# Patient Record
Sex: MALE | Race: BLACK OR AFRICAN AMERICAN | Employment: OTHER | URBAN - METROPOLITAN AREA
[De-identification: names, ages, dates, MRNs, and addresses within clinical notes are randomized per-mention and may not be internally consistent; named-entity substitution may affect disease eponyms.]

---

## 2017-03-02 LAB
AVERAGE GLUCOSE: NORMAL
HBA1C MFR BLD: 5.3 %
HBA1C MFR BLD: 5.3 %

## 2017-03-09 PROBLEM — M48.00 SPINAL STENOSIS: Status: ACTIVE | Noted: 2017-03-09

## 2017-07-25 ENCOUNTER — HOSPITAL ENCOUNTER (OUTPATIENT)
Dept: GENERAL RADIOLOGY | Age: 77
Discharge: HOME | End: 2017-07-25
Payer: COMMERCIAL

## 2017-07-25 ENCOUNTER — HOSPITAL ENCOUNTER (OUTPATIENT)
Age: 77
Discharge: HOME | End: 2017-07-25
Payer: COMMERCIAL

## 2017-07-25 DIAGNOSIS — G89.29 CHRONIC PAIN OF LEFT KNEE: ICD-10-CM

## 2017-07-25 DIAGNOSIS — M25.562 CHRONIC PAIN OF LEFT KNEE: ICD-10-CM

## 2017-07-25 DIAGNOSIS — N28.9 RENAL INSUFFICIENCY: ICD-10-CM

## 2017-07-25 DIAGNOSIS — I10 ESSENTIAL HYPERTENSION: ICD-10-CM

## 2017-07-25 LAB
ALBUMIN SERPL-MCNC: 3.8 G/DL (ref 3.5–5.2)
ALBUMIN/GLOBULIN RATIO: ABNORMAL (ref 1–2.5)
ALP BLD-CCNC: 65 U/L (ref 40–129)
ALT SERPL-CCNC: 20 U/L (ref 5–41)
ANION GAP SERPL CALCULATED.3IONS-SCNC: 14 MMOL/L (ref 9–17)
AST SERPL-CCNC: 22 U/L
BILIRUB SERPL-MCNC: 0.35 MG/DL (ref 0.3–1.2)
BUN BLDV-MCNC: 15 MG/DL (ref 8–23)
BUN/CREAT BLD: 12 (ref 9–20)
CALCIUM SERPL-MCNC: 9.1 MG/DL (ref 8.6–10.4)
CHLORIDE BLD-SCNC: 101 MMOL/L (ref 98–107)
CO2: 27 MMOL/L (ref 20–31)
CREAT SERPL-MCNC: 1.27 MG/DL (ref 0.7–1.2)
GFR AFRICAN AMERICAN: >60 ML/MIN
GFR NON-AFRICAN AMERICAN: 55 ML/MIN
GFR SERPL CREATININE-BSD FRML MDRD: ABNORMAL ML/MIN/{1.73_M2}
GFR SERPL CREATININE-BSD FRML MDRD: ABNORMAL ML/MIN/{1.73_M2}
GLUCOSE BLD-MCNC: 101 MG/DL (ref 70–99)
POTASSIUM SERPL-SCNC: 4 MMOL/L (ref 3.7–5.3)
SODIUM BLD-SCNC: 142 MMOL/L (ref 135–144)
TOTAL PROTEIN: 7.5 G/DL (ref 6.4–8.3)

## 2017-07-25 PROCEDURE — 36415 COLL VENOUS BLD VENIPUNCTURE: CPT

## 2017-07-25 PROCEDURE — 80053 COMPREHEN METABOLIC PANEL: CPT

## 2017-07-25 PROCEDURE — 73562 X-RAY EXAM OF KNEE 3: CPT

## 2017-07-27 PROBLEM — M17.12 OSTEOARTHRITIS OF LEFT KNEE: Status: ACTIVE | Noted: 2017-07-27

## 2017-09-13 ENCOUNTER — HOSPITAL ENCOUNTER (OUTPATIENT)
Age: 77
Discharge: HOME | End: 2017-09-13
Payer: COMMERCIAL

## 2017-09-13 DIAGNOSIS — I10 ESSENTIAL HYPERTENSION: ICD-10-CM

## 2017-09-13 DIAGNOSIS — R73.09 ELEVATED GLUCOSE: ICD-10-CM

## 2017-09-13 DIAGNOSIS — E88.81 METABOLIC SYNDROME: ICD-10-CM

## 2017-09-13 DIAGNOSIS — R73.03 PREDIABETES: ICD-10-CM

## 2017-09-13 LAB
ALBUMIN SERPL-MCNC: 4 G/DL (ref 3.5–5.2)
ALBUMIN/GLOBULIN RATIO: ABNORMAL (ref 1–2.5)
ALP BLD-CCNC: 62 U/L (ref 40–129)
ALT SERPL-CCNC: 14 U/L (ref 5–41)
ANION GAP SERPL CALCULATED.3IONS-SCNC: 14 MMOL/L (ref 9–17)
AST SERPL-CCNC: 16 U/L
BILIRUB SERPL-MCNC: 0.36 MG/DL (ref 0.3–1.2)
BUN BLDV-MCNC: 16 MG/DL (ref 8–23)
BUN/CREAT BLD: 13 (ref 9–20)
CALCIUM SERPL-MCNC: 9.2 MG/DL (ref 8.6–10.4)
CHLORIDE BLD-SCNC: 101 MMOL/L (ref 98–107)
CHOLESTEROL/HDL RATIO: 3.2
CHOLESTEROL: 179 MG/DL
CO2: 27 MMOL/L (ref 20–31)
CREAT SERPL-MCNC: 1.26 MG/DL (ref 0.7–1.2)
ESTIMATED AVERAGE GLUCOSE: 114 MG/DL
GFR AFRICAN AMERICAN: >60 ML/MIN
GFR NON-AFRICAN AMERICAN: 56 ML/MIN
GFR SERPL CREATININE-BSD FRML MDRD: ABNORMAL ML/MIN/{1.73_M2}
GFR SERPL CREATININE-BSD FRML MDRD: ABNORMAL ML/MIN/{1.73_M2}
GLUCOSE BLD-MCNC: 104 MG/DL (ref 70–99)
HBA1C MFR BLD: 5.6 % (ref 4–6)
HDLC SERPL-MCNC: 56 MG/DL
LDL CHOLESTEROL: 111 MG/DL (ref 0–130)
POTASSIUM SERPL-SCNC: 4 MMOL/L (ref 3.7–5.3)
SODIUM BLD-SCNC: 142 MMOL/L (ref 135–144)
TOTAL PROTEIN: 7.3 G/DL (ref 6.4–8.3)
TRIGL SERPL-MCNC: 59 MG/DL
VLDLC SERPL CALC-MCNC: NORMAL MG/DL (ref 1–30)

## 2017-09-13 PROCEDURE — 80061 LIPID PANEL: CPT

## 2017-09-13 PROCEDURE — 83036 HEMOGLOBIN GLYCOSYLATED A1C: CPT

## 2017-09-13 PROCEDURE — 36415 COLL VENOUS BLD VENIPUNCTURE: CPT

## 2017-09-13 PROCEDURE — 80053 COMPREHEN METABOLIC PANEL: CPT

## 2017-11-13 ENCOUNTER — HOSPITAL ENCOUNTER (OUTPATIENT)
Age: 77
Discharge: HOME | End: 2017-11-13
Payer: COMMERCIAL

## 2017-11-13 DIAGNOSIS — N28.9 RENAL INSUFFICIENCY: ICD-10-CM

## 2017-11-13 LAB
ALBUMIN SERPL-MCNC: 3.7 G/DL (ref 3.5–5.2)
ALBUMIN/GLOBULIN RATIO: ABNORMAL (ref 1–2.5)
ALP BLD-CCNC: 62 U/L (ref 40–129)
ALT SERPL-CCNC: 28 U/L (ref 5–41)
ANION GAP SERPL CALCULATED.3IONS-SCNC: 11 MMOL/L (ref 9–17)
AST SERPL-CCNC: 21 U/L
BILIRUB SERPL-MCNC: 0.46 MG/DL (ref 0.3–1.2)
BUN BLDV-MCNC: 16 MG/DL (ref 8–23)
BUN/CREAT BLD: 15 (ref 9–20)
CALCIUM SERPL-MCNC: 8.7 MG/DL (ref 8.6–10.4)
CHLORIDE BLD-SCNC: 98 MMOL/L (ref 98–107)
CO2: 28 MMOL/L (ref 20–31)
CREAT SERPL-MCNC: 1.05 MG/DL (ref 0.7–1.2)
GFR AFRICAN AMERICAN: >60 ML/MIN
GFR NON-AFRICAN AMERICAN: >60 ML/MIN
GFR SERPL CREATININE-BSD FRML MDRD: ABNORMAL ML/MIN/{1.73_M2}
GFR SERPL CREATININE-BSD FRML MDRD: ABNORMAL ML/MIN/{1.73_M2}
GLUCOSE FASTING: 106 MG/DL (ref 70–99)
POTASSIUM SERPL-SCNC: 4.1 MMOL/L (ref 3.7–5.3)
SODIUM BLD-SCNC: 137 MMOL/L (ref 135–144)
TOTAL PROTEIN: 7 G/DL (ref 6.4–8.3)

## 2017-11-13 PROCEDURE — 36415 COLL VENOUS BLD VENIPUNCTURE: CPT

## 2017-11-13 PROCEDURE — 80053 COMPREHEN METABOLIC PANEL: CPT

## 2017-11-20 ENCOUNTER — HOSPITAL ENCOUNTER (OUTPATIENT)
Age: 77
Discharge: HOME | End: 2017-11-20
Payer: COMMERCIAL

## 2017-11-20 LAB
SEX HORMONE BINDING GLOBULIN: 43 NMOL/L (ref 11–80)
TESTOSTERONE FREE-NONMALE: 57.9 PG/ML (ref 47–244)
TESTOSTERONE TOTAL: 343 NG/DL (ref 220–1000)
TESTOSTERONE, BIOAVAILABLE: 135.7 NG/DL (ref 130–680)

## 2017-11-20 PROCEDURE — 36415 COLL VENOUS BLD VENIPUNCTURE: CPT

## 2017-11-20 PROCEDURE — 84403 ASSAY OF TOTAL TESTOSTERONE: CPT

## 2017-11-20 PROCEDURE — 84270 ASSAY OF SEX HORMONE GLOBUL: CPT

## 2018-04-24 PROBLEM — Z79.899 ON POTASSIUM WASTING DIURETIC THERAPY: Status: ACTIVE | Noted: 2018-04-24

## 2018-04-24 PROBLEM — R60.9 EDEMA: Status: ACTIVE | Noted: 2018-04-24

## 2018-05-04 ENCOUNTER — HOSPITAL ENCOUNTER (OUTPATIENT)
Age: 78
Discharge: HOME | End: 2018-05-04
Payer: MEDICARE

## 2018-05-04 DIAGNOSIS — Z79.899 ON POTASSIUM WASTING DIURETIC THERAPY: ICD-10-CM

## 2018-05-04 LAB
ALBUMIN SERPL-MCNC: 3.8 G/DL (ref 3.5–5.2)
ALBUMIN/GLOBULIN RATIO: NORMAL (ref 1–2.5)
ALP BLD-CCNC: 69 U/L (ref 40–129)
ALT SERPL-CCNC: 21 U/L (ref 5–41)
ANION GAP SERPL CALCULATED.3IONS-SCNC: 13 MMOL/L (ref 9–17)
AST SERPL-CCNC: 21 U/L
BILIRUB SERPL-MCNC: 0.4 MG/DL (ref 0.3–1.2)
BUN BLDV-MCNC: 18 MG/DL (ref 8–23)
BUN/CREAT BLD: 16 (ref 9–20)
CALCIUM SERPL-MCNC: 9.1 MG/DL (ref 8.6–10.4)
CHLORIDE BLD-SCNC: 102 MMOL/L (ref 98–107)
CO2: 28 MMOL/L (ref 20–31)
CREAT SERPL-MCNC: 1.15 MG/DL (ref 0.7–1.2)
GFR AFRICAN AMERICAN: >60 ML/MIN
GFR NON-AFRICAN AMERICAN: >60 ML/MIN
GFR SERPL CREATININE-BSD FRML MDRD: NORMAL ML/MIN/{1.73_M2}
GFR SERPL CREATININE-BSD FRML MDRD: NORMAL ML/MIN/{1.73_M2}
GLUCOSE BLD-MCNC: 99 MG/DL (ref 70–99)
POTASSIUM SERPL-SCNC: 4.3 MMOL/L (ref 3.7–5.3)
SODIUM BLD-SCNC: 143 MMOL/L (ref 135–144)
TOTAL PROTEIN: 7.8 G/DL (ref 6.4–8.3)

## 2018-05-04 PROCEDURE — 80053 COMPREHEN METABOLIC PANEL: CPT

## 2018-05-04 PROCEDURE — 36415 COLL VENOUS BLD VENIPUNCTURE: CPT

## 2018-05-24 PROBLEM — M25.561 CHRONIC PAIN OF BOTH KNEES: Status: ACTIVE | Noted: 2018-05-24

## 2018-05-24 PROBLEM — G89.29 CHRONIC PAIN OF BOTH KNEES: Status: ACTIVE | Noted: 2018-05-24

## 2018-05-24 PROBLEM — Z71.2 ENCOUNTER TO DISCUSS TEST RESULTS: Status: ACTIVE | Noted: 2018-05-24

## 2018-05-24 PROBLEM — M25.562 CHRONIC PAIN OF BOTH KNEES: Status: ACTIVE | Noted: 2018-05-24

## 2018-06-21 PROBLEM — R14.0 BLOATING: Status: ACTIVE | Noted: 2018-06-21

## 2018-06-22 ENCOUNTER — HOSPITAL ENCOUNTER (OUTPATIENT)
Age: 78
Discharge: HOME | End: 2018-06-22
Payer: MEDICARE

## 2018-06-22 DIAGNOSIS — R14.0 BLOATING: ICD-10-CM

## 2018-06-22 PROCEDURE — 87338 HPYLORI STOOL AG IA: CPT

## 2018-06-23 PROBLEM — Z71.2 ENCOUNTER TO DISCUSS TEST RESULTS: Status: RESOLVED | Noted: 2018-05-24 | Resolved: 2018-06-23

## 2018-06-23 LAB
DIRECT EXAM: NEGATIVE
Lab: NORMAL
SPECIMEN DESCRIPTION: NORMAL
STATUS: NORMAL

## 2018-07-09 ENCOUNTER — HOSPITAL ENCOUNTER (OUTPATIENT)
Dept: GENERAL RADIOLOGY | Age: 78
Discharge: HOME | End: 2018-07-11
Payer: MEDICARE

## 2018-07-09 ENCOUNTER — HOSPITAL ENCOUNTER (OUTPATIENT)
Age: 78
Discharge: HOME | End: 2018-07-11
Payer: MEDICARE

## 2018-07-09 DIAGNOSIS — M43.16 SPONDYLOLISTHESIS OF LUMBAR REGION: ICD-10-CM

## 2018-07-09 PROCEDURE — 72100 X-RAY EXAM L-S SPINE 2/3 VWS: CPT

## 2018-09-17 PROBLEM — M17.11 ARTHRITIS OF RIGHT KNEE: Status: ACTIVE | Noted: 2018-09-17

## 2018-09-17 PROBLEM — G47.30 SLEEP APNEA: Status: ACTIVE | Noted: 2018-09-17

## 2018-10-02 ENCOUNTER — HOSPITAL ENCOUNTER (OUTPATIENT)
Age: 78
Discharge: HOME | End: 2018-10-02
Payer: MEDICARE

## 2018-10-02 DIAGNOSIS — E88.81 METABOLIC SYNDROME: ICD-10-CM

## 2018-10-02 LAB
ESTIMATED AVERAGE GLUCOSE: 114 MG/DL
HBA1C MFR BLD: 5.6 % (ref 4–6)

## 2018-10-02 PROCEDURE — 83036 HEMOGLOBIN GLYCOSYLATED A1C: CPT

## 2018-10-02 PROCEDURE — 36415 COLL VENOUS BLD VENIPUNCTURE: CPT

## 2018-10-15 PROBLEM — M54.16 LUMBAR RADICULOPATHY: Status: ACTIVE | Noted: 2018-10-15

## 2018-10-15 PROBLEM — E66.01 MORBID OBESITY WITH BMI OF 45.0-49.9, ADULT (HCC): Status: ACTIVE | Noted: 2018-10-15

## 2018-11-07 ENCOUNTER — OFFICE VISIT (OUTPATIENT)
Dept: PULMONOLOGY | Age: 78
End: 2018-11-07
Payer: MEDICARE

## 2018-11-07 VITALS
HEART RATE: 79 BPM | HEIGHT: 69 IN | OXYGEN SATURATION: 97 % | RESPIRATION RATE: 20 BRPM | BODY MASS INDEX: 41.32 KG/M2 | DIASTOLIC BLOOD PRESSURE: 88 MMHG | SYSTOLIC BLOOD PRESSURE: 158 MMHG | WEIGHT: 279 LBS

## 2018-11-07 DIAGNOSIS — E66.01 MORBID OBESITY (HCC): ICD-10-CM

## 2018-11-07 DIAGNOSIS — G47.33 OBSTRUCTIVE SLEEP APNEA SYNDROME: Primary | ICD-10-CM

## 2018-11-07 PROCEDURE — 99204 OFFICE O/P NEW MOD 45 MIN: CPT | Performed by: INTERNAL MEDICINE

## 2018-11-07 NOTE — PROGRESS NOTES
PULMONARY OUTPATIENT CONSULT NOTE     Patient:  Mary Kate Nichols  MRN: [id number]  [facility] Physician: Danny Fagan MD    HISTORY     CHIEF COMPLAINT/REASON FOR CONSULT:  Obstructive Sleep Apnea    HISTORY OF PRESENT ILLNESS:  Mary Kate Nichols is a 78 y.o. male who is being evaluated in the clinic today for sleep apnea. Patient recently had a home sleep study and was noted to have severe obstructive sleep apnea AHI 68.5. Patient is morbidly obese, his major symptoms include:     Snoring Yes    Witnessed apnea  No    Wakes up with choking and gasping sensation No    Dry mouth upon awakening No    Fatigue and tiredness during the day Yes    Morning headaches No    Car wrecks or near wrecks because of the sleepiness No    Nodding off while driving No    Weight gain Yes    Forgetfulness or decreased concentration No    Nasal congestion or obstruction at night No    Leg jerks during sleep No        Parasomnias: No    Restless feelings, numbness/burning, aches/cramps in legs at night    Loss of muscle strength when angry or laugh    Hallucination when dozing off or waking up from sleep    Paralysis upon awakening from sleep or going to sleep    Teeth grinding    Nightmares     Sleep walking    Night time panic attacks      Sleep Habits:   Weekdays Weekends   Bedtime at  10-11 PM   10-11 PM    Wakes up at 7-8 AM  7-8 AM      It takes him about 10-15 minutes to fall asleep and wakes up 1-2 times at night to go to bathroom  Usually takes no naps during the day   Usually consumes 1-2 caffienated drinks during the day. Shift work ? No   Patient denies excessive daytime sleepiness and the total score on [place] Sleepiness Scale (ESS) is 5 today.   Collar size: unknown    PAST MEDICAL HISTORY:       Diagnosis Date    Abnormal renal function     Anemia     Depression     Dysmetabolic syndrome     Edema     Elevated glucose 2/7/2016    Health care maintenance 4/14/2016    Hypertension     Leukopenia    

## 2018-12-04 ENCOUNTER — TELEPHONE (OUTPATIENT)
Dept: PULMONOLOGY | Age: 78
End: 2018-12-04

## 2018-12-05 NOTE — TELEPHONE ENCOUNTER
Ramila Fields called back, states they made a mistake and told the patient to cancel the sleep study.  I gave her the number to the sleep center to schedule and instructed the patient to call us when it is scheduled, and make a follow up

## 2018-12-27 ENCOUNTER — HOSPITAL ENCOUNTER (OUTPATIENT)
Dept: SLEEP CENTER | Age: 78
Discharge: HOME | End: 2018-12-29
Payer: MEDICARE

## 2018-12-27 DIAGNOSIS — G47.33 OBSTRUCTIVE SLEEP APNEA SYNDROME: ICD-10-CM

## 2018-12-27 PROBLEM — Z13.31 POSITIVE DEPRESSION SCREENING: Status: ACTIVE | Noted: 2018-12-27

## 2018-12-27 PROBLEM — F32.A DEPRESSION: Status: ACTIVE | Noted: 2018-12-27

## 2018-12-27 PROCEDURE — 95811 POLYSOM 6/>YRS CPAP 4/> PARM: CPT

## 2018-12-28 VITALS — HEART RATE: 90 BPM | HEIGHT: 69 IN | WEIGHT: 279 LBS | BODY MASS INDEX: 41.32 KG/M2 | RESPIRATION RATE: 12 BRPM

## 2018-12-30 PROBLEM — M17.11 LOCALIZED OSTEOARTHRITIS OF RIGHT KNEE: Status: ACTIVE | Noted: 2018-12-30

## 2019-02-14 LAB — STATUS: NORMAL

## 2019-05-01 PROBLEM — M17.12 OSTEOARTHRITIS OF LEFT KNEE: Status: ACTIVE | Noted: 2019-05-01

## 2019-05-01 PROBLEM — M17.11 ARTHRITIS OF RIGHT KNEE: Status: RESOLVED | Noted: 2018-09-17 | Resolved: 2019-05-01

## 2019-06-10 PROBLEM — R73.03 PREDIABETES: Status: ACTIVE | Noted: 2019-06-10

## 2019-06-10 PROBLEM — Z23 NEED FOR PNEUMOCOCCAL VACCINATION: Status: ACTIVE | Noted: 2019-06-10

## 2019-06-11 PROBLEM — Z13.6 SCREENING FOR CARDIOVASCULAR CONDITION: Status: ACTIVE | Noted: 2019-06-11

## 2019-07-01 ENCOUNTER — HOSPITAL ENCOUNTER (OUTPATIENT)
Age: 79
Discharge: HOME | End: 2019-07-01
Payer: MEDICARE

## 2019-07-01 DIAGNOSIS — E88.81 METABOLIC SYNDROME: ICD-10-CM

## 2019-07-01 DIAGNOSIS — R73.03 PREDIABETES: ICD-10-CM

## 2019-07-01 DIAGNOSIS — I10 HYPERTENSION, ESSENTIAL: ICD-10-CM

## 2019-07-01 DIAGNOSIS — Z23 NEED FOR PNEUMOCOCCAL VACCINATION: ICD-10-CM

## 2019-07-01 LAB
ALBUMIN SERPL-MCNC: 3.9 G/DL (ref 3.5–5.2)
ALBUMIN/GLOBULIN RATIO: 1.1 (ref 1–2.5)
ALP BLD-CCNC: 59 U/L (ref 40–129)
ALT SERPL-CCNC: 13 U/L (ref 5–41)
ANION GAP SERPL CALCULATED.3IONS-SCNC: 11 MMOL/L (ref 9–17)
AST SERPL-CCNC: 15 U/L
BILIRUB SERPL-MCNC: 0.33 MG/DL (ref 0.3–1.2)
BUN BLDV-MCNC: 21 MG/DL (ref 8–23)
BUN/CREAT BLD: NORMAL (ref 9–20)
CALCIUM SERPL-MCNC: 8.9 MG/DL (ref 8.6–10.4)
CHLORIDE BLD-SCNC: 106 MMOL/L (ref 98–107)
CHOLESTEROL/HDL RATIO: 3.1
CHOLESTEROL: 182 MG/DL
CO2: 26 MMOL/L (ref 20–31)
CREAT SERPL-MCNC: 1.13 MG/DL (ref 0.7–1.2)
ESTIMATED AVERAGE GLUCOSE: 100 MG/DL
GFR AFRICAN AMERICAN: >60 ML/MIN
GFR NON-AFRICAN AMERICAN: >60 ML/MIN
GFR SERPL CREATININE-BSD FRML MDRD: NORMAL ML/MIN/{1.73_M2}
GFR SERPL CREATININE-BSD FRML MDRD: NORMAL ML/MIN/{1.73_M2}
GLUCOSE BLD-MCNC: 95 MG/DL (ref 70–99)
HBA1C MFR BLD: 5.1 % (ref 4–6)
HDLC SERPL-MCNC: 59 MG/DL
LDL CHOLESTEROL: 114 MG/DL (ref 0–130)
POTASSIUM SERPL-SCNC: 4 MMOL/L (ref 3.7–5.3)
SODIUM BLD-SCNC: 143 MMOL/L (ref 135–144)
TOTAL PROTEIN: 7.4 G/DL (ref 6.4–8.3)
TRIGL SERPL-MCNC: 47 MG/DL
VLDLC SERPL CALC-MCNC: NORMAL MG/DL (ref 1–30)

## 2019-07-01 PROCEDURE — 80061 LIPID PANEL: CPT

## 2019-07-01 PROCEDURE — 83036 HEMOGLOBIN GLYCOSYLATED A1C: CPT

## 2019-07-01 PROCEDURE — 36415 COLL VENOUS BLD VENIPUNCTURE: CPT

## 2019-07-01 PROCEDURE — 80053 COMPREHEN METABOLIC PANEL: CPT

## 2019-07-11 PROBLEM — Z13.6 SCREENING FOR CARDIOVASCULAR CONDITION: Status: RESOLVED | Noted: 2019-06-11 | Resolved: 2019-07-11

## 2019-08-07 PROBLEM — M65.30 ACQUIRED TRIGGER FINGER: Status: ACTIVE | Noted: 2019-08-07

## 2019-08-07 PROBLEM — M54.50 BILATERAL LOW BACK PAIN: Status: ACTIVE | Noted: 2019-08-07

## 2019-08-07 PROBLEM — E55.9 VITAMIN D DEFICIENCY: Status: ACTIVE | Noted: 2019-08-07

## 2019-08-07 PROBLEM — M25.551 RIGHT HIP PAIN: Status: ACTIVE | Noted: 2019-08-07

## 2019-08-08 ENCOUNTER — HOSPITAL ENCOUNTER (OUTPATIENT)
Age: 79
Discharge: HOME | End: 2019-08-10
Payer: MEDICARE

## 2019-08-08 ENCOUNTER — HOSPITAL ENCOUNTER (OUTPATIENT)
Dept: GENERAL RADIOLOGY | Age: 79
Discharge: HOME | End: 2019-08-10
Payer: MEDICARE

## 2019-08-08 DIAGNOSIS — M25.551 RIGHT HIP PAIN: ICD-10-CM

## 2019-08-08 DIAGNOSIS — M54.5 BILATERAL LOW BACK PAIN, UNSPECIFIED CHRONICITY, WITH SCIATICA PRESENCE UNSPECIFIED: ICD-10-CM

## 2019-08-08 PROCEDURE — 72100 X-RAY EXAM L-S SPINE 2/3 VWS: CPT

## 2019-08-08 PROCEDURE — 73502 X-RAY EXAM HIP UNI 2-3 VIEWS: CPT

## 2019-09-06 PROBLEM — M54.50 BILATERAL LOW BACK PAIN: Status: ACTIVE | Noted: 2019-09-06

## 2019-09-06 PROBLEM — M17.9 OSTEOARTHRITIS OF KNEE: Status: ACTIVE | Noted: 2019-09-06

## 2019-09-06 PROBLEM — M16.11 PRIMARY OSTEOARTHRITIS OF RIGHT HIP: Status: ACTIVE | Noted: 2019-09-06

## 2019-10-16 ENCOUNTER — HOSPITAL ENCOUNTER (OUTPATIENT)
Age: 79
Discharge: HOME | End: 2019-10-16
Payer: MEDICARE

## 2019-10-16 DIAGNOSIS — E53.8 VITAMIN B 12 DEFICIENCY: ICD-10-CM

## 2019-10-16 LAB — VITAMIN B-12: 732 PG/ML (ref 232–1245)

## 2019-10-16 PROCEDURE — 82607 VITAMIN B-12: CPT

## 2019-10-16 PROCEDURE — 36415 COLL VENOUS BLD VENIPUNCTURE: CPT

## 2019-10-31 ENCOUNTER — HOSPITAL ENCOUNTER (OUTPATIENT)
Dept: MRI IMAGING | Age: 79
Discharge: HOME | End: 2019-11-02
Payer: MEDICARE

## 2019-10-31 DIAGNOSIS — M54.16 LUMBAR RADICULOPATHY: ICD-10-CM

## 2019-10-31 DIAGNOSIS — M48.00 SPINAL STENOSIS, UNSPECIFIED SPINAL REGION: ICD-10-CM

## 2019-10-31 DIAGNOSIS — M43.16 SPONDYLOLISTHESIS OF LUMBAR REGION: ICD-10-CM

## 2019-10-31 PROCEDURE — 72148 MRI LUMBAR SPINE W/O DYE: CPT

## 2019-12-19 PROBLEM — K59.09 OTHER CONSTIPATION: Status: ACTIVE | Noted: 2019-12-19

## 2019-12-19 PROBLEM — K92.1 BLACK STOOL: Status: ACTIVE | Noted: 2019-12-19

## 2019-12-20 ENCOUNTER — HOSPITAL ENCOUNTER (OUTPATIENT)
Age: 79
Discharge: HOME | End: 2019-12-22
Payer: MEDICARE

## 2019-12-20 ENCOUNTER — HOSPITAL ENCOUNTER (OUTPATIENT)
Dept: GENERAL RADIOLOGY | Age: 79
Discharge: HOME | End: 2019-12-22
Payer: MEDICARE

## 2019-12-20 ENCOUNTER — HOSPITAL ENCOUNTER (OUTPATIENT)
Age: 79
Discharge: HOME | End: 2019-12-20
Payer: MEDICARE

## 2019-12-20 ENCOUNTER — HOSPITAL ENCOUNTER (OUTPATIENT)
Age: 79
End: 2019-12-20
Payer: MEDICARE

## 2019-12-20 DIAGNOSIS — K92.1 BLACK STOOL: ICD-10-CM

## 2019-12-20 DIAGNOSIS — K59.09 OTHER CONSTIPATION: ICD-10-CM

## 2019-12-20 LAB
ABSOLUTE EOS #: 0.21 K/UL (ref 0–0.44)
ABSOLUTE IMMATURE GRANULOCYTE: <0.03 K/UL (ref 0–0.3)
ABSOLUTE LYMPH #: 1.47 K/UL (ref 1.1–3.7)
ABSOLUTE MONO #: 0.35 K/UL (ref 0.1–1.2)
BASOPHILS # BLD: 1 % (ref 0–2)
BASOPHILS ABSOLUTE: 0.04 K/UL (ref 0–0.2)
DIFFERENTIAL TYPE: ABNORMAL
EOSINOPHILS RELATIVE PERCENT: 4 % (ref 1–4)
HCT VFR BLD CALC: 41.7 % (ref 40.7–50.3)
HEMOGLOBIN: 12.9 G/DL (ref 13–17)
IMMATURE GRANULOCYTES: 0 %
LYMPHOCYTES # BLD: 26 % (ref 24–43)
MCH RBC QN AUTO: 29.4 PG (ref 25.2–33.5)
MCHC RBC AUTO-ENTMCNC: 30.9 G/DL (ref 28.4–34.8)
MCV RBC AUTO: 95 FL (ref 82.6–102.9)
MONOCYTES # BLD: 6 % (ref 3–12)
NRBC AUTOMATED: 0 PER 100 WBC
PDW BLD-RTO: 11.7 % (ref 11.8–14.4)
PLATELET # BLD: 191 K/UL (ref 138–453)
PLATELET ESTIMATE: ABNORMAL
PMV BLD AUTO: 11.3 FL (ref 8.1–13.5)
RBC # BLD: 4.39 M/UL (ref 4.21–5.77)
RBC # BLD: ABNORMAL 10*6/UL
SEG NEUTROPHILS: 63 % (ref 36–65)
SEGMENTED NEUTROPHILS ABSOLUTE COUNT: 3.49 K/UL (ref 1.5–8.1)
WBC # BLD: 5.6 K/UL (ref 3.5–11.3)
WBC # BLD: ABNORMAL 10*3/UL

## 2019-12-20 PROCEDURE — 85025 COMPLETE CBC W/AUTO DIFF WBC: CPT

## 2019-12-20 PROCEDURE — 36415 COLL VENOUS BLD VENIPUNCTURE: CPT

## 2019-12-20 PROCEDURE — 74018 RADEX ABDOMEN 1 VIEW: CPT

## 2019-12-23 ENCOUNTER — HOSPITAL ENCOUNTER (OUTPATIENT)
Age: 79
Setting detail: SPECIMEN
Discharge: HOME | End: 2019-12-23
Payer: MEDICARE

## 2019-12-23 DIAGNOSIS — K59.09 OTHER CONSTIPATION: ICD-10-CM

## 2019-12-23 DIAGNOSIS — K92.1 BLACK STOOL: ICD-10-CM

## 2019-12-23 LAB
DATE, STOOL #1: 12
DATE, STOOL #2: NORMAL
DATE, STOOL #3: NORMAL
HEMOCCULT SP1 STL QL: NEGATIVE
HEMOCCULT SP2 STL QL: NORMAL
HEMOCCULT SP3 STL QL: NORMAL
TIME, STOOL #1: 700
TIME, STOOL #2: NORMAL
TIME, STOOL #3: NORMAL

## 2019-12-23 PROCEDURE — G0328 FECAL BLOOD SCRN IMMUNOASSAY: HCPCS

## 2020-01-13 PROBLEM — B07.9 VIRAL WARTS: Status: ACTIVE | Noted: 2020-01-13

## 2020-02-25 ENCOUNTER — OFFICE VISIT (OUTPATIENT)
Dept: ORTHOPEDIC SURGERY | Age: 80
End: 2020-02-25
Payer: MEDICARE

## 2020-02-25 VITALS — HEIGHT: 69 IN | WEIGHT: 302.69 LBS | BODY MASS INDEX: 44.83 KG/M2

## 2020-02-25 PROCEDURE — 99203 OFFICE O/P NEW LOW 30 MIN: CPT | Performed by: ORTHOPAEDIC SURGERY

## 2020-05-20 ENCOUNTER — TELEPHONE (OUTPATIENT)
Dept: ORTHOPEDIC SURGERY | Age: 80
End: 2020-05-20

## 2020-05-21 PROBLEM — Z20.828 VIRAL DISEASE EXPOSURE: Status: ACTIVE | Noted: 2020-05-21

## 2020-07-31 PROBLEM — Z13.29 SCREENING FOR THYROID DISORDER: Status: ACTIVE | Noted: 2019-06-11

## 2020-07-31 PROBLEM — Z00.00 MEDICARE ANNUAL WELLNESS VISIT, SUBSEQUENT: Status: ACTIVE | Noted: 2019-06-11

## 2020-08-03 ENCOUNTER — HOSPITAL ENCOUNTER (OUTPATIENT)
Age: 80
Discharge: HOME | End: 2020-08-03
Payer: MEDICARE

## 2020-08-03 LAB
ABSOLUTE EOS #: 0.13 K/UL (ref 0–0.44)
ABSOLUTE IMMATURE GRANULOCYTE: <0.03 K/UL (ref 0–0.3)
ABSOLUTE LYMPH #: 0.99 K/UL (ref 1.1–3.7)
ABSOLUTE MONO #: 0.43 K/UL (ref 0.1–1.2)
ALBUMIN SERPL-MCNC: 3.8 G/DL (ref 3.5–5.2)
ALBUMIN/GLOBULIN RATIO: 1 (ref 1–2.5)
ALP BLD-CCNC: 70 U/L (ref 40–129)
ALT SERPL-CCNC: 15 U/L (ref 5–41)
ANION GAP SERPL CALCULATED.3IONS-SCNC: 17 MMOL/L (ref 9–17)
AST SERPL-CCNC: 24 U/L
BASOPHILS # BLD: 1 % (ref 0–2)
BASOPHILS ABSOLUTE: 0.03 K/UL (ref 0–0.2)
BILIRUB SERPL-MCNC: 0.43 MG/DL (ref 0.3–1.2)
BUN BLDV-MCNC: 19 MG/DL (ref 8–23)
BUN/CREAT BLD: ABNORMAL (ref 9–20)
CALCIUM SERPL-MCNC: 9.4 MG/DL (ref 8.6–10.4)
CHLORIDE BLD-SCNC: 101 MMOL/L (ref 98–107)
CHOLESTEROL/HDL RATIO: 3.5
CHOLESTEROL: 173 MG/DL
CO2: 23 MMOL/L (ref 20–31)
CREAT SERPL-MCNC: 1.25 MG/DL (ref 0.7–1.2)
DIFFERENTIAL TYPE: ABNORMAL
EOSINOPHILS RELATIVE PERCENT: 3 % (ref 1–4)
ESTIMATED AVERAGE GLUCOSE: 108 MG/DL
GFR AFRICAN AMERICAN: >60 ML/MIN
GFR NON-AFRICAN AMERICAN: 56 ML/MIN
GFR SERPL CREATININE-BSD FRML MDRD: ABNORMAL ML/MIN/{1.73_M2}
GFR SERPL CREATININE-BSD FRML MDRD: ABNORMAL ML/MIN/{1.73_M2}
GLUCOSE FASTING: 90 MG/DL (ref 70–99)
HBA1C MFR BLD: 5.4 % (ref 4–6)
HCT VFR BLD CALC: 41.6 % (ref 40.7–50.3)
HDLC SERPL-MCNC: 49 MG/DL
HEMOGLOBIN: 13 G/DL (ref 13–17)
IMMATURE GRANULOCYTES: 0 %
LDL CHOLESTEROL: 112 MG/DL (ref 0–130)
LYMPHOCYTES # BLD: 24 % (ref 24–43)
MCH RBC QN AUTO: 30.1 PG (ref 25.2–33.5)
MCHC RBC AUTO-ENTMCNC: 31.3 G/DL (ref 28.4–34.8)
MCV RBC AUTO: 96.3 FL (ref 82.6–102.9)
MONOCYTES # BLD: 10 % (ref 3–12)
NRBC AUTOMATED: 0 PER 100 WBC
PDW BLD-RTO: 12 % (ref 11.8–14.4)
PLATELET # BLD: 173 K/UL (ref 138–453)
PLATELET ESTIMATE: ABNORMAL
PMV BLD AUTO: 11.2 FL (ref 8.1–13.5)
POTASSIUM SERPL-SCNC: 4.4 MMOL/L (ref 3.7–5.3)
RBC # BLD: 4.32 M/UL (ref 4.21–5.77)
RBC # BLD: ABNORMAL 10*6/UL
SEG NEUTROPHILS: 62 % (ref 36–65)
SEGMENTED NEUTROPHILS ABSOLUTE COUNT: 2.63 K/UL (ref 1.5–8.1)
SODIUM BLD-SCNC: 141 MMOL/L (ref 135–144)
THYROXINE, FREE: 1.23 NG/DL (ref 0.93–1.7)
TOTAL PROTEIN: 7.5 G/DL (ref 6.4–8.3)
TRIGL SERPL-MCNC: 61 MG/DL
TSH SERPL DL<=0.05 MIU/L-ACNC: 1.32 MIU/L (ref 0.3–5)
VLDLC SERPL CALC-MCNC: NORMAL MG/DL (ref 1–30)
WBC # BLD: 4.2 K/UL (ref 3.5–11.3)
WBC # BLD: ABNORMAL 10*3/UL

## 2020-08-03 PROCEDURE — 85025 COMPLETE CBC W/AUTO DIFF WBC: CPT

## 2020-08-03 PROCEDURE — 80061 LIPID PANEL: CPT

## 2020-08-03 PROCEDURE — 80053 COMPREHEN METABOLIC PANEL: CPT

## 2020-08-03 PROCEDURE — 84443 ASSAY THYROID STIM HORMONE: CPT

## 2020-08-03 PROCEDURE — 36415 COLL VENOUS BLD VENIPUNCTURE: CPT

## 2020-08-03 PROCEDURE — 83036 HEMOGLOBIN GLYCOSYLATED A1C: CPT

## 2020-08-03 PROCEDURE — 84439 ASSAY OF FREE THYROXINE: CPT

## 2020-08-25 ENCOUNTER — HOSPITAL ENCOUNTER (OUTPATIENT)
Age: 80
Discharge: HOME | End: 2020-08-27
Payer: MEDICARE

## 2020-08-25 ENCOUNTER — HOSPITAL ENCOUNTER (OUTPATIENT)
Dept: GENERAL RADIOLOGY | Age: 80
Discharge: HOME | End: 2020-08-27
Payer: MEDICARE

## 2020-08-25 ENCOUNTER — HOSPITAL ENCOUNTER (OUTPATIENT)
Age: 80
Discharge: HOME | End: 2020-08-25
Payer: MEDICARE

## 2020-08-25 LAB
ANION GAP SERPL CALCULATED.3IONS-SCNC: 13 MMOL/L (ref 9–17)
BUN BLDV-MCNC: 22 MG/DL (ref 8–23)
BUN/CREAT BLD: ABNORMAL (ref 9–20)
CALCIUM SERPL-MCNC: 9.2 MG/DL (ref 8.6–10.4)
CHLORIDE BLD-SCNC: 104 MMOL/L (ref 98–107)
CO2: 25 MMOL/L (ref 20–31)
CREAT SERPL-MCNC: 1.35 MG/DL (ref 0.7–1.2)
GFR AFRICAN AMERICAN: >60 ML/MIN
GFR NON-AFRICAN AMERICAN: 51 ML/MIN
GFR SERPL CREATININE-BSD FRML MDRD: ABNORMAL ML/MIN/{1.73_M2}
GFR SERPL CREATININE-BSD FRML MDRD: ABNORMAL ML/MIN/{1.73_M2}
GLUCOSE BLD-MCNC: 114 MG/DL (ref 70–99)
POTASSIUM SERPL-SCNC: 4.3 MMOL/L (ref 3.7–5.3)
SODIUM BLD-SCNC: 142 MMOL/L (ref 135–144)

## 2020-08-25 PROCEDURE — 72100 X-RAY EXAM L-S SPINE 2/3 VWS: CPT

## 2020-08-25 PROCEDURE — 80048 BASIC METABOLIC PNL TOTAL CA: CPT

## 2020-08-25 PROCEDURE — 36415 COLL VENOUS BLD VENIPUNCTURE: CPT

## 2020-08-27 PROBLEM — L23.89 ALLERGIC CONTACT DERMATITIS DUE TO OTHER AGENTS: Status: ACTIVE | Noted: 2020-08-27

## 2020-08-30 PROBLEM — Z00.00 MEDICARE ANNUAL WELLNESS VISIT, SUBSEQUENT: Status: RESOLVED | Noted: 2019-06-11 | Resolved: 2020-08-30

## 2020-09-23 ENCOUNTER — HOSPITAL ENCOUNTER (OUTPATIENT)
Age: 80
Discharge: HOME | End: 2020-09-23
Payer: MEDICARE

## 2020-09-23 LAB
ALBUMIN SERPL-MCNC: 3.8 G/DL (ref 3.5–5.2)
ALBUMIN/GLOBULIN RATIO: 1.2 (ref 1–2.5)
ALP BLD-CCNC: 61 U/L (ref 40–129)
ALT SERPL-CCNC: 15 U/L (ref 5–41)
ANION GAP SERPL CALCULATED.3IONS-SCNC: 9 MMOL/L (ref 9–17)
AST SERPL-CCNC: 18 U/L
BILIRUB SERPL-MCNC: 0.44 MG/DL (ref 0.3–1.2)
BUN BLDV-MCNC: 19 MG/DL (ref 8–23)
BUN/CREAT BLD: ABNORMAL (ref 9–20)
CALCIUM SERPL-MCNC: 9.1 MG/DL (ref 8.6–10.4)
CHLORIDE BLD-SCNC: 106 MMOL/L (ref 98–107)
CO2: 26 MMOL/L (ref 20–31)
CREAT SERPL-MCNC: 1.31 MG/DL (ref 0.7–1.2)
GFR AFRICAN AMERICAN: >60 ML/MIN
GFR NON-AFRICAN AMERICAN: 53 ML/MIN
GFR SERPL CREATININE-BSD FRML MDRD: ABNORMAL ML/MIN/{1.73_M2}
GFR SERPL CREATININE-BSD FRML MDRD: ABNORMAL ML/MIN/{1.73_M2}
GLUCOSE FASTING: 100 MG/DL (ref 70–99)
POTASSIUM SERPL-SCNC: 4.2 MMOL/L (ref 3.7–5.3)
SODIUM BLD-SCNC: 141 MMOL/L (ref 135–144)
TOTAL PROTEIN: 7.1 G/DL (ref 6.4–8.3)

## 2020-09-23 PROCEDURE — 36415 COLL VENOUS BLD VENIPUNCTURE: CPT

## 2020-09-23 PROCEDURE — 80053 COMPREHEN METABOLIC PANEL: CPT

## 2020-10-19 ENCOUNTER — HOSPITAL ENCOUNTER (OUTPATIENT)
Age: 80
Discharge: HOME | End: 2020-10-19
Payer: MEDICARE

## 2020-10-19 LAB
ALBUMIN SERPL-MCNC: 3.6 G/DL (ref 3.5–5.2)
ALBUMIN/GLOBULIN RATIO: 1.1 (ref 1–2.5)
ALP BLD-CCNC: 67 U/L (ref 40–129)
ALT SERPL-CCNC: 14 U/L (ref 5–41)
ANION GAP SERPL CALCULATED.3IONS-SCNC: 14 MMOL/L (ref 9–17)
AST SERPL-CCNC: 21 U/L
BILIRUB SERPL-MCNC: 0.41 MG/DL (ref 0.3–1.2)
BUN BLDV-MCNC: 19 MG/DL (ref 8–23)
BUN/CREAT BLD: ABNORMAL (ref 9–20)
CALCIUM SERPL-MCNC: 9.1 MG/DL (ref 8.6–10.4)
CHLORIDE BLD-SCNC: 107 MMOL/L (ref 98–107)
CO2: 22 MMOL/L (ref 20–31)
CREAT SERPL-MCNC: 1.18 MG/DL (ref 0.7–1.2)
GFR AFRICAN AMERICAN: >60 ML/MIN
GFR NON-AFRICAN AMERICAN: 60 ML/MIN
GFR SERPL CREATININE-BSD FRML MDRD: ABNORMAL ML/MIN/{1.73_M2}
GFR SERPL CREATININE-BSD FRML MDRD: ABNORMAL ML/MIN/{1.73_M2}
GLUCOSE BLD-MCNC: 99 MG/DL (ref 70–99)
POTASSIUM SERPL-SCNC: 4.4 MMOL/L (ref 3.7–5.3)
SODIUM BLD-SCNC: 143 MMOL/L (ref 135–144)
TOTAL PROTEIN: 7 G/DL (ref 6.4–8.3)

## 2020-10-19 PROCEDURE — 80053 COMPREHEN METABOLIC PANEL: CPT

## 2020-10-19 PROCEDURE — 36415 COLL VENOUS BLD VENIPUNCTURE: CPT

## 2020-11-13 ENCOUNTER — TELEPHONE (OUTPATIENT)
Dept: SURGERY | Age: 80
End: 2020-11-13

## 2020-11-13 NOTE — TELEPHONE ENCOUNTER
Left a VM to call the office back about his appt. That is scheduled on Wednesday November 18th, due to him having spine problems we need to move his appt to see our spine specialist Dr. Nelia Betancourt. Dr. Nelia Betancourt is in office on Thursday mornings.        Please Reschedule appt with Dr. John Lee Specialist.

## 2020-11-19 ENCOUNTER — OFFICE VISIT (OUTPATIENT)
Dept: ORTHOPEDIC SURGERY | Age: 80
End: 2020-11-19
Payer: MEDICARE

## 2020-11-19 VITALS — WEIGHT: 296 LBS | TEMPERATURE: 99.1 F | BODY MASS INDEX: 43.84 KG/M2 | HEIGHT: 69 IN

## 2020-11-19 PROBLEM — M54.50 LUMBAR BACK PAIN: Status: ACTIVE | Noted: 2020-11-19

## 2020-11-19 PROBLEM — M48.062 SPINAL STENOSIS OF LUMBAR REGION WITH NEUROGENIC CLAUDICATION: Status: ACTIVE | Noted: 2017-03-09

## 2020-11-19 PROCEDURE — 99203 OFFICE O/P NEW LOW 30 MIN: CPT | Performed by: ORTHOPAEDIC SURGERY

## 2020-11-19 NOTE — PROGRESS NOTES
Patient ID: Juany Latham is a 70 y.o. male. Chief Complaint   Patient presents with    New Patient     lower back pain         HPI     Patient presents with chronic low back pain with neurogenic claudication qualities. Patient status post lumbar decompression in 2004 at L4-5. Patient reports prior to that he had lumbar epidural steroid injections. At that time he thought he was having good days about 4 days a week ever since her at least is anywhere in the recent. The patient has chronic low back pain with strong neurogenic claudication symptoms difficulty walking greater than 50 yards or standing greater than 5 minutes. Patient also with right greater than left radicular leg pain    Past Medical History:   Diagnosis Date    Abnormal renal function     Anemia     Depression     Dysmetabolic syndrome     Edema     Elevated glucose 2/7/2016    Health care maintenance 4/14/2016    Hypertension     Leukopenia     Osteoarthritis     Other specified counseling 3/3/2016    Renal insufficiency 10/21/2015    Sleep apnea     with severe desaturations    Trigger finger of right thumb 2/3/2016    Has seen Ortho and got injection, doing better      Past Surgical History:   Procedure Laterality Date    BACK SURGERY  2005    NECK SURGERY       Family History   Problem Relation Age of Onset    High Blood Pressure Mother     Asthma Other      Social History     Occupational History    Not on file   Tobacco Use    Smoking status: Never Smoker    Smokeless tobacco: Never Used   Substance and Sexual Activity    Alcohol use: No     Alcohol/week: 0.0 standard drinks    Drug use: Not on file    Sexual activity: Not on file        Review of Systems   All other systems reviewed and are negative. Physical Exam  Vitals signs and nursing note reviewed. Constitutional:       Appearance: He is well-developed. HENT:      Head: Normocephalic and atraumatic.       Nose: Nose normal.

## 2021-02-03 ENCOUNTER — HOSPITAL ENCOUNTER (OUTPATIENT)
Dept: GENERAL RADIOLOGY | Age: 81
Discharge: HOME | End: 2021-02-05
Payer: MEDICARE

## 2021-02-03 ENCOUNTER — HOSPITAL ENCOUNTER (OUTPATIENT)
Age: 81
Discharge: HOME | End: 2021-02-05
Payer: MEDICARE

## 2021-02-03 DIAGNOSIS — M54.6 THORACIC SPINE PAIN: ICD-10-CM

## 2021-02-03 DIAGNOSIS — M54.12 CERVICAL RADICULOPATHY: ICD-10-CM

## 2021-02-03 DIAGNOSIS — M54.2 NECK PAIN: ICD-10-CM

## 2021-02-03 PROBLEM — M62.838 TRAPEZIUS MUSCLE SPASM: Status: ACTIVE | Noted: 2021-02-03

## 2021-02-03 PROCEDURE — 72040 X-RAY EXAM NECK SPINE 2-3 VW: CPT

## 2021-02-03 PROCEDURE — 72072 X-RAY EXAM THORAC SPINE 3VWS: CPT

## 2021-02-26 ENCOUNTER — HOSPITAL ENCOUNTER (OUTPATIENT)
Dept: MRI IMAGING | Age: 81
Discharge: HOME | End: 2021-02-28
Payer: MEDICARE

## 2021-02-26 DIAGNOSIS — M48.00 SPINAL STENOSIS, UNSPECIFIED SPINAL REGION: ICD-10-CM

## 2021-02-26 PROCEDURE — 72141 MRI NECK SPINE W/O DYE: CPT

## 2021-03-17 PROBLEM — M53.9 MULTILEVEL DEGENERATIVE DISC DISEASE: Status: ACTIVE | Noted: 2021-03-17

## 2021-03-17 PROBLEM — M48.02 CERVICAL STENOSIS OF SPINAL CANAL: Status: ACTIVE | Noted: 2021-03-17

## 2021-03-19 PROBLEM — Z11.59 NEED FOR HEPATITIS C SCREENING TEST: Status: ACTIVE | Noted: 2021-03-19

## 2021-03-22 ENCOUNTER — HOSPITAL ENCOUNTER (OUTPATIENT)
Age: 81
Setting detail: SPECIMEN
Discharge: HOME | End: 2021-03-22
Payer: MEDICARE

## 2021-03-22 DIAGNOSIS — Z11.59 NEED FOR HEPATITIS C SCREENING TEST: ICD-10-CM

## 2021-03-22 DIAGNOSIS — N28.9 RENAL INSUFFICIENCY: ICD-10-CM

## 2021-03-22 DIAGNOSIS — R60.0 LOCALIZED EDEMA: ICD-10-CM

## 2021-03-22 DIAGNOSIS — I10 HYPERTENSION, ESSENTIAL: ICD-10-CM

## 2021-03-22 LAB
ALBUMIN SERPL-MCNC: 3.7 G/DL (ref 3.5–5.2)
ALBUMIN/GLOBULIN RATIO: 1.1 (ref 1–2.5)
ALP BLD-CCNC: 67 U/L (ref 40–129)
ALT SERPL-CCNC: 19 U/L (ref 5–41)
ANION GAP SERPL CALCULATED.3IONS-SCNC: 10 MMOL/L (ref 9–17)
AST SERPL-CCNC: 20 U/L
BILIRUB SERPL-MCNC: 0.45 MG/DL (ref 0.3–1.2)
BUN BLDV-MCNC: 20 MG/DL (ref 8–23)
BUN/CREAT BLD: NORMAL (ref 9–20)
CALCIUM SERPL-MCNC: 9 MG/DL (ref 8.6–10.4)
CHLORIDE BLD-SCNC: 105 MMOL/L (ref 98–107)
CO2: 26 MMOL/L (ref 20–31)
CREAT SERPL-MCNC: 1.15 MG/DL (ref 0.7–1.2)
GFR AFRICAN AMERICAN: >60 ML/MIN
GFR NON-AFRICAN AMERICAN: >60 ML/MIN
GFR SERPL CREATININE-BSD FRML MDRD: NORMAL ML/MIN/{1.73_M2}
GFR SERPL CREATININE-BSD FRML MDRD: NORMAL ML/MIN/{1.73_M2}
GLUCOSE FASTING: 85 MG/DL (ref 70–99)
HEPATITIS C ANTIBODY: NONREACTIVE
POTASSIUM SERPL-SCNC: 3.7 MMOL/L (ref 3.7–5.3)
SODIUM BLD-SCNC: 141 MMOL/L (ref 135–144)
TOTAL PROTEIN: 7 G/DL (ref 6.4–8.3)

## 2021-03-22 PROCEDURE — 36415 COLL VENOUS BLD VENIPUNCTURE: CPT

## 2021-03-22 PROCEDURE — 80053 COMPREHEN METABOLIC PANEL: CPT

## 2021-03-22 PROCEDURE — 86803 HEPATITIS C AB TEST: CPT

## 2021-04-18 PROBLEM — Z11.59 NEED FOR HEPATITIS C SCREENING TEST: Status: RESOLVED | Noted: 2021-03-19 | Resolved: 2021-04-18

## 2021-05-25 PROBLEM — E53.8 VITAMIN B12 DEFICIENCY: Status: ACTIVE | Noted: 2021-05-25

## 2021-05-25 PROBLEM — Z20.828 VIRAL DISEASE EXPOSURE: Status: RESOLVED | Noted: 2020-05-21 | Resolved: 2021-05-25

## 2021-05-25 PROBLEM — L23.89 ALLERGIC CONTACT DERMATITIS DUE TO OTHER AGENTS: Status: RESOLVED | Noted: 2020-08-27 | Resolved: 2021-05-25

## 2021-05-25 PROBLEM — K59.09 OTHER CONSTIPATION: Status: RESOLVED | Noted: 2019-12-19 | Resolved: 2021-05-25

## 2021-06-15 ENCOUNTER — OFFICE VISIT (OUTPATIENT)
Dept: ORTHOPEDIC SURGERY | Age: 81
End: 2021-06-15
Payer: MEDICARE

## 2021-06-15 VITALS — BODY MASS INDEX: 42.51 KG/M2 | RESPIRATION RATE: 12 BRPM | WEIGHT: 287 LBS | HEIGHT: 69 IN | TEMPERATURE: 98.6 F

## 2021-06-15 DIAGNOSIS — M25.561 PAIN IN BOTH KNEES, UNSPECIFIED CHRONICITY: Primary | ICD-10-CM

## 2021-06-15 DIAGNOSIS — M25.562 PAIN IN BOTH KNEES, UNSPECIFIED CHRONICITY: Primary | ICD-10-CM

## 2021-06-15 DIAGNOSIS — M17.0 PRIMARY OSTEOARTHRITIS OF BOTH KNEES: Primary | ICD-10-CM

## 2021-06-15 DIAGNOSIS — M24.50 FLEXION CONTRACTURE: ICD-10-CM

## 2021-06-15 PROCEDURE — 20610 DRAIN/INJ JOINT/BURSA W/O US: CPT | Performed by: ORTHOPAEDIC SURGERY

## 2021-06-15 PROCEDURE — 99203 OFFICE O/P NEW LOW 30 MIN: CPT | Performed by: ORTHOPAEDIC SURGERY

## 2021-06-15 RX ORDER — LIDOCAINE HYDROCHLORIDE 10 MG/ML
8 INJECTION, SOLUTION INFILTRATION; PERINEURAL ONCE
Status: COMPLETED | OUTPATIENT
Start: 2021-06-15 | End: 2021-06-15

## 2021-06-15 RX ORDER — TRIAMCINOLONE ACETONIDE 40 MG/ML
40 INJECTION, SUSPENSION INTRA-ARTICULAR; INTRAMUSCULAR ONCE
Status: COMPLETED | OUTPATIENT
Start: 2021-06-15 | End: 2021-06-15

## 2021-06-15 RX ADMIN — TRIAMCINOLONE ACETONIDE 40 MG: 40 INJECTION, SUSPENSION INTRA-ARTICULAR; INTRAMUSCULAR at 12:22

## 2021-06-15 RX ADMIN — LIDOCAINE HYDROCHLORIDE 8 ML: 10 INJECTION, SOLUTION INFILTRATION; PERINEURAL at 12:19

## 2021-06-15 RX ADMIN — TRIAMCINOLONE ACETONIDE 40 MG: 40 INJECTION, SUSPENSION INTRA-ARTICULAR; INTRAMUSCULAR at 12:21

## 2021-06-15 NOTE — PROGRESS NOTES
[facility]  [facility], [facility] Veronica Lies  [address] [place] [address]  Dept: [phone]    Ambulatory Orthopedic Consult      CHIEF COMPLAINT:    Chief Complaint   Patient presents with    Knee Pain     bilateral       HISTORY OF PRESENT ILLNESS:      The patient is a 68 y.o. male who is being seen for evaluation of the above, which began 3 years ago atraumatically  . At today's visit, he is using no brace/assistive device. History is obtained today from:   [x]  the patient     [x]  [facility]     []  one family member/friend    []  multiple family members/friends    []  other:      He is referred here today by Dr. Verena Lawrence. REVIEW OF SYSTEMS:  Constitutional: Negative for fever. HENT: Negative for tinnitus. Eyes: Negative for pain. Respiratory: Negative for shortness of breath. Cardiovascular: Negative for chest pain. Gastrointestinal: Negative for abdominal pain. Genitourinary: Negative for dysuria. Skin: Negative for rash. Neurological: Negative for headaches. Hematological: Does not bruise/bleed easily. Musculoskeletal: See HPI for pertinent positives     Past Medical History:    He  has a past medical history of Abnormal renal function, Allergic contact dermatitis due to other agents (8/27/2020), Anemia, Depression, Dysmetabolic syndrome, Edema, Elevated glucose (2/7/2016), Health care maintenance (4/14/2016), Hypertension, Leukopenia, Osteoarthritis, Other constipation (12/19/2019), Other specified counseling (3/3/2016), Renal insufficiency (10/21/2015), Sleep apnea, Trigger finger of right thumb (2/3/2016), and Viral disease exposure (5/21/2020). Past Surgical History:    He  has a past surgical history that includes back surgery (2005) and Neck surgery.      Current Medications:     Current Outpatient Medications:     bumetanide (BUMEX) 2 MG tablet, Take 1 tablet by mouth daily, Disp: 30 tablet, Rfl: 3    DULoxetine (CYMBALTA) reflects the content of the visit, the document can still have some errors including those of syntax and sound-a-like substitutions which may escape proof reading. In such instances, actual meaning can be extrapolated by context.

## 2021-06-15 NOTE — LETTER
Dr. Carlos Yoder  [address] [place] [address] [facility]  [phone]        6/15/2021     Patient: Vesta Dawkins  YOB: 1940    Dear Peggy Landin MD,    I had the pleasure of seeing one of your patients, Vesta Dawkins, recently in the office. Below are the relevant portions of my assessment and plan of care. ASSESSMENT AND PLAN:  He has bilateral tricompartmental knee arthritis. Notably, he has the past medical history as above. He has a history of lumbar radiculopathy, prediabetes, and renal insufficiency. We had a discussion today about the likely diagnosis and its natural history, physical exam and imaging findings, as well as various treatment options in detail. Surgically, we discussed possible total knee replacements. After discussing surgical and nonsurgical options, at this point, he is considering proceeding with knee replacements in the fall 2021. Orders/referrals were placed as below at today's visit. He will continue to use his Voltaren gel, to help avoid injury to his kidneys from an oral NSAID. After discussing his treatment options, he wished to proceed with bilateral knee injections as below, to help provide him some pain relief prior to surgery. All questions were answered and the above plan was agreed upon. The patient will return to clinic in 8 weeks . Thank you for allowing me to participate in the care of this patient. I look forward to serving you and your patients again in the future. Please don't hesitate to contact me at my mobile number .         Van Day MD  Orthopedic Surgery

## 2021-06-19 ENCOUNTER — HOSPITAL ENCOUNTER (OUTPATIENT)
Age: 81
Setting detail: SPECIMEN
Discharge: HOME | End: 2021-06-19
Payer: MEDICARE

## 2021-06-19 DIAGNOSIS — R60.0 LOCALIZED EDEMA: ICD-10-CM

## 2021-06-19 DIAGNOSIS — Z79.899 ON POTASSIUM WASTING DIURETIC THERAPY: ICD-10-CM

## 2021-06-19 DIAGNOSIS — E53.8 VITAMIN B12 DEFICIENCY: ICD-10-CM

## 2021-06-19 LAB
ALBUMIN SERPL-MCNC: 3.8 G/DL (ref 3.5–5.2)
ALBUMIN/GLOBULIN RATIO: 1.1 (ref 1–2.5)
ALP BLD-CCNC: 67 U/L (ref 40–129)
ALT SERPL-CCNC: 15 U/L (ref 5–41)
ANION GAP SERPL CALCULATED.3IONS-SCNC: 10 MMOL/L (ref 9–17)
AST SERPL-CCNC: 18 U/L
BILIRUB SERPL-MCNC: 0.65 MG/DL (ref 0.3–1.2)
BUN BLDV-MCNC: 22 MG/DL (ref 8–23)
BUN/CREAT BLD: ABNORMAL (ref 9–20)
CALCIUM SERPL-MCNC: 8.8 MG/DL (ref 8.6–10.4)
CHLORIDE BLD-SCNC: 104 MMOL/L (ref 98–107)
CO2: 25 MMOL/L (ref 20–31)
CREAT SERPL-MCNC: 1.19 MG/DL (ref 0.7–1.2)
GFR AFRICAN AMERICAN: >60 ML/MIN
GFR NON-AFRICAN AMERICAN: 59 ML/MIN
GFR SERPL CREATININE-BSD FRML MDRD: ABNORMAL ML/MIN/{1.73_M2}
GFR SERPL CREATININE-BSD FRML MDRD: ABNORMAL ML/MIN/{1.73_M2}
GLUCOSE BLD-MCNC: 90 MG/DL (ref 70–99)
POTASSIUM SERPL-SCNC: 4.3 MMOL/L (ref 3.7–5.3)
SODIUM BLD-SCNC: 139 MMOL/L (ref 135–144)
TOTAL PROTEIN: 7.3 G/DL (ref 6.4–8.3)
VITAMIN B-12: 893 PG/ML (ref 232–1245)

## 2021-06-19 PROCEDURE — 80053 COMPREHEN METABOLIC PANEL: CPT

## 2021-06-19 PROCEDURE — 82607 VITAMIN B-12: CPT

## 2021-06-19 PROCEDURE — 36415 COLL VENOUS BLD VENIPUNCTURE: CPT

## 2021-06-22 ENCOUNTER — HOSPITAL ENCOUNTER (OUTPATIENT)
Age: 81
Discharge: HOME | End: 2021-06-22
Payer: MEDICARE

## 2021-06-22 DIAGNOSIS — R30.0 DYSURIA: ICD-10-CM

## 2021-06-22 LAB
-: NORMAL
AMORPHOUS: NORMAL
BACTERIA: NORMAL
BILIRUBIN URINE: NEGATIVE
CASTS UA: NORMAL /LPF (ref 0–8)
COLOR: YELLOW
CRYSTALS, UA: NORMAL /HPF
EPITHELIAL CELLS UA: NORMAL /HPF (ref 0–5)
GLUCOSE URINE: NEGATIVE
KETONES, URINE: NEGATIVE
LEUKOCYTE ESTERASE, URINE: NEGATIVE
MUCUS: NORMAL
NITRITE, URINE: NEGATIVE
OTHER OBSERVATIONS UA: NORMAL
PH UA: 6 (ref 5–8)
PROTEIN UA: NEGATIVE
RBC UA: NORMAL /HPF (ref 0–4)
RENAL EPITHELIAL, UA: NORMAL /HPF
SPECIFIC GRAVITY UA: 1.02 (ref 1–1.03)
TRICHOMONAS: NORMAL
TURBIDITY: CLEAR
URINE HGB: NEGATIVE
UROBILINOGEN, URINE: NORMAL
WBC UA: NORMAL /HPF (ref 0–5)
YEAST: NORMAL

## 2021-06-22 PROCEDURE — 87086 URINE CULTURE/COLONY COUNT: CPT

## 2021-06-22 PROCEDURE — 81001 URINALYSIS AUTO W/SCOPE: CPT

## 2021-06-23 LAB
CULTURE: NORMAL
Lab: NORMAL
SPECIMEN DESCRIPTION: NORMAL

## 2021-06-28 PROBLEM — R30.0 DYSURIA: Status: ACTIVE | Noted: 2021-06-28

## 2021-07-01 ENCOUNTER — HOSPITAL ENCOUNTER (OUTPATIENT)
Age: 81
Setting detail: SPECIMEN
Discharge: HOME | End: 2021-07-01
Payer: MEDICARE

## 2021-07-01 LAB — PROSTATE SPECIFIC ANTIGEN: 0.25 UG/L

## 2021-07-01 PROCEDURE — 36415 COLL VENOUS BLD VENIPUNCTURE: CPT

## 2021-07-01 PROCEDURE — 84154 ASSAY OF PSA FREE: CPT

## 2021-07-01 PROCEDURE — G0103 PSA SCREENING: HCPCS

## 2021-07-02 LAB
PROSTATE SPECIFIC ANTIGEN FREE: <0.1 UG/L
PROSTATE SPECIFIC ANTIGEN PERCENT FREE: 20 %
PROSTATE SPECIFIC ANTIGEN: 0.2 UG/L (ref 0–4)

## 2021-07-22 PROBLEM — L08.1 ERYTHRASMA: Status: ACTIVE | Noted: 2021-07-22

## 2021-07-22 PROBLEM — L98.8 SKIN MACERATION: Status: ACTIVE | Noted: 2021-07-22

## 2021-07-22 PROBLEM — L03.317 CELLULITIS OF BUTTOCK: Status: ACTIVE | Noted: 2021-07-22

## 2021-07-30 ENCOUNTER — OFFICE VISIT (OUTPATIENT)
Dept: ORTHOPEDIC SURGERY | Age: 81
End: 2021-07-30
Payer: MEDICARE

## 2021-07-30 DIAGNOSIS — M17.0 PRIMARY OSTEOARTHRITIS OF BOTH KNEES: Primary | ICD-10-CM

## 2021-07-30 PROCEDURE — 99213 OFFICE O/P EST LOW 20 MIN: CPT | Performed by: ORTHOPAEDIC SURGERY

## 2021-08-05 PROBLEM — L30.4 INTERTRIGO: Status: ACTIVE | Noted: 2021-08-05

## 2021-09-13 PROBLEM — R20.0 NUMBNESS: Status: ACTIVE | Noted: 2021-09-13

## 2021-09-14 ENCOUNTER — HOSPITAL ENCOUNTER (OUTPATIENT)
Dept: GENERAL RADIOLOGY | Age: 81
Discharge: HOME | End: 2021-09-16
Payer: MEDICARE

## 2021-09-14 ENCOUNTER — HOSPITAL ENCOUNTER (OUTPATIENT)
Age: 81
Discharge: HOME | End: 2021-09-16
Payer: MEDICARE

## 2021-09-14 DIAGNOSIS — M54.12 CERVICAL RADICULOPATHY: ICD-10-CM

## 2021-09-14 DIAGNOSIS — G89.29 CHRONIC BILATERAL LOW BACK PAIN WITH BILATERAL SCIATICA: ICD-10-CM

## 2021-09-14 DIAGNOSIS — M54.42 CHRONIC BILATERAL LOW BACK PAIN WITH BILATERAL SCIATICA: ICD-10-CM

## 2021-09-14 DIAGNOSIS — M54.41 CHRONIC BILATERAL LOW BACK PAIN WITH BILATERAL SCIATICA: ICD-10-CM

## 2021-09-14 PROCEDURE — 72100 X-RAY EXAM L-S SPINE 2/3 VWS: CPT

## 2021-09-14 PROCEDURE — 73521 X-RAY EXAM HIPS BI 2 VIEWS: CPT

## 2021-09-14 PROCEDURE — 72040 X-RAY EXAM NECK SPINE 2-3 VW: CPT

## 2021-09-16 ENCOUNTER — OFFICE VISIT (OUTPATIENT)
Dept: ORTHOPEDIC SURGERY | Age: 81
End: 2021-09-16
Payer: MEDICARE

## 2021-09-16 DIAGNOSIS — M17.0 PRIMARY OSTEOARTHRITIS OF BOTH KNEES: Primary | ICD-10-CM

## 2021-09-16 PROCEDURE — 20610 DRAIN/INJ JOINT/BURSA W/O US: CPT | Performed by: ORTHOPAEDIC SURGERY

## 2021-09-16 RX ORDER — BETAMETHASONE SODIUM PHOSPHATE AND BETAMETHASONE ACETATE 3; 3 MG/ML; MG/ML
12 INJECTION, SUSPENSION INTRA-ARTICULAR; INTRALESIONAL; INTRAMUSCULAR; SOFT TISSUE ONCE
Status: COMPLETED | OUTPATIENT
Start: 2021-09-16 | End: 2021-09-16

## 2021-09-16 RX ORDER — LIDOCAINE HYDROCHLORIDE 10 MG/ML
2 INJECTION, SOLUTION INFILTRATION; PERINEURAL ONCE
Status: COMPLETED | OUTPATIENT
Start: 2021-09-16 | End: 2021-09-16

## 2021-09-16 RX ORDER — BUPIVACAINE HYDROCHLORIDE 5 MG/ML
2 INJECTION, SOLUTION PERINEURAL ONCE
Status: COMPLETED | OUTPATIENT
Start: 2021-09-16 | End: 2021-09-16

## 2021-09-16 RX ADMIN — BUPIVACAINE HYDROCHLORIDE 10 MG: 5 INJECTION, SOLUTION PERINEURAL at 15:21

## 2021-09-16 RX ADMIN — LIDOCAINE HYDROCHLORIDE 2 ML: 10 INJECTION, SOLUTION INFILTRATION; PERINEURAL at 15:21

## 2021-09-16 RX ADMIN — BETAMETHASONE SODIUM PHOSPHATE AND BETAMETHASONE ACETATE 12 MG: 3; 3 INJECTION, SUSPENSION INTRA-ARTICULAR; INTRALESIONAL; INTRAMUSCULAR; SOFT TISSUE at 15:22

## 2021-09-16 RX ADMIN — LIDOCAINE HYDROCHLORIDE 2 ML: 10 INJECTION, SOLUTION INFILTRATION; PERINEURAL at 15:20

## 2021-09-16 NOTE — PROGRESS NOTES
Arturo Beltran M.D.            [address] [place] [address]., [address] [facility], [address] [facility]           Dept Phone: [phone]           Dept Fax:  [address]           Angel Rodgers          Dept Phone: [phone]           Dept Fax:  [phone]      Chief Compliant:  Chief Complaint   Patient presents with    Pain     bilateral knees        History of Present Illness: This is a 68 y.o. male who presents to the clinic today for evaluation / follow up of lateral knee pain. Patient is a previous patient who is a 68-year-old gentleman who has severe DJD of both knees. Last time he had injections he got about nearly 4 months of relief. He like to have another set of injections he is considering having arthroplasty in early 2022. Femi Fairbanks Review of Systems   Constitutional: Negative for fever, chills, sweats. Eyes: Negative for changes in vision, or pain. HENT: Negative for ear ache, epistaxis, or sore throat. Respiratory/Cardio: Negative for Chest pain, palpitations, SOB, or cough. Gastrointestinal: Negative for abdominal pain, N/V/D. Genitourinary: Negative for dysuria, frequency, urgency, or hematuria. Neurological: Negative for headache, numbness, or weakness. Integumentary: Negative for rash, itching, laceration, or abrasion. Musculoskeletal: Positive for Pain (bilateral knees)       Physical Exam:  Constitutional: Patient is oriented to person, place, and time. Patient appears well-developed and well nourished. HENT: Negative otherwise noted  Head: Normocephalic and Atraumatic  Nose: Normal  Eyes: Conjunctivae and EOM are normal  Neck: Normal range of motion Neck supple. Respiratory/Cardio: Effort normal. No respiratory distress. Musculoskeletal: Examination of both knees are pretty much identical minimally changed from previous.   He has motion of 5 to but 110 degrees in both sides crepitus and grinding noted throughout. No obvious effusion noted today. Ligamentously grossly intact  Neurological: Patient is alert and oriented to person, place, and time. Normal strenght. No sensory deficit. Skin: Skin is warm and dry  Psychiatric: Behavior is normal. Thought content normal.  Nursing note and vitals reviewed. Labs and Imaging:     XR not taken today however patient had previous x-rays which show severe DJD both knees with bone-on-bone apposition medial compartment of both knees     No orders of the defined types were placed in this encounter. Assessment and Plan:  1.  Primary osteoarthritis of both knees        Administrations This Visit     betamethasone acetate-betamethasone sodium phosphate (CELESTONE) injection 12 mg     Admin Date  09/16/2021  15:22 Action  Given Dose  12 mg Route  Intra-articular Site   Administered By  Vipul Fisher MA    Ordering Provider: Bhavesh Perrin MD    ND: 0176-8918-56    Lot#: [id number]    : [id number]    Patient Supplied?: No    Admin Date  09/16/2021  15:22 Action  Given Dose  12 mg Route  Intra-articular Site   Administered By  Vipul Fisher MA    Ordering Provider: Bhavesh Perrin MD    ND: 6938-7021-49    Lot#: [id number]    : [id number]    Patient Supplied?: No          bupivacaine (MARCAINE) 0.5 % injection 10 mg     Admin Date  09/16/2021  15:21 Action  Given Dose  10 mg Route  Intra-articular Site   Administered By  Vipul Fisher MA    Ordering Provider: Bhavesh Perrin MD    ND: 8524-4723-86    Lot#: [id number]    : [address]    Patient Supplied?: No    Admin Date  09/16/2021  15:21 Action  Given Dose  10 mg Route  Intra-articular Site   Administered By  Vipul Fisher MA    Ordering Provider: Bhavesh Perrin MD    ND: 9871-5877-03    Lot#: [id number]    : [address]    Patient Supplied?: No          lidocaine 1 % injection 2 mL Admin Date  09/16/2021  15:20 Action  Given Dose  2 mL Route  Intra-articular Site   Administered By  Gerard Magana MA    Ordering Provider: Anita Nicole MD    NDC: 1842-9588-29    Lot#: [id number]    : Roberto Otto    Patient Supplied?: No           Admin Date  09/16/2021  15:21 Action  Given Dose  2 mL Route  Intra-articular Site   Administered By  Gerard Magana MA    Ordering Provider: Anita Nicole MD    NDC: 9597-6845-86    Lot#: [id number]    : [id number]    Patient Supplied?: No                This is a 68 y.o. male who presents to the clinic today for evaluation / follow up of DJD both knees. Past History:    Current Outpatient Medications:     baclofen (LIORESAL) 10 MG tablet, Take 1 tablet by mouth 3 times daily, Disp: 90 tablet, Rfl: 2    Cholecalciferol (VITAMIN D3) 125 MCG (5000 UT) CAPS, Take 1 capsule by mouth Daily, Disp: 30 capsule, Rfl: 3    terbinafine (LAMISIL) 1 % cream, Apply topically 2 times daily to affected area, Disp: 42 g, Rfl: 0    nystatin-triamcinolone (MYCOLOG II) 131862-1.1 UNIT/GM-% cream, Apply topically 2 times daily. , Disp: 60 g, Rfl: 0    zinc oxide (DESITIN) 40 % ointment, Apply topically three times daily, Disp: 1 Tube, Rfl: 3    bumetanide (BUMEX) 2 MG tablet, Take 1 tablet by mouth daily, Disp: 30 tablet, Rfl: 3    DULoxetine (CYMBALTA) 60 MG extended release capsule, Take 1 capsule by mouth daily, Disp: 30 capsule, Rfl: 3    diclofenac sodium (VOLTAREN) 1 % GEL, Voltaren 1 % topical gel, Disp: , Rfl:   Allergies   Allergen Reactions    Ace Inhibitors Other (See Comments)     cough    Voltaren [Diclofenac Sodium] Other (See Comments)     Elevated Creatinine     Social History     Socioeconomic History    Marital status:      Spouse name: Not on file    Number of children: Not on file    Years of education: Not on file    Highest education level: Not on file   Occupational History    Not on file   Tobacco Use    Smoking status: Never Smoker    Smokeless tobacco: Never Used   Vaping Use    Vaping Use: Never used   Substance and Sexual Activity    Alcohol use: No     Alcohol/week: 0.0 standard drinks    Drug use: Not on file    Sexual activity: Not on file   Other Topics Concern    Not on file   Social History Narrative    Not on file     Social Determinants of Health     Financial Resource Strain:     Difficulty of Paying Living Expenses:    Food Insecurity:     Worried About Running Out of Food in the Last Year:     [address] in the Last Year:    Transportation Needs:     Lack of Transportation (Medical):      Lack of Transportation (Non-Medical):    Physical Activity:     Days of Exercise per Week:     Minutes of Exercise per Session:    Stress:     Feeling of Stress :    Social Connections:     Frequency of Communication with Friends and Family:     Frequency of Social Gatherings with Friends and Family:     Attends Shinto Services:     Active Member of Clubs or Organizations:     Attends Club or Organization Meetings:     Marital Status:    Intimate Partner Violence:     Fear of Current or Ex-Partner:     Emotionally Abused:     Physically Abused:     Sexually Abused:      Past Medical History:   Diagnosis Date    Abnormal renal function     Allergic contact dermatitis due to other agents 8/27/2020    Anemia     Depression     Dysmetabolic syndrome     Edema     Elevated glucose 2/7/2016    Health care maintenance 4/14/2016    Hypertension     Leukopenia     Osteoarthritis     Other constipation 12/19/2019    Other specified counseling 3/3/2016    Renal insufficiency 10/21/2015    Sleep apnea     with severe desaturations    Trigger finger of right thumb 2/3/2016    Has seen Ortho and got injection, doing better     Viral disease exposure 5/21/2020     Past Surgical History:   Procedure Laterality Date    BACK SURGERY  2005    NECK SURGERY       Family History   Problem Relation Age of Onset    High Blood Pressure Mother     Asthma Other    Plan  An informed verbal consent for the procedure was obtained and risks including, but not limited to: allergy to medications, injection, bleeding, stiffness of joint, recurrence of symptoms, loss of function, swelling, drainage, irrigation, need for surgery and pseudo-septic inflammation, were explained to the patient. Also, discussed was the potential for further injections, irrigation and debridement and surgery. Alternate means of treatment have also been discussed with the patient.       Administrations This Visit     betamethasone acetate-betamethasone sodium phosphate (CELESTONE) injection 12 mg     Admin Date  09/16/2021  15:22 Action  Given Dose  12 mg Route  Intra-articular Site   Administered By  Stacy Brush MA    Ordering Provider: Alvaro Craft MD    NDC: 1691-8429-12    Lot#: [id number]    : [id number]     Patient Supplied?: No    Admin Date  09/16/2021  15:22 Action  Given Dose  12 mg Route  Intra-articular Site   Administered By  Stacy Brush MA    Ordering Provider: Alvaro Craft MD    NDC: 3843-8371-72    Lot#: [id number]    : [id number]    Patient Supplied?: No          bupivacaine (MARCAINE) 0.5 % injection 10 mg     Admin Date  09/16/2021  15:21 Action  Given Dose  10 mg Route  Intra-articular Site   Administered By  Stacy Brush MA    Ordering Provider: Alvaro Craft MD    NDC: 2206-9696-92    Lot#: [id number]    : Karl Cease    Patient Supplied?: No    Admin Date  09/16/2021  15:21 Action  Given Dose  10 mg Route  Intra-articular Site   Administered By  Stacy Brush MA    Ordering Provider: Alvaro Craft MD    NDC: 9793-7512-16    Lot#: [id number]    : Karl Cease    Patient Supplied?: No          lidocaine 1 % injection 2 mL     Admin Date  09/16/2021  15:20 Action  Given Dose  2 mL Route  Intra-articular Site   Administered By  Stacy Brush MA    Ordering Provider: Elias Hannah MD    NDC: 9394-8528-06    Lot#: [id number]    : Deloris Older    Patient Supplied?: No           Admin Date  09/16/2021  15:21 Action  Given Dose  2 mL Route  Intra-articular Site   Administered By  Mina Fallon MA    Ordering Provider: Elias Hannah MD    NDC: 7913-4863-50    Lot#: [id number]    : Deloris Older    Patient Supplied?: No            Under sterile conditions both knees were injected with lidocaine 2 cc bupivacaine 2 cc and Celestone 2 cc. He tolerate procedure well. Patient to return back here in 4 months for possible discussion for arthroplasty                    Provider Attestation:  Cary Agarwal, personally performed the services described in this documentation. All medical record entries made by the scribe were at my direction and in my presence. I have reviewed the chart and discharge instructions and agree that the records reflect my personal performance and is accurate and complete. Elias Hannah MD. 09/16/21      Please note that this chart was generated using voice recognition Dragon dictation software. Although every effort was made to ensure the accuracy of this automated transcription, some errors in transcription may have occurred.

## 2021-09-20 PROBLEM — M47.26 OSTEOARTHRITIS OF SPINE WITH RADICULOPATHY, LUMBAR REGION: Status: ACTIVE | Noted: 2021-09-20

## 2021-09-20 PROBLEM — R60.0 LOCALIZED EDEMA: Status: ACTIVE | Noted: 2021-09-20

## 2021-09-20 PROBLEM — M50.30 DDD (DEGENERATIVE DISC DISEASE), CERVICAL: Status: ACTIVE | Noted: 2021-03-17

## 2021-10-19 PROBLEM — M25.561 RIGHT KNEE PAIN: Status: ACTIVE | Noted: 2021-10-19

## 2021-10-22 ENCOUNTER — OFFICE VISIT (OUTPATIENT)
Dept: ORTHOPEDIC SURGERY | Age: 81
End: 2021-10-22
Payer: MEDICARE

## 2021-10-22 DIAGNOSIS — M43.16 SPONDYLOLISTHESIS OF LUMBAR REGION: ICD-10-CM

## 2021-10-22 DIAGNOSIS — M54.50 LUMBAR BACK PAIN: ICD-10-CM

## 2021-10-22 DIAGNOSIS — M17.0 PRIMARY OSTEOARTHRITIS OF BOTH KNEES: Primary | ICD-10-CM

## 2021-10-22 PROCEDURE — 99211 OFF/OP EST MAY X REQ PHY/QHP: CPT | Performed by: ORTHOPAEDIC SURGERY

## 2021-10-22 NOTE — PROGRESS NOTES
Time he is actually here today he was hoping to be seen for his low back unfortunately I do not do backs. I will refer the patient Dr. Benitez Padilla    In the meantime the patient I did discuss his knees he has a history of severe DJD of both knees. He is actually scheduled to be have his left knee performed in January 2022. We did discuss the perioperative course of total knee arthroplasty again. Patient was last injected on September 18 meeting we need 3 months from that time which will be adequate in January before considering arthroplasty.

## 2021-10-29 PROBLEM — M47.814 SPONDYLOSIS OF THORACIC REGION WITHOUT MYELOPATHY OR RADICULOPATHY: Status: ACTIVE | Noted: 2021-10-29

## 2021-11-02 PROBLEM — L60.0 INGROWN TOENAIL: Status: ACTIVE | Noted: 2021-11-02

## 2021-11-07 PROBLEM — L98.8 SKIN MACERATION: Status: RESOLVED | Noted: 2021-07-22 | Resolved: 2021-11-07

## 2021-11-07 PROBLEM — L03.317 CELLULITIS OF BUTTOCK: Status: RESOLVED | Noted: 2021-07-22 | Resolved: 2021-11-07

## 2021-11-15 ENCOUNTER — OFFICE VISIT (OUTPATIENT)
Dept: PODIATRY | Age: 81
End: 2021-11-15
Payer: MEDICARE

## 2021-11-15 VITALS — RESPIRATION RATE: 18 BRPM | HEIGHT: 70 IN | WEIGHT: 278 LBS | BODY MASS INDEX: 39.8 KG/M2

## 2021-11-15 DIAGNOSIS — I73.9 PVD (PERIPHERAL VASCULAR DISEASE) (HCC): ICD-10-CM

## 2021-11-15 DIAGNOSIS — L60.0 INGROWN TOENAIL: ICD-10-CM

## 2021-11-15 DIAGNOSIS — M79.605 PAIN OF LEFT LOWER EXTREMITY: ICD-10-CM

## 2021-11-15 DIAGNOSIS — B35.1 DERMATOPHYTOSIS OF NAIL: Primary | ICD-10-CM

## 2021-11-15 PROCEDURE — 99203 OFFICE O/P NEW LOW 30 MIN: CPT | Performed by: PODIATRIST

## 2021-11-15 PROCEDURE — 11720 DEBRIDE NAIL 1-5: CPT | Performed by: PODIATRIST

## 2021-11-15 NOTE — PROGRESS NOTES
[facility] PHYSICIANS  [facility]  [address] [address] [place] [address]  Dept: [phone]  Dept Fax: [phone]    NEW PATIENT PROGRESS NOTE  Date of patient's visit: 11/15/2021  Patient's Name:  Sekou Anderson YOB: 1940            Patient Care Team:  Shi Mendez MD as PCP - General (Family Medicine)  Shi Mendez MD as PCP - [facility]  Hector Dewitt DPM as Physician (Podiatry)        Chief Complaint   Patient presents with    New Patient    Ingrown Toenail     left great toe x3 weeks    Nail Problem         HPI:   Sekou Anderson is a 68 y.o. male who presents to the office today complaining of ingrown nail on the left great toe. Symptoms began 3 week(s) ago. Patient relates pain is Present. Pain is rated 2 out of 10 and is described as constant, mild. Treatments prior to today's visit include: none. Currently denies F/C/N/V. Pt's primary care physician is Shi Mendez MD last seen 11/12/2021     Allergies   Allergen Reactions    Ace Inhibitors Other (See Comments)     cough    Voltaren [Diclofenac Sodium] Other (See Comments)     Elevated Creatinine       Past Medical History:   Diagnosis Date    Abnormal renal function     Allergic contact dermatitis due to other agents 8/27/2020    Anemia     Cellulitis of buttock 7/22/2021    Depression     Dysmetabolic syndrome     Edema     Elevated glucose 2/7/2016    Health care maintenance 4/14/2016    Hypertension     Leukopenia     Osteoarthritis     Other constipation 12/19/2019    Other specified counseling 3/3/2016    Renal insufficiency 10/21/2015    Skin maceration 7/22/2021    Sleep apnea     with severe desaturations    Trigger finger of right thumb 2/3/2016    Has seen Ortho and got injection, doing better     Viral disease exposure 5/21/2020       Prior to Admission medications    Medication Sig Start Date End Date Taking?  Authorizing Provider Negative for weakness and numbness. Dermatological ROS: negative for - mole changes, rash  Cardiovascular: Negative for leg swelling. Gastrointestinal: Negative for constipation, diarrhea, nausea and vomiting. Lower Extremity Physical Examination:   Vitals:   Vitals:    11/15/21 1522   Resp: 18     General: AAO x 3 in NAD. Dermatologic Exam:  Left hallux nail is incurvated with increased edema. No erythema    Skin No rashes or nodules noted. .   Skin is thin, with flaky sloughing skin as well as decreased hair growth to the lower leg  Small red hemosiderin deposits seen dorsal foot   Musculoskeletal:     1st MPJ ROM decreased, Bilateral.  Muscle strength 5/5, Bilateral.  Pain present upon palpation of toenails 1-5, Bilateral. decreased medial longitudinal arch, Bilateral.  Ankle ROM decreased,Bilateral.    Dorsally contracted digits present digits 2, Bilateral.     Vascular: DP pulses 1/4 bilateral.  PT pulses 0/4 bilateral.   CFT <5 seconds, Bilateral.  Hair growth absent to the level of the digits, Bilateral.  Edema present, Bilateral.  Varicosities absent, Bilateral. Erythema absent, Bilateral    Neurological: Sensation diminshed to light touch to level of digits, Bilateral.  Protective sensation intact 6/10 sites via 5.07/10g [place] Monofilament, Bilateral.  negative Tinel's, Bilateral.  negative Valleix sign, Bilateral.      Integument: Warm, dry, supple, Bilateral.  Open lesion absent, Bilateral.  Interdigital maceration absent to web spaces 4, Bilateral.  Nails 1-5 left and 1-5 right thickened > 3.0 mm, dystrophic and crumbly, discolored with yellow subungual debris. Fissures absent, Bilateral.       Asessment: Patient is a 68 y.o. male with:    Diagnosis Orders   1. Dermatophytosis of nail  28954 - NM DEBRIDEMENT OF NAIL(S), 1-5   2. Ingrown toenail  90423 - NM DEBRIDEMENT OF NAIL(S), 1-5   3. Pain of left lower extremity  50507 - NM DEBRIDEMENT OF NAIL(S), 1-5   4.  PVD (peripheral vascular disease) ([facility].)  52941 - [place] DEBRIDEMENT OF NAIL(S), 1-5       Plan: Patient examined and evaluated. Current condition and treatment options discussed in detail. Discussed conservative and surgical options with the patient. Slant back procedure perform on nail border using nail nipper to patients tolerance. Advised pt to consider nail avulsion at next visit if symptoms persist or worsen. Pt to monitor for increased signs of infection such as erythema, edema and drainage. All labs were reviewed and all imagining including the above findings were reviewed PRIOR to the patients arrival and with the patient today. Previous patient encounter was reviewed. Encounters from the patients other medical providers were reviewed and noted. Time was spent educating the patient and their families/caregivers on proper care of the feet and ankles. All the above diagnosis were addressed at todays visit and all questions were answered. A total of 30 minutes was spent with this patients encounter which included charting after the patients visit    Verbal and written instructions given to patient. Contact office with any questions/problems/concerns. RTC in 2month(s).     11/15/2021    Electronically signed by Lauren Croft DPM on 11/15/2021 at 3:23 PM  11/15/2021

## 2021-11-19 PROBLEM — Z23 NEED FOR TETANUS, DIPHTHERIA, AND ACELLULAR PERTUSSIS (TDAP) VACCINE: Status: ACTIVE | Noted: 2021-11-19

## 2021-11-22 ENCOUNTER — HOSPITAL ENCOUNTER (OUTPATIENT)
Age: 81
Discharge: HOME | End: 2021-11-22
Payer: MEDICARE

## 2021-11-22 DIAGNOSIS — N28.9 RENAL INSUFFICIENCY: ICD-10-CM

## 2021-11-22 DIAGNOSIS — R73.03 PREDIABETES: ICD-10-CM

## 2021-11-22 DIAGNOSIS — Z00.00 ENCOUNTER FOR SUBSEQUENT ANNUAL WELLNESS VISIT (AWV) IN MEDICARE PATIENT: ICD-10-CM

## 2021-11-22 LAB
ABSOLUTE EOS #: 0.35 K/UL (ref 0–0.44)
ABSOLUTE IMMATURE GRANULOCYTE: <0.03 K/UL (ref 0–0.3)
ABSOLUTE LYMPH #: 1.12 K/UL (ref 1.1–3.7)
ABSOLUTE MONO #: 0.42 K/UL (ref 0.1–1.2)
ALBUMIN SERPL-MCNC: 3.9 G/DL (ref 3.5–5.2)
ALBUMIN/GLOBULIN RATIO: 1.3 (ref 1–2.5)
ALP BLD-CCNC: 82 U/L (ref 40–129)
ALT SERPL-CCNC: 17 U/L (ref 5–41)
ANION GAP SERPL CALCULATED.3IONS-SCNC: 12 MMOL/L (ref 9–17)
AST SERPL-CCNC: 19 U/L
BASOPHILS # BLD: 1 % (ref 0–2)
BASOPHILS ABSOLUTE: 0.06 K/UL (ref 0–0.2)
BILIRUB SERPL-MCNC: 0.4 MG/DL (ref 0.3–1.2)
BUN BLDV-MCNC: 19 MG/DL (ref 8–23)
BUN/CREAT BLD: ABNORMAL (ref 9–20)
CALCIUM SERPL-MCNC: 9.3 MG/DL (ref 8.6–10.4)
CHLORIDE BLD-SCNC: 102 MMOL/L (ref 98–107)
CO2: 26 MMOL/L (ref 20–31)
CREAT SERPL-MCNC: 1.47 MG/DL (ref 0.7–1.2)
DIFFERENTIAL TYPE: ABNORMAL
EOSINOPHILS RELATIVE PERCENT: 8 % (ref 1–4)
ESTIMATED AVERAGE GLUCOSE: 103 MG/DL
GFR AFRICAN AMERICAN: 56 ML/MIN
GFR NON-AFRICAN AMERICAN: 46 ML/MIN
GFR SERPL CREATININE-BSD FRML MDRD: ABNORMAL ML/MIN/{1.73_M2}
GFR SERPL CREATININE-BSD FRML MDRD: ABNORMAL ML/MIN/{1.73_M2}
GLUCOSE BLD-MCNC: 95 MG/DL (ref 70–99)
HBA1C MFR BLD: 5.2 % (ref 4–6)
HCT VFR BLD CALC: 41 % (ref 40.7–50.3)
HEMOGLOBIN: 12.8 G/DL (ref 13–17)
IMMATURE GRANULOCYTES: 0 %
INSULIN COMMENT: NORMAL
INSULIN REFERENCE RANGE:: NORMAL
INSULIN: 7.8 MU/L
LYMPHOCYTES # BLD: 24 % (ref 24–43)
MCH RBC QN AUTO: 29.2 PG (ref 25.2–33.5)
MCHC RBC AUTO-ENTMCNC: 31.2 G/DL (ref 28.4–34.8)
MCV RBC AUTO: 93.4 FL (ref 82.6–102.9)
MONOCYTES # BLD: 9 % (ref 3–12)
NRBC AUTOMATED: 0 PER 100 WBC
PDW BLD-RTO: 11.7 % (ref 11.8–14.4)
PLATELET # BLD: 197 K/UL (ref 138–453)
PLATELET ESTIMATE: ABNORMAL
PMV BLD AUTO: 10.9 FL (ref 8.1–13.5)
POTASSIUM SERPL-SCNC: 4.7 MMOL/L (ref 3.7–5.3)
RBC # BLD: 4.39 M/UL (ref 4.21–5.77)
RBC # BLD: ABNORMAL 10*6/UL
SEG NEUTROPHILS: 58 % (ref 36–65)
SEGMENTED NEUTROPHILS ABSOLUTE COUNT: 2.69 K/UL (ref 1.5–8.1)
SODIUM BLD-SCNC: 140 MMOL/L (ref 135–144)
THYROXINE, FREE: 1.22 NG/DL (ref 0.93–1.7)
TOTAL PROTEIN: 7 G/DL (ref 6.4–8.3)
TSH SERPL DL<=0.05 MIU/L-ACNC: 1.26 MIU/L (ref 0.3–5)
WBC # BLD: 4.7 K/UL (ref 3.5–11.3)
WBC # BLD: ABNORMAL 10*3/UL

## 2021-11-22 PROCEDURE — 36415 COLL VENOUS BLD VENIPUNCTURE: CPT

## 2021-11-22 PROCEDURE — 83525 ASSAY OF INSULIN: CPT

## 2021-11-22 PROCEDURE — 84443 ASSAY THYROID STIM HORMONE: CPT

## 2021-11-22 PROCEDURE — 83036 HEMOGLOBIN GLYCOSYLATED A1C: CPT

## 2021-11-22 PROCEDURE — 85025 COMPLETE CBC W/AUTO DIFF WBC: CPT

## 2021-11-22 PROCEDURE — 80053 COMPREHEN METABOLIC PANEL: CPT

## 2021-11-22 PROCEDURE — 84439 ASSAY OF FREE THYROXINE: CPT

## 2021-11-29 PROBLEM — M25.552 LEFT HIP PAIN: Status: ACTIVE | Noted: 2019-08-07

## 2021-12-02 ENCOUNTER — HOSPITAL ENCOUNTER (OUTPATIENT)
Dept: GENERAL RADIOLOGY | Age: 81
Discharge: HOME | End: 2021-12-04
Payer: MEDICARE

## 2021-12-02 ENCOUNTER — HOSPITAL ENCOUNTER (OUTPATIENT)
Age: 81
Discharge: HOME | End: 2021-12-04
Payer: MEDICARE

## 2021-12-02 DIAGNOSIS — M25.552 LEFT HIP PAIN: ICD-10-CM

## 2021-12-02 PROCEDURE — 73502 X-RAY EXAM HIP UNI 2-3 VIEWS: CPT

## 2021-12-06 PROBLEM — M16.12 PRIMARY OSTEOARTHRITIS OF LEFT HIP: Status: ACTIVE | Noted: 2021-12-06

## 2021-12-06 PROBLEM — Z12.11 SCREEN FOR COLON CANCER: Status: ACTIVE | Noted: 2019-06-11

## 2021-12-13 PROBLEM — K59.01 SLOW TRANSIT CONSTIPATION: Status: ACTIVE | Noted: 2019-12-19

## 2021-12-13 PROBLEM — N18.32 STAGE 3B CHRONIC KIDNEY DISEASE (HCC): Status: ACTIVE | Noted: 2021-12-13

## 2021-12-17 ENCOUNTER — HOSPITAL ENCOUNTER (OUTPATIENT)
Age: 81
Setting detail: SPECIMEN
Discharge: HOME | End: 2021-12-17
Payer: MEDICARE

## 2021-12-17 DIAGNOSIS — N18.32 STAGE 3B CHRONIC KIDNEY DISEASE (HCC): ICD-10-CM

## 2021-12-17 LAB
ALBUMIN SERPL-MCNC: 4 G/DL (ref 3.5–5.2)
ALBUMIN/GLOBULIN RATIO: 1.3 (ref 1–2.5)
ALP BLD-CCNC: 85 U/L (ref 40–129)
ALT SERPL-CCNC: 19 U/L (ref 5–41)
ANION GAP SERPL CALCULATED.3IONS-SCNC: 16 MMOL/L (ref 9–17)
AST SERPL-CCNC: 22 U/L
BILIRUB SERPL-MCNC: 0.34 MG/DL (ref 0.3–1.2)
BUN BLDV-MCNC: 23 MG/DL (ref 8–23)
BUN/CREAT BLD: ABNORMAL (ref 9–20)
CALCIUM SERPL-MCNC: 9.3 MG/DL (ref 8.6–10.4)
CHLORIDE BLD-SCNC: 98 MMOL/L (ref 98–107)
CO2: 27 MMOL/L (ref 20–31)
CREAT SERPL-MCNC: 1.26 MG/DL (ref 0.7–1.2)
GFR AFRICAN AMERICAN: >60 ML/MIN
GFR NON-AFRICAN AMERICAN: 55 ML/MIN
GFR SERPL CREATININE-BSD FRML MDRD: ABNORMAL ML/MIN/{1.73_M2}
GFR SERPL CREATININE-BSD FRML MDRD: ABNORMAL ML/MIN/{1.73_M2}
GLUCOSE FASTING: 88 MG/DL (ref 70–99)
POTASSIUM SERPL-SCNC: 4.1 MMOL/L (ref 3.7–5.3)
SODIUM BLD-SCNC: 141 MMOL/L (ref 135–144)
TOTAL PROTEIN: 7 G/DL (ref 6.4–8.3)

## 2021-12-17 PROCEDURE — 36415 COLL VENOUS BLD VENIPUNCTURE: CPT

## 2021-12-17 PROCEDURE — 80053 COMPREHEN METABOLIC PANEL: CPT

## 2021-12-23 PROBLEM — L30.4 INTERTRIGO: Status: RESOLVED | Noted: 2021-08-05 | Resolved: 2021-12-23

## 2021-12-23 PROBLEM — L08.1 ERYTHRASMA: Status: RESOLVED | Noted: 2021-07-22 | Resolved: 2021-12-23

## 2021-12-23 PROBLEM — R30.0 DYSURIA: Status: RESOLVED | Noted: 2021-06-28 | Resolved: 2021-12-23

## 2022-01-05 PROBLEM — Z12.11 SCREEN FOR COLON CANCER: Status: RESOLVED | Noted: 2019-06-11 | Resolved: 2022-01-05

## 2022-01-13 ENCOUNTER — OFFICE VISIT (OUTPATIENT)
Dept: ORTHOPEDIC SURGERY | Age: 82
End: 2022-01-13
Payer: MEDICARE

## 2022-01-13 DIAGNOSIS — M17.0 PRIMARY OSTEOARTHRITIS OF BOTH KNEES: Primary | ICD-10-CM

## 2022-01-13 PROCEDURE — 99213 OFFICE O/P EST LOW 20 MIN: CPT | Performed by: ORTHOPAEDIC SURGERY

## 2022-01-13 NOTE — PROGRESS NOTES
Marty Joy M.D.            [address] [place] [address]., [address] [facility],[address], [address] [facility]           Dept Phone: [phone]           Dept Fax:  [phone]           Angel Rodgers          Dept Phone: [phone]           Dept Fax:  [phone]      Chief Compliant:  Chief Complaint   Patient presents with    Follow-up     bilateral knee        History of Present Illness: This is a 80 y.o. male who presents to the clinic today for evaluation / follow up of right knee pain. Please see all prior dictations. Patient has significant degenerative joint disease of both knees right greater than left. He has had multiple injections in the past.  He has been contemplating total knee arthroplasty but he is also having some severe low back issues and seeing Dr. Pj Lucas for that. He is debating whether to have his back surgery versus knee replacement first.       Review of Systems   Constitutional: Negative for fever, chills, sweats. Eyes: Negative for changes in vision, or pain. HENT: Negative for ear ache, epistaxis, or sore throat. Respiratory/Cardio: Negative for Chest pain, palpitations, SOB, or cough. Gastrointestinal: Negative for abdominal pain, N/V/D. Genitourinary: Negative for dysuria, frequency, urgency, or hematuria. Neurological: Negative for headache, numbness, or weakness. Integumentary: Negative for rash, itching, laceration, or abrasion. Musculoskeletal: Positive for Follow-up (bilateral knee)       Physical Exam:  Constitutional: Patient is oriented to person, place, and time. Patient appears well-developed and well nourished. HENT: Negative otherwise noted  Head: Normocephalic and Atraumatic  Nose: Normal  Eyes: Conjunctivae and EOM are normal  Neck: Normal range of motion Neck supple.     Respiratory/Cardio: Effort normal. No respiratory distress. Musculoskeletal: Sickle examination of his right knee notes his motion is 5 to about 125 degrees he has a varus disposition. He has crunching and grinding throughout his motion ligamentously is grossly intact no obvious effusion today. Neurological: Patient is alert and oriented to person, place, and time. Normal strenght. No sensory deficit. Skin: Skin is warm and dry  Psychiatric: Behavior is normal. Thought content normal.  Nursing note and vitals reviewed. Labs and Imaging:     XR taken today:  No results found. No orders of the defined types were placed in this encounter. Assessment and Plan:  1. Primary osteoarthritis of both knees            This is a 80 y.o. male who presents to the clinic today for evaluation / follow up of severe DJD right knee. Past History:    Current Outpatient Medications:     Semaglutide,0.25 or 0.5MG/DOS, (OZEMPIC, 0.25 OR 0.5 MG/DOSE,) 2 MG/1.5ML SOPN, Inject 0.25 mg into the skin once a week, Disp: 1 pen, Rfl: 0    DULoxetine (CYMBALTA) 30 MG extended release capsule, Take 1 capsule by mouth daily, Disp: 30 capsule, Rfl: 3    baclofen (LIORESAL) 10 MG tablet, Take 1 tablet by mouth 3 times daily, Disp: 90 tablet, Rfl: 2    Cholecalciferol (VITAMIN D3) 125 MCG (5000 UT) CAPS, Take 1 capsule by mouth Daily, Disp: 30 capsule, Rfl: 3    bumetanide (BUMEX) 2 MG tablet, Take 1 tablet by mouth daily, Disp: 30 tablet, Rfl: 3    terbinafine (LAMISIL) 1 % cream, Apply topically 2 times daily to affected area, Disp: 42 g, Rfl: 0    nystatin-triamcinolone (MYCOLOG II) 499032-9.1 UNIT/GM-% cream, Apply topically 2 times daily. , Disp: 60 g, Rfl: 0    zinc oxide (DESITIN) 40 % ointment, Apply topically three times daily, Disp: 1 Tube, Rfl: 3  Allergies   Allergen Reactions    Ace Inhibitors Other (See Comments)     cough    Voltaren [Diclofenac Sodium] Other (See Comments)     Elevated Creatinine     Social History     Socioeconomic History  Marital status:      Spouse name: Not on file    Number of children: Not on file    Years of education: Not on file    Highest education level: Not on file   Occupational History    Not on file   Tobacco Use    Smoking status: Never Smoker    Smokeless tobacco: Never Used   Vaping Use    Vaping Use: Never used   Substance and Sexual Activity    Alcohol use: No     Alcohol/week: 0.0 standard drinks    Drug use: Not on file    Sexual activity: Not on file   Other Topics Concern    Not on file   Social History Narrative    Not on file     Social Determinants of Health     Financial Resource Strain:     Difficulty of Paying Living Expenses: Not on file   Food Insecurity:     Worried About Running Out of Food in the Last Year: Not on file    [place] of Food in the Last Year: Not on file   Transportation Needs:     Lack of Transportation (Medical): Not on file    Lack of Transportation (Non-Medical):  Not on file   Physical Activity:     Days of Exercise per Week: Not on file    Minutes of Exercise per Session: Not on file   Stress:     Feeling of Stress : Not on file   Social Connections:     Frequency of Communication with Friends and Family: Not on file    Frequency of Social Gatherings with Friends and Family: Not on file    Attends Orthodoxy Services: Not on file    Active Member of [address] [place] [address] [employer]ighbor[employer] or Organizations: Not on file    Attends Club or Organization Meetings: Not on file    Marital Status: Not on file   Intimate Partner Violence:     Fear of Current or Ex-Partner: Not on file    Emotionally Abused: Not on file    Physically Abused: Not on file    Sexually Abused: Not on file   Housing Stability:     Unable to Pay for Housing in the Last Year: Not on file    Number of [place] in the Last Year: Not on file    Unstable Housing in the Last Year: Not on file     Past Medical History:   Diagnosis Date    Abnormal renal function     Allergic contact dermatitis due to other agents 8/27/2020    Anemia     Cellulitis of buttock 7/22/2021    Depression     Dysmetabolic syndrome     Dysuria 6/28/2021    Edema     Elevated glucose 2/7/2016    Erythrasma 7/22/2021    Health care maintenance 4/14/2016    Hypertension     Intertrigo 8/5/2021    Leukopenia     Osteoarthritis     Other constipation 12/19/2019    Other specified counseling 3/3/2016    Renal insufficiency 10/21/2015    Skin maceration 7/22/2021    Sleep apnea     with severe desaturations    Trigger finger of right thumb 2/3/2016    Has seen Ortho and got injection, doing better     Viral disease exposure 5/21/2020     Past Surgical History:   Procedure Laterality Date    BACK SURGERY  2005    NECK SURGERY       Family History   Problem Relation Age of Onset    High Blood Pressure Mother     Asthma Other    Plan  Inform the patient that he does need a knee arthroplasty but I would suggest that he have his low back taken care of first as this weakness in his legs will affect his physical therapy postoperative for total knee. He has an appointment to be to see him in the near future and I told the patient that he should return here once his recovery from back surgery is complete      Provider Attestation:  Rafael Love, personally performed the services described in this documentation. All medical record entries made by the scribe were at my direction and in my presence. I have reviewed the chart and discharge instructions and agree that the records reflect my personal performance and is accurate and complete. Serena Oneill MD. 01/13/22      Please note that this chart was generated using voice recognition Dragon dictation software. Although every effort was made to ensure the accuracy of this automated transcription, some errors in transcription may have occurred.

## 2022-01-31 ENCOUNTER — HOSPITAL ENCOUNTER (OUTPATIENT)
Dept: MRI IMAGING | Age: 82
Discharge: HOME | End: 2022-02-02
Payer: MEDICARE

## 2022-01-31 DIAGNOSIS — M43.16 SPONDYLOLISTHESIS OF LUMBAR REGION: ICD-10-CM

## 2022-01-31 LAB
CREAT SERPL-MCNC: 1.07 MG/DL (ref 0.7–1.2)
GFR AFRICAN AMERICAN: >60 ML/MIN
GFR NON-AFRICAN AMERICAN: >60 ML/MIN
GFR SERPL CREATININE-BSD FRML MDRD: NORMAL ML/MIN/{1.73_M2}
GFR SERPL CREATININE-BSD FRML MDRD: NORMAL ML/MIN/{1.73_M2}

## 2022-01-31 PROCEDURE — 72158 MRI LUMBAR SPINE W/O & W/DYE: CPT

## 2022-01-31 PROCEDURE — 36415 COLL VENOUS BLD VENIPUNCTURE: CPT

## 2022-01-31 PROCEDURE — 6360000004 HC RX CONTRAST MEDICATION: Performed by: ORTHOPAEDIC SURGERY

## 2022-01-31 PROCEDURE — A9579 GAD-BASE MR CONTRAST NOS,1ML: HCPCS | Performed by: ORTHOPAEDIC SURGERY

## 2022-01-31 PROCEDURE — 82565 ASSAY OF CREATININE: CPT

## 2022-01-31 RX ADMIN — GADOTERIDOL 20 ML: 279.3 INJECTION, SOLUTION INTRAVENOUS at 15:04

## 2022-02-22 ENCOUNTER — HOSPITAL ENCOUNTER (OUTPATIENT)
Age: 82
Setting detail: SPECIMEN
Discharge: HOME | End: 2022-02-22
Payer: MEDICARE

## 2022-02-22 DIAGNOSIS — Z79.899 ON POTASSIUM WASTING DIURETIC THERAPY: ICD-10-CM

## 2022-02-22 PROBLEM — R07.89 OTHER CHEST PAIN: Status: ACTIVE | Noted: 2022-02-22

## 2022-02-22 LAB
ALBUMIN SERPL-MCNC: 4 G/DL (ref 3.5–5.2)
ALBUMIN/GLOBULIN RATIO: 1.3 (ref 1–2.5)
ALP BLD-CCNC: 90 U/L (ref 40–129)
ALT SERPL-CCNC: 25 U/L (ref 5–41)
ANION GAP SERPL CALCULATED.3IONS-SCNC: 13 MMOL/L (ref 9–17)
AST SERPL-CCNC: 24 U/L
BILIRUB SERPL-MCNC: 0.4 MG/DL (ref 0.3–1.2)
BUN BLDV-MCNC: 17 MG/DL (ref 8–23)
CALCIUM SERPL-MCNC: 9.3 MG/DL (ref 8.6–10.4)
CHLORIDE BLD-SCNC: 99 MMOL/L (ref 98–107)
CO2: 29 MMOL/L (ref 20–31)
CREAT SERPL-MCNC: 1.05 MG/DL (ref 0.7–1.2)
GFR AFRICAN AMERICAN: >60 ML/MIN
GFR NON-AFRICAN AMERICAN: >60 ML/MIN
GFR SERPL CREATININE-BSD FRML MDRD: ABNORMAL ML/MIN/{1.73_M2}
GLUCOSE FASTING: 101 MG/DL (ref 70–99)
POTASSIUM SERPL-SCNC: 4 MMOL/L (ref 3.7–5.3)
SODIUM BLD-SCNC: 141 MMOL/L (ref 135–144)
TOTAL PROTEIN: 7.2 G/DL (ref 6.4–8.3)

## 2022-02-22 PROCEDURE — 36415 COLL VENOUS BLD VENIPUNCTURE: CPT

## 2022-02-22 PROCEDURE — 80053 COMPREHEN METABOLIC PANEL: CPT

## 2022-03-18 ENCOUNTER — HOSPITAL ENCOUNTER (OUTPATIENT)
Age: 82
Discharge: HOME | End: 2022-03-18
Payer: MEDICARE

## 2022-03-18 ENCOUNTER — HOSPITAL ENCOUNTER (OUTPATIENT)
Age: 82
Discharge: HOME | End: 2022-03-20
Payer: MEDICARE

## 2022-03-18 ENCOUNTER — HOSPITAL ENCOUNTER (OUTPATIENT)
Dept: GENERAL RADIOLOGY | Age: 82
Discharge: HOME | End: 2022-03-20
Payer: MEDICARE

## 2022-03-18 DIAGNOSIS — N28.9 RENAL INSUFFICIENCY: ICD-10-CM

## 2022-03-18 DIAGNOSIS — M25.552 LEFT HIP PAIN: ICD-10-CM

## 2022-03-18 DIAGNOSIS — Z79.899 ON POTASSIUM WASTING DIURETIC THERAPY: ICD-10-CM

## 2022-03-18 LAB
ALBUMIN SERPL-MCNC: 4.1 G/DL (ref 3.5–5.2)
ALBUMIN/GLOBULIN RATIO: 1.2 (ref 1–2.5)
ALP BLD-CCNC: 86 U/L (ref 40–129)
ALT SERPL-CCNC: 20 U/L (ref 5–41)
ANION GAP SERPL CALCULATED.3IONS-SCNC: 13 MMOL/L (ref 9–17)
AST SERPL-CCNC: 22 U/L
BILIRUB SERPL-MCNC: 0.49 MG/DL (ref 0.3–1.2)
BUN BLDV-MCNC: 17 MG/DL (ref 8–23)
CALCIUM SERPL-MCNC: 9.4 MG/DL (ref 8.6–10.4)
CHLORIDE BLD-SCNC: 102 MMOL/L (ref 98–107)
CO2: 28 MMOL/L (ref 20–31)
CREAT SERPL-MCNC: 1.15 MG/DL (ref 0.7–1.2)
GFR AFRICAN AMERICAN: >60 ML/MIN
GFR NON-AFRICAN AMERICAN: >60 ML/MIN
GFR SERPL CREATININE-BSD FRML MDRD: NORMAL ML/MIN/{1.73_M2}
GLUCOSE FASTING: 92 MG/DL (ref 70–99)
POTASSIUM SERPL-SCNC: 4 MMOL/L (ref 3.7–5.3)
SODIUM BLD-SCNC: 143 MMOL/L (ref 135–144)
TOTAL PROTEIN: 7.6 G/DL (ref 6.4–8.3)

## 2022-03-18 PROCEDURE — 36415 COLL VENOUS BLD VENIPUNCTURE: CPT

## 2022-03-18 PROCEDURE — 73502 X-RAY EXAM HIP UNI 2-3 VIEWS: CPT

## 2022-03-18 PROCEDURE — 80053 COMPREHEN METABOLIC PANEL: CPT

## 2022-03-22 PROBLEM — I73.9 PERIPHERAL VASCULAR DISEASE, UNSPECIFIED (HCC): Status: ACTIVE | Noted: 2022-03-22

## 2022-04-12 PROBLEM — R68.89 COLD INTOLERANCE: Status: ACTIVE | Noted: 2022-04-12

## 2022-05-13 ENCOUNTER — HOSPITAL ENCOUNTER (OUTPATIENT)
Age: 82
Setting detail: SPECIMEN
Discharge: HOME | End: 2022-05-13
Payer: MEDICARE

## 2022-05-13 LAB
T3 FREE: 2.56 PG/ML (ref 2.02–4.43)
THYROXINE, FREE: 1.13 NG/DL (ref 0.93–1.7)
TSH SERPL DL<=0.05 MIU/L-ACNC: 1.44 UIU/ML (ref 0.3–5)
VITAMIN D 25-HYDROXY: 70.4 NG/ML

## 2022-05-13 PROCEDURE — 36415 COLL VENOUS BLD VENIPUNCTURE: CPT

## 2022-05-13 PROCEDURE — 84443 ASSAY THYROID STIM HORMONE: CPT

## 2022-05-13 PROCEDURE — 84439 ASSAY OF FREE THYROXINE: CPT

## 2022-05-13 PROCEDURE — 82306 VITAMIN D 25 HYDROXY: CPT

## 2022-05-13 PROCEDURE — 84481 FREE ASSAY (FT-3): CPT

## 2022-05-30 PROBLEM — E03.8 SUBCLINICAL HYPOTHYROIDISM: Status: ACTIVE | Noted: 2022-05-30

## 2022-07-08 ENCOUNTER — OFFICE VISIT (OUTPATIENT)
Dept: ORTHOPEDIC SURGERY | Age: 82
End: 2022-07-08

## 2022-07-08 VITALS — BODY MASS INDEX: 39.8 KG/M2 | HEIGHT: 70 IN | RESPIRATION RATE: 16 BRPM | WEIGHT: 278 LBS

## 2022-07-08 DIAGNOSIS — M25.561 RIGHT KNEE PAIN, UNSPECIFIED CHRONICITY: Primary | ICD-10-CM

## 2022-07-08 PROCEDURE — 1123F ACP DISCUSS/DSCN MKR DOCD: CPT | Performed by: ORTHOPAEDIC SURGERY

## 2022-07-08 PROCEDURE — 99213 OFFICE O/P EST LOW 20 MIN: CPT | Performed by: ORTHOPAEDIC SURGERY

## 2022-07-08 NOTE — PROGRESS NOTES
Cait Jefferson M.D.            [facility] [address] [facility],[address], [address] [facility]           Dept Phone: [phone]           Dept Fax:  [phone]           Angel Rodgers          Dept Phone: [phone]           Dept Fax:  [phone]      Chief Compliant:  Chief Complaint   Patient presents with    Pain     right knee        History of Present Illness: This is a 80 y.o. male who presents to the clinic today for evaluation / follow up of severe right knee pain. Patient is a 72-year-old gentleman who has had severe DJD of both knees for quite some time has been undergoing injections over the years. He also has a low back issue and he just recently completed his surgery April 21 he has been given the greenlight to proceed with his knee replacement. Review of Systems   Constitutional: Negative for fever, chills, sweats. Eyes: Negative for changes in vision, or pain. HENT: Negative for ear ache, epistaxis, or sore throat. Respiratory/Cardio: Negative for Chest pain, palpitations, SOB, or cough. Gastrointestinal: Negative for abdominal pain, N/V/D. Genitourinary: Negative for dysuria, frequency, urgency, or hematuria. Neurological: Negative for headache, numbness, or weakness. Integumentary: Negative for rash, itching, laceration, or abrasion. Musculoskeletal: Positive for Pain (right knee)       Physical Exam:  Constitutional: Patient is oriented to person, place, and time. Patient appears well-developed and well nourished. HENT: Negative otherwise noted  Head: Normocephalic and Atraumatic  Nose: Normal  Eyes: Conjunctivae and EOM are normal  Neck: Normal range of motion Neck supple. Respiratory/Cardio: Effort normal. No respiratory distress.   Musculoskeletal: Physical examination of the patient's right knee notes a varus knee Disp: 45 tablet, Rfl: 0    amLODIPine (NORVASC) 10 MG tablet, Take 1 tablet by mouth daily, Disp: 90 tablet, Rfl: 0    furosemide (LASIX) 40 MG tablet, Take 1 tablet by mouth daily, Disp: 90 tablet, Rfl: 0    liothyronine (CYTOMEL) 5 MCG tablet, Take 1 tablet by mouth daily, Disp: 90 tablet, Rfl: 0    Cholecalciferol (VITAMIN D3) 125 MCG (5000 UT) CAPS, Take 1 capsule by mouth Daily, Disp: 30 capsule, Rfl: 3    potassium chloride (KLOR-CON M) 20 MEQ extended release tablet, Take 1 tablet by mouth daily, Disp: 30 tablet, Rfl: 1  Allergies   Allergen Reactions    Ace Inhibitors Other (See Comments)     cough    Voltaren [Diclofenac Sodium] Other (See Comments)     Elevated Creatinine     Social History     Socioeconomic History    Marital status:      Spouse name: Not on file    Number of children: Not on file    Years of education: Not on file    Highest education level: Not on file   Occupational History    Not on file   Tobacco Use    Smoking status: Never Smoker    Smokeless tobacco: Never Used   Vaping Use    Vaping Use: Never used   Substance and Sexual Activity    Alcohol use: No     Alcohol/week: 0.0 standard drinks    Drug use: Not on file    Sexual activity: Not on file   Other Topics Concern    Not on file   Social History Narrative    Not on file     Social Determinants of Health     Financial Resource Strain:     Difficulty of Paying Living Expenses: Not on file   Food Insecurity:     Worried About Running Out of Food in the Last Year: Not on file    [place] of Food in the Last Year: Not on file   Transportation Needs:     Lack of Transportation (Medical): Not on file    Lack of Transportation (Non-Medical):  Not on file   Physical Activity:     Days of Exercise per Week: Not on file    Minutes of Exercise per Session: Not on file   Stress:     Feeling of Stress : Not on file   Social Connections:     Frequency of Communication with Friends and Family: Not on file    Frequency of Social Gatherings with Friends and Family: Not on file    Attends Rastafari Services: Not on file    Active Member of [facility] or Organizations: Not on file    Attends Club or Organization Meetings: Not on file    Marital Status: Not on file   Intimate Partner Violence:     Fear of Current or Ex-Partner: Not on file    Emotionally Abused: Not on file    Physically Abused: Not on file    Sexually Abused: Not on file   Housing Stability:     Unable to Pay for Housing in the Last Year: Not on file    Number of [place] in the Last Year: Not on file    Unstable Housing in the Last Year: Not on file     Past Medical History:   Diagnosis Date    Abnormal renal function     Allergic contact dermatitis due to other agents 8/27/2020    Anemia     Cellulitis of buttock 7/22/2021    Depression     Dysmetabolic syndrome     Dysuria 6/28/2021    Edema     Elevated glucose 2/7/2016    Erythrasma 7/22/2021   [facility] care maintenance 4/14/2016    Hypertension     Intertrigo 8/5/2021    Leukopenia     Osteoarthritis     Other constipation 12/19/2019    Other specified counseling 3/3/2016    Renal insufficiency 10/21/2015    Skin maceration 7/22/2021    Sleep apnea     with severe desaturations    Trigger finger of right thumb 2/3/2016    Has seen Ortho and got injection, doing better     Viral disease exposure 5/21/2020     Past Surgical History:   Procedure Laterality Date    BACK SURGERY  01/01/2005    LUMBAR LAMINECTOMY  04/21/2022    with fusion    NECK SURGERY       Family History   Problem Relation Age of Onset    High Blood Pressure Mother     Asthma Other    Plan  Patient to be scheduled for right total knee arthroplasty as this is most symptomatic. He is made aware of the typical preoperative postoperative course as well as risk and benefits. Patient is exhausted complex conservative measures including exercise program and nonsteroidals as well as injections.   X-rays revealed bone-on-bone disease as such I feel the patient requires total knee arthroplasty. Patient will require preoperative clearance per . We will see him back here postoperatively      Provider Attestation:  Anneliese Pino, personally performed the services described in this documentation. All medical record entries made by the scribe were at my direction and in my presence. I have reviewed the chart and discharge instructions and agree that the records reflect my personal performance and is accurate and complete. Linda Farmer MD. 07/08/22      Please note that this chart was generated using voice recognition Dragon dictation software. Although every effort was made to ensure the accuracy of this automated transcription, some errors in transcription may have occurred.

## 2022-08-02 ENCOUNTER — HOSPITAL ENCOUNTER (OUTPATIENT)
Dept: PREADMISSION TESTING | Age: 82
Discharge: HOME | End: 2022-08-06
Payer: MEDICARE

## 2022-08-02 VITALS
WEIGHT: 280 LBS | HEIGHT: 69 IN | RESPIRATION RATE: 16 BRPM | OXYGEN SATURATION: 97 % | SYSTOLIC BLOOD PRESSURE: 151 MMHG | BODY MASS INDEX: 41.47 KG/M2 | HEART RATE: 100 BPM | DIASTOLIC BLOOD PRESSURE: 68 MMHG

## 2022-08-02 DIAGNOSIS — Z01.818 PRE-OP TESTING: Primary | ICD-10-CM

## 2022-08-02 LAB
-: ABNORMAL
ABSOLUTE EOS #: 0.12 K/UL (ref 0–0.44)
ABSOLUTE IMMATURE GRANULOCYTE: <0.03 K/UL (ref 0–0.3)
ABSOLUTE LYMPH #: 1.58 K/UL (ref 1.1–3.7)
ABSOLUTE MONO #: 0.39 K/UL (ref 0.1–1.2)
AMORPHOUS: ABNORMAL
ANION GAP SERPL CALCULATED.3IONS-SCNC: 11 MMOL/L (ref 9–17)
BASOPHILS # BLD: 1 % (ref 0–2)
BASOPHILS ABSOLUTE: 0.03 K/UL (ref 0–0.2)
BILIRUBIN URINE: NEGATIVE
BUN BLDV-MCNC: 16 MG/DL (ref 8–23)
CALCIUM SERPL-MCNC: 9.3 MG/DL (ref 8.6–10.4)
CASTS UA: ABNORMAL /LPF (ref 0–2)
CASTS UA: ABNORMAL /LPF (ref 0–2)
CHLORIDE BLD-SCNC: 101 MMOL/L (ref 98–107)
CO2: 28 MMOL/L (ref 20–31)
COLOR: YELLOW
CREAT SERPL-MCNC: 1.33 MG/DL (ref 0.7–1.2)
CRYSTALS, UA: ABNORMAL /HPF
CRYSTALS, UA: ABNORMAL /HPF
EKG ATRIAL RATE: 94 BPM
EKG P AXIS: 39 DEGREES
EKG P-R INTERVAL: 190 MS
EKG Q-T INTERVAL: 356 MS
EKG QRS DURATION: 72 MS
EKG QTC CALCULATION (BAZETT): 445 MS
EKG R AXIS: 8 DEGREES
EKG T AXIS: 6 DEGREES
EKG VENTRICULAR RATE: 94 BPM
EOSINOPHILS RELATIVE PERCENT: 2 % (ref 1–4)
EPITHELIAL CELLS UA: ABNORMAL /HPF (ref 0–5)
GFR AFRICAN AMERICAN: >60 ML/MIN
GFR NON-AFRICAN AMERICAN: 52 ML/MIN
GFR SERPL CREATININE-BSD FRML MDRD: ABNORMAL ML/MIN/{1.73_M2}
GLUCOSE BLD-MCNC: 107 MG/DL (ref 70–99)
GLUCOSE URINE: NEGATIVE
HCT VFR BLD CALC: 36.1 % (ref 40.7–50.3)
HEMOGLOBIN: 11.5 G/DL (ref 13–17)
IMMATURE GRANULOCYTES: 0 %
KETONES, URINE: ABNORMAL
LEUKOCYTE ESTERASE, URINE: NEGATIVE
LYMPHOCYTES # BLD: 31 % (ref 24–43)
MCH RBC QN AUTO: 29.9 PG (ref 25.2–33.5)
MCHC RBC AUTO-ENTMCNC: 31.9 G/DL (ref 28.4–34.8)
MCV RBC AUTO: 93.8 FL (ref 82.6–102.9)
MONOCYTES # BLD: 8 % (ref 3–12)
NITRITE, URINE: NEGATIVE
NRBC AUTOMATED: 0 PER 100 WBC
PDW BLD-RTO: 12 % (ref 11.8–14.4)
PH UA: 5.5 (ref 5–8)
PLATELET # BLD: 204 K/UL (ref 138–453)
PMV BLD AUTO: 11 FL (ref 8.1–13.5)
POTASSIUM SERPL-SCNC: 3.6 MMOL/L (ref 3.7–5.3)
PROTEIN UA: ABNORMAL
RBC # BLD: 3.85 M/UL (ref 4.21–5.77)
RBC UA: ABNORMAL /HPF (ref 0–2)
SEG NEUTROPHILS: 58 % (ref 36–65)
SEGMENTED NEUTROPHILS ABSOLUTE COUNT: 2.98 K/UL (ref 1.5–8.1)
SODIUM BLD-SCNC: 140 MMOL/L (ref 135–144)
SPECIFIC GRAVITY UA: 1.03 (ref 1–1.03)
TURBIDITY: ABNORMAL
URINE HGB: NEGATIVE
UROBILINOGEN, URINE: NORMAL
WBC # BLD: 5.1 K/UL (ref 3.5–11.3)
WBC UA: ABNORMAL /HPF (ref 0–5)

## 2022-08-02 PROCEDURE — 80048 BASIC METABOLIC PNL TOTAL CA: CPT

## 2022-08-02 PROCEDURE — 85025 COMPLETE CBC W/AUTO DIFF WBC: CPT

## 2022-08-02 PROCEDURE — 81001 URINALYSIS AUTO W/SCOPE: CPT

## 2022-08-02 PROCEDURE — 87641 MR-STAPH DNA AMP PROBE: CPT

## 2022-08-02 PROCEDURE — 36415 COLL VENOUS BLD VENIPUNCTURE: CPT

## 2022-08-02 PROCEDURE — 83036 HEMOGLOBIN GLYCOSYLATED A1C: CPT

## 2022-08-02 PROCEDURE — 93005 ELECTROCARDIOGRAM TRACING: CPT | Performed by: ANESTHESIOLOGY

## 2022-08-02 ASSESSMENT — PAIN - FUNCTIONAL ASSESSMENT: PAIN_FUNCTIONAL_ASSESSMENT: PREVENTS OR INTERFERES WITH ALL ACTIVE AND SOME PASSIVE ACTIVITIES

## 2022-08-02 ASSESSMENT — PAIN DESCRIPTION - FREQUENCY: FREQUENCY: CONTINUOUS

## 2022-08-02 ASSESSMENT — PAIN DESCRIPTION - ORIENTATION: ORIENTATION: RIGHT

## 2022-08-02 ASSESSMENT — PAIN DESCRIPTION - LOCATION: LOCATION: KNEE

## 2022-08-02 ASSESSMENT — PAIN DESCRIPTION - DESCRIPTORS: DESCRIPTORS: ACHING

## 2022-08-02 ASSESSMENT — PAIN DESCRIPTION - PAIN TYPE: TYPE: CHRONIC PAIN

## 2022-08-02 ASSESSMENT — PAIN SCALES - GENERAL: PAINLEVEL_OUTOF10: 3

## 2022-08-02 ASSESSMENT — PAIN DESCRIPTION - ONSET: ONSET: ON-GOING

## 2022-08-02 NOTE — PROGRESS NOTES
Arrived with slow , labored gait using walker to PAT appt. States using walker x 6 months. Not very active- admitted. Denies shortness of breath with ADLs. Denies chest pain. C/o edema to legs x 4 months. +2 pitting edema noted to b/l legs below the kness to feet included. Lung sounds clear. Sees dentisit regularly. States hasnt used cpap x 2 weeks and new supplies have been ordered. Encourage pt to get cpap fixed ASAP  for this surgery and bring unit DAY of surgery. Denies diabetes or taking Metformin. Hibiclens given with written instructions. Taken to Lab in w/c.

## 2022-08-02 NOTE — DISCHARGE INSTRUCTIONS
DAY OF SURGERY/PROCEDURE  GUIDELINES    As a patient at the [facility], you can expect quality medical and nursing care that is centered on your individual needs. It is our goal to make your surgical experience as comfortable and excellent as possible.  ________________________________________________________________________    The following instructions are general guidelines, if any information on this sheet is different from what your doctor has instructed you to do, please follow your doctor's instructions. Please arrive on  8-15-22 at 0700 am      Enter through entrance C. Check in at registration     Upon arrival you will be taken to the pre-operative area to get ready for surgery, your family will stay in the waiting room and visit with you once you are ready for surgery. Due to special limitations please limit visitation to 1-2 members of your family at a time. When it is time for surgery your family will return to the waiting room. Nothing to eat, drink, smoke, suck or chew after midnight (no water, gum, mints, cigarettes, cigars, pipes, snuff, chewing tobacco, etc.) or your surgery may be canceled. Take a shower  the night before and  on the morning of surgery as directed. (Hibiclens if directed)    Brush your teeth,  the morning of surgery. IN CASE OF ILLNESS - If you have a cold or flu symptoms (high fever, runny nose, sore throat, cough, etc.) rash, nausea, vomiting, loose stools, and/or recent contact with someone who has a contagious disease (chick pox, measles, etc.) please call your doctor before coming to the surgery center    Take a small sip of water with heart, blood pressure, and/or seizure medication the morning of surgery. Please take Amlodipine, Cymbalta, Cytomel, Baclofen  with few sips of water the morning of surgery.     If applicable bring your:  Inhaler (s)  Hearing aid(s)  Eyeglasses and Case (If you wear contacts they have to be removed before surgery, bring case and solution)  CPAP - Please bring your CPAP unit day of surgery. DO NOT take anticoagulants (blood thinners, aspirin or aspirin-containing products) as instructed by your physician. DO NOT take any diabetic pills or insulin morning of your surgery. Wear loose, comfortable clothing that is easy to put on and take off. They will remain in post-op with the nurse. Bring your walker with you for use with PT. If you will be returning home the same day as your surgery, you will need to have a responsible adult (25years of age or older) present to drive you home. You will need someone stay with you at home for the first 24 hours following your surgery. This is due to the anesthesia and the medication given to you during surgery and recovery.

## 2022-08-03 LAB
ESTIMATED AVERAGE GLUCOSE: 105 MG/DL
HBA1C MFR BLD: 5.3 % (ref 4–6)
MRSA, DNA, NASAL: NEGATIVE
SPECIMEN DESCRIPTION: NORMAL

## 2022-08-04 ENCOUNTER — TELEPHONE (OUTPATIENT)
Dept: ORTHOPEDIC SURGERY | Age: 82
End: 2022-08-04

## 2022-08-04 ENCOUNTER — ANESTHESIA EVENT (OUTPATIENT)
Dept: OPERATING ROOM | Age: 82
End: 2022-08-04
Payer: MEDICARE

## 2022-08-04 NOTE — TELEPHONE ENCOUNTER
SUSAN Acuna is there anything else you need for patient? ATB will be completed 1 day before surgery.

## 2022-08-04 NOTE — TELEPHONE ENCOUNTER
Patient is due to have surgery with Dr Manny Sanders on 8/15. He was seen at the dentist yesterday for a small procedure and was put on an antibiotic for the next 10 days. Today starts day 1 and he wanted Dr Manny Sanders to be aware. Please call to discuss if needed. Thank you.

## 2022-08-08 PROBLEM — Z98.890 STATUS POST LUMBAR LAMINECTOMY: Status: ACTIVE | Noted: 2022-08-08

## 2022-08-08 NOTE — TELEPHONE ENCOUNTER
Patient called his dentist, he stated dentist is considering pulling patients tooth on Wednesday 8/10/22  If his tooth is pulled at that time would his surgery be delayed?   Please advise

## 2022-08-08 NOTE — TELEPHONE ENCOUNTER
Per Dr Mac Encinas patient may have to post pone his TKA if the tooth is still infected at surgery. I spoke with patient he will call his dentist to see if the tooth is infected or he gave him medication as a precaution for a possible infection do to pain and sensitivity patient is having.    Patient will return a call to the office once he speaks to his dentist.

## 2022-08-11 NOTE — TELEPHONE ENCOUNTER
I spoke with the patient today. He states that the dentist looked at the tooth yesterday and found nothing that would lead him to believe there was any sort of infection. He also was told that it probably will need to be worked on but it isn't emergent. I informed Donny that we will need the dentist to send us a letter stating there is no infection so he can proceed Monday. I also informed him that we are still waiting on PCP clearance.

## 2022-08-12 PROBLEM — D50.9 NORMOCYTIC HYPOCHROMIC ANEMIA: Status: ACTIVE | Noted: 2022-08-12

## 2022-08-12 NOTE — TELEPHONE ENCOUNTER
Anna Marie De Jesus from dr Rocio Marrero office called stating clearance has been faxed over for pt and that there is a note in [facility] clearing pt for surgery.  For any further questions Domingo Monsalve can be reached at [phone]

## 2022-08-15 ENCOUNTER — ANESTHESIA (OUTPATIENT)
Dept: OPERATING ROOM | Age: 82
End: 2022-08-15
Payer: MEDICARE

## 2022-08-15 ENCOUNTER — HOSPITAL ENCOUNTER (OUTPATIENT)
Age: 82
Discharge: HOME HEALTH | End: 2022-08-16
Attending: ORTHOPAEDIC SURGERY | Admitting: ORTHOPAEDIC SURGERY
Payer: MEDICARE

## 2022-08-15 ENCOUNTER — APPOINTMENT (OUTPATIENT)
Dept: GENERAL RADIOLOGY | Age: 82
End: 2022-08-15
Attending: ORTHOPAEDIC SURGERY
Payer: MEDICARE

## 2022-08-15 DIAGNOSIS — M17.11 PRIMARY OSTEOARTHRITIS OF RIGHT KNEE: Primary | ICD-10-CM

## 2022-08-15 PROCEDURE — 6360000002 HC RX W HCPCS: Performed by: ORTHOPAEDIC SURGERY

## 2022-08-15 PROCEDURE — 2580000003 HC RX 258: Performed by: ORTHOPAEDIC SURGERY

## 2022-08-15 PROCEDURE — 6370000000 HC RX 637 (ALT 250 FOR IP): Performed by: ORTHOPAEDIC SURGERY

## 2022-08-15 PROCEDURE — 2709999900 HC NON-CHARGEABLE SUPPLY: Performed by: ORTHOPAEDIC SURGERY

## 2022-08-15 PROCEDURE — 27447 TOTAL KNEE ARTHROPLASTY: CPT | Performed by: ORTHOPAEDIC SURGERY

## 2022-08-15 PROCEDURE — 2720000010 HC SURG SUPPLY STERILE: Performed by: ORTHOPAEDIC SURGERY

## 2022-08-15 PROCEDURE — 97110 THERAPEUTIC EXERCISES: CPT

## 2022-08-15 PROCEDURE — 97535 SELF CARE MNGMENT TRAINING: CPT

## 2022-08-15 PROCEDURE — 97116 GAIT TRAINING THERAPY: CPT

## 2022-08-15 PROCEDURE — 2580000003 HC RX 258: Performed by: ANESTHESIOLOGY

## 2022-08-15 PROCEDURE — 6360000002 HC RX W HCPCS

## 2022-08-15 PROCEDURE — 2500000003 HC RX 250 WO HCPCS: Performed by: ANESTHESIOLOGY

## 2022-08-15 PROCEDURE — 3700000000 HC ANESTHESIA ATTENDED CARE: Performed by: ORTHOPAEDIC SURGERY

## 2022-08-15 PROCEDURE — 97166 OT EVAL MOD COMPLEX 45 MIN: CPT

## 2022-08-15 PROCEDURE — C1776 JOINT DEVICE (IMPLANTABLE): HCPCS | Performed by: ORTHOPAEDIC SURGERY

## 2022-08-15 PROCEDURE — 3600000014 HC SURGERY LEVEL 4 ADDTL 15MIN: Performed by: ORTHOPAEDIC SURGERY

## 2022-08-15 PROCEDURE — 7100000000 HC PACU RECOVERY - FIRST 15 MIN: Performed by: ORTHOPAEDIC SURGERY

## 2022-08-15 PROCEDURE — 73560 X-RAY EXAM OF KNEE 1 OR 2: CPT

## 2022-08-15 PROCEDURE — 3700000001 HC ADD 15 MINUTES (ANESTHESIA): Performed by: ORTHOPAEDIC SURGERY

## 2022-08-15 PROCEDURE — 2500000003 HC RX 250 WO HCPCS

## 2022-08-15 PROCEDURE — 7100000001 HC PACU RECOVERY - ADDTL 15 MIN: Performed by: ORTHOPAEDIC SURGERY

## 2022-08-15 PROCEDURE — C1713 ANCHOR/SCREW BN/BN,TIS/BN: HCPCS | Performed by: ORTHOPAEDIC SURGERY

## 2022-08-15 PROCEDURE — A4216 STERILE WATER/SALINE, 10 ML: HCPCS | Performed by: ORTHOPAEDIC SURGERY

## 2022-08-15 PROCEDURE — 97162 PT EVAL MOD COMPLEX 30 MIN: CPT

## 2022-08-15 PROCEDURE — C9290 INJ, BUPIVACAINE LIPOSOME: HCPCS | Performed by: ANESTHESIOLOGY

## 2022-08-15 PROCEDURE — 2500000003 HC RX 250 WO HCPCS: Performed by: ORTHOPAEDIC SURGERY

## 2022-08-15 PROCEDURE — 3600000004 HC SURGERY LEVEL 4 BASE: Performed by: ORTHOPAEDIC SURGERY

## 2022-08-15 PROCEDURE — 6370000000 HC RX 637 (ALT 250 FOR IP)

## 2022-08-15 PROCEDURE — 64447 NJX AA&/STRD FEMORAL NRV IMG: CPT | Performed by: ANESTHESIOLOGY

## 2022-08-15 PROCEDURE — 6360000002 HC RX W HCPCS: Performed by: ANESTHESIOLOGY

## 2022-08-15 DEVICE — IMPLANTABLE DEVICE: Type: IMPLANTABLE DEVICE | Site: KNEE | Status: FUNCTIONAL

## 2022-08-15 DEVICE — CEMENT BNE 40GM HI VISC RADPQ FOR REV SURG: Type: IMPLANTABLE DEVICE | Site: KNEE | Status: FUNCTIONAL

## 2022-08-15 DEVICE — TRAY TIB L79MM KNEE CO CHROM FIN MOD INTLOK VANGUARD: Type: IMPLANTABLE DEVICE | Site: KNEE | Status: FUNCTIONAL

## 2022-08-15 DEVICE — COMPONENT PAT DIA40MM THK10MM STD KNEE TI ALLY S STL UHMWPE: Type: IMPLANTABLE DEVICE | Site: KNEE | Status: FUNCTIONAL

## 2022-08-15 DEVICE — BEARING TIB L79MM THK10MM KNEE ARCM ANT STBL MOD COMPLT: Type: IMPLANTABLE DEVICE | Site: KNEE | Status: FUNCTIONAL

## 2022-08-15 RX ORDER — DEXAMETHASONE SODIUM PHOSPHATE 10 MG/ML
INJECTION, SOLUTION INTRAMUSCULAR; INTRAVENOUS PRN
Status: DISCONTINUED | OUTPATIENT
Start: 2022-08-15 | End: 2022-08-15 | Stop reason: SDUPTHER

## 2022-08-15 RX ORDER — CALCIUM CHLORIDE 100 MG/ML
INJECTION INTRAVENOUS; INTRAVENTRICULAR
Status: DISPENSED
Start: 2022-08-15 | End: 2022-08-15

## 2022-08-15 RX ORDER — ACETAMINOPHEN 500 MG
1000 TABLET ORAL ONCE
Status: COMPLETED | OUTPATIENT
Start: 2022-08-15 | End: 2022-08-15

## 2022-08-15 RX ORDER — ROCURONIUM BROMIDE 10 MG/ML
INJECTION, SOLUTION INTRAVENOUS PRN
Status: DISCONTINUED | OUTPATIENT
Start: 2022-08-15 | End: 2022-08-15 | Stop reason: SDUPTHER

## 2022-08-15 RX ORDER — GLYCOPYRROLATE 1 MG/5 ML
SYRINGE (ML) INTRAVENOUS PRN
Status: DISCONTINUED | OUTPATIENT
Start: 2022-08-15 | End: 2022-08-15 | Stop reason: SDUPTHER

## 2022-08-15 RX ORDER — ACETAMINOPHEN 325 MG/1
650 TABLET ORAL EVERY 6 HOURS
Status: DISCONTINUED | OUTPATIENT
Start: 2022-08-15 | End: 2022-08-16 | Stop reason: HOSPADM

## 2022-08-15 RX ORDER — DEXAMETHASONE SODIUM PHOSPHATE 10 MG/ML
10 INJECTION, SOLUTION INTRAMUSCULAR; INTRAVENOUS ONCE
Status: DISCONTINUED | OUTPATIENT
Start: 2022-08-15 | End: 2022-08-15 | Stop reason: HOSPADM

## 2022-08-15 RX ORDER — SODIUM CHLORIDE 9 MG/ML
INJECTION, SOLUTION INTRAVENOUS CONTINUOUS
Status: DISCONTINUED | OUTPATIENT
Start: 2022-08-15 | End: 2022-08-15

## 2022-08-15 RX ORDER — SODIUM CHLORIDE 0.9 % (FLUSH) 0.9 %
5-40 SYRINGE (ML) INJECTION EVERY 12 HOURS SCHEDULED
Status: DISCONTINUED | OUTPATIENT
Start: 2022-08-15 | End: 2022-08-15 | Stop reason: HOSPADM

## 2022-08-15 RX ORDER — MORPHINE SULFATE 2 MG/ML
1 INJECTION, SOLUTION INTRAMUSCULAR; INTRAVENOUS EVERY 5 MIN PRN
Status: DISCONTINUED | OUTPATIENT
Start: 2022-08-15 | End: 2022-08-15 | Stop reason: HOSPADM

## 2022-08-15 RX ORDER — SODIUM CHLORIDE, SODIUM LACTATE, POTASSIUM CHLORIDE, CALCIUM CHLORIDE 600; 310; 30; 20 MG/100ML; MG/100ML; MG/100ML; MG/100ML
INJECTION, SOLUTION INTRAVENOUS CONTINUOUS
Status: DISCONTINUED | OUTPATIENT
Start: 2022-08-15 | End: 2022-08-15

## 2022-08-15 RX ORDER — SODIUM CHLORIDE 0.9 % (FLUSH) 0.9 %
5-40 SYRINGE (ML) INJECTION PRN
Status: DISCONTINUED | OUTPATIENT
Start: 2022-08-15 | End: 2022-08-16 | Stop reason: HOSPADM

## 2022-08-15 RX ORDER — CALCIUM CHLORIDE 100 MG/ML
INJECTION INTRAVENOUS; INTRAVENTRICULAR PRN
Status: DISCONTINUED | OUTPATIENT
Start: 2022-08-15 | End: 2022-08-15 | Stop reason: ALTCHOICE

## 2022-08-15 RX ORDER — ACETAMINOPHEN 500 MG
TABLET ORAL
Status: COMPLETED
Start: 2022-08-15 | End: 2022-08-15

## 2022-08-15 RX ORDER — FENTANYL CITRATE 50 UG/ML
INJECTION, SOLUTION INTRAMUSCULAR; INTRAVENOUS PRN
Status: DISCONTINUED | OUTPATIENT
Start: 2022-08-15 | End: 2022-08-15 | Stop reason: SDUPTHER

## 2022-08-15 RX ORDER — SODIUM CHLORIDE 9 MG/ML
INJECTION, SOLUTION INTRAVENOUS PRN
Status: DISCONTINUED | OUTPATIENT
Start: 2022-08-15 | End: 2022-08-15 | Stop reason: HOSPADM

## 2022-08-15 RX ORDER — OXYCODONE HYDROCHLORIDE 5 MG/1
10 TABLET ORAL EVERY 4 HOURS PRN
Status: DISCONTINUED | OUTPATIENT
Start: 2022-08-15 | End: 2022-08-16 | Stop reason: HOSPADM

## 2022-08-15 RX ORDER — TRANEXAMIC ACID 10 MG/ML
INJECTION, SOLUTION INTRAVENOUS
Status: COMPLETED
Start: 2022-08-15 | End: 2022-08-15

## 2022-08-15 RX ORDER — SODIUM CHLORIDE 9 MG/ML
INJECTION, SOLUTION INTRAVENOUS PRN
Status: DISCONTINUED | OUTPATIENT
Start: 2022-08-15 | End: 2022-08-16 | Stop reason: HOSPADM

## 2022-08-15 RX ORDER — DIPHENHYDRAMINE HYDROCHLORIDE 50 MG/ML
12.5 INJECTION INTRAMUSCULAR; INTRAVENOUS
Status: DISCONTINUED | OUTPATIENT
Start: 2022-08-15 | End: 2022-08-15 | Stop reason: HOSPADM

## 2022-08-15 RX ORDER — ONDANSETRON 2 MG/ML
4 INJECTION INTRAMUSCULAR; INTRAVENOUS
Status: DISCONTINUED | OUTPATIENT
Start: 2022-08-15 | End: 2022-08-15 | Stop reason: HOSPADM

## 2022-08-15 RX ORDER — BUPIVACAINE HYDROCHLORIDE 5 MG/ML
INJECTION, SOLUTION EPIDURAL; INTRACAUDAL
Status: COMPLETED
Start: 2022-08-15 | End: 2022-08-15

## 2022-08-15 RX ORDER — OXYCODONE HYDROCHLORIDE 5 MG/1
5 TABLET ORAL EVERY 4 HOURS PRN
Status: DISCONTINUED | OUTPATIENT
Start: 2022-08-15 | End: 2022-08-16 | Stop reason: HOSPADM

## 2022-08-15 RX ORDER — ASPIRIN 81 MG/1
81 TABLET ORAL 2 TIMES DAILY
Status: DISCONTINUED | OUTPATIENT
Start: 2022-08-15 | End: 2022-08-16 | Stop reason: HOSPADM

## 2022-08-15 RX ORDER — SODIUM CHLORIDE 0.9 % (FLUSH) 0.9 %
5-40 SYRINGE (ML) INJECTION PRN
Status: DISCONTINUED | OUTPATIENT
Start: 2022-08-15 | End: 2022-08-15 | Stop reason: HOSPADM

## 2022-08-15 RX ORDER — SODIUM CHLORIDE 0.9 % (FLUSH) 0.9 %
5-40 SYRINGE (ML) INJECTION EVERY 12 HOURS SCHEDULED
Status: DISCONTINUED | OUTPATIENT
Start: 2022-08-15 | End: 2022-08-16 | Stop reason: HOSPADM

## 2022-08-15 RX ORDER — GENTAMICIN SULFATE 40 MG/ML
INJECTION, SOLUTION INTRAMUSCULAR; INTRAVENOUS
Status: DISPENSED
Start: 2022-08-15 | End: 2022-08-15

## 2022-08-15 RX ORDER — NEOSTIGMINE METHYLSULFATE 5 MG/5 ML
SYRINGE (ML) INTRAVENOUS PRN
Status: DISCONTINUED | OUTPATIENT
Start: 2022-08-15 | End: 2022-08-15 | Stop reason: SDUPTHER

## 2022-08-15 RX ORDER — SCOLOPAMINE TRANSDERMAL SYSTEM 1 MG/1
PATCH, EXTENDED RELEASE TRANSDERMAL
Status: DISPENSED
Start: 2022-08-15 | End: 2022-08-15

## 2022-08-15 RX ORDER — PROPOFOL 10 MG/ML
INJECTION, EMULSION INTRAVENOUS PRN
Status: DISCONTINUED | OUTPATIENT
Start: 2022-08-15 | End: 2022-08-15 | Stop reason: SDUPTHER

## 2022-08-15 RX ORDER — SCOLOPAMINE TRANSDERMAL SYSTEM 1 MG/1
1 PATCH, EXTENDED RELEASE TRANSDERMAL ONCE
Status: DISCONTINUED | OUTPATIENT
Start: 2022-08-15 | End: 2022-08-15 | Stop reason: HOSPADM

## 2022-08-15 RX ORDER — GABAPENTIN 300 MG/1
CAPSULE ORAL
Status: COMPLETED
Start: 2022-08-15 | End: 2022-08-15

## 2022-08-15 RX ORDER — SODIUM CHLORIDE, SODIUM LACTATE, POTASSIUM CHLORIDE, CALCIUM CHLORIDE 600; 310; 30; 20 MG/100ML; MG/100ML; MG/100ML; MG/100ML
INJECTION, SOLUTION INTRAVENOUS CONTINUOUS
Status: DISCONTINUED | OUTPATIENT
Start: 2022-08-15 | End: 2022-08-16 | Stop reason: HOSPADM

## 2022-08-15 RX ORDER — SODIUM CHLORIDE 9 MG/ML
25 INJECTION, SOLUTION INTRAVENOUS PRN
Status: DISCONTINUED | OUTPATIENT
Start: 2022-08-15 | End: 2022-08-15 | Stop reason: HOSPADM

## 2022-08-15 RX ORDER — OXYCODONE HYDROCHLORIDE AND ACETAMINOPHEN 5; 325 MG/1; MG/1
1 TABLET ORAL EVERY 4 HOURS PRN
Qty: 42 TABLET | Refills: 0 | Status: SHIPPED | OUTPATIENT
Start: 2022-08-15 | End: 2022-08-22

## 2022-08-15 RX ORDER — LIDOCAINE HYDROCHLORIDE 10 MG/ML
INJECTION, SOLUTION EPIDURAL; INFILTRATION; INTRACAUDAL; PERINEURAL PRN
Status: DISCONTINUED | OUTPATIENT
Start: 2022-08-15 | End: 2022-08-15 | Stop reason: SDUPTHER

## 2022-08-15 RX ORDER — GABAPENTIN 600 MG/1
300 TABLET ORAL ONCE
Status: DISCONTINUED | OUTPATIENT
Start: 2022-08-15 | End: 2022-08-15 | Stop reason: HOSPADM

## 2022-08-15 RX ORDER — TRANEXAMIC ACID 10 MG/ML
INJECTION, SOLUTION INTRAVENOUS PRN
Status: DISCONTINUED | OUTPATIENT
Start: 2022-08-15 | End: 2022-08-15 | Stop reason: SDUPTHER

## 2022-08-15 RX ORDER — ASPIRIN 81 MG/1
81 TABLET ORAL 2 TIMES DAILY
Qty: 90 TABLET | Refills: 1 | Status: SHIPPED | OUTPATIENT
Start: 2022-08-15

## 2022-08-15 RX ORDER — BUPIVACAINE HYDROCHLORIDE 5 MG/ML
INJECTION, SOLUTION EPIDURAL; INTRACAUDAL
Status: DISCONTINUED | OUTPATIENT
Start: 2022-08-15 | End: 2022-08-15 | Stop reason: SDUPTHER

## 2022-08-15 RX ORDER — ONDANSETRON 2 MG/ML
INJECTION INTRAMUSCULAR; INTRAVENOUS PRN
Status: DISCONTINUED | OUTPATIENT
Start: 2022-08-15 | End: 2022-08-15 | Stop reason: SDUPTHER

## 2022-08-15 RX ORDER — KETOROLAC TROMETHAMINE 15 MG/ML
15 INJECTION, SOLUTION INTRAMUSCULAR; INTRAVENOUS EVERY 6 HOURS PRN
Status: ACTIVE | OUTPATIENT
Start: 2022-08-15 | End: 2022-08-15

## 2022-08-15 RX ADMIN — TRANEXAMIC ACID 1 G: 10 INJECTION, SOLUTION INTRAVENOUS at 10:10

## 2022-08-15 RX ADMIN — ACETAMINOPHEN 650 MG: 325 TABLET ORAL at 12:59

## 2022-08-15 RX ADMIN — Medication 0.6 MG: at 10:12

## 2022-08-15 RX ADMIN — Medication 1000 MG: at 08:01

## 2022-08-15 RX ADMIN — SUGAMMADEX 200 MG: 100 INJECTION, SOLUTION INTRAVENOUS at 10:24

## 2022-08-15 RX ADMIN — Medication 3000 MG: at 09:02

## 2022-08-15 RX ADMIN — ACETAMINOPHEN 650 MG: 325 TABLET ORAL at 18:35

## 2022-08-15 RX ADMIN — BUPIVACAINE HYDROCHLORIDE 15 ML: 5 INJECTION, SOLUTION EPIDURAL; INTRACAUDAL; PERINEURAL at 11:18

## 2022-08-15 RX ADMIN — TRANEXAMIC ACID 1 G: 10 INJECTION, SOLUTION INTRAVENOUS at 09:14

## 2022-08-15 RX ADMIN — SODIUM CHLORIDE, POTASSIUM CHLORIDE, SODIUM LACTATE AND CALCIUM CHLORIDE: 600; 310; 30; 20 INJECTION, SOLUTION INTRAVENOUS at 10:33

## 2022-08-15 RX ADMIN — GABAPENTIN 300 MG: 300 CAPSULE ORAL at 08:00

## 2022-08-15 RX ADMIN — FENTANYL CITRATE 50 MCG: 50 INJECTION, SOLUTION INTRAMUSCULAR; INTRAVENOUS at 10:30

## 2022-08-15 RX ADMIN — DEXAMETHASONE SODIUM PHOSPHATE 5 MG: 10 INJECTION INTRAMUSCULAR; INTRAVENOUS at 09:10

## 2022-08-15 RX ADMIN — ONDANSETRON 4 MG: 2 INJECTION INTRAMUSCULAR; INTRAVENOUS at 09:49

## 2022-08-15 RX ADMIN — Medication 3 MG: at 10:13

## 2022-08-15 RX ADMIN — DEXTROSE MONOHYDRATE 3000 MG: 5 INJECTION INTRAVENOUS at 16:47

## 2022-08-15 RX ADMIN — SODIUM CHLORIDE, POTASSIUM CHLORIDE, SODIUM LACTATE AND CALCIUM CHLORIDE: 600; 310; 30; 20 INJECTION, SOLUTION INTRAVENOUS at 12:38

## 2022-08-15 RX ADMIN — FENTANYL CITRATE 50 MCG: 50 INJECTION, SOLUTION INTRAMUSCULAR; INTRAVENOUS at 08:48

## 2022-08-15 RX ADMIN — ASPIRIN 81 MG: 81 TABLET, COATED ORAL at 19:57

## 2022-08-15 RX ADMIN — ACETAMINOPHEN 1000 MG: 500 TABLET ORAL at 08:01

## 2022-08-15 RX ADMIN — PROPOFOL 200 MG: 10 INJECTION, EMULSION INTRAVENOUS at 08:52

## 2022-08-15 RX ADMIN — ASPIRIN 81 MG: 81 TABLET, COATED ORAL at 13:00

## 2022-08-15 RX ADMIN — BUPIVACAINE 12 ML: 13.3 INJECTION, SUSPENSION, LIPOSOMAL INFILTRATION at 11:18

## 2022-08-15 RX ADMIN — LIDOCAINE HYDROCHLORIDE 50 MG: 10 INJECTION, SOLUTION EPIDURAL; INFILTRATION; INTRACAUDAL; PERINEURAL at 08:52

## 2022-08-15 RX ADMIN — SODIUM CHLORIDE, POTASSIUM CHLORIDE, SODIUM LACTATE AND CALCIUM CHLORIDE: 600; 310; 30; 20 INJECTION, SOLUTION INTRAVENOUS at 18:21

## 2022-08-15 RX ADMIN — FENTANYL CITRATE 50 MCG: 50 INJECTION, SOLUTION INTRAMUSCULAR; INTRAVENOUS at 08:52

## 2022-08-15 RX ADMIN — SODIUM CHLORIDE, POTASSIUM CHLORIDE, SODIUM LACTATE AND CALCIUM CHLORIDE: 600; 310; 30; 20 INJECTION, SOLUTION INTRAVENOUS at 08:30

## 2022-08-15 RX ADMIN — ROCURONIUM BROMIDE 40 MG: 10 INJECTION, SOLUTION INTRAVENOUS at 08:52

## 2022-08-15 ASSESSMENT — PAIN DESCRIPTION - PAIN TYPE
TYPE: SURGICAL PAIN;ACUTE PAIN
TYPE: SURGICAL PAIN;ACUTE PAIN

## 2022-08-15 ASSESSMENT — PAIN SCALES - GENERAL
PAINLEVEL_OUTOF10: 2
PAINLEVEL_OUTOF10: 4
PAINLEVEL_OUTOF10: 1
PAINLEVEL_OUTOF10: 0
PAINLEVEL_OUTOF10: 1
PAINLEVEL_OUTOF10: 2
PAINLEVEL_OUTOF10: 2
PAINLEVEL_OUTOF10: 4

## 2022-08-15 ASSESSMENT — PAIN DESCRIPTION - ONSET
ONSET: ON-GOING
ONSET: ON-GOING

## 2022-08-15 ASSESSMENT — PAIN DESCRIPTION - FREQUENCY
FREQUENCY: CONTINUOUS
FREQUENCY: CONTINUOUS

## 2022-08-15 ASSESSMENT — PAIN - FUNCTIONAL ASSESSMENT
PAIN_FUNCTIONAL_ASSESSMENT: PREVENTS OR INTERFERES SOME ACTIVE ACTIVITIES AND ADLS
PAIN_FUNCTIONAL_ASSESSMENT: 0-10
PAIN_FUNCTIONAL_ASSESSMENT: PREVENTS OR INTERFERES SOME ACTIVE ACTIVITIES AND ADLS

## 2022-08-15 ASSESSMENT — PAIN DESCRIPTION - LOCATION
LOCATION: KNEE

## 2022-08-15 ASSESSMENT — PAIN DESCRIPTION - DESCRIPTORS
DESCRIPTORS: ACHING
DESCRIPTORS: ACHING

## 2022-08-15 ASSESSMENT — PAIN DESCRIPTION - ORIENTATION
ORIENTATION: RIGHT

## 2022-08-15 NOTE — ANESTHESIA PRE PROCEDURE
Department of Anesthesiology  Preprocedure Note       Name:  Katerine Amaya   Age:  80 y.o.  :  1940                                          MRN:  [id number]         Date:  8/15/2022      Surgeon: Brian Woodson):  Cary Runner, MD    Procedure: Procedure(s):  RIGHT KNEE TOTAL ARTHROPLASTY WITH BIOMET AND GPS    Medications prior to admission:   Prior to Admission medications    Medication Sig Start Date End Date Taking? Authorizing Provider   aspirin EC 81 MG EC tablet Take 1 tablet by mouth in the morning and 1 tablet in the evening. 8/15/22  Yes Cary Runner, MD   oxyCODONE-acetaminophen (PERCOCET) 5-325 MG per tablet Take 1 tablet by mouth every 4 hours as needed for Pain for up to 7 days. Intended supply: 7 days. Take lowest dose possible to manage pain 8/15/22 8/22/22 Yes Cary Runner, MD   amLODIPine (NORVASC) 10 MG tablet Take 1 tablet by mouth in the morning. 8/12/22 11/10/22  Anushka Shaw MD   Cholecalciferol (VITAMIN D3) 125 MCG (5000 UT) CAPS Take 1 capsule by mouth in the morning. 8/12/22 11/10/22  Anushka Shaw MD   DULoxetine (CYMBALTA) 30 MG extended release capsule Take 1 capsule by mouth in the morning. 8/12/22 11/10/22  Anushka Shaw MD   furosemide (LASIX) 40 MG tablet Take 1 tablet by mouth in the morning. 8/12/22 11/10/22  Anushka Shaw MD   liothyronine (CYTOMEL) 5 MCG tablet Take 1 tablet by mouth in the morning. 8/12/22 11/10/22  Anushka Shaw MD   potassium chloride (KLOR-CON M) 20 MEQ extended release tablet Take 2 tablets by mouth in the morning. 8/12/22 11/10/22  Anushka Shaw MD   Handicap Placard MISC by Does not apply route Dx: Knee OA, Lumbar spine DDD    Duration: one year till 23   Anushka Shaw MD   chlorthalidone (HYGROTEN) 50 MG tablet Take 1 tablet by mouth in the morning. 8/12/22 11/10/22  Anushka Shaw MD   docusate sodium (COLACE) 100 MG capsule Take 1 capsule by mouth in the morning and 1 capsule before bedtime.  Hypertension, essential I10    Renal insufficiency N28.9    Fatigue R53.83    Primary osteoarthritis of both knees M17.0    On potassium wasting diuretic therapy Z79.899    Edema R60.9    Bloating R14.0    Arthritis of right knee M17.11    Sleep apnea G47.30    Morbid obesity with body mass index (BMI) of 40.0 or higher ([facility]) E66.01    Lumbar radiculopathy M54.16    Depression F32. A    Positive depression screening Z13.31    Primary osteoarthritis of left knee M17.12    Prediabetes R73.03    Acquired trigger finger M65.30    Vitamin D deficiency E55.9    Primary osteoarthritis of right hip M16.11    Other constipation K59.09    Black stool K92.1    Trapezius muscle spasm M62.838    Thoracic spine pain M54.6    Cervical stenosis of spinal canal M48.02    DDD (degenerative disc disease), cervical M50.30    Vitamin B12 deficiency E53.8    Numbness R20.0    Localized edema R60.0    Osteoarthritis of spine with radiculopathy, lumbar region M47.26    Spondylosis of thoracic region without myelopathy or radiculopathy M47.814    Ingrown toenail L60.0    Primary osteoarthritis of left hip M16.12    Stage 3b chronic kidney disease ([facility]) N18.32    Other chest pain R07.89    Peripheral vascular disease, unspecified ([facility]) I73.9    Cold intolerance R68.89    Subclinical hypothyroidism E03.8    Status post lumbar laminectomy Z98.890    Normocytic hypochromic anemia D50.9    Primary osteoarthritis of right knee M17.11       Past Medical History:        Diagnosis Date    Abnormal renal function     Allergic contact dermatitis due to other agents 08/27/2020    Anemia     Cellulitis of buttock 07/22/2021    Depression     Dysmetabolic syndrome     Dysuria 06/28/2021    Edema     legs    Elevated glucose 02/07/2016    Erythrasma 07/22/2021    Health care maintenance 04/14/2016    Hypertension     Intertrigo 08/05/2021    Leukopenia     Obesity     Osteoarthritis     Other constipation 2019    Other specified counseling 2016    PVD [facility]peripheral vascular disease) ([facility]     Renal insufficiency 10/21/2015    Skin maceration 2021    Sleep apnea     with severe desaturations    Trigger finger of right thumb 2016    Has seen Ortho and got injection, doing better     Viral disease exposure 2020       Past Surgical History:        Procedure Laterality Date    BACK SURGERY  2005    CATARACT EXTRACTION Left     COLONOSCOPY      EYE SURGERY      cataract    HERNIA REPAIR      age 12   [de-identified] LUMBAR LAMINECTOMY  2022    with fusion   [address]      not spine- hair follicle    TONSILLECTOMY         Social History:    Social History     Tobacco Use    Smoking status: Former     Types: Cigarettes     Quit date: 1961     Years since quittin.0    Smokeless tobacco: Never   Substance Use Topics    Alcohol use: No     Alcohol/week: 0.0 standard drinks                                Counseling given: Not Answered      Vital Signs (Current):   Vitals:    08/15/22 0734   Weight: 274 lb (124.3 kg)   Height: 5' 9\" (1.753 m)                                              BP Readings from Last 3 Encounters:   22 (!) (P) 140/83   22 (!) 151/68   22 126/68       NPO Status: Time of last liquid consumption: 0400 (sip of water with med)                                                 Date of last liquid consumption: 08/15/22                             BMI:   Wt Readings from Last 3 Encounters:   08/15/22 274 lb (124.3 kg)   22 (P) 279 lb 8 oz (126.8 kg)   22 280 lb (127 kg)     Body mass index is 40.46 kg/m².     CBC:   Lab Results   Component Value Date/Time    WBC 5.1 2022 04:05 PM    RBC 3.85 2022 04:05 PM    HGB 11.5 2022 04:05 PM    HCT 36.1 2022 04:05 PM    MCV 93.8 2022 04:05 PM    RDW 12.0 2022 04:05 PM     2022 04:05 PM       CMP:   Lab Results   Component Value Date/Time     08/02/2022 04:05 PM    K 3.6 08/02/2022 04:05 PM     08/02/2022 04:05 PM    CO2 28 08/02/2022 04:05 PM    BUN 16 08/02/2022 04:05 PM    CREATININE 1.33 08/02/2022 04:05 PM    GFRAA >60 08/02/2022 04:05 PM    LABGLOM 52 08/02/2022 04:05 PM    GLUCOSE 107 08/02/2022 04:05 PM    PROT 7.6 03/18/2022 11:13 AM    CALCIUM 9.3 08/02/2022 04:05 PM    BILITOT 0.49 03/18/2022 11:13 AM    ALKPHOS 86 03/18/2022 11:13 AM    AST 22 03/18/2022 11:13 AM    ALT 20 03/18/2022 11:13 AM       POC Tests: No results for input(s): POCGLU, POCNA, POCK, POCCL, POCBUN, POCHEMO, POCHCT in the last 72 hours. Coags: No results found for: PROTIME, INR, APTT    HCG (If Applicable): No results found for: PREGTESTUR, PREGSERUM, HCG, HCGQUANT     ABGs: No results found for: PHART, PO2ART, LSK5OWG, SSU8AKI, BEART, P7CGAAMI     Type & Screen (If Applicable):  No results found for: LABABO, LABRH    Drug/Infectious Status (If Applicable):  Lab Results   Component Value Date/Time    HEPCAB NONREACTIVE 03/22/2021 01:07 PM       COVID-19 Screening (If Applicable): No results found for: COVID19        Anesthesia Evaluation  Patient summary reviewed and Nursing notes reviewed no history of anesthetic complications:   Airway: Mallampati: III  TM distance: >3 FB   Neck ROM: full  Mouth opening: > = 3 FB   Dental: normal exam         Pulmonary:normal exam  breath sounds clear to auscultation  (+) sleep apnea:                             Cardiovascular:    (+) hypertension: no interval change,       ECG reviewed  Rhythm: regular  Rate: normal  Echocardiogram reviewed                  Neuro/Psych:   (+) neuromuscular disease:, psychiatric history:depression/anxiety             GI/Hepatic/Renal:   (+) renal disease: CRI and no interval change, morbid obesity          Endo/Other:    (+) hypothyroidism: arthritis: OA., .                 Abdominal:   (+) obese,     Abdomen: soft. Vascular:   + PVD, aortic or cerebral, .        Other Findings:           Anesthesia Plan      general     ASA 3     (GA and adductor canal block  Cleared for the surgery)  Induction: intravenous. Anesthetic plan and risks discussed with patient. Plan discussed with CRNA.           Post-op pain plan if not by surgeon: single peripheral nerve block            Sherrill Freitas MD   8/15/2022

## 2022-08-15 NOTE — DISCHARGE INSTRUCTIONS
DISCHARGE INSTRUCTIONS  Caring for yourself after joint replacement surgery (Total Hip and Total Knee Replacement)    Activity and Therapy  Receive physical therapy three times per week. (Pain medication one hour prior to therapy)   Perform PT exercises on own when not receiving home or outpatient PT. Ideally exercises should be at least two times a day. Increase level of activity and ambulation each day. Perform deep breathing exercises daily. Patient provides self-care when possible. Work on Range of motion for Total knee patients. No pillow under the knee for Total knee patients. Elevate the surgical leg when seated. Diet:  Increase oral intake of fruits, fiber and water to prevent constipation. Drink fluids frequently and take stool softeners to aid in bowel motility. Increase protein intake/reduce high-sugar intake to help promote healing and prevent infection. Incision Care:  Keep Aquacel or other dressing intact until seen and removed by surgeon, unless saturated, in which case, call surgeon and request instructions. If dressing falls off, call surgeon. [facility] on in the am and off in the pm to reduce swelling. Ice affected area four times a day, for twenty minutes. Pain Medications and Anticoagulant  You have been place on an anticoagulant to prevent blood clots. Take this medication exactly as prescribed. Be alert for signs of bleeding. Take care not to injure yourself. You have been provided pain medicine to control your pain. Do not take more narcotics than prescribed. You may begin weaning from narcotics as your pain level improves by decreasing the amount or frequency of the narcotics. You may also take plain acetaminophen as an alternate to the narcotics. Never exceed the recommended dosage. Ice, rest and elevating the surgical limb also help with pain control.       When to call the Surgeon:  Increased redness, warmth, drainage, swelling or odor from incision site.  Temperature above 101 degrees. Pain not controlled by prescribed medications. Calf tenderness, swelling, or redness. Shortness of breath or chest pain. If you cannot urinate and have been consuming liquids  Any incision or surgical-related concerns. Call surgeon with concerns PRIOR TO going to hospital.    Normal Conditions:  Swelling in the operative leg: this should reduce over time. Bruising behind the knee and around surgical area. Some post-operative pain. Constipation related to pain medications/decreased mobility. (Increase fiber & water intake.)   Slight warmth of operative leg. Fatigue and moderate pain after therapy. Numbness near the incision site. Nausea - take pain medications with food. Cut back on pain medication. NOTE: Remember to go to follow-up orthopedic appointment with surgeon      Discharge instructions video: [email or web address]

## 2022-08-15 NOTE — H&P
ORTHOPEDIC PATIENT EVALUATION      HPI / Chief Complaint  Genet Esparza is a 80 y.o. male who presents for right total knee arthroplasty having failed conservative treatment    Past Medical History  Donny  has a past medical history of Abnormal renal function, Allergic contact dermatitis due to other agents, Anemia, Cellulitis of buttock, Depression, Dysmetabolic syndrome, Dysuria, Edema, Elevated glucose, Erythrasma, Health care maintenance, Hypertension, Intertrigo, Leukopenia, Obesity, Osteoarthritis, Other constipation, Other specified counseling, PVD (peripheral vascular disease) ([address] Renal insufficiency, Skin maceration, Sleep apnea, Trigger finger of right thumb, and Viral disease exposure. Past Surgical History  Donny  has a past surgical history that includes back surgery (01/01/2005); Neck surgery; lumbar laminectomy (04/21/2022); Colonoscopy; Cataract extraction (Left); eye surgery; hernia repair; and Tonsillectomy.     Current Medications  Current Facility-Administered Medications   Medication Dose Route Frequency Provider Last Rate Last Admin    0.9 % sodium chloride infusion   IntraVENous Continuous Juarez Moss MD        lactated ringers infusion   IntraVENous Continuous Juarez Moss MD        sodium chloride flush 0.9 % injection 5-40 mL  5-40 mL IntraVENous 2 times per day Juarez Moss MD        sodium chloride flush 0.9 % injection 5-40 mL  5-40 mL IntraVENous PRN Juarez Moss MD        0.9 % sodium chloride infusion   IntraVENous PRN Juarez Moss MD        sodium chloride flush 0.9 % injection 5-40 mL  5-40 mL IntraVENous 2 times per day Darwin Caal MD        sodium chloride flush 0.9 % injection 5-40 mL  5-40 mL IntraVENous PRN Darwin Caal MD        0.9 % sodium chloride infusion   IntraVENous PRN Darwin Caal MD        ceFAZolin (ANCEF) 3000 mg in dextrose 5 % 100 mL IVPB  3,000 mg IntraVENous On Call to 16 Jensen Coelho MD        dexamethasone (PF) (DECADRON) injection 10 mg  10 mg discharge and utilizing aspirin 81 mg twice daily for DVT prophylaxis    This note is created with the assistance of a speech recognition program.  While intending to generate adocument that actually reflects the content of the visit, the document can still have some errors including those of syntax and sound a like substitutions which may escape proof reading. It such instances, actual meaningcan be extrapolated by contextual diversion.

## 2022-08-15 NOTE — CARE COORDINATION
the patient's individualized plan of care/goals, treatment preferences, and shares the quality data associated with the providers?   Yes       D/c home with wife, requesting [address] for home care- referral sent    21 [phone] with Shiela Current at [address] [place] [address] Street, they can accept patient

## 2022-08-15 NOTE — PROGRESS NOTES
Physical Therapy  Facility/Department: [facility] ICU  Physical Therapy Initial Assessment    Name: Nadir Kaur  : 1940  MRN: [id number]  Date of Service: 8/15/2022    Pt presented for elective right TKA with Dr Facundo Shearer this morning. Discharge Recommendations:  Patient would benefit from continued therapy after discharge   PT Equipment Recommendations  Equipment Needed: No  Other: Pt owns a RW and will need to use device at all times. Patient Diagnosis(es): The encounter diagnosis was Primary osteoarthritis of right knee. Past Medical History:  has a past medical history of Abnormal renal function, Allergic contact dermatitis due to other agents, Anemia, Cellulitis of buttock, Depression, Dysmetabolic syndrome, Dysuria, Edema, Elevated glucose, Erythrasma, Health care maintenance, Hypertension, Intertrigo, Leukopenia, Obesity, Osteoarthritis, Other constipation, Other specified counseling, PVD (peripheral vascular disease) ([facility] [address] Renal insufficiency, Skin maceration, Sleep apnea, Trigger finger of right thumb, and Viral disease exposure. Past Surgical History:  has a past surgical history that includes back surgery (2005); Neck surgery; lumbar laminectomy (2022); Colonoscopy; Cataract extraction (Left); eye surgery; hernia repair; Tonsillectomy; and Total knee arthroplasty (Right, 08/15/2022). Assessment   Body Structures, Functions, Activity Limitations Requiring Skilled Therapeutic Intervention: Decreased functional mobility ; Decreased ROM; Decreased strength;Decreased endurance;Decreased balance  Assessment: Pt with impaired mobility requiring up to max Ax2 for transfers this date and then able to ambulate 15ft CGA with RW. Pt currently would be unsafe to return to his prior living arrangements but will continue to assess as able to further attempt mobility. Pt will benefit from further therapy at discharge.   Therapy Prognosis: Good  Decision Making: Medium Complexity  Requires PT Follow-Up: Yes  Activity Tolerance  Activity Tolerance: Patient limited by endurance; Patient limited by fatigue;Patient tolerated treatment well     Plan   Plan  Plan: 2 times a day 7 days a week  Current Treatment Recommendations: Strengthening, Balance training, ROM, Functional mobility training, Transfer training, Endurance training, Gait training, Stair training, Patient/Caregiver education & training, Home exercise program, Safety education & training, Therapeutic activities  Safety Devices  Type of Devices: Call light within reach, Chair alarm in place, Gait belt, Left in chair, Nurse notified  Restraints  Restraints Initially in Place: No     Restrictions  Restrictions/Precautions  Restrictions/Precautions: Fall Risk, Weight Bearing  Required Braces or Orthoses?: No  Lower Extremity Weight Bearing Restrictions  Right Lower Extremity Weight Bearing: Weight Bearing As Tolerated  Position Activity Restriction  Other position/activity restrictions: S/p R TKA, POD#0 Dr Jaxson Ramirez  Patient assessed for rehabilitation services?: Yes  Response To Previous Treatment: Not applicable  Family / Caregiver Present: Yes (family presented at end of session)  Follows Commands: Within Functional Limits  Subjective  Subjective: Pt supine in bed and agreeable to therapy this afternoon. Pt reports 1-3/10 pain in his right knee (it increases with mobility).          Social/Functional History  Social/Functional History  Lives With: Spouse  Type of Home: House  Home Layout: One level  Home Access: Stairs to enter without rails  Entrance Stairs - Number of Steps: one small step at doorway  Bathroom Shower/Tub: Walk-in shower  Bathroom Toilet: Handicap height  Bathroom Equipment: Shower chair, Grab bars in shower, Grab bars around toilet  Bathroom Accessibility: Accessible  Home Equipment: [employer], 4 wheeled, Walker, rolling, Cane  Has the patient had two or more falls in the past year or any fall with injury in the past year?: No  Receives Help From: Family (Pt reports supportive wife however states wife does work)  ADL Assistance: Independent  Homemaking Assistance: Needs assistance  Homemaking Responsibilities: No (wife performs all)  Ambulation Assistance: Independent (pt reports use of RW for the last ~6 months)  Transfer Assistance: Independent  Active : Yes  Mode of Transportation: Truck, SUV  Occupation: Retired  Type of Occupation: Previously worked at [employer]: Enjoys riding his motorcycle, reading, and listening to music  [address]: Impaired  Vision Exceptions: Wears glasses at all times  Hearing  Hearing: Exceptions to [facility]  Hearing Exceptions: Bilateral hearing aid;Hard of hearing/hearing concerns (pt reports hearing aids are currently out for repair)    Cognition   Orientation  Overall Orientation Status: Within Functional Limits  Cognition  Overall Cognitive Status: WFL     Objective        Gross Assessment  Sensation: Intact (Pt denies any numnbess or tingling and reports equal sensation to light touch to lukas LEs- pt did have a sensory nerve block following surgery.)     AROM RLE (degrees)  RLE AROM: WFL  R Knee Flexion 0-145: 77 degrees AROM seated  R Knee Extension 0: Lacking 5 degrees supine  AROM LLE (degrees)  LLE AROM : WFL  AROM RUE (degrees)  RUE AROM : WFL  AROM LUE (degrees)  LUE AROM : WFL  Strength RLE  Strength RLE: Exception  R Hip Flexion: At least;3/5  R Knee Flexion: At least;3/5  R Knee Extension: At least;3/5  R Ankle Dorsiflexion: 4+/5  R Ankle Plantar flexion: 4+/5  Strength LLE  Strength LLE: Exception  L Hip Flexion: At least;3/5  L Knee Flexion: At least;3/5  L Knee Extension:  At least;3/5  L Ankle Dorsiflexion: 4+/5  L Ankle Plantar Flexion: 4+/5  Strength RUE  Comment: Co evaluation with OT-see OT evaluation for full UE assessment  Strength LUE  Comment: Co evaluation with OT-see OT evaluation for full UE RLE  Short term goal 5: Pt to demonstrate independent understanding of basic supine/seated HEP for R TKA to allow for continued ROM/strengthening at discharge       Education  Patient Education  Education Given To: Patient  Education Provided: Role of Therapy;Plan of Care;Transfer Training;Home Exercise Program  Education Method: Demonstration;Verbal  Barriers to Learning: None  Education Outcome: Verbalized understanding;Demonstrated understanding      Therapy Time   Individual Concurrent Group Co-treatment   Time In 1416         Time Out 1449         Minutes 33         Timed Code Treatment Minutes: [facility], PT

## 2022-08-15 NOTE — ANESTHESIA PROCEDURE NOTES
Peripheral Block    Patient location during procedure: PACU  Reason for block: post-op pain management and at surgeon's request  Start time: 8/15/2022 11:18 AM  End time: 8/15/2022 11:20 AM  Staffing  Performed: anesthesiologist   Anesthesiologist: Aliyah Sharp MD  Preanesthetic Checklist  Completed: patient identified, IV checked, site marked, risks and benefits discussed, surgical/procedural consents, equipment checked, pre-op evaluation, timeout performed, anesthesia consent given, oxygen available, monitors applied/VS acknowledged, fire risk safety assessment completed and verbalized and blood product R/B/A discussed and consented  Peripheral Block   Patient position: supine  Prep: ChloraPrep  Provider prep: mask and sterile gloves  Patient monitoring: cardiac monitor, continuous pulse ox, frequent blood pressure checks, IV access, oxygen and responsive to questions  Block type: Femoral  Adductor canal  Laterality: right  Injection technique: single-shot  Guidance: ultrasound guided  Local infiltration: bupivacaine  Infiltration strength: 0.5 %  Local infiltration: bupivacaine  Dose: 2 mL    Needle   Needle type: insulated echogenic nerve stimulator needle   Needle gauge: 20 G  Needle localization: anatomical landmarks and ultrasound guidance  Needle insertion depth: 5 cm  Test dose: negative  Expiration date: 4/30/2027  Kit: [id number]  Lot Number: [id number]  Needle length: 10 cm  Assessment   Injection assessment: negative aspiration for heme, no paresthesia on injection, local visualized surrounding nerve on ultrasound and no intravascular symptoms  Paresthesia pain: none  Slow fractionated injection: yes  Hemodynamics: stable  Outcomes: patient tolerated procedure well    Medications Administered  bupivacaine (PF) 0.5 % - Perineural   15 mL - 8/15/2022 11:18:00 AM  bupivacaine liposome 1.3 % - Perineural   12 mL - 8/15/2022 11:18:00 AM

## 2022-08-15 NOTE — OP NOTE
Operative Note      Patient: Delmi Rand  YOB: 1940  MRN: [id number]    Date of Procedure: 8/15/2022    Pre-Op Diagnosis: RIGHT KNEE DEGENERATIVE JOINT DISEASE    Post-Op Diagnosis: Same       Procedure(s):  RIGHT KNEE TOTAL ARTHROPLASTY WITH BIOMET AND GPS    Surgeon(s):  Ulices Gunn MD    Assistant:   Resident: DO Dileep Cordoba CST  Anesthesia: General    Estimated Blood Loss (mL): Minimal    Complications: None    Specimens:   * No specimens in log *    Implants:  Implant Name Type Inv. Item Serial No.  Lot No. LRB No. Used Action   CEMENT BNE 40GM HI VISC RADPQ FOR REV SURG - [id number]  CEMENT BNE 40GM HI VISC RADPQ FOR REV SURG  [employer]- [id number] Right 1 Implanted   CEMENT BNE 40GM HI VISC RADPQ FOR REV SURG - [id number]  CEMENT BNE 40GM HI VISC RADPQ FOR REV SURG  [employer]-WD [id number] Right 1 Implanted   TRAY TIB L79MM KNEE CO CHROM FIN MOD INTLOK VANGUARD - [id number]  TRAY TIB L79MM KNEE CO CHROM FIN MOD INTLOK VANGUARD  [employer]- [id number] Right 1 Implanted   COMPONENT PAT [id number] STD KNEE TI ALLY S STL UHMWPE - [id number]  COMPONENT PAT [id number] STD KNEE TI ALLY S STL UHMWPE  [employer]- [id number] Right 1 Implanted   IMPL KNEE FEM INTERLOCK 75MM RT - [id number] Knee IMPL KNEE FEM INTERLOCK 75MM RT  [employer]PMM [id number] Right 1 Implanted   BEARING TIB L79MM [id number] KNEE ARCM ANT STBL MOD COMPLT - [id number]  BEARING TIB L79MM [id number] KNEE ARCM ANT STBL MOD COMPLT  [employer]- [id number] Right 1 Implanted         Drains: * No LDAs found *    Findings: Severe degenerative joint disease right knee    Detailed Description of Procedure:     Patient is a 80 y.o. male with a long standing history of right DJD of the knee. Patient has failed all types of conservative treatment included physical therapy, weight-loss counseling, NSAIDS, and injections.  The patient has significant restriction of activities of daily living and/or work/job place abilities, and, therefore, has been counseled for TKA. Patient is aware of all the risks and benefits as highlighted in the surgical consent form. The patient was given 3 grams of Ancef in the holding area as well as an adductor canal block. The patient was then taken to the operating suite where general anesthesia was administered. A tourniquet was applied to effected leg and then prepped and draped in the usual sterile fashion. Time out was called to verify laterality. The leg was examined   and the tourniquet inflated to 350 mm of Hg. Midline incision was utilized and taken down to the joint capsule where a mid-vastus approach to the knee was performed. After a complete synovectomy, the patella was elevated, calibrated for thickness, and the above sized, patellar triple pegged drills guide positioned medially and then drilled. Atrial patellar button was positioned in order to re-established the original thickness. This was then replaced with a protective patellar plate. The knee was then flexed and revealed significant  arthrosis. Osteophytes were removed via rongeur. A drill hole was made in the distal femur and the femoral canal was then irrigated and suctioned. A fluted IM gale was inserted and a distal femoral cut was made at 5 degrees of valgus at the +4 setting. The proximal tibia was then exposed after resection of the ACL and lateral meniscus. An extra-medullary tibial cutting jug was then aligned in reference to the tibia tubercle and ankle mortise and positional with a slight posterior slope. The cut was made with minimal cutting of the lowest depth of the tibial wear and the fragment removed. The distal femur was then approached and femoral sizing guide with 3 degrees of external rotation was placed flush with the surface and drill holes made and femoral size determined.  The appropriate size cutting jig was then placed and anterior, posterior, and chamfer cuts made with an oscillating saw. A laminar  was inserted with care to avoid damaging the MCL. Posterior osteophytes were removed and the capsule stripped from the posterior femoral condyles. The capsule was then injected with the orthopedic cocktail at this time. The tibial cut was then assessed with a baseplate and alignment gale to assure proper cut. Spacer blocks were then inserted and the knee was assessed to determine the amount of soft tissue release and osteophyte removal necessary  to establish symmetric flexion and extension gaps. The tibia was then elevated, and the above sized tibial plate was positioned for proper external rotation with an alignment gale down the middle-third of the tibial tubercles. This was pinned in place, the proximal tibialis reamed, the fins were then repacked. The appropriate size femur was positioned and various size of the polyethylene trials were inserted. The knee was assessed for  ROM and patellar tracking. Satisfied with this, this distal femoral logs were drilled and then all the trial components were removed. The knee was irrigated and dried while the cement was prepared. Cement was applied to to both implant and cut surfaces and the implants impacted and the compression with one size larger poly inserted and excess cement removed. The patella was placed and compressed as well. The knee was placed in full extension and then injected with the remainder  of the 100ml of the orthopedic cocktail. The Irrisept lavage was carried out for the 1 minutes. This was then irrigated and a permanent polyethylene was insert was injected with platelet rich/poor plasma and the tourniquet was released at 49 minutes. Hemostasis was excellent. The knee was closed with a #1 Strata-Fix and vastus with a double-layer of O-Vicryl suture.  The subcutaneous tissue closed with a 2-0 Monocryl Strata-Fix and then a Zip-line used for the skin. This was covered with adaptic and Aquacel dressing and dressed with a large 6-inch Ace dressing from toes to mid-thigh. The patient was awakened and taken to PACU in good condition.       Electronically signed by Radha Powell MD on 8/15/2022 at 10:15 AM

## 2022-08-15 NOTE — PROGRESS NOTES
Occupational Therapy  Facility/Department: [facility] [address] [place] [address]  Occupational Therapy Initial Assessment    Name: Elfego Choe  : 1940  MRN: [id number]  Date of Service: 8/15/2022    Pt present s/p R TKA with Dr Jacqueline Copeland, POD#0. Discharge Recommendations:  Patient would benefit from continued therapy after discharge        Patient Diagnosis(es): The encounter diagnosis was Primary osteoarthritis of right knee. Past Medical History:  has a past medical history of Abnormal renal function, Allergic contact dermatitis due to other agents, Anemia, Cellulitis of buttock, Depression, Dysmetabolic syndrome, Dysuria, Edema, Elevated glucose, Erythrasma, Health care maintenance, Hypertension, Intertrigo, Leukopenia, Obesity, Osteoarthritis, Other constipation, Other specified counseling, PVD (peripheral vascular disease) ([facility] [address] Renal insufficiency, Skin maceration, Sleep apnea, Trigger finger of right thumb, and Viral disease exposure. Past Surgical History:  has a past surgical history that includes back surgery (2005); Neck surgery; lumbar laminectomy (2022); Colonoscopy; Cataract extraction (Left); eye surgery; hernia repair; Tonsillectomy; and Total knee arthroplasty (Right, 08/15/2022). Assessment   Performance deficits / Impairments: Decreased functional mobility ; Decreased ADL status; Decreased safe awareness;Decreased balance;Decreased high-level IADLs;Decreased endurance  Assessment: Pt currently limited in performing ADL tasks due to above noted deficits, most significantly pt's ADL performance is limited due to pt requiring mod A-max A x2 to participate in functional transfers. Pt to benefit from continued therapy services while hospitalized to maximize pt's safety and independence in performing functional tasks.  At this time, pt unsafe to return to prior living arrangements as high levels of physical assist of two persons are required for functional transfer performance, will continue to assess pending pt's progress with therapy services while hospitalized. Prognosis: Good  Decision Making: Medium Complexity  REQUIRES OT FOLLOW-UP: Yes  Activity Tolerance  Activity Tolerance: Patient limited by fatigue      Safety Devices  Type of Devices: Call light within reach; Chair alarm in place;Gait belt;Left in chair;Nurse notified  Restraints  Restraints Initially in Place: No    Plan   Plan  Times per Week: 6-7x/wk  Times per Day: Daily (1-2x)  Current Treatment Recommendations: Strengthening, Balance training, Functional mobility training, Endurance training, Patient/Caregiver education & training, Self-Care / ADL, Equipment evaluation, education, & procurement, Safety education & training     Restrictions  Restrictions/Precautions  Restrictions/Precautions: Fall Risk, Weight Bearing  Required Braces or Orthoses?: No  Lower Extremity Weight Bearing Restrictions  Right Lower Extremity Weight Bearing: Weight Bearing As Tolerated  Position Activity Restriction  Other position/activity restrictions: S/p R TKA, POD#0 Dr Kisha Cruz  Patient assessed for rehabilitation services?: Yes  Family / Caregiver Present: No  General Comment  Comments: RN ok'd for therapy this PM. Pt agreeable to participate in session and pleasant/cooperative throughout. Pt reports 2/10 pain to R knee.        Social/Functional History  Social/Functional History  Lives With: Spouse  Type of Home: House  Home Layout: One level  Home Access: Stairs to enter without rails  Entrance Stairs - Number of Steps: one small step at doorway  Bathroom Shower/Tub: Walk-in shower  Bathroom Toilet: Handicap height  Bathroom Equipment: Shower chair, Grab bars in shower, Grab bars around toilet  Bathroom Accessibility: Accessible  Home Equipment: Eduardo Christian, 4 wheeled, Walker, rolling, [address] [facility] [place], Sock aid  Has the patient had two or more falls in the past year or any fall with injury in the past year?: No  Receives Help From: Family (Pt reports supportive wife however states wife does work)  ADL Assistance: Independent  Homemaking Assistance: Needs assistance  Homemaking Responsibilities: No (wife performs all)  Ambulation Assistance: Independent (pt reports use of RW for the last ~6 months)  Transfer Assistance: Independent  Active : Yes  Mode of Transportation: Truck, SUV  Occupation: Retired  Type of Occupation: Previously worked at [address] [place] [address] Rd: Enjoys riding his motorcycle, reading, and listening to music       Objective   Vision  Vision: Impaired  Vision Exceptions: Wears glasses at all times  Hearing  Hearing: [facility]  Hearing Exceptions: Bilateral hearing aid;Hard of hearing/hearing concerns (pt reports hearing aids are currently out for repair)    Balance  Sitting: Stand by assistane (unsupported seated EOB ~5 minutes, unsupported seated at edge of recliner chair ~2 minutes)  Standing: Contact guard assistance (~4 minutes during static standing at EOB and functional mobility within hospital room; CGA with use of RW throughout)    Functional Mobility   Overall Level of Assistance: Contact-guard assistance  Interventions: Safety awareness training;Verbal cues  Assistive Device: Walker, rolling    AROM: Within functional limits (BUE)  Strength: Within functional limits (BUE)  Coordination: Within functional limits (BUE FMC/GMC)  Sensation: Impaired (Pt with decreased sensation/motor control to RLE secondary to nerve block in place)    ADL  Feeding: Setup; Increased time to complete  Grooming: Increased time to complete;Contact guard assistance  UE Bathing: Increased time to complete;Minimal assistance  LE Bathing: Increased time to complete;Maximum assistance  UE Dressing: Increased time to complete;Minimal assistance  LE Dressing: Increased time to complete;Maximum assistance  Toileting: Maximum assistance; Increased time to complete       Bed mobility  Supine to Sit: Contact guard assistance  Sit

## 2022-08-15 NOTE — PROGRESS NOTES
Occupational Therapy  Facility/Department: [facility] [address] [place] [address]  Occupational Therapy Daily Treatment Note    Name: Minor Goods  : 1940  MRN: [id number]  Date of Service: 8/15/2022    Discharge Recommendations:  Patient would benefit from continued therapy after discharge       Patient Diagnosis(es): The encounter diagnosis was Primary osteoarthritis of right knee. Past Medical History:  has a past medical history of Abnormal renal function, Allergic contact dermatitis due to other agents, Anemia, Cellulitis of buttock, Depression, Dysmetabolic syndrome, Dysuria, Edema, Elevated glucose, Erythrasma, Health care maintenance, Hypertension, Intertrigo, Leukopenia, Obesity, Osteoarthritis, Other constipation, Other specified counseling, PVD (peripheral vascular disease) ([facility] [address] Renal insufficiency, Skin maceration, Sleep apnea, Trigger finger of right thumb, and Viral disease exposure. Past Surgical History:  has a past surgical history that includes back surgery (2005); Neck surgery; lumbar laminectomy (2022); Colonoscopy; Cataract extraction (Left); eye surgery; hernia repair; Tonsillectomy; and Total knee arthroplasty (Right, 08/15/2022). Assessment   Performance deficits / Impairments: Decreased functional mobility ; Decreased ADL status; Decreased safe awareness;Decreased balance;Decreased high-level IADLs;Decreased endurance  Assessment: Pt currently limited in performing ADL tasks due to above noted deficits, most significantly pt's ADL performance is limited due to pt requiring max A x2 to participate in functional transfers. Pt to benefit from continued therapy services while hospitalized to maximize pt's safety and independence in performing functional tasks.  At this time, pt unsafe to return to prior living arrangements as high levels of physical assist of two persons are required for functional transfer performance, will continue to assess pending pt's progress with therapy services while hospitalized. Prognosis: Good  Decision Making: Medium Complexity  REQUIRES OT FOLLOW-UP: Yes  Activity Tolerance  Activity Tolerance: Patient limited by fatigue      Safety Devices  Type of Devices: Call light within reach; Chair alarm in place;Gait belt;Left in chair;Nurse notified  Restraints  Restraints Initially in Place: No    Plan   Plan  Times per Week: 6-7x/wk  Times per Day: Daily (1-2x)  Specific Instructions for Next Treatment: Bathing Tasks with Use of Chlorhexidine Surgical Soap- Importance of/Proper Use; Dressing tasks including Karthik Hose- Wear Schedule and Techniques to Don/Doff  Current Treatment Recommendations: Strengthening, Balance training, Functional mobility training, Endurance training, Patient/Caregiver education & training, Self-Care / ADL, Equipment evaluation, education, & procurement, Safety education & training (Transfer Training)     Restrictions  Restrictions/Precautions  Restrictions/Precautions: Fall Risk, Weight Bearing  Required Braces or Orthoses?: No  Lower Extremity Weight Bearing Restrictions  Right Lower Extremity Weight Bearing: Weight Bearing As Tolerated  Position Activity Restriction  Other position/activity restrictions: S/p R TKA, POD#0 Dr Steph Zapata  Patient assessed for rehabilitation services?: Yes  Response to previous treatment: Patient with no complaints from previous session  Family / Caregiver Present: No  General Comment  Comments: RN ok'd for therapy this PM. Pt agreeable to participate in session and pleasant/cooperative throughout. Pt reports 3/10 pain to R knee.        Objective   Balance  Sitting: Supervision  Standing: Contact guard assistance    Functional Mobility  Overall Level of Assistance: Contact-guard assistance (Pt performed functional mobility to/from bathroom with use of RW)  Interventions: Verbal cues  Assistive Device: Walker, rolling    Toilet Transfers  Toilet - Technique: Ambulating  Equipment Used: Standard toilet (with use of unilateral grab bar to mimic home setup)  Toilet Transfer: Maximum assistance;2 Person assistance    Bed mobility  Supine to Sit: Contact guard assistance  Sit to Supine:  (retired to chair at end of session)  Scooting: Stand by assistance (assessed in chair)  Bed Mobility Comments: Pt up to chair at therapist arrival and retired up to chair at therapist exit. Transfers  Sit to stand: Maximum assistance; Moderate assistance;2 Person assistance  Stand to sit: Moderate assistance;2 Person assistance  Transfer Comments: Increased time/effort to perform; min VCs for proper hand placement/safety awareness with use of RW    Cognition  Overall Cognitive Status: WFL  Orientation  Overall Orientation Status: Within Functional Limits        Education Given To: Patient  Education Provided: Role of Therapy;Plan of Care;Precautions;Transfer Training;Equipment; Energy Conservation; Fall Prevention Strategies  Education Method: Demonstration  Education Outcome: Verbalized understanding;Continued education needed        AM-PAC Score   AM-[facility] Inpatient Daily Activity Raw Score: 16 (08/15/22 1544)  AM-PAC Inpatient ADL T-Scale Score : 35.96 (08/15/22 1544)  ADL Inpatient CMS 0-100% Score: 53.32 (08/15/22 1544)  ADL Inpatient CMS G-Code Modifier : CK (08/15/22 1544)    Goals  Short Term Goals  Time Frame for Short term goals: 14 visits  Short Term Goal 1: Pt will perform functional transfers/functional mobility with mod IND using least restrictive AD  Short Term Goal 2: Pt will demo 10+ minutes standing tolerance with use of least restrictive AD for increased participation in ADL/IADL tasks  Short Term Goal 3: Pt will perform grooming/UB ADL tasks with mod IND using AE/DME PRN  Short Term Goal 4: Pt will perform toileting/LB ADL tasks with SBA and use of AE/DME PRN  Short Term Goal 5: Pt will independently demo good safety awareness during engagement in all ADLs and functional transfers/functional mobility       Therapy Time   Individual Concurrent Group Co-treatment   Time In 1741         Time Out 1804         Minutes 23         Timed Code Treatment Minutes: 9 Minutes       Brain BEVERLEY DolanR/L

## 2022-08-15 NOTE — PLAN OF CARE
Problem: Pain  Goal: Verbalizes/displays adequate comfort level or baseline comfort level  Outcome: Progressing     Problem: Discharge Planning  Goal: Discharge to home or other facility with appropriate resources  Outcome: Progressing  Flowsheets (Taken 8/15/2022 1223)  Discharge to home or other facility with appropriate resources:   Identify barriers to discharge with patient and caregiver   Identify discharge learning needs (meds, wound care, etc)   Refer to discharge planning if patient needs post-hospital services based on physician order or complex needs related to functional status, cognitive ability or social support system   Arrange for needed discharge resources and transportation as appropriate     Problem: Safety - Adult  Goal: Free from fall injury  Outcome: Progressing     Problem: ABCDS Injury Assessment  Goal: Absence of physical injury  Outcome: Progressing

## 2022-08-15 NOTE — PROGRESS NOTES
Physical Therapy  Facility/Department: [facility] SURG ICU  Physical Therapy Daily Treatment Note     Name: Chace Gambino  : 1940  MRN: [id number]  Date of Service: 8/15/2022      Discharge Recommendations:  Patient would benefit from continued therapy after discharge   PT Equipment Recommendations  Equipment Needed: No  Other: Pt owns a RW and will need to use device at all times. Patient Diagnosis(es): The encounter diagnosis was Primary osteoarthritis of right knee. Past Medical History:  has a past medical history of Abnormal renal function, Allergic contact dermatitis due to other agents, Anemia, Cellulitis of buttock, Depression, Dysmetabolic syndrome, Dysuria, Edema, Elevated glucose, Erythrasma, Health care maintenance, Hypertension, Intertrigo, Leukopenia, Obesity, Osteoarthritis, Other constipation, Other specified counseling, PVD (peripheral vascular disease) ([address] Renal insufficiency, Skin maceration, Sleep apnea, Trigger finger of right thumb, and Viral disease exposure. Past Surgical History:  has a past surgical history that includes back surgery (2005); Neck surgery; lumbar laminectomy (2022); Colonoscopy; Cataract extraction (Left); eye surgery; hernia repair; Tonsillectomy; and Total knee arthroplasty (Right, 08/15/2022). Assessment   Body Structures, Functions, Activity Limitations Requiring Skilled Therapeutic Intervention: Decreased functional mobility ; Decreased ROM; Decreased strength;Decreased endurance;Decreased balance  Assessment: Pt with impaired mobility requiring up to max Ax2 for transfers this date and then able to ambulate 30ft CGA with RW. Pt requires max Ax2 for single step negotiation attempt today. Pt currently would be unsafe to return to his prior living arrangements but will continue to assess as able to further attempt mobility. Pt will benefit from further therapy at discharge.   Therapy Prognosis: Good  Decision Making: Medium Complexity  Requires PT Follow-Up: Yes  Activity Tolerance  Activity Tolerance: Patient limited by endurance; Patient limited by fatigue;Patient tolerated treatment well      Plan   Plan  Plan: 2 times a day 7 days a week  Current Treatment Recommendations: Strengthening, Balance training, ROM, Functional mobility training, Transfer training, Endurance training, Gait training, Stair training, Patient/Caregiver education & training, Home exercise program, Safety education & training, Therapeutic activities  Safety Devices  Type of Devices: Call light within reach, Chair alarm in place, Gait belt, Left in chair, Nurse notified  Restraints  Restraints Initially in Place: No      Restrictions  Restrictions/Precautions  Restrictions/Precautions: Fall Risk, Weight Bearing  Required Braces or Orthoses?: No  Lower Extremity Weight Bearing Restrictions  Right Lower Extremity Weight Bearing: Weight Bearing As Tolerated  Position Activity Restriction  Other position/activity restrictions: S/p R TKA, POD#0 Dr Umair Ferris  Patient assessed for rehabilitation services?: Yes  Response To Previous Treatment: No complaints to prior session  Family / Caregiver Present: No  Follows Commands: Within Functional Limits  Subjective  Subjective: Pt up to chair this afternoon and agreeable to therapy. Pt reports 1-3/10 pain in his right knee (it increases with mobility).       Cognition   Orientation  Overall Orientation Status: Within Functional Limits  Cognition  Overall Cognitive Status: WFL      Objective      R Knee Flexion 0-145: 85 degrees AROM seated  R Knee Extension 0: Lacking 2 degrees supine    Bed mobility  Scooting: Stand by assistance  Bed Mobility Comments: Scooting was assessed in chair as pt was up to chair upon arrival and retired to chair at end of session    Transfers  Sit to Stand: 2 Person Assistance;Maximum Assistance  Stand to sit: Moderate Assistance;2 Person Assistance  Comment: Cues for hand placement, pt was able to demonstrate enough flex to lukas knees to have feet under to assist with transfer but decreased LE strength requiring max Ax2 to assist with standing. Good effort noted throughout    Ambulation  Surface: level tile  Device: Rolling Walker  Assistance: Contact guard assistance  Quality of Gait: step to gait pattern, antalgic, no buckling noted  Gait Deviations: Slow Oriana;Decreased step length;Decreased step height  Distance: 15ft, seated rest break, 30ft  More Ambulation?: No    Stairs/Curb  Stairs?: Yes  Number of steps: 1  Stairs height: 4 inches  Rails: None  Device: Rolling Walker  Assistance: Maximal assistance x2; buckling during ascending requiring max Ax2  Cues for sequencing from writer with good carryover    Balance  Posture: Fair  Sitting - Static: Good;-  Sitting - Dynamic: Good;-  Standing - Static: Fair;-  Standing - Dynamic: Fair; +  Comments: standing balance assessed with RW  A/AROM Exercises: Supine: ankle pumps, HS sets, quad sets, glut sets, heel slides, hip abduction. Seated: LAQ and seated heel slides.  x10 reps RLE     AM-PAC Score  AM-PAC Inpatient Mobility Raw Score : 16   AM-PAC Inpatient T-Scale Score : 40.78  Mobility Inpatient CMS 0-100% Score: 54.16  Mobility Inpatient CMS G-Code Modifier : CK      Goals  Short Term Goals  Time Frame for Short term goals: 14 visits  Short term goal 1: Pt to ambulate 200ft modified independently with RW WBAT RLE  Short term goal 2: Pt to sit <> stand transfer independently WBAT RLE  Short term goal 3: Pt to demonstrate independent bed mobility  Short term goal 4: Pt to ascend/descend one step with RW CGA WBAT RLE  Short term goal 5: Pt to demonstrate independent understanding of basic supine/seated HEP for R TKA to allow for continued ROM/strengthening at discharge     Education  Patient Education  Education Given To: Patient  Education Provided: Role of Therapy;Plan of Care;Transfer Training;Home Exercise Program; Stair negotiation  Education Method: Demonstration;Verbal  Barriers to Learning: None  Education Outcome: Verbalized understanding;Demonstrated understanding        Therapy Time    Individual Concurrent Group Co-treatment   Time In 1741      Time Out 1804      Minutes 24      Timed Code Treatment Minutes: 14 Minutes- co treat with OT secondary to level of assistance required     Denilson Galarza, PT

## 2022-08-16 VITALS
RESPIRATION RATE: 16 BRPM | HEART RATE: 95 BPM | WEIGHT: 274.69 LBS | SYSTOLIC BLOOD PRESSURE: 137 MMHG | OXYGEN SATURATION: 94 % | DIASTOLIC BLOOD PRESSURE: 70 MMHG | BODY MASS INDEX: 40.69 KG/M2 | HEIGHT: 69 IN | TEMPERATURE: 98.6 F

## 2022-08-16 PROCEDURE — 97116 GAIT TRAINING THERAPY: CPT

## 2022-08-16 PROCEDURE — 6370000000 HC RX 637 (ALT 250 FOR IP): Performed by: ORTHOPAEDIC SURGERY

## 2022-08-16 PROCEDURE — 97535 SELF CARE MNGMENT TRAINING: CPT

## 2022-08-16 PROCEDURE — 2580000003 HC RX 258: Performed by: ORTHOPAEDIC SURGERY

## 2022-08-16 PROCEDURE — 97110 THERAPEUTIC EXERCISES: CPT

## 2022-08-16 RX ADMIN — SODIUM CHLORIDE, POTASSIUM CHLORIDE, SODIUM LACTATE AND CALCIUM CHLORIDE 125 ML/HR: 600; 310; 30; 20 INJECTION, SOLUTION INTRAVENOUS at 07:49

## 2022-08-16 RX ADMIN — SODIUM CHLORIDE, POTASSIUM CHLORIDE, SODIUM LACTATE AND CALCIUM CHLORIDE 125 ML/HR: 600; 310; 30; 20 INJECTION, SOLUTION INTRAVENOUS at 00:32

## 2022-08-16 RX ADMIN — ACETAMINOPHEN 650 MG: 325 TABLET ORAL at 00:25

## 2022-08-16 RX ADMIN — OXYCODONE HYDROCHLORIDE 10 MG: 5 TABLET ORAL at 13:57

## 2022-08-16 RX ADMIN — ASPIRIN 81 MG: 81 TABLET, COATED ORAL at 09:46

## 2022-08-16 RX ADMIN — ACETAMINOPHEN 650 MG: 325 TABLET ORAL at 12:33

## 2022-08-16 RX ADMIN — ACETAMINOPHEN 650 MG: 325 TABLET ORAL at 05:59

## 2022-08-16 ASSESSMENT — PAIN DESCRIPTION - ORIENTATION
ORIENTATION: RIGHT
ORIENTATION: RIGHT

## 2022-08-16 ASSESSMENT — PAIN SCALES - GENERAL
PAINLEVEL_OUTOF10: 1
PAINLEVEL_OUTOF10: 2
PAINLEVEL_OUTOF10: 1

## 2022-08-16 ASSESSMENT — PAIN DESCRIPTION - LOCATION
LOCATION: KNEE
LOCATION: KNEE

## 2022-08-16 ASSESSMENT — PAIN DESCRIPTION - DESCRIPTORS: DESCRIPTORS: DISCOMFORT;ACHING

## 2022-08-16 NOTE — PROGRESS NOTES
Physical Therapy  Facility/Department: [facility] ICU  Daily Treatment Note  NAME: Genet Esparza  : 1940  MRN: [id number]    Date of Service: 2022    Discharge Recommendations:  Patient would benefit from continued therapy after discharge   PT Equipment Recommendations  Equipment Needed: No  Other: Pt owns a RW and will need to use device at all times. Patient Diagnosis(es): The encounter diagnosis was Primary osteoarthritis of right knee. Assessment   Assessment: Pt with improved mobility this morning with transfers improved from Max assist x 2 yesterday to CGA today. Pt ambulated 80' x 2 with RW and CGA, improved from 30' yesterday. Pt demonstrated appropriate walker management and good return performance of home exercise TKA program. Pt should be able to return to previous living arrangement with assistance as needed. Pt would benefit from further therapy at discharge. Activity Tolerance: Patient limited by endurance; Patient limited by fatigue;Patient tolerated treatment well  Equipment Needed: No  Other: Pt owns a RW and will need to use device at all times. Plan    Plan  Plan: 2 times a day 7 days a week  Current Treatment Recommendations: Strengthening;Balance training;ROM; Functional mobility training;Transfer training; Endurance training;Gait training;Stair training;Patient/Caregiver education & training;Home exercise program;Safety education & training; Therapeutic activities     Restrictions  Restrictions/Precautions  Restrictions/Precautions: Fall Risk, Weight Bearing  Required Braces or Orthoses?: No  Lower Extremity Weight Bearing Restrictions  Right Lower Extremity Weight Bearing: Weight Bearing As Tolerated  Position Activity Restriction  Other position/activity restrictions: S/p R TKA, POD#0 Dr Sophie Winter     Subjective    Subjective  Subjective: Pt up in chair in PM and agreeable to therapy.   Pain: Pain 4/10 in (R) knee and nursing notified of patient's srequest for pain RLE  Short term goal 2: Pt to sit <> stand transfer independently WBAT RLE  Short term goal 3: Pt to demonstrate independent bed mobility  Short term goal 4: Pt to ascend/descend one step with RW CGA WBAT RLE  Short term goal 5: Pt to demonstrate independent understanding of basic supine/seated HEP for R TKA to allow for continued ROM/strengthening at discharge    Education  Patient Education  Education Given To: Patient  Education Provided: Plan of Care;Home Exercise Program;Transfer Training  Education Provided Comments: HEP, transfer and gait training with walker management, stair negotiation, car transfer  Education Method: Demonstration;Verbal  Barriers to Learning: None  Education Outcome: Verbalized understanding;Demonstrated understanding    Therapy Time   Individual Concurrent Group Co-treatment   Time In 1338         Time Out 1404         Minutes 26         Timed Code Treatment Minutes: Argenis 97, PT

## 2022-08-16 NOTE — PLAN OF CARE
Problem: Pain  Goal: Verbalizes/displays adequate comfort level or baseline comfort level  8/16/2022 0229 by Trupti Lr RN  Outcome: Progressing   Scheduled and prn pain medication ordered (See Mar). Problem: Discharge Planning  Goal: Discharge to home or other facility with appropriate resources  8/16/2022 0229 by Trupti Lr RN  Outcome: Progressing     Problem: Safety - Adult  Goal: Free from fall injury  8/16/2022 0229 by Trupti Lr RN  Outcome: Progressing     The patient remained free from falls this shift, call light within reach, bed in locked and lowest position. Side rails up x2. Continue to monitor closely.      Problem: ABCDS Injury Assessment  Goal: Absence of physical injury  8/16/2022 0229 by Trupti Lr RN  Outcome: Progressing

## 2022-08-16 NOTE — PLAN OF CARE
Problem: Discharge Planning  Goal: Discharge to home or other facility with appropriate resources  8/16/2022 1057 by Vani Mejia RN  Outcome: Progressing  Plans for discharge home today after PT/OT.

## 2022-08-16 NOTE — PROGRESS NOTES
accurate technique and use of RW.)  Sit to Stand: Contact-guard assistance; Adaptive equipment; Additional time  Stand to Sit: Adaptive equipment; Additional time;Contact-guard assistance  Stand Pivot Transfers: Contact-guard assistance; Adaptive equipment  Bed to Chair: Adaptive equipment;Contact-guard assistance  Toilet Transfer: Contact-guard assistance; Adaptive equipment (Using RW and Bilateral Grab Bars)  Gait  Overall Level of Assistance: Contact-guard assistance; Adaptive equipment (In-room / In-bathroom / In-home distances and functional mobility using RW.  CGA.)  Interventions: Verbal cues; Visual cues (Min Cues for safe maneuvering of RW and integration of Ax pacing / rest breaks (intermittently). )    ADL  Grooming: Stand by assistance; Adaptive equipment;Verbal cueing  Grooming Skilled Clinical Factors: Standing at the sink with RW to complete hand hygiene after toileting. UE Bathing Skilled Clinical Factors: Reviewed strategies / use of AE in depth, including application and techniques with Hibiclens (which was gien to Pt to take home). LE Bathing Skilled Clinical Factors: Reviewed strategies / use of AE in depth, including application and techniques with Hibiclens (which was gien to Pt to take home). UE Dressing: Stand by assistance;Verbal cueing; Increased time to complete  UE Dressing Skilled Clinical Factors: Seated upright in recliner chair to don / doff gown like jin. LE Dressing: Minimal assistance; Increased time to complete;Verbal cueing  LE Dressing Skilled Clinical Factors: Max Assist Compression Stockings / JUDY hose, Min Assist Bilateral Socks using long-handled AE (see below). Significant increase in time / Jong Fam and education. Toileting: Contact guard assistance; Adaptive equipment  Toileting Skilled Clinical Factors: Standing at toilet (with RW and Bilateral Grab bars to complete etoileting, toilet hygiene, underwear mgmt). No LOB.   Additional Comments: Therapist provided max education / instruction / demo / review with Pt re: restrictions related to Right Knee (post-surgical, dressing, bathing). Therapist guided Pt through removal of bandaging (ACE bandaging), then application of JUDY hose and socks to Bilateral LEs (after max education and recommendations while trialing long-handled AE, reacher and sock aide trialed, Pt successful with both but will require assist from wife as well). Safety Devices  Type of Devices: Call light within reach; Chair alarm in place;Gait belt;Left in chair;Nurse notified  Restraints  Restraints Initially in Place: No     Patient Education  Education Given To: Patient  Education Provided: Role of Therapy;Plan of Care;Precautions;Transfer Training;Equipment; Energy Conservation; Fall Prevention Strategies  Education Method: Demonstration  Education Outcome: Verbalized understanding;Continued education needed      Goals  Short Term Goals  Time Frame for Short term goals: 14 visits  Short Term Goal 1: Pt will perform functional transfers/functional mobility with mod IND using least restrictive AD  Short Term Goal 2: Pt will demo 10+ minutes standing tolerance with use of least restrictive AD for increased participation in ADL/IADL tasks  Short Term Goal 3: Pt will perform grooming/UB ADL tasks with mod IND using AE/DME PRN  Short Term Goal 4: Pt will perform toileting/LB ADL tasks with SBA and use of AE/DME PRN  Short Term Goal 5: Pt will independently demo good safety awareness during engagement in all ADLs and functional transfers/functional mobility       Therapy Time   Individual Concurrent Group Co-treatment   Time In 1006         Time Out 1102         Minutes 56         Timed Code Treatment Minutes: 23 Minutes (ADL, Co-treat with PT)    JENNIFER Love, OTR/L

## 2022-08-16 NOTE — PROGRESS NOTES
Occupational Therapy  Facility/Department: [facility] SURG ICU  Daily Treatment Note  (Session #2)      NAME: Kym Arvizu  : 1940  MRN: [id number]    Date of Service: 2022    Discharge Recommendations:  Patient would benefit from continued therapy after discharge    OT Equipment Recommendations  Equipment Needed: Yes  Other: Reacher, Sock Aid, Long-handled sponge, Long handled shoe horn. Toilet riser with Bilateral handles (vs BSC over toilet). Patient Diagnosis(es): The encounter diagnosis was Primary osteoarthritis of right knee. Assessment    Assessment: Pt seen for 2 sessions this date - AM and PM sessions. He demo'd significant improvement in both sessions today (vs yesterday, day of surgery). Pt verbalized / demo'd good understanding and good integration of all education and training provided by therapist.  Activity Tolerance: Patient limited by endurance; Patient tolerated treatment well;Patient limited by pain  Discharge Recommendations: Patient would benefit from continued therapy after discharge  Equipment Needed: Yes  Other: Reacher, Sock Aid, Long-handled sponge, Long handled shoe horn. Toilet riser with Bilateral handles (vs BSC over toilet). Plan   Plan  Times per Week: 6-7x/wk  (TKA, ORTHO)  Times per Day:  (1-2x/week)  Specific Instructions for Next Treatment: Bathing Tasks with Use of Chlorhexidine Surgical Soap- Importance of/Proper Use; Dressing tasks including Karthik Hose- Wear Schedule and Techniques to Don/Doff  Current Treatment Recommendations: Strengthening;Balance training;Functional mobility training; Endurance training;Patient/Caregiver education & training;Self-Care / ADL;Equipment evaluation, education, & procurement; Safety education & training       Subjective   Subjective  Subjective: RN approved Pt to be seen for OT treatment session. Pain: Increased pain in second (afternoon) OT session.   Therapist provided education / recommendations for pain mgmt.  Orientation  Overall Orientation Status: Within Functional Limits  Orientation Level: Oriented X4  Pain: Pain 4/10 in (R) knee and nursing notified of patient's srequest for pain medications. Cognition  Overall Cognitive Status: WFL      Objective    Bed Mobility Training  Bed Mobility Training: No (Sitting up in recliner at start / end of tx session.)    Balance  Sitting: Intact  Standing: With support  Transfer Training  Transfer Training: Yes  Overall Level of Assistance: Contact-guard assistance; Additional time; Adaptive equipment (Using RW. CGA for transfers from Chair. Min Assist from Toilet (standard height). )  Interventions: Visual cues; Verbal cues; Tactile cues (Mod Cues for safe / accurate use of DME)  Sit to Stand: Contact-guard assistance; Additional time; Adaptive equipment (CGA with RW to / from [address])  Stand to Sit: Contact-guard assistance; Additional time; Adaptive equipment (CGA with RW to / from Chair)  Stand Pivot Transfers: Contact-guard assistance; Additional time; Adaptive equipment (CGA with RW to / from [address])  Bed to Chair: Adaptive equipment;Contact-guard assistance  Toilet Transfer: Minimum assistance; Additional time; Adaptive equipment (Min A using standard height toilet (Bilat grab bars =) and RW. Mod cues and significant increased time. Max recommendation to obtain elevated toilet for home (discussed toilet riser with Bilat handles vs BSC over toilet.)  Gait Training  Gait Training: Yes  Right Side Weight Bearing: As tolerated  Left Side Weight Bearing: Full    Gait  Overall Level of Assistance: Contact-guard assistance; Adaptive equipment (In-room and In-bathroom with RW.  CGA.)  Interventions: Visual cues; Verbal cues; Tactile cues (Mod Cues for safe / accurate use of DME)  Base of Support: Widened  Speed/Oriana: Pace decreased (< 100 feet/min)  Step Length: Left shortened;Right shortened  Gait Abnormalities: Ataxic;Decreased step clearance (Patient with more continous gait pattern require assist from wife as well). Safety Devices  Type of Devices: Call light within reach; Chair alarm in place;Gait belt;Left in chair;Nurse notified  Restraints  Restraints Initially in Place: No     Patient Education  Education Given To: Patient  Education Provided: Role of Therapy;Plan of Care;Precautions;Transfer Training;Equipment; Energy Conservation; Fall Prevention Strategies  Education Method: Demonstration  Education Outcome: Verbalized understanding;Continued education needed      Goals  Short Term Goals  Time Frame for Short term goals: 14 visits  Short Term Goal 1: Pt will perform functional transfers/functional mobility with mod IND using least restrictive AD  Short Term Goal 2: Pt will demo 10+ minutes standing tolerance with use of least restrictive AD for increased participation in ADL/IADL tasks  Short Term Goal 3: Pt will perform grooming/UB ADL tasks with mod IND using AE/DME PRN  Short Term Goal 4: Pt will perform toileting/LB ADL tasks with SBA and use of AE/DME PRN  Short Term Goal 5: Pt will independently demo good safety awareness during engagement in all ADLs and functional transfers/functional mobility       Therapy Time   Individual Concurrent Group Co-treatment   Time In 1430         Time Out 1448         Minutes 18         Timed Code Treatment Minutes: 18 Minutes (ADL)    JENNIFER Love, OTR/L

## 2022-08-16 NOTE — PROGRESS NOTES
Physical Therapy  Facility/Department: [facility] SURG ICU  Daily Treatment Note  NAME: Isamar Chang  : 1940  MRN: [id number]    Date of Service: 2022    Discharge Recommendations:  Patient would benefit from continued therapy after discharge        Patient Diagnosis(es): The encounter diagnosis was Primary osteoarthritis of right knee. Assessment   Assessment: Pt with improved mobility this morning with transfers improved from Max assist x 2 yesterday to CGA today. Pt ambulated 80' x 2 with RW and CGA, improved from 30' yesterday. Pt demonstrated appropriate walker management and good return performance of home exercise TKA program. Pt should be able to return to previous living arrangement with assistance as needed. Pt would benefit from further therapy at discharge. Activity Tolerance: Patient limited by endurance; Patient limited by fatigue;Patient tolerated treatment well     Plan    Plan  Plan: 2 times a day 7 days a week  Current Treatment Recommendations: Strengthening;Balance training;ROM; Functional mobility training;Transfer training; Endurance training;Gait training;Stair training;Patient/Caregiver education & training;Home exercise program;Safety education & training; Therapeutic activities     Restrictions  Restrictions/Precautions  Restrictions/Precautions: Fall Risk, Weight Bearing  Required Braces or Orthoses?: No  Lower Extremity Weight Bearing Restrictions  Right Lower Extremity Weight Bearing: Weight Bearing As Tolerated  Position Activity Restriction  Other position/activity restrictions: S/p R TKA, POD#0 Dr Gentry Schaumann     Subjective    Subjective  Subjective: Pt up in chair and agreeable to therapy. Pt reports doing better today. Pain: Pain (R) knee 1-2/10.   Orientation  Overall Orientation Status: Within Functional Limits  Orientation Level: Oriented X4  Cognition  Overall Cognitive Status: WFL     Objective   Vitals   AROM: 2-82 degrees  Bed Mobility Training  Bed Mobility Training: No  Balance  Sitting: Intact  Standing: With support  Transfer Training  Transfer Training: Yes  Overall Level of Assistance: Contact-guard assistance; Additional time; Adaptive equipment (Transfers with RW.)  Interventions: Verbal cues; Visual cues  Sit to Stand: Contact-guard assistance; Adaptive equipment; Additional time  Stand to Sit: Adaptive equipment; Additional time;Contact-guard assistance  Stand Pivot Transfers: Contact-guard assistance; Adaptive equipment  Gait Training  Gait Training: Yes  Right Side Weight Bearing: As tolerated  Left Side Weight Bearing: Full  Gait  Overall Level of Assistance: Contact-guard assistance; Adaptive equipment  Interventions: Verbal cues; Visual cues  Base of Support: Widened  Speed/Oriana: Pace decreased (< 100 feet/min)  Step Length: Left shortened;Right shortened  Gait Abnormalities: Ataxic;Decreased step clearance  Distance (ft): 90 Feet  Assistive Device: Walker, rolling  Rail Use: None  Stairs - Level of Assistance: Contact-guard assistance (Pt negotiated 1-4\" step with RW x 2 reps; verbal cues for proper sequencing and walker placement.)  Number of Stairs Trained: 1     PT Exercises  A/AROM Exercises: Reclined in chair: ankle pumps, quad sets, heel slides x 15 reps; Seated LAQ's x 15, towel slides with 5 second hold x 6. Safety Devices  Type of Devices: Call light within reach; Chair alarm in place;Gait belt;Left in chair;Nurse notified  Restraints  Restraints Initially in Place: No       Goals  Short Term Goals  Time Frame for Short term goals: 14 visits  Short term goal 1: Pt to ambulate 200ft modified independently with RW WBAT RLE  Short term goal 2: Pt to sit <> stand transfer independently WBAT RLE  Short term goal 3: Pt to demonstrate independent bed mobility  Short term goal 4: Pt to ascend/descend one step with RW CGA WBAT RLE  Short term goal 5: Pt to demonstrate independent understanding of basic supine/seated HEP for R TKA to allow for continued

## 2022-08-16 NOTE — DISCHARGE INSTR - DIET

## 2022-08-16 NOTE — PROGRESS NOTES
Writer received message back from Dr. Caroline Hedrick with update. Okay to proceed with discharge today.

## 2022-08-18 ENCOUNTER — TELEPHONE (OUTPATIENT)
Dept: ORTHOPEDIC SURGERY | Age: 82
End: 2022-08-18

## 2022-08-18 NOTE — TELEPHONE ENCOUNTER
Patient called in requesting compression stockings. Patient stated PT had given him a pair and to request more from his ortho provider. Please advise thank you!

## 2022-08-23 ENCOUNTER — TELEPHONE (OUTPATIENT)
Dept: ORTHOPEDIC SURGERY | Age: 82
End: 2022-08-23

## 2022-08-23 NOTE — TELEPHONE ENCOUNTER
Patient called office stating that the leg he just had surgery on is swollen from just above his knee to his ankle. I saw previous telephone encounter that the patient was advised to purchase compression stockings. Patient states that he bought them but was not currently wearing it on his right leg. I advised patient to put on compression stocking, elevate his leg and ice frequently. I advised patient on signs on symptoms of infection and blood clots and told patient to call office back if he begins to experience any of these signs or symptoms. Patient currently denied any calf pain, fever, chills, and drainage from his bandage.

## 2022-09-01 ENCOUNTER — OFFICE VISIT (OUTPATIENT)
Dept: ORTHOPEDIC SURGERY | Age: 82
End: 2022-09-01

## 2022-09-01 DIAGNOSIS — M17.0 PRIMARY OSTEOARTHRITIS OF BOTH KNEES: ICD-10-CM

## 2022-09-01 DIAGNOSIS — M25.561 RIGHT KNEE PAIN, UNSPECIFIED CHRONICITY: Primary | ICD-10-CM

## 2022-09-01 DIAGNOSIS — Z96.651 STATUS POST TOTAL RIGHT KNEE REPLACEMENT: ICD-10-CM

## 2022-09-01 PROCEDURE — 99024 POSTOP FOLLOW-UP VISIT: CPT | Performed by: PHYSICIAN ASSISTANT

## 2022-09-01 RX ORDER — OXYCODONE HYDROCHLORIDE AND ACETAMINOPHEN 5; 325 MG/1; MG/1
1 TABLET ORAL EVERY 6 HOURS PRN
Qty: 28 TABLET | Refills: 0 | Status: SHIPPED | OUTPATIENT
Start: 2022-09-01 | End: 2022-09-08

## 2022-09-01 NOTE — PROGRESS NOTES
Spenser Scherer M.D.            [address] [place] [address]., [address] [facility],[address], [address] [facility]           Dept Phone: [phone]           Dept Fax:  [phone]           Angel [place]          Dept Phone: [phone]           Dept Fax:  [phone]      Chief Compliant:  Chief Complaint   Patient presents with    Post-Op Check     Right total knee        History of Present Illness:  Patient returns today status post Right TKA times 2 weeks. Patient has no major complaints. He did initially report significant swelling of the right lower extremity states this is since a bit improved since wearing his compression stockings. He states his pain has been controlled with Percocet over the last week is requesting refill but no other complaints at this time. Review of Systems   Constitutional: Negative for fever, chills, sweats, recent injury, recent illness  Neurological: Negative for Headaches, numbness, or weakness. Integumentary: Negative for rash, itching, ecchymosis, or wounds. Musculoskeletal: Positive for Post-Op Check (Right total knee)       Physical Exam:  Constitutional: Patient is oriented to person, place, and time. Patient appears well-developed and well nourished. Musculoskeletal: Normal gait. Motion 5-85 degrees with expected pain with ROM. No Calf tenderness, Negative Meir's sign. Neurovascular intact. Neurological: Patient is alert and oriented to person, place, and time. Normal strenght. No sensory deficit. Skin: Skin is warm and dry. Incision is healing well without signs of redness or drainage  Nursing note and vitals reviewed. Labs and Imaging:     XR taken today:  No results found.        Orders Placed This Encounter   Procedures    XR KNEE RIGHT (1-2 VIEWS)     Standing Status:   Future     Number of Occurrences:   1     Standing Expiration Date:   8/30/2023    [facility] [place]     Referral Priority:   Routine     Referral Type:   Eval and Treat     Referral Reason:   Specialty Services Required     Requested Specialty:   Physical Therapist     Number of Visits Requested:   1       Assessment and Plan:  1. Right knee pain, unspecified chronicity    2. Primary osteoarthritis of both knees    3. Status post total right knee replacement        2 weeks status post right TKA        This is a 80 y.o. male who presents to the clinic today status post right TKA. Continue anticoagulation. Transition to outpatient Pt. Restrictions given. RTO 5-6 weeks. Call if any problems/issues prior to that       Provider Attestation:  Von Neri, personally performed the services described in this documentation. All medical record entries made by the scribe were at my direction and in my presence. I have reviewed the chart and discharge instructions and agree that the records reflect my personal performance and is accurate and complete. Nidhi Leavitt MD. 09/01/22        Please note that this chart was generated using voice recognition Dragon dictation software. Although every effort was made to ensure the accuracy of this automated transcription, some errors in transcription may have occurred.

## 2022-09-22 ENCOUNTER — OFFICE VISIT (OUTPATIENT)
Dept: ORTHOPEDIC SURGERY | Age: 82
End: 2022-09-22

## 2022-09-22 VITALS — BODY MASS INDEX: 40.58 KG/M2 | RESPIRATION RATE: 16 BRPM | WEIGHT: 274 LBS | HEIGHT: 69 IN

## 2022-09-22 DIAGNOSIS — Z96.651 H/O TOTAL KNEE REPLACEMENT, RIGHT: Primary | ICD-10-CM

## 2022-09-22 PROCEDURE — 99024 POSTOP FOLLOW-UP VISIT: CPT | Performed by: PHYSICIAN ASSISTANT

## 2022-09-22 NOTE — PROGRESS NOTES
[address] [facility], [address] [place], [address] [place] [address], [id number] [place] [address]           Dept Phone: [phone]           Dept Fax:  [address]           Angel Rodgers          Dept Phone: [phone]           Dept Fax:  [phone]      Chief Compliant:  Chief Complaint   Patient presents with    Post-Op Check     Right TKA 8/15/22        History of Present Illness:  Mitchel Billingsley returns today. This is a 80 y.o. male who presents to the clinic today for follow up of right total knee arthroplasty on 8/15/2022. Patient reports overall he is actually doing quite well he states his pain is approximately 1/10 with most activities. He was just released from home physical therapy today and start outpatient physical therapy tomorrow at [facility] [place], operated by [facility]. He reports has had no issues with the wound which appears to be healing appropriately. Denies any knee joint warmth, redness, fever or chills. No calf pain. Review of Systems   Constitutional: Negative for fever, chills, sweats, recent illness, or recent injury. Neurological: Negative for headaches, numbness, or weakness. Integumentary: Negative for rash, itching, ecchymosis, abrasions, or laceration. Musculoskeletal: Positive for Post-Op Check (Right TKA 8/15/22)       Physical Exam:  Constitutional: Patient is oriented to person, place, and time. Patient appears well-developed and well nourished. Musculoskeletal:    Right Knee    Gait:  Ambulating with the assistance of walker   Incision:  Well healed without any incisional erythema. Tenderness:  none   Flexion ROM:  92   Extension ROM:  -5   Effusion:  mild   DVT Evaluation:  No evidence of DVT seen on physical exam.       Neurological: Patient is alert and oriented to person, place, and time. Normal strenght. No sensory deficit.   Skin: Skin is warm and dry  Psychiatric: Behavior is normal. Thought content normal.  Nursing note and vitals reviewed. Labs and Imaging:     XR taken today:  No results found. No new x-rays taken today however those from 9/1/2022 available for independent review. Status post right total knee arthroplasty component appears in excellent position without any evidence of hardware complication. Left knee with fairly advanced tricompartmental OA. Assessment and Plan:  1. H/O total knee replacement, right              PLAN:  This is a 80 y.o. male who presents to the clinic today for follow up right total knee arthroplasty on 8/15/2022. Overall patient reports she is doing quite well has very minimal pain he does note some stiffness with flexion and extension but feels like this is getting better. 1.  Patient is set to start outpatient physical therapy tomorrow he is encouraged to strongly work with this to avoid having undergo a manipulation under anesthesia given the stiffness in the knee. 2.  Patient with history of keloid formation although his scar looks excellent without keloid evidence he is told that he may be prone to scar tissue formation within the knee has healed strongly encouraged to continue with PT and verbalized understanding. 3.  We will have him follow-up with Dr. Jacqueline Copeland in 4 weeks for reevaluation and range of motion check however patient is instructed to call or return sooner for any questions or concerns. Please note that this chart was generated using voice recognition Dragon dictation software. Although every effort was made to ensure the accuracy of this automated transcription, some errors in transcription may have occurred.

## 2022-09-23 ENCOUNTER — HOSPITAL ENCOUNTER (OUTPATIENT)
Dept: PHYSICAL THERAPY | Facility: CLINIC | Age: 82
Setting detail: THERAPIES SERIES
Discharge: HOME | End: 2022-09-23
Payer: MEDICARE

## 2022-09-23 PROCEDURE — 97016 VASOPNEUMATIC DEVICE THERAPY: CPT

## 2022-09-23 PROCEDURE — 97161 PT EVAL LOW COMPLEX 20 MIN: CPT

## 2022-09-23 PROCEDURE — 97110 THERAPEUTIC EXERCISES: CPT

## 2022-09-23 NOTE — PRE-CERTIFICATION NOTE
[place] Fall Risk Assessment    Patient Name:  Chencho Ochoa  : 1940    Risk Factor Scale  Score   History of Falls [x] Yes  [] No 25  0    Secondary Diagnosis [] Yes  [x] No 15  0    Ambulatory Aid [] Furniture  [] Crutches/cane/walker  [x] None/bedrest/wheelchair/nurse 30  15  0    IV/Heparin Lock [] Yes  [x] No 20  0    Gait/Transferring [] Impaired  [] Weak  [x] Normal/bedrest/immobile 20  10  0    Mental Status [] Forgets limitations  [x] Oriented to own ability 15  0       Total:     Based on the Assessment score: check the appropriate box.     []  No intervention needed   Low =   Score of 0-24    [x]  Use standard prevention interventions Moderate =  Score of 24-44   [x] Give patient handout and discuss fall prevention strategies   [x] Establish goal of education for patient/family RE: fall prevention strategies    []  Use high risk prevention interventions High = Score of 45 and higher   [] Give patient handout and discuss fall prevention strategies   [] Establish goal of education for patient/family Re: fall prevention strategies   [] Discuss lifeline / other resources    Electronically signed by:   Carlene Lozano PT  Date: 2022

## 2022-09-23 NOTE — CONSULTS
[] [address]  [address]  [phone]  F: [phone] [] [address]  [address]   [address]  P: [phone]  F: [phone] [] [place]  [facility]  P: [phone]  F: [phone] [] [address]  [facility]   [address]   [place]: [phone]  F: [phone]  [x] [address]: [phone]  F: [phone]      Physical Therapy Lower Extremity Evaluation    Date:  2022  Patient: Chencho Ochoa  :  1940  MRN: [id number]  Physician: CECILIA Workman   Insurance: Medicare (no auth required, BMN)  Medical Diagnosis: R TKA  Rehab Codes: J67.857  Onset date: DOS: 8/15/22  Next 's appt.: 10/20- Dr. Matias Almeida    Subjective:   CC: Pt arrives after TKA approx 5 weeks ago, has been completing Home PT up until 4 days ago. Arrives with RW that pt states was using at all times prior. Is currently taking pain medication as needed, did take one this morning. HPI: (onset date)    PMHx: [] Unremarkable [] Diabetes [] HTN  [] Pacemaker   [] MI/Heart Problems [] Cancer [] Arthritis [] Other:              [x] Refer to full medical chart  In EPIC         Tests: [] X-Ray: [] MRI:  [] Other:    Medications: [x] Refer to full medical record [] None [] Other:  Allergies:      [x] Refer to full medical record [] None [] Other:        Marital Status Wife   Home type One story    Stairs from outside 1 threshold            Hand rail    Stairs inside None            Hand rail    Employment Retired- [employer]    Job status    Work Activities/duties     Recreational Activities Traveling       Pain present?  Yes   Location R knee    Pain Rating currently 2/10   Pain at worse 5/10   Pain at best 1/10   Description of pain ache   Altered Sensation Tingling over incision    What makes it worse Ambulation    What makes it better rest   Symptom progression    Sleep            ADL/IADL Previous level of function Current level of function Who currently assists the patient with task   Bathing  [x] Independent  [] Assist [] Independent  [x] Assist Wife assists    Dress/grooming [x] Independent  [] Assist [] Independent  [x] Assist Wife assists    Transfer/mobility [x] Independent  [] Assist [] Independent  [] Assist    Feeding [x] Independent  [] Assist [x] Independent  [] Assist    Toileting [x] Independent  [] Assist [x] Independent  [] Assist    Driving [x] Independent  [] Assist [x] Independent  [] Assist    Housekeeping [] Independent  [x] Assist [] Independent  [x] Assist Wife typically completes    Grocery shop/meal prep [] Independent  [x] Assist [] Independent  [x] Assist Wife typically competes      Gait Prior level of function Current level of function    [] Independent  [x] Assist [] Independent  [x] Assist   Device: [] Independent [] Independent    [] Straight Cane [] Quad cane [] Straight Cane [] Quad cane    [] Standard walker [x] Rolling walker   [] 4 wheeled walker [] Standard walker [x] Rolling walker   [] 4 wheeled walker    [] Wheelchair [] Wheelchair    Rollator inside of home     Circumference: RLE: 20\", LLE: 17.5\"    Objective:    ROM  ° A/P STRENGTH    Left Right Left Right   Hip Flex   4-/5    Ext       ER       IR       ABD       ADD       Knee Flex 110/115 80/90 4-/5 4/5   Ext 0/0 2/0 4-/5 4/5   Ankle DF knee ext       Ankle DF knee flex        PF       INV       EVER                  OBSERVATION No Deficit Deficit Not Tested Comments   Posture       Forward Head [] [] []    Rounded Shoulders [] [] []    Observation           Palpation [] [] []    Sensation [] [] []    Edema [] [] []    Neurological [] [] []    Patellar Mobility [] [] []    Patellar Orientation [] [] []    Gait [] [x] [] Analysis: slight antalgic gait with decreased stance time on RLE         FUNCTION Normal Difficult Unable   Sitting [x] [] []   Standing [] [x] []   Ambulation [] [x] []   Groom/Dress [] [x] []   Lift/Carry [] [x] []   Stairs [] [x] []   Bending [] [x] []   Squat [] [] [x]   Kneel [] [] [x]           Flexibility Normal Left tight Right tight   Hip flexor [] [] []   quad [] [] [x]   HS [] [] [x]   piriformis [] [] []   ITB [] [] []   gastroc [] [] []   Soleus  [] [] []                        Functional Test: LEFS Score: 59% functionally impaired         Assessment:   Patient presents with signs and symptoms consistent with status post R TKA, with decreased R knee ROM and strength. Pt however has less strength in LLE, pt notes that it \"gives out\" at times and has caused pt to fall in the past. Pt has pain in L knee as well and plans to have a knee replacement in the future. Patient would benefit from skilled physical therapy services in order to: Increase R knee ROM and strength, will also work on increasing L knee strength as well d/t weakness leading to knee buckling. Goals:        STG: (to be met in 10 treatments)  ? Pain: Decrease pain levels to 2/10 at worst in R knee   ? ROM: Increase flexibility and AROM limitations throughout to equal bilat to reduce difficulty with ADLs, increase R knee flex AROM to 110deg, ext to 0deg  ? Strength: Increase R knee ext to 4+/5, L knee ext to 4/5  ?  Function: Pt to report ambulating around house without any increase in knee pain (with RW)  Independent with Home Exercise Programs  Demonstrate Knowledge of fall prevention    LTG: (to be met in 20 treatments)  Improve score on assessment tool from 59% impaired to 25% impaired, demonstrating an improvement in ADLs  Reduce pain levels to 0/10                   Patient goals:    Rehab Potential:  [x] Good  [] Fair  [] Poor   Suggested Professional Referral:  [x] No  [] Yes:  Barriers to Goal Achievement[de-identified]  [x] No  []

## 2022-09-27 ENCOUNTER — HOSPITAL ENCOUNTER (OUTPATIENT)
Dept: PHYSICAL THERAPY | Facility: CLINIC | Age: 82
Setting detail: THERAPIES SERIES
Discharge: HOME | End: 2022-09-27
Payer: MEDICARE

## 2022-09-27 PROCEDURE — 97016 VASOPNEUMATIC DEVICE THERAPY: CPT

## 2022-09-27 PROCEDURE — 97110 THERAPEUTIC EXERCISES: CPT

## 2022-09-27 NOTE — FLOWSHEET NOTE
[] [facility]  [address]  [phone]  F: [phone] [] [address]  [address]   [address]  P: [phone]  F: [phone] [] [address]  Therapy  [address]  P: [phone]  F: [phone] [x] [address]  P: [phone]  F: [phone] [] [address]  [facility]   [address]   [place]: [phone]  F: [phone]      Physical Therapy Daily Treatment Note    Date:  2022  Patient Name:  Eric Chris    :  1940  MRN: [id number]  Physician: CECILIA Morrissey                                 Insurance: Medicare (no auth required, BMN)  Medical Diagnosis: R TKA               Rehab Codes: K28.123  Onset date: DOS: 8/15/22                 Next 's appt.: 10/20- Dr. Louise Garcia  Visit# / total visits: ; Progress note for Medicare patient due at visit 10    Cancels/No Shows: 0/0    Subjective:    Pain:  [x] Yes  [] No Location: R knee Pain Rating: (0-10 scale) 1/10  Pain altered Tx:  [] No  [] Yes  Action:  Comments: Pt arrives reporting mild, aching pain. Nothing more than that.      Objective:  Modalities: Vasocompression: 15', low compression, 34°  Precautions:  Exercises:  Exercise  R TKA    Reps/ Time Weight/ Level Comments   NUSTEP 5' 5                Supine heel slides 2x10       SAQ 2x10       SLR 10x   Cues to perform quad set prior to lifting leg   LAQ 2x10       Bridges 10x       SL Clamshells 10x ea                 Prone HS curls 10x                                              Other:      Vasocompression: 13' for 34 moderate compression supine with bolster     Specific Instructions for next treatment: Progress knee flex ROM as lenny      Treatment Charges: Mins Units   [] Modalities     [x]  Ther Exercise 30 2   []  Manual Therapy     []  Ther Activities     []  Aquatics     [x]  Vasocompression 15 1   []  Other     Total Treatment time 45 3       Assessment: [] Progressing toward goals. [] No change. [x] Other: Initiated treatment w/ warm-up on NuStep followed by mat ex as mentioned above. Focused on gentle strengthening of bilateral hips, quads, and hamstrings this date. Pt reports mild incr in back pain when attempted prone hip extension - held. Ended treatment w/ vaso to prevent post exercise soreness. AROM measured 0-105° of RLE post overpressed heel slides. [x] Patient would continue to benefit from skilled physical therapy services in order to: Increase R knee ROM and strength, will also work on increasing L knee strength as well d/t weakness leading to knee buckling. STG: (to be met in 10 treatments)  ? Pain: Decrease pain levels to 2/10 at worst in R knee   ? ROM: Increase flexibility and AROM limitations throughout to equal bilat to reduce difficulty with ADLs, increase R knee flex AROM to 110deg, ext to 0deg  ? Strength: Increase R knee ext to 4+/5, L knee ext to 4/5  ? Function: Pt to report ambulating around house without any increase in knee pain (with RW)  Independent with Home Exercise Programs  Demonstrate Knowledge of fall prevention     LTG: (to be met in 20 treatments)  Improve score on assessment tool from 59% impaired to 25% impaired, demonstrating an improvement in ADLs  Reduce pain levels to 0/10                    Patient goals:    Pt. Education:  [x] Yes  [] No  [] Reviewed Prior HEP/Ed  Method of Education: [x] Verbal  [x] Demo  [] Written  Comprehension of Education:  [] Verbalizes understanding. [] Demonstrates understanding. [] Needs review. [x] Demonstrates/verbalizes HEP/Ed previously given. Plan: [x] Continue current frequency toward long and short term goals.     [] Specific Instructions for subsequent treatments:       Time In: 1200           Time Out: 8585    Electronically signed by:  Zulay Ventura PTA

## 2022-09-27 NOTE — PRE-CERTIFICATION NOTE
Medicare Cap   [] Physical Therapy  [] Speech Therapy  [] Occupational therapy  *PT and Speech caps combine      $2150 Limit for PT and Speech combined  $2150 Limit for OT individually  At the beginning of the month where you expect to go over $2150, please add the [address] [place] [address] modifier      Patient Name: Humza Carnes: 1940    Note:  This is an estimate of charges billed.      Date of [address] Name # units/ charge $$$ charge Daily Total Charge Ongoing Total $$$   9/23 PT eval  Vaso  Therex 1+1+1 97.02+22.29+11.48 130.79 130.79   9/27 2TE, 1 VASO 2+1 25.10+19.62+7.91 52.63 183.42

## 2022-09-30 ENCOUNTER — HOSPITAL ENCOUNTER (OUTPATIENT)
Dept: PHYSICAL THERAPY | Facility: CLINIC | Age: 82
Setting detail: THERAPIES SERIES
Discharge: HOME | End: 2022-09-30
Payer: MEDICARE

## 2022-09-30 PROCEDURE — 97016 VASOPNEUMATIC DEVICE THERAPY: CPT

## 2022-09-30 PROCEDURE — 97110 THERAPEUTIC EXERCISES: CPT

## 2022-09-30 NOTE — FLOWSHEET NOTE
[] [address]  [address]  [phone]  F: [phone] [] [address]  [address]   [address]  P: [phone]  F: [phone] [] [place]  Therapy  [address]  P: [phone]  F: [phone] [x] [address]  P: [phone]  F: [phone] [] [address]  [facility]   [address]   [place]: [phone]  F: [phone]      Physical Therapy Daily Treatment Note    Date:  2022  Patient Name:  Liborio Jones    :  1940  MRN: [id number]  Physician: CECILIA Dee                                 Insurance: Medicare (no auth required, BMN)  Medical Diagnosis: R TKA               Rehab Codes: H55.846  Onset date: DOS: 8/15/22                 Next 's appt.: 10/20- Dr. Poly Bishop  Visit# / total visits: ; Progress note for Medicare patient due at visit 10    Cancels/No Shows: 0/0    Subjective:    Pain:  [x] Yes  [] No Location: R knee Pain Rating: (0-10 scale) 1/10  Pain altered Tx:  [x] No  [] Yes  Action:  Comments: Pt reported no pain in R knee noted that L knee is having minor pain and is weaker than the R.     Objective:  Modalities: Vasocompression: 15', low compression, 34°  Precautions:  Exercises:  Exercise  R TKA    Reps/ Time Weight/ Level Comments   NUSTEP 5' 5                Supine heel slides 2x10       SAQ 2x10       SLR 10x   Cues to perform quad set prior to lifting leg   LAQ 2x10       Bridges 10x'       SL Clamshells 10x ea                 Prone HS curls 10x                 Standing          Weight shifts  10x ea        Marching  10x ea       Step taps 10x ea 4 in    Hip flexion 10x ea     Hip abduction 10x ea                       Other:      Vasocompression: 13' for 34 moderate compression supine with bolster     Specific Instructions for next treatment: Progress knee flex ROM as lenny, weight bearing activities as tolerated. Treatment Charges: Mins Units   []  Modalities     [x]  Ther Exercise 38 3   []  Manual Therapy     []  Ther Activities     []  Aquatics     [x]  Vasocompression 15 1   []  Other     Total Treatment time 53 4       Assessment: [x] Progressing toward goals. Pt with good tolerance to all activities. Initiated treatment on nustep. Added standing activities with minor increase in hamstring pain during hip flexion pt was able to perform all other activities pain free. Pt then completed mat activities with good tolerance. Pt concluded session with vasocompression to decrease soreness. No measurements taken this date. [] No change. [] Other:   [x] Patient would continue to benefit from skilled physical therapy services in order to: Increase R knee ROM and strength, will also work on increasing L knee strength as well d/t weakness leading to knee buckling. STG: (to be met in 10 treatments)  ? Pain: Decrease pain levels to 2/10 at worst in R knee   ? ROM: Increase flexibility and AROM limitations throughout to equal bilat to reduce difficulty with ADLs, increase R knee flex AROM to 110deg, ext to 0deg  ? Strength: Increase R knee ext to 4+/5, L knee ext to 4/5  ? Function: Pt to report ambulating around house without any increase in knee pain (with RW)  Independent with Home Exercise Programs  Demonstrate Knowledge of fall prevention     LTG: (to be met in 20 treatments)  Improve score on assessment tool from 59% impaired to 25% impaired, demonstrating an improvement in ADLs  Reduce pain levels to 0/10                    Patient goals:    Pt. Education:  [x] Yes  [] No  [] Reviewed Prior HEP/Ed  Method of Education: [x] Verbal  [x] Demo  [] Written  Comprehension of Education:  [] Verbalizes understanding. [] Demonstrates understanding.   [] Needs review. [x] Demonstrates/verbalizes HEP/Ed previously given. Plan: [x] Continue current frequency toward long and short term goals.     [x] Specific Instructions for subsequent treatments: see above for specific instructions      Time In: 1100           Time Out: [address]    Electronically signed by:  Steven Lyles PTA

## 2022-09-30 NOTE — PRE-CERTIFICATION NOTE
Medicare Cap   [x] Physical Therapy  [] Speech Therapy  [] Occupational therapy  *PT and Speech caps combine      $2150 Limit for PT and Speech combined  $2150 Limit for OT individually  At the beginning of the month where you expect to go over $2150, please add the [address] [place] [address] modifier      Patient Name: Alba Logan: 1940    Note:  This is an estimate of charges billed.      Date of [address] Name # units/ charge $$$ charge Daily Total Charge Ongoing Total $$$   9/23 PT eval  Vaso  Therex 1+1+1 97.02+22.29+11.48 130.79 130.79   9/27 PTA 2TE, 1 VASO 2+1 25.10+19.62+7.91 52.63 183.42   9/30 PTA TE, Vaso 3+1 25.10+19.62*2+7.91 72.25 255.67

## 2022-10-03 ENCOUNTER — HOSPITAL ENCOUNTER (OUTPATIENT)
Dept: PHYSICAL THERAPY | Facility: CLINIC | Age: 82
Setting detail: THERAPIES SERIES
Discharge: HOME | End: 2022-10-03
Payer: MEDICARE

## 2022-10-03 PROCEDURE — 97016 VASOPNEUMATIC DEVICE THERAPY: CPT

## 2022-10-03 PROCEDURE — 97110 THERAPEUTIC EXERCISES: CPT

## 2022-10-03 NOTE — FLOWSHEET NOTE
[] [address]  [address]  [phone]  F: [phone] [] [address]  [address]   [address]  P: [phone]  F: [phone] [] [place]  [facility]  [address]  P: [phone]  F: [phone] [x] [address]  P: [phone]  F: [phone] [] [address]  [facility]   [address]   [place]: [phone]  F: [phone]      Physical Therapy Daily Treatment Note    Date:  10/3/2022  Patient Name:  Higinio Draper    :  1940  MRN: [id number]  Physician: CECILIA Aaron                                 Insurance: Medicare (no auth required, BMN)  Medical Diagnosis: R TKA               Rehab Codes: U57.179  Onset date: DOS: 8/15/22                 Next 's appt.: 10/20- Dr. Tiffany García  Visit# / total visits: 3/20; Progress note for Medicare patient due at visit 10    Cancels/No Shows: 0/0    Subjective:    Pain:  [x] Yes  [] No Location: L knee Pain Rating: (0-10 scale) 2/10  Pain altered Tx:  [x] No  [] Yes  Action:  Comments: Pt reported no pain in R knee noted that L knee is having minor pain and is weaker than the R.     Objective:  Modalities: Vasocompression: 15', low compression, 34°  Precautions:  Exercises:  Exercise  R TKA    Reps/ Time Weight/ Level Comments   NUSTEP 10' 5                Supine heel slides 2x10       SAQ 2x10       SLR 10x   Cues to perform quad set prior to lifting leg   LAQ 2x10       Bridges 10x'       SL Clamshells 10x ea                 Prone HS curls 10x                 Standing          Weight shifts  10x ea    HEP    Marching  10x ea       Step taps fwd 10x ea 4 in-LLE  6 in- RLE    Step ups lat 10x 4 in Only 5 reps LLE d/t pain   Hip extension 10x ea     Hip abduction 10x ea     Sit to stands 2x10  Normal chair with airex               Other:      Vasocompression: 13' for 34 moderate compression supine with bolster     Specific Instructions for next treatment: Progress knee flex ROM as lenny, take knee measurements     Eval 10/3   R knee Flex AROM/PROM 80/90 95/98   R knee Ext AROM/PROM 2/0 2/0       Treatment Charges: Mins Units   []  Modalities     [x]  Ther Exercise 45 3   []  Manual Therapy     []  Ther Activities     []  Aquatics     [x]  Vasocompression 15 1   []  Other     Total Treatment time 60 4       Assessment: [x] Progressing toward goals. Pt arrived with decreased R knee pain, states to feeling improvement. Progressed to lateral step ups which pt tolerated well, again having greater difficulty with RLE compared to LLE d/t weakness. Pt with noted increased R knee flexion ROM compared to initial visit. Ended with vasocompression to decrease post exercise soreness and edema. [] No change. [] Other:   [x] Patient would continue to benefit from skilled physical therapy services in order to: Increase R knee ROM and strength, will also work on increasing L knee strength as well d/t weakness leading to knee buckling. STG: (to be met in 10 treatments)  ? Pain: Decrease pain levels to 2/10 at worst in R knee   ? ROM: Increase flexibility and AROM limitations throughout to equal bilat to reduce difficulty with ADLs, increase R knee flex AROM to 110deg, ext to 0deg  ? Strength: Increase R knee ext to 4+/5, L knee ext to 4/5  ?  Function: Pt to report ambulating around house without any increase in knee pain (with RW)  Independent with Home Exercise Programs  Demonstrate Knowledge of fall prevention     LTG: (to be met in 20 treatments)  Improve score on assessment tool from 59% impaired to 25% impaired, demonstrating an improvement in ADLs  Reduce pain levels to 0/10                    Patient goals:    Pt. Education:  [x] Yes  [] No  [] Reviewed Prior HEP/Ed  Method of Education: [x] Verbal  [x] Demo  [] Written  Comprehension of Education:  [] Verbalizes understanding. [] Demonstrates understanding. [] Needs review. [x] Demonstrates/verbalizes HEP/Ed previously given. Plan: [x] Continue current frequency toward long and short term goals.     [x] Specific Instructions for subsequent treatments: see above for specific instructions      Time In: 3618           Time Out: 337 [facility]    Electronically signed by:  Tu Rosales PT

## 2022-10-03 NOTE — PRE-CERTIFICATION NOTE
Medicare Cap   [x] Physical Therapy  [] Speech Therapy  [] Occupational therapy  *PT and Speech caps combine      $2150 Limit for PT and Speech combined  $2150 Limit for OT individually  At the beginning of the month where you expect to go over $2150, please add the [address] [place] [address] modifier      Patient Name: Jayne Pleitez: 1940    Note:  This is an estimate of charges billed.      Date of [address] Name # units/ charge $$$ charge Daily Total Charge Ongoing Total $$$   9/23 PT eval  Vaso  Therex 1+1+1 97.02+22.29+11.48 130.79 130.79   9/27 PTA 2TE, 1 VASO 2+1 25.10+19.62+7.91 52.63 183.42   9/30 PTA TE, Vaso 3+1 25.10+19.62*2+7.91 72.25 255.67   10/3 PT Therex, Vaso 3+1 28.50+22.29+22.29+8.99 82.07 337.74

## 2022-10-05 ENCOUNTER — HOSPITAL ENCOUNTER (OUTPATIENT)
Dept: PHYSICAL THERAPY | Facility: CLINIC | Age: 82
Setting detail: THERAPIES SERIES
Discharge: HOME | End: 2022-10-05
Payer: MEDICARE

## 2022-10-05 PROCEDURE — 97016 VASOPNEUMATIC DEVICE THERAPY: CPT

## 2022-10-05 PROCEDURE — 97110 THERAPEUTIC EXERCISES: CPT

## 2022-10-05 NOTE — FLOWSHEET NOTE
[] [facility]) [facility]  [facility]  [address]  [phone]  F: [phone] [] [address]  [employer]   [address]  P: [phone]  F: [phone] [] [place]  [facility]  [address]  P: [phone]  F: [phone] [x] [address]  P: [phone]  F: [phone] [] [address]  [facility]   [address]   [place]: [phone]  F: [phone]      Physical Therapy Daily Treatment Note    Date:  10/5/2022  Patient Name:  Marcelle Interiano    :  1940  MRN: [id number]  Physician: CECILIA Finch                                 Insurance: Medicare (no auth required, BMN)  Medical Diagnosis: R TKA               Rehab Codes: A32.239  Onset date: DOS: 8/15/22                 Next Dr's appt.: 10/20- Dr. Christal Correa  Visit# / total visits: ; Progress note for Medicare patient due at visit 10    Cancels/No Shows: 0/0    Subjective:    Pain:  [x] Yes  [] No Location: L knee Pain Rating: (0-10 scale) 0/10  Pain altered Tx:  [x] No  [] Yes  Action:  Comments: Pt arrives with no pain, states to feeling good.     Objective:  Modalities: Vasocompression: 15', low compression, 34°  Precautions:  Exercises:  Exercise  R TKA    Reps/ Time Weight/ Level Comments   NUSTEP 10' 5                Supine heel slides 2x10       SAQ 2x10       SLR 10x   Cues to perform quad set prior to lifting leg   LAQ 2x10  2#     Bridges 10x'       SL Clamshells 10x ea                 Prone HS curls 10x                 Standing          Weight shifts  10x ea    HEP    Marching  10x ea       Step taps fwd 10x ea 4 in-LLE  6 in- RLE    Step ups lat 10x 4 in Only 5 reps LLE d/t pain   Hip extension 10x ea     Hip abduction 10x ea     Sit to stands 2x10  Normal Demonstrates/verbalizes HEP/Ed previously given. Plan: [x] Continue current frequency toward long and short term goals.     [x] Specific Instructions for subsequent treatments: see above for specific instructions      Time In: 1255           Time Out: 1400    Electronically signed by:  Marlon Ceballos PT

## 2022-10-07 ENCOUNTER — HOSPITAL ENCOUNTER (OUTPATIENT)
Dept: PHYSICAL THERAPY | Facility: CLINIC | Age: 82
Setting detail: THERAPIES SERIES
Discharge: HOME | End: 2022-10-07
Payer: MEDICARE

## 2022-10-07 PROCEDURE — 97110 THERAPEUTIC EXERCISES: CPT

## 2022-10-07 PROCEDURE — 97016 VASOPNEUMATIC DEVICE THERAPY: CPT

## 2022-10-07 NOTE — PRE-CERTIFICATION NOTE
Medicare Cap   [x] Physical Therapy  [] Speech Therapy  [] Occupational therapy  *PT and Speech caps combine      $2150 Limit for PT and Speech combined  $2150 Limit for OT individually  At the beginning of the month where you expect to go over $2150, please add the [address] [place] modifier      Patient Name: Alba Logan: 1940    Note:  This is an estimate of charges billed.      Date of [address] Name # units/ charge $$$ charge Daily Total Charge Ongoing Total $$$   9/23 PT eval  Vaso  Therex 1+1+1 97.02+22.29+11.48 130.79 130.79   9/27 PTA 2TE, 1 VASO 2+1 25.10+19.62+7.91 52.63 183.42   9/30 PTA TE, Vaso 3+1 25.10+19.62*2+7.91 72.25 255.67   10/3 PT Therex, Vaso 3+1 28.50+22.29+22.29+8.99 82.07 337.74   10/5 PT        10/7 PTA  TE, Vaso 3+1  72.25

## 2022-10-07 NOTE — FLOWSHEET NOTE
[] [facility]) [facility] &  Therapy  [address]  [phone]  F: [phone] [] [address]  [address]   [address]  P: [phone]  F: [phone] [] [place] [address]  Therapy  [address]  P: [phone]  F: [phone] [x] [address]  P: [phone]  F: [phone] [] [address]  [facility]   [address]   [place]: [phone]  F: [phone]      Physical Therapy Daily Treatment Note    Date:  10/7/2022  Patient Name:  Nan Jones    :  1940  MRN: [id number]  Physician: CECILIA Dunlap                                 Insurance: Medicare (no auth required, BMN)  Medical Diagnosis: R TKA               Rehab Codes: D86.165  Onset date: DOS: 8/15/22                 Next 's appt.: 10/20- Dr. Wali Hadley  Visit# / total visits: ; Progress note for Medicare patient due at visit 10    Cancels/No Shows: 0/0    Subjective:    Pain:  [x] Yes  [] No  Location: R knee Pain Rating: (0-10 scale) 0/10  Pain altered Tx:  [x] No  [] Yes  Action:  Comments: Pt denies pain in R knee upon arrival, states L knee continues to be problematic.      Objective:  Modalities: Vasocompression: 10', low compression, 34°  Precautions:  Exercises:  Exercise  R TKA    Reps/ Time Weight/ Level Comments    NUSTEP 10' 5    x              Supine heel slides 2x15     x   SAQ 2x15 4#   x   SLR 2x10   Cues to perform quad set prior to lifting leg x   LAQ 2x15  4#  incr to 5 or 6# next visit  x   Bridges 2x10     x   SL Clamshells 2x15    R only  x             Prone HS curls 10x                   Standing           Weight shifts  10x ea    HEP -    Marching  10x ea     x   Step taps fwd 10x ea 4 in-LLE  6 in- RLE  x   Step up fwd 2x8 4 in R only  x Step ups lat 10x 4 in Only 5 reps LLE d/t pain -   Hip flex  2x10  Bilat  x   Hip extension 2x10  Bilat  x   Hip abduction 2x10  Bilat  x   Sit to stands 2x10  Normal chair with airex -   SB stretch 3x30\"   x   Total Gym squats  2x10 L17  x   Other:      Vasocompression: 10'  for 34 moderate compression supine with bolster     Specific Instructions for next treatment: Progress knee flex ROM as lenny, take knee measurements     Eval 10/3   R knee Flex AROM/PROM 80/90 95/98   R knee Ext AROM/PROM 2/0 2/0       Treatment Charges: Mins Units   []  Modalities     [x]  Ther Exercise 45 3   []  Manual Therapy     []  Ther Activities     []  Aquatics     [x]  Vasocompression 10 1   []  Other     Total Treatment time 55 4       Assessment: [x] Progressing toward goals. Able to progress with Total Gym squats with good tolerance, pt req min A getting off of Total Gym. Pt able to increase reps throughout standing exercises as listed above with good lenny. Also able to increase to 4# with LAQ and SAQ this date. [] No change. [] Other:   [x] Patient would continue to benefit from skilled physical therapy services in order to: Increase R knee ROM and strength, will also work on increasing L knee strength as well d/t weakness leading to knee buckling. STG: (to be met in 10 treatments)  ? Pain: Decrease pain levels to 2/10 at worst in R knee   ? ROM: Increase flexibility and AROM limitations throughout to equal bilat to reduce difficulty with ADLs, increase R knee flex AROM to 110deg, ext to 0deg  ? Strength: Increase R knee ext to 4+/5, L knee ext to 4/5  ?  Function: Pt to report ambulating around house without any increase in knee pain (with RW)  Independent with Home Exercise Programs  Demonstrate Knowledge of fall prevention     LTG: (to be met in 20 treatments)  Improve score on assessment tool from 59% impaired to 25% impaired, demonstrating an improvement in ADLs  Reduce pain levels to 0/10 Patient goals:    Pt. Education:  [x] Yes  [] No  [] Reviewed Prior HEP/Ed  Method of Education: [x] Verbal  [x] Demo  [] Written  Comprehension of Education:  [] Verbalizes understanding. [] Demonstrates understanding. [] Needs review. [x] Demonstrates/verbalizes HEP/Ed previously given. Plan: [x] Continue current frequency toward long and short term goals.     [x] Specific Instructions for subsequent treatments: see above for specific instructions      Time In: 1:00pm           Time Out: 2:00pm    Electronically signed by:  Landon Jin PTA

## 2022-10-12 ENCOUNTER — HOSPITAL ENCOUNTER (OUTPATIENT)
Dept: PHYSICAL THERAPY | Facility: CLINIC | Age: 82
Setting detail: THERAPIES SERIES
Discharge: HOME | End: 2022-10-12
Payer: MEDICARE

## 2022-10-12 PROCEDURE — 97016 VASOPNEUMATIC DEVICE THERAPY: CPT

## 2022-10-12 PROCEDURE — 97110 THERAPEUTIC EXERCISES: CPT

## 2022-10-12 NOTE — FLOWSHEET NOTE
[] [facility]) [facility] &  Therapy  [address]  [phone]  F: [phone] [] [address]  [address]   [address]  P: [phone]  F: [phone] [] [place]  [facility]  [address]  P: [phone]  F: [phone] [x] [address]  P: [phone]  F: [phone] [] [address]  [facility]   [address]   [place]: [phone]  F: [phone]      Physical Therapy Daily Treatment Note    Date:  10/12/2022  Patient Name:  Tila Jones    :  1940  MRN: [id number]  Physician: CECILIA Xie                                 Insurance: Medicare (no auth required, BMN)  Medical Diagnosis: R TKA               Rehab Codes: O33.880  Onset date: DOS: 8/15/22                 Next 's appt.: 10/20- Dr. Ben Du  Visit# / total visits: ; Progress note for Medicare patient due at visit 10    Cancels/No Shows: 0/0    Subjective:    Pain:  [x] Yes  [] No  Location: R knee Pain Rating: (0-10 scale) 0/10  Pain altered Tx:  [x] No  [] Yes  Action:  Comments: Pt arrives stating he would like a few minutes to rest prior to beginning therapy d/t fatigue today.  No pain upon arrival.     Objective:  Modalities: Vasocompression: 10', low compression, 34°  Precautions:  Exercises:  Exercise  R TKA    Reps/ Time Weight/ Level Comments    NUSTEP 10'  L5    x              Supine heel slides 2x15     x   SAQ 2x15  5#   x   SLR 2x15   Cues to perform quad set prior to lifting leg x   LAQ 2x15  5#   x   Bridges 2x10     x   SL Clamshells 2x15    R only  x             Prone HS curls 10x                   Standing           Weight shifts  10x ea    HEP -    HS Curls  15x   x    Mini Squats  10x       Marching  15x ea     x   Step taps fwd 10x ea 4 in-LLE  6 in- RLE  x   Step up fwd 2x8 4 in R only  x   Step ups lat 10x 4 in Only 5 reps LLE d/t pain -   Hip flex  2x10  Bilat  x   Hip extension 2x10  Bilat  x   Hip abduction 2x10  Bilat  x   Sit to stands 2x10  Normal chair with airex -   SB stretch 3x30\"   x   Total Gym squats  2x10 L17  x   Other:      Vasocompression: 10'  for 34 moderate compression supine with bolster     Specific Instructions for next treatment: Progress knee flex ROM as lenny, take knee measurements     Eval 10/3   R knee Flex AROM/PROM 80/90 95/98   R knee Ext AROM/PROM 2/0 2/0       Treatment Charges: Mins Units   []  Modalities     [x]  Ther Exercise 45 3   []  Manual Therapy     []  Ther Activities     []  Aquatics     [x]  Vasocompression 10 1   []  Other     Total Treatment time 55 4       Assessment: [x] Progressing toward goals. Increased knee flexion ROM with the addition of mini squats and HS curls in standing with good tolerance and no increase in pain. Increased weight tolerated with short and LAQ to enhance quad strengthening with LE fatigue noted however no difficulty. Ended session with vaso for soreness reduction with good relief verbalized. Pt stated his only pain is of the L knee. [] No change. [] Other:   [x] Patient would continue to benefit from skilled physical therapy services in order to: Increase R knee ROM and strength, will also work on increasing L knee strength as well d/t weakness leading to knee buckling. STG: (to be met in 10 treatments)  ? Pain: Decrease pain levels to 2/10 at worst in R knee   ? ROM: Increase flexibility and AROM limitations throughout to equal bilat to reduce difficulty with ADLs, increase R knee flex AROM to 110deg, ext to 0deg  ? Strength: Increase R knee ext to 4+/5, L knee ext to 4/5  ?  Function: Pt to report ambulating around house without any increase in knee pain (with RW)  Independent with Home Exercise Programs  Demonstrate Knowledge of fall prevention     LTG: (to be met in 20 treatments)  Improve score on assessment tool from 59% impaired to 25% impaired, demonstrating an improvement in ADLs  Reduce pain levels to 0/10                    Patient goals:    Pt. Education:  [x] Yes  [] No  [] Reviewed Prior HEP/Ed  Method of Education: [x] Verbal  [x] Demo  [] Written  Comprehension of Education:  [] Verbalizes understanding. [] Demonstrates understanding. [] Needs review. [x] Demonstrates/verbalizes HEP/Ed previously given. Plan: [x] Continue current frequency toward long and short term goals. [x] Specific Instructions for subsequent treatments: see above for specific instructions      Time In: 2:03 PM          Time Out: 3:09 PM    Electronically signed by:   Leydi Lanza [place]

## 2022-10-12 NOTE — PRE-CERTIFICATION NOTE
Medicare Cap   [x] Physical Therapy  [] Speech Therapy  [] Occupational therapy  *PT and Speech caps combine      $2150 Limit for PT and Speech combined  $2150 Limit for OT individually  At the beginning of the month where you expect to go over $2150, please add the [address] [place] modifier      Patient Name: Nancy Govern: 1940    Note:  This is an estimate of charges billed.      Date of [address] Name # units/ charge $$$ charge Daily Total Charge Ongoing Total $$$   9/23 PT eval  Vaso  Therex 1+1+1 97.02+22.29+11.48 130.79 130.79   9/27 PTA 2TE, 1 VASO 2+1 25.10+19.62+7.91 52.63 183.42   9/30 PTA TE, Vaso 3+1 25.10+19.62*2+7.91 72.25 255.67   10/3 PT Therex, Vaso 3+1 28.50+22.29+22.29+8.99 82.07 337.74   10/5 PT        10/7 PTA  TE, Vaso 3+1  72.25    10/12 PTA TE, vaso 3+1  72.25

## 2022-10-13 ENCOUNTER — HOSPITAL ENCOUNTER (OUTPATIENT)
Dept: PHYSICAL THERAPY | Facility: CLINIC | Age: 82
Setting detail: THERAPIES SERIES
Discharge: HOME | End: 2022-10-13
Payer: MEDICARE

## 2022-10-13 PROCEDURE — 97110 THERAPEUTIC EXERCISES: CPT

## 2022-10-13 PROCEDURE — 97016 VASOPNEUMATIC DEVICE THERAPY: CPT

## 2022-10-13 NOTE — PRE-CERTIFICATION NOTE
Medicare Cap   [x] Physical Therapy  [] Speech Therapy  [] Occupational therapy  *PT and Speech caps combine      $2150 Limit for PT and Speech combined  $2150 Limit for OT individually  At the beginning of the month where you expect to go over $2150, please add the [address] [place] modifier      Patient Name: Jayne Pleitez: 1940    Note:  This is an estimate of charges billed.      Date of [address] Name # units/ charge $$$ charge Daily Total Charge Ongoing Total $$$   9/23 PT eval  Vaso  Therex 1+1+1 97.02+22.29+11.48 130.79 130.79   9/27 PTA 2TE, 1 VASO 2+1 25.10+19.62+7.91 52.63 183.42   9/30 PTA TE, Vaso 3+1 25.10+19.62*2+7.91 72.25 255.67   10/3 PT Therex, Vaso 3+1 28.50+22.29+22.29+8.99 82.07 337.74   10/5 PT \" \" \" \"    10/7 PTA  TE, Vaso 3+1  72.25    10/12 PTA TE, vaso 3+1  72.25    10/13 PTA TE, vaso 3+1  72.25 636.56

## 2022-10-13 NOTE — FLOWSHEET NOTE
[] [facility]) [facility]  Therapy  [address]  [phone]  F: [phone] [] [address]  [address]   [address]  P: [phone]  F: [phone] [] [place]  Therapy  [address]  P: [phone]  F: [phone] [x] [address]  P: [phone]  F: [phone] [] [address]  [facility]   [address]   [place]: [phone]  F: [phone]      Physical Therapy Daily Treatment Note    Date:  10/13/2022  Patient Name:  Dov Esquivel    :  1940  MRN: [id number]  Physician: CECILIA Mendenhall                                 Insurance: Medicare (no auth required, BMN)  Medical Diagnosis: R TKA               Rehab Codes: H14.463  Onset date: DOS: 8/15/22                 Next 's appt.: 10/20- Dr. Aakash Khan  Visit# / total visits: ; Progress note for Medicare patient due at visit 10    Cancels/No Shows: 0/0    Subjective:    Pain:  [] Yes  [x] No  Location: R knee Pain Rating: (0-10 scale) 0/10  Pain altered Tx:  [x] No  [] Yes  Action:  Comments: Took pain meds therefore no pain on arrival. A little sore since last PT session was yesterday.     Objective:  Modalities: Vasocompression: 10', low compression, 34°  Precautions:  Exercises:  Exercise  R TKA    Reps/ Time Weight/ Level Comments    NUSTEP 10'  L5    x              Supine heel slides 2x15     x   SAQ 2x15  5#   x   SLR 2x15   Cues to perform quad set prior to lifting leg x   LAQ 2x15  5#   x   Bridges 2x15     x   SL Clamshells 2x15    R only  x             Prone HS curls 10x                   Standing           Weight shifts  10x ea    HEP -    HS Curls  15x   resume    Mini Squats  10x       Marching  15x ea     x   Step taps fwd 15x ea 4 in-LLE  6 in- RLE  x Step up fwd 2x8 4 in R only  resume   Step ups lat 10x 4 in Only 5 reps LLE d/t pain -   Hip flex  2x10  Bilat  x   Hip extension 2x10  Bilat  x   Hip abduction 2x10  Bilat  x   Sit to stands 2x10  Normal chair with airex -   SB stretch 3x30\"   resume   Total Gym squats  2x15 L17  x   Other:      Vasocompression: 10'  for 34 moderate compression supine with bolster     Specific Instructions for next treatment: Progress knee flex ROM as lenny, take knee measurements     Eval 10/3 10/13 AROM   R knee Flex AROM/PROM 80/90 95/98 99   R knee Ext AROM/PROM 2/0 2/0 0       Treatment Charges: Mins Units   []  Modalities     [x]  Ther Exercise 45 3   []  Manual Therapy     []  Ther Activities     []  Aquatics     [x]  Vasocompression 10 1   []  Other     Total Treatment time 55 4       Assessment: [x] Progressing toward goals. 2 hand support required during (R) LE closed chain stair tap, attempt with one hand support but feels unstable. Direction to improve eccentric control of stand to sit. Tactile input and cuing for quad engagement and decrease quad lag during SLR. Completed exercise as time allowed. Improved active knee flexion and extension ROM. No complaints at end of treatment, responds well to vaso in managing symptoms. [] No change. [] Other:   [x] Patient would continue to benefit from skilled physical therapy services in order to: Increase R knee ROM and strength, will also work on increasing L knee strength as well d/t weakness leading to knee buckling. STG: (to be met in 10 treatments)  ? Pain: Decrease pain levels to 2/10 at worst in R knee   ? ROM: Increase flexibility and AROM limitations throughout to equal bilat to reduce difficulty with ADLs, increase R knee flex AROM to 110deg, ext to 0deg  ? Strength: Increase R knee ext to 4+/5, L knee ext to 4/5  ?  Function: Pt to report ambulating around house without any increase in knee pain (with RW)  Independent with Home Exercise Programs  Demonstrate Knowledge of fall prevention     LTG: (to be met in 20 treatments)  Improve score on assessment tool from 59% impaired to 25% impaired, demonstrating an improvement in ADLs  Reduce pain levels to 0/10                    Patient goals:    Pt. Education:  [x] Yes  [] No  [] Reviewed Prior HEP/Ed  Method of Education: [x] Verbal  [x] Demo  [] Written  Comprehension of Education:  [] Verbalizes understanding. [] Demonstrates understanding. [] Needs review. [x] Demonstrates/verbalizes HEP/Ed previously given. Plan: [x] Continue current frequency toward long and short term goals.     [x] Specific Instructions for subsequent treatments: see above for specific instructions      Time In: 12:00 PM          Time Out: 1:00 PM    Electronically signed by:  Katy Sanches PTA

## 2022-10-14 ENCOUNTER — OFFICE VISIT (OUTPATIENT)
Dept: PULMONOLOGY | Age: 82
End: 2022-10-14
Payer: MEDICARE

## 2022-10-14 VITALS
DIASTOLIC BLOOD PRESSURE: 71 MMHG | HEIGHT: 69 IN | BODY MASS INDEX: 41.47 KG/M2 | HEART RATE: 84 BPM | SYSTOLIC BLOOD PRESSURE: 120 MMHG | WEIGHT: 280 LBS | OXYGEN SATURATION: 97 %

## 2022-10-14 DIAGNOSIS — Z99.89 OSA ON CPAP: Primary | ICD-10-CM

## 2022-10-14 DIAGNOSIS — E66.01 MORBID OBESITY WITH BMI OF 40.0-44.9, ADULT (HCC): ICD-10-CM

## 2022-10-14 DIAGNOSIS — G47.33 OSA ON CPAP: Primary | ICD-10-CM

## 2022-10-14 DIAGNOSIS — Z23 ENCOUNTER FOR IMMUNIZATION: ICD-10-CM

## 2022-10-14 PROCEDURE — G0008 ADMIN INFLUENZA VIRUS VAC: HCPCS | Performed by: INTERNAL MEDICINE

## 2022-10-14 PROCEDURE — 90694 VACC AIIV4 NO PRSRV 0.5ML IM: CPT | Performed by: INTERNAL MEDICINE

## 2022-10-14 PROCEDURE — 99204 OFFICE O/P NEW MOD 45 MIN: CPT | Performed by: INTERNAL MEDICINE

## 2022-10-14 PROCEDURE — 1123F ACP DISCUSS/DSCN MKR DOCD: CPT | Performed by: INTERNAL MEDICINE

## 2022-10-14 ASSESSMENT — SLEEP AND FATIGUE QUESTIONNAIRES
HOW LIKELY ARE YOU TO NOD OFF OR FALL ASLEEP WHILE SITTING QUIETLY AFTER LUNCH WITHOUT ALCOHOL: 0
HOW LIKELY ARE YOU TO NOD OFF OR FALL ASLEEP WHILE SITTING AND READING: 0
HOW LIKELY ARE YOU TO NOD OFF OR FALL ASLEEP WHILE SITTING AND TALKING TO SOMEONE: 0
HOW LIKELY ARE YOU TO NOD OFF OR FALL ASLEEP WHEN YOU ARE A PASSENGER IN A CAR FOR AN HOUR WITHOUT A BREAK: 0
HOW LIKELY ARE YOU TO NOD OFF OR FALL ASLEEP WHILE LYING DOWN TO REST IN THE AFTERNOON WHEN CIRCUMSTANCES PERMIT: 2
HOW LIKELY ARE YOU TO NOD OFF OR FALL ASLEEP WHILE WATCHING TV: 0
HOW LIKELY ARE YOU TO NOD OFF OR FALL ASLEEP WHILE SITTING INACTIVE IN A PUBLIC PLACE: 0
HOW LIKELY ARE YOU TO NOD OFF OR FALL ASLEEP IN A CAR, WHILE STOPPED FOR A FEW MINUTES IN TRAFFIC: 0
ESS TOTAL SCORE: 2

## 2022-10-14 NOTE — PROGRESS NOTES
PULMONARY MEDICINE OUTPATIENT  FOLLOW UP NOTE                                                                    PATIENT:  Higinio Draper  YOB: 1940  MRN: [id number]   REFERRED BY: Warren Mccullough MD   CHIEF COMPLIANT: New Patient (/) and Sleep Apnea       HISTORY     HISTORY OF PRESENT ILLNESS:   Higinio Draper is a 80y.o. year old male here for follow-up    Obstructive sleep apnea:  Patient is morbidly obese. He was seen in the clinic about 4 years back (November 2018) after he was found to have severe sleep apnea on home sleep study. He subsequently had a split-night study (12/27/2018) which showed AHI of 28.8, REM AHI 36, O2 rebeka 71%. He was recommended CPAP at 13 cmH2O through a full facemask patient reports that he has been using CPAP as recommended and has been benefiting from the therapy. He is requesting new supplies for the CPAP. Denies any hypersomnia or frequent nighttime awakening.        PAST MEDICAL HISTORY:      Diagnosis Date    Abnormal renal function     Allergic contact dermatitis due to other agents 08/27/2020    Anemia     Cellulitis of buttock 07/22/2021    Depression     Dysmetabolic syndrome     Dysuria 06/28/2021    Edema     legs    Elevated glucose 02/07/2016    Erythrasma 07/22/2021    Health care maintenance 04/14/2016    Hypertension     Intertrigo 08/05/2021    Leukopenia     Obesity     Osteoarthritis     Other constipation 12/19/2019    Other specified counseling 03/03/2016    PVD (peripheral vascular disease) ([facility] [address]     Renal insufficiency 10/21/2015    Skin maceration 07/22/2021    Sleep apnea     with severe desaturations    Trigger finger of right thumb 02/03/2016    Has seen Ortho and got injection, doing better     Viral disease exposure 05/21/2020     PAST SURGICAL HISTORY:      Procedure Laterality Date    BACK SURGERY  01/01/2005    CATARACT EXTRACTION Left     COLONOSCOPY      EYE SURGERY      cataract    HERNIA REPAIR      age 12    LUMBAR LAMINECTOMY 04/21/2022    with fusion    NECK SURGERY      not spine- hair follicle    TONSILLECTOMY      TOTAL KNEE ARTHROPLASTY Right 08/15/2022    RIGHT KNEE TOTAL ARTHROPLASTY WITH BIOMET AND GPS (Right: Knee)    TOTAL KNEE ARTHROPLASTY Right 8/15/2022    RIGHT KNEE TOTAL ARTHROPLASTY WITH BIOMET AND GPS performed by Khoa Holley MD at [address] [place] [address] Drive:  TOBACCO:   reports that he quit smoking about 61 years ago. His smoking use included cigarettes. He has a 3.00 pack-year smoking history. He has never used smokeless tobacco.  ETOH:   reports no history of alcohol use. DRUGS: reports no history of drug use.      AVOCATION/OCCUPATIONAL EXPOSURE:  [] Asbestos      [] Hot tub  [] Silica dust    [] Pets  [] Coal            [] Exotic birds  [] [employer]       [] Tuberculosis  [] Syliva Freeman         [] Others  [] [place]          PHYSICAL EXAMINATION      VITAL SIGNS:   /71 (Site: Left Upper Arm, Position: Sitting, Cuff Size: Large Adult)   Pulse 84   Ht 5' 9\" (1.753 m)   Wt 280 lb (127 kg)   SpO2 97%   BMI 41.35 kg/m²   Wt Readings from Last 3 Encounters:   10/14/22 280 lb (127 kg)   09/22/22 274 lb (124.3 kg)   08/16/22 274 lb 11.1 oz (124.6 kg)        SYSTEMIC EXAMINATION:  General appearance -  [x] well appearing  [] overweight  [x] morbidly obese [] cachectic [x] comfortable  [x] in no acute distress  [] chronically ill-appearing  [] in mild to moderate respiratory distress  Mental status -  [x] alert  [x] oriented to person, place, and time  [] anxious  [] depressed mood   Head-  [x] atraumatic  [x] normocephalic  Eyes -  [] pupils equal and reactive, extraocular eye movements intact  [] sclera anicteric  Ears -  [x] hearing grossly normal bilaterally [] bilateral TM's and external ear canals normal  Nose -  [x] normal and patent [] no erythema  [] discharge  Mouth -  [x] mucous membranes moist  [] pharynx normal without lesions [] erythematous  [] exudate noted  Mallampati Airway Score - [] I (soft palate, uvula, fauces, tonsillar pillars visible) [] II (soft palate, uvula, fauces visible) []  III (soft palate, base of uvula visible) [] IV (only hard palate visible)  Neck -  [] supple  [] no significant adenopathy  [] carotids upstroke normal bilaterally [] no JVD  [] no bruit  Lymphatics -  [x] no palpable lymphadenopathy  [] no hepatosplenomegaly  Chest -   [x] normal chest excursion  [x] no chest wall tenderness  [] increased AP diameter[] pectus noted [] scoliosis noted [x] no tachypnea retraction or cyanosis [x] clear to auscultation, no wheezes, rales or rhonchi, symmetric air entry  [] wheezing noted  [] rales noted  []rhonchi noted [] decreased air entry noted bilaterally  Heart -  [x] normal rate,  [x] regular rhythm  [] irregularly irregular rhythm [x] normal S1  [x] normal S2  [x] no murmurs, rubs, clicks or gallops  [] S3 present  [] S4 present  [] systolic murmur  [] diastolic murmur  [] midsystolic click []pericardial rub present   Abdomen -  [] soft [] nontender  [] nondistended  [] no masses or organomegaly  Neurological -  [x] normal speech  [x] no focal findings or movement disorder noted  [] cranial nerves II through XII intact  [] DTR's normal and symmetric  [] Babinski sign negative,  [x] motor and sensory grossly normal bilaterally  [] normal muscle tone [x] no tremors  [] strength 5/5  [] Romberg sign negative [] normal gait   Musculoskeletal -  [x] no joint tenderness [x] no deformity or swelling  Extremities - [x] no clubbing or cyanosis,  [x] no pedal edema [] pedal edema  [] intact peripheral pulses  Skin -  [x] normal coloration and turgor  [x] no rashes  [] no suspicious skin lesions noted       MEDICAL DECISION MAKING     PROBLEMS ADDRESSED (NUMBER AND COMPLEXITY)       ICD-10-CM    1. JEMAL on CPAP  G47.33 DME Order for CPAP as OP    Z99.89       2. Morbid obesity with BMI of 40.0-44.9, adult ([facility]  E66.01     Z68.41       3.  Encounter for immunization  Z23 Influenza, FLUAD, (age 72 y+), IM, Preservative Free, 0.5 mL           2.  DATA REVIEWED AND ANALYZED (AMOUNT AND/OR COMPLEXITY)   LABS:  ABG:   No results found for: PH, PHART, PCO2, HUV0XBJ, PO2, PO2ART, HCO3, LLG2ACI, BE, BEART, THGB, O6OUFKDA  VBG:  No results found for: PHVEN, QQV4NNC, BEVEN, L0QZBKQT  CBC:   Lab Results   Component Value Date    WBC 5.1 08/02/2022    RBC 3.85 (L) 08/02/2022    HGB 11.5 (L) 08/02/2022    HCT 36.1 (L) 08/02/2022     08/02/2022    MCV 93.8 08/02/2022    MCH 29.9 08/02/2022    MCHC 31.9 08/02/2022    RDW 12.0 08/02/2022    LYMPHOPCT 31 08/02/2022    MONOPCT 8 08/02/2022    BASOPCT 1 08/02/2022    MONOSABS 0.39 08/02/2022    LYMPHSABS 1.58 08/02/2022    EOSABS 0.12 08/02/2022    BASOSABS 0.03 08/02/2022    DIFFTYPE NOT REPORTED 11/22/2021     Allergen Panel:No results found for: REGVALL  Eosinophils/IgE:   Lab Results   Component Value Date/Time    EOSABS 0.12 08/02/2022 04:05 PM     Alpha 1 antitrypsin: No results found for: A1A  CMP:   Lab Results   Component Value Date     08/02/2022    K 3.6 (L) 08/02/2022     08/02/2022    CO2 28 08/02/2022    BUN 16 08/02/2022    CREATININE 1.33 (H) 08/02/2022    GLUCOSE 107 (H) 08/02/2022    CALCIUM 9.3 08/02/2022    PROT 7.6 03/18/2022    LABALBU 4.1 03/18/2022    BILITOT 0.49 03/18/2022    ALT 20 03/18/2022    AST 22 03/18/2022    ALKPHOS 86 03/18/2022     Coagulation Profile:   No results found for: INR, PROTIME, APTT  BNP:   No results found for: BNP, PROBNP  D-Dimer/Fibrinogen:  No results found for: DDIMER  Others labs:  Lab Results   Component Value Date    TSH 1.44 05/13/2022    FT3 2.56 05/13/2022     No results found for: PHILIPPE, ANATITER, RHEUMFACTOR, RF, C3, C4, MPO, PR3, SEDRATE, CRP  No results found for: IRON, TIBC, FERRITIN, FOLATE, LDH  Lab Results   Component Value Date    PSA 0.2 07/01/2021    PSA 0.25 07/01/2021     No results found for: RPR, HIV    RADIOLOGY:  [] Ordered  [x] Unavailable  [] Reviewed  No results found.  Chest x-ray:  CT chest:  PET scan:    ECHOCARDIOGRAM:  No results found for this or any previous visit. PULMONARY FUNCTION TEST:  [] Ordered  [x] Unavailable  [] Reviewed  No results found for: FEV1, FVC, WOQ1WCG, TLC, DLCO     6 MINUTE WALK TEST:  [] Ordered  [] Unavailable  [x] Reviewed  No results found for this or any previous visit. Distance Walk Predicted Distance LLN** Predicted % Duration (min) Duration of Stops Sec Kermit Dyspnea Kermit Fatigue               POLYSOMNOGRAM:  SPLIT-NIGHT SLEEP STUDY:   [] Ordered  [] Unavailable  [x] Reviewed  12/27/2018  Severe sleep apnea, recommended CPAP at 13 cmH2O    TITRATION STUDY:   [] Ordered  [x] Unavailable  [] Reviewed    CPAP/BIPAP COMPLIANCE DATA:   [x] Unavailable  [] Reviewed    ASSESSMENT OF EXCESSIVE DAYTIME SLEEPINESS (BY INDEPENDENT HISTORIAN):  [place] Sleepiness Scale:  [] Not obtained  [x] Obtained  Sleep Medicine 10/14/2022   Sitting and reading 0   Watching TV 0   Sitting, inactive in a public place (e.g. a theatre or a meeting) 0   As a passenger in a car for an hour without a break 0   Lying down to rest in the afternoon when circumstances permit 2   Sitting and talking to someone 0   Sitting quietly after a lunch without alcohol 0   In a car, while stopped for a few minutes in traffic 0   [place] Sleepiness Score 2        PRIOR EXTERNAL NOTES:  [] Unavailable  [x] Reviewed    ORDERS PLACED:  Orders Placed This Encounter   Procedures    Influenza, FLUAD, (age 72 y+), IM, Preservative Free, 0.5 mL    DME Order for CPAP as OP     CPAP 13 cm H2O    Heated Humidifier    Full Face Mask 1 per 3 months and Cushions/Seals 1 per month    Headgear 1 per 6 months, Chin Strap 1 per 6 months, Disposable Filters 2 per month, Non-disposable Filters 1 per 6 months, Tubing 1 per 3 months, Chambers 1 per 6 months and Other Related Supplies  Replace supplies and accessories as needed.   Patient may choose another interface for compliance and comfort. Comments: Needs new supplies  Diagnosis: JEMAL  Length of need: 12 Months        3. RISK OF COMPLICATIONS AND/OR MORBIDITY OR MORTALITY:     ALLERGIES:  Allergies   Allergen Reactions    Ace Inhibitors Other (See Comments)     cough    Voltaren [Diclofenac Sodium] Other (See Comments)     Elevated Creatinine      MEDICATIONS:  Outpatient Medications Prior to Visit   Medication Sig Dispense Refill    aspirin EC 81 MG EC tablet Take 1 tablet by mouth in the morning and 1 tablet in the evening. 90 tablet 1    amLODIPine (NORVASC) 10 MG tablet Take 1 tablet by mouth in the morning. 90 tablet 0    Cholecalciferol (VITAMIN D3) 125 MCG (5000 UT) CAPS Take 1 capsule by mouth in the morning. 90 capsule 0    DULoxetine (CYMBALTA) 30 MG extended release capsule Take 1 capsule by mouth in the morning. 90 capsule 0    furosemide (LASIX) 40 MG tablet Take 1 tablet by mouth in the morning. 90 tablet 0    liothyronine (CYTOMEL) 5 MCG tablet Take 1 tablet by mouth in the morning. 90 tablet 0    potassium chloride (KLOR-CON M) 20 MEQ extended release tablet Take 2 tablets by mouth in the morning. 180 tablet 0    Handicap Placard MISC by Does not apply route Dx: Knee OA, Lumbar spine DDD    Duration: one year till 8/12/23 1 each 0    chlorthalidone (HYGROTEN) 50 MG tablet Take 1 tablet by mouth in the morning.  90 tablet 0     Facility-Administered Medications Prior to Visit   Medication Dose Route Frequency Provider Last Rate Last Admin    triamcinolone acetonide (KENALOG-40) injection 40 mg  40 mg IntraMUSCular Once Rashid Duran MD           SLEEP DISORDER RECOMMENDATIONS:  Patient has history of severe sleep apnea, which was initially diagnosed in December 2018  He has been on CPAP with a full facemask  He has been using CPAP as recommended and has been between therapy  He denies any excessive daytime sleepiness and reports refreshing and restorative sleep  Compliance data was not available at the time of clinic visit  Continue to use CPAP/BiPAP for at least 4 hours every night  Use heated humidification, if needed  Weight loss recommended  Recommend good sleep hygiene and instructions given  Patient not to drive or operate heavy machinery, if sleepy    LIFESTYLE MODIFICATION RECOMMENDATIONS/COUNSELING:  Follow healthy behaviors: nutrition, exercise and medication adherence  Maintain an active lifestyle    LUNG CANCER SCREENING/OTHER IMAGING RECOMMENDATIONS:  After reviewing the patient's smoking history, age and other clinical data, patient DOES NOT meet the following Low Dose CT (LDCT) Lung Screening criteria:    [] Age: Screening recommended if age range is 55-77(Medicare) or 50-80 ([facility])  [x] Pack-yr: Screening recommended if pack year smoking >30 (Medicare) or >20 ([facility])  [] Duration since quit: Screening recommended if still smoking or less than 15 year since quit  [] Lung cancer: Screening not recommended if there are sign or symptoms of lung cancer   [] Duration since last CT: Screening recommended if > 11 months since last LDCT        IMMUNIZATION HISTORY/RECOMMENDATIONS:    Immunization History   Administered Date(s) Administered    COVID-19, MODERNA BLUE border, Primary or Immunocompromised, (age 12y+), IM, 100 mcg/0.5mL 01/25/2021, 02/22/2021, 12/04/2021    Influenza, FLUAD, (age 72 y+), Adjuvanted, 0.5mL 09/17/2020, 10/14/2022    Influenza, FLUARIX, FLULAVAL, FLUZONE (age 10 mo+) AND AFLURIA, (age 1 y+), PF, 0.5mL 09/17/2020    Influenza, High Dose (Fluzone 65 yrs and older) 10/19/2017, 11/10/2018, 08/07/2019    Influenza, Triv, inactivated, subunit, adjuvanted, IM (Fluad 65 yrs and older) 11/10/2018, 08/07/2019    Pneumococcal Conjugate 13-valent ([id number]) 02/27/2018    Pneumococcal Polysaccharide ([id number]) 06/17/2019    Zoster Recombinant (Shingrix) 02/27/2018, 04/30/2018, 07/27/2021, 10/03/2021       All the questions that the patient had were answered to his satisfaction  We'll see the patient back in 1 year  Thank you for having us involved in the care of your patient. Please call us if you have any questions or concerns. Monique Parr MD  Pulmonary and Critical Care Medicine             Please note that this chart was generated using voice recognition Dragon dictation software. Although every effort was made to ensure the accuracy of this automated transcription, some errors in transcription may have occurred.

## 2022-10-18 ENCOUNTER — HOSPITAL ENCOUNTER (OUTPATIENT)
Dept: PHYSICAL THERAPY | Facility: CLINIC | Age: 82
Setting detail: THERAPIES SERIES
Discharge: HOME | End: 2022-10-18
Payer: MEDICARE

## 2022-10-18 PROCEDURE — 97016 VASOPNEUMATIC DEVICE THERAPY: CPT

## 2022-10-18 PROCEDURE — 97110 THERAPEUTIC EXERCISES: CPT

## 2022-10-18 NOTE — PRE-CERTIFICATION NOTE
Medicare Cap   [x] Physical Therapy  [] Speech Therapy  [] Occupational therapy  *PT and Speech caps combine      $2150 Limit for PT and Speech combined  $2150 Limit for OT individually  At the beginning of the month where you expect to go over $2150, please add the [address] [place] [address] modifier      Patient Name: Anju Harm: 1940    Note:  This is an estimate of charges billed.      Date of [address] Name # units/ charge $$$ charge Daily Total Charge Ongoing Total $$$   9/23 PT eval  Vaso  Therex 1+1+1 97.02+22.29+11.48 130.79 130.79   9/27 PTA 2TE, 1 VASO 2+1 25.10+19.62+7.91 52.63 183.42   9/30 PTA TE, Vaso 3+1 25.10+19.62*2+7.91 72.25 255.67   10/3 PT Therex, Vaso 3+1 28.50+22.29+22.29+8.99 82.07 337.74   10/5 PT \" \" \" \"    10/7 PTA  TE, Vaso 3+1  72.25    10/12 PTA TE, vaso 3+1  72.25    10/13 PTA TE, vaso 3+1  72.25 636.56   10/18 PTA TE, vaso 3+1  72.25 708.81

## 2022-10-18 NOTE — FLOWSHEET NOTE
[] [facility]) [facility] CENTER &  Therapy  [address]  [phone]  F: [phone] [] [address]  [address] [employer]   [address]  P: [phone]  F: [phone] [] [address]  Therapy  [address] [facility]  P: [phone]  F: [phone] [x] [address]  P: [phone]  F: [phone] [] [address]  [facility]   [address]   [place]: [phone]  F: [phone]      Physical Therapy Daily Treatment Note    Date:  10/18/2022  Patient Name:  Laura Prather    :  1940  MRN: [id number]  Physician: CECILIA Thayer                                 Insurance: Medicare (no auth required, BMN)  Medical Diagnosis: R TKA               Rehab Codes: C29.627  Onset date: DOS: 8/15/22                 Next 's appt.: 10/20- Dr. Oskar Ibrahim  Visit# / total visits: ; Progress note for Medicare patient due at visit 10    Cancels/No Shows: 0/0    Subjective:    Pain:  [x] Yes  [] No  Location: R knee Pain Rating: (0-10 scale) 1/10  Pain altered Tx:  [x] No  [] Yes  Action:  Comments: Pt reports mild achy pain present in R knee - L knee has a consistent pain that \"comes and goes\". Has a f/u with his doctor 10/20.     Objective:  Modalities: Vasocompression: 10', low compression, 34°  Precautions:  Exercises:  Exercise  R TKA    Reps/ Time Weight/ Level Comments    NuStep 10'  L5    x              Calf S 3x30''      HS S 2x30'' ea             Supine heel slides 2x15     x   SAQ 2x15  5#   x   SLR 2x15   Cues to perform quad set prior to lifting leg x   LAQ 2x15  5#   x   Bridges 2x15     x   SL Clamshells 2x15    R only  x             Prone HS curls 10x                   Standing           Weight shifts  10x ea    HEP     HS Curls  15x       Mini Squats  10x Marching  15x ea     x   Step taps fwd 15x ea 4 in-LLE  6 in- RLE     Step up fwd 2x10 6 in Bilateral x   Step ups lat 10x 6 in  x   Hip flex  2x10  Bilat  x   Hip extension 2x10  Bilat  x   Hip abduction 2x10  Bilat  x   Sit to stands 2x10  Normal chair with airex x   Other:      Vasocompression: 10'  for 34 moderate compression supine with bolster     Specific Instructions for next treatment: Progress knee flex ROM as lenny, take knee measurements     Eval 10/3 10/13 AROM 10/18   R knee Flex AROM 80/90 95/98 99 91   R knee Ext AROM 2/0 2/0 0 0       Treatment Charges: Mins Units   []  Modalities     [x]  Ther Exercise 45 3   []  Manual Therapy     []  Ther Activities     []  Aquatics     [x]  Vasocompression 15 1   []  Other     Total Treatment time 60 4       Assessment: [x] Progressing toward goals. One UE support during 3 way hip ex w/ cues for upright posture, glut med activation, and less hip extension during SLS of LLE. Focused on WB strengthening this date d/t pt's report of compliance w/ HEP mat exercises. AROM measured at 0-91° in supine followed by vaso at end of treatment to prevent post ex soreness and decr swelling that is still present. Pt expresses concern for continuous edema and wants to address it with his doctor at his upcoming f/u on 10/20. [] No change. [] Other:   [x] Patient would continue to benefit from skilled physical therapy services in order to: Increase R knee ROM and strength, will also work on increasing L knee strength as well d/t weakness leading to knee buckling. STG: (to be met in 10 treatments)  ? Pain: Decrease pain levels to 2/10 at worst in R knee   ? ROM: Increase flexibility and AROM limitations throughout to equal bilat to reduce difficulty with ADLs, increase R knee flex AROM to 110deg, ext to 0deg  ? Strength: Increase R knee ext to 4+/5, L knee ext to 4/5  ?  Function: Pt to report ambulating around house without any increase in knee pain (with RW)  Independent with Home Exercise Programs  Demonstrate Knowledge of fall prevention     LTG: (to be met in 20 treatments)  Improve score on assessment tool from 59% impaired to 25% impaired, demonstrating an improvement in ADLs  Reduce pain levels to 0/10                    Patient goals:    Pt. Education:  [x] Yes  [] No  [] Reviewed Prior HEP/Ed  Method of Education: [x] Verbal  [x] Demo  [] Written  Comprehension of Education:  [] Verbalizes understanding. [] Demonstrates understanding. [] Needs review. [x] Demonstrates/verbalizes HEP/Ed previously given. Plan: [x] Continue current frequency toward long and short term goals.     [x] Specific Instructions for subsequent treatments: see above for specific instructions      Time In: 1200          Time Out: 1300    Electronically signed by:  George Lozoya PTA

## 2022-10-20 ENCOUNTER — OFFICE VISIT (OUTPATIENT)
Dept: ORTHOPEDIC SURGERY | Age: 82
End: 2022-10-20

## 2022-10-20 ENCOUNTER — APPOINTMENT (OUTPATIENT)
Dept: PHYSICAL THERAPY | Facility: CLINIC | Age: 82
End: 2022-10-20
Payer: MEDICARE

## 2022-10-20 DIAGNOSIS — Z96.651 H/O TOTAL KNEE REPLACEMENT, RIGHT: Primary | ICD-10-CM

## 2022-10-20 PROCEDURE — 99024 POSTOP FOLLOW-UP VISIT: CPT | Performed by: ORTHOPAEDIC SURGERY

## 2022-10-20 NOTE — PROGRESS NOTES
Donny returns today status post right total knee. Patient was last seen 4 weeks ago at that time his motion was subpar and told him he really need to push it in therapy. He is basically back here today for motion check to make sure we do not need to do a manipulation patient states that for the most part most of his pain is gone just has some stiffness when he first arising out of bed    Examination of the patient's right knee notes his wound is pristine he does have some mild pitting edema from the mid tibia on down but is apparently chronic for him no redness or cellulitis. .  Patient's motion however significantly improved he is basically full extension I can easily take him back past 125 degrees. There is no calf tenderness negative Homans    Impression  Status post right total knee  Severe DJD left knee    Plan  Patient is wishing to contemplate having his left knee done in the future.   I think the patient needs a little bit more work on his right knee with therapy and he is can continue doing this we will see him back in 1 month's time to discuss possible working on his left knee replacement

## 2022-10-21 ENCOUNTER — HOSPITAL ENCOUNTER (OUTPATIENT)
Dept: PHYSICAL THERAPY | Facility: CLINIC | Age: 82
Setting detail: THERAPIES SERIES
Discharge: HOME | End: 2022-10-21
Payer: MEDICARE

## 2022-10-21 PROCEDURE — 97016 VASOPNEUMATIC DEVICE THERAPY: CPT

## 2022-10-21 PROCEDURE — 97110 THERAPEUTIC EXERCISES: CPT

## 2022-10-21 NOTE — PRE-CERTIFICATION NOTE
Medicare Cap   [x] Physical Therapy  [] Speech Therapy  [] Occupational therapy  *PT and Speech caps combine      $2150 Limit for PT and Speech combined  $2150 Limit for OT individually  At the beginning of the month where you expect to go over $2150, please add the [address] [place] [address] modifier      Patient Name: Bernice Bar: 1940    Note:  This is an estimate of charges billed.      Date of [address] Name # units/ charge $$$ charge Daily Total Charge Ongoing Total $$$   9/23 PT eval  Vaso  Therex 1+1+1 97.02+22.29+11.48 130.79 130.79   9/27 PTA 2TE, 1 VASO 2+1 25.10+19.62+7.91 52.63 183.42   9/30 PTA TE, Vaso 3+1 25.10+19.62*2+7.91 72.25 255.67   10/3 PT Therex, Vaso 3+1 28.50+22.29+22.29+8.99 82.07 337.74   10/5 PT \" \" \" \"    10/7 PTA  TE, Vaso 3+1  72.25    10/12 PTA TE, vaso 3+1  72.25    10/13 PTA TE, vaso 3+1  72.25 636.56   10/18 PTA TE, vaso 3+1  72.25 708.81   10/21 Therex (PT)  Vaso 3+1  82.07 790.88

## 2022-10-21 NOTE — PROGRESS NOTES
[] [facility]) [facility] &  Therapy  [address]  [phone]  F: [phone] [] [address]  [address]   [address]  P: [phone]  F: [phone] [] [address] &  Therapy  [address] [facility]  P: [phone]  F: [phone] [x] [address]  P: [phone]  F: [phone] [] [address]  [facility]   [address]   [place]: [phone]  F: [phone]      Physical Therapy Progress Note    Date: 10/21/2022      Patient: Malcolm Du  : 1940  MRN: [id number]    Physician: CECILIA Shultz                                 Insurance: Medicare (no auth required, BMN)  Medical Diagnosis: R TKA               Rehab Codes: O47.965  Onset date: DOS: 8/15/22                 Next 's appt.: 10/20- Dr. Yvon Castillo  Total visits attended: 10        Subjective:    Pain:  [x] Yes  [] No               Location: R knee         Pain Rating: (0-10 scale) 1/10  Pain altered Tx:  [x] No  [] Yes  Action:  Comments: Pt arrives feeling good overall, had a follow up with MD yesterday who reported good porgress so far. Would like a follow up in 4 weeks to discuss possible L TKA. Objective:  Test Measurements:   Eval 10/3 10/13 AROM 10/18 10/21   R knee Flex AROM 80/90 95/98 99 91 100/110   R knee Ext AROM 2/0 2/0 0 0 0         Assessment:  STG: (to be met in 10 treatments)  ? Pain: Decrease pain levels to 2/10 at worst in R knee MET (10/21)  ? ROM: Increase flexibility and AROM limitations throughout to equal bilat to reduce difficulty with ADLs, increase R knee flex AROM to 110deg, ext to 0deg MET (10/21, pt is currently 110 deg flex, 0deg ext)  ? Strength: Increase R knee ext to 4+/5, L knee ext to 4/5 MET (10/21)  ?  Function: Pt to report ambulating around house without any increase in knee pain (with RW) MET (10/21)  Independent with Home Exercise Programs MET (10/21)     LTG: (to be met in 20 treatments)  Improve score on assessment tool from 59% impaired to 25% impaired, demonstrating an improvement in ADLs Progressing (10/21) pt has only improved by one point to 58%, most likely d/t majority of issues are coming from non-surgical LE. Reduce pain levels to 0/10 Progressing (10/21)    Treatment Plan:  [x] Therapeutic Exercise   33434  [] Iontophoresis: 4 mg/mL Dexamethasone Sodium Phosphate  mAmin  08428   [] Therapeutic Activity  80667 [x] Vasopneumatic cold with compression  31131    [] Gait Training   75367 [] Ultrasound   82520   [] Neuromuscular Re-education  51910 [] Electrical Stimulation Unattended  04131   [x] Manual Therapy  55738 [] Electrical Stimulation Attended  79024   [x] Instruction in HEP  [] Lumbar/Cervical Traction  47169   [] Aquatic Therapy   70068 [] Cold/hotpack    [] Massage   38913      [] Dry Needling, 1 or 2 muscles  39014   [] Biofeedback, first 15 minutes   69419  [] Biofeedback, additional 15 minutes   16604 [] Dry Needling, 3 or more muscles  17250       Patient Status:     [x] Continue per initial plan of care. [] Additional visits necessary. [] Other:             Electronically signed by Angelika Pollard PT on 10/21/2022 at 9:20 AM      If you have any questions or concerns, please don't hesitate to call. Thank you for your referral.    Physician Signature:________________________________Date:__________________  By signing above or cosigning this note, I have reviewed this plan of care and certify a need for medically necessary rehabilitation services.      *PLEASE SIGN ABOVE AND FAX BACK ALL PAGES*

## 2022-10-21 NOTE — FLOWSHEET NOTE
[] [facility]) [facility] &  Therapy  [address]  [phone]  F: [phone] [] [address]  [address]   [address]  P: [phone]  F: [phone] [] [address]  Therapy  [address]  P: [phone]  F: [phone] [x] [address]  P: [phone]  F: [phone] [] [address]  [facility]   [address]   [place]: [phone]  F: [phone]      Physical Therapy Daily Treatment Note    Date:  10/21/2022  Patient Name:  Mane Power    :  1940  MRN: [id number]  Physician: CECILIA Dominguez                                 Insurance: Medicare (no auth required, BMN)  Medical Diagnosis: R TKA               Rehab Codes: D28.958  Onset date: DOS: 8/15/22                 Next Dr's appt.: 10/20- Dr. Toma Marin  Visit# / total visits: 10/20; Progress note for Medicare patient due at visit 20    Cancels/No Shows: 0/0    Subjective:    Pain:  [x] Yes  [] No  Location: R knee Pain Rating: (0-10 scale) 1/10  Pain altered Tx:  [x] No  [] Yes  Action:  Comments: Pt arrives feeling good overall, had a follow up with MD yesterday who reported good porgress so far. Would like a follow up in 4 weeks to discuss possible L TKA.      Objective:  Modalities: Vasocompression: 10', low compression, 34°  Precautions:  Exercises:  Exercise  R TKA    Reps/ Time Weight/ Level Comments    NuStep 10'  L5    x              Calf S 3x30''      HS S 2x30'' ea             Supine heel slides 2x15     x   SAQ 2x15  5#   -   SLR 2x15   Cues to perform quad set prior to lifting leg x   LAQ 2x15  5#   x   Bridges 2x15     x   SL Clamshells 2x15    R only  x             Prone HS curls 10x                   Standing           Weight shifts  10x ea    HEP     HS Curls  15x       Mini Squats  10x       Marching  15x ea     x   Step taps fwd 15x ea 4 in-LLE  6 in- RLE     Step up fwd 2x10 6 in Bilateral x   Step ups lat 10x 6 in  x   Hip flex  2x10  Bilat  x   Hip extension 2x10  Bilat  x   Hip abduction 2x10  Bilat  x   Sit to stands 2x10  Normal chair with airex x   Other:      Vasocompression: 10'  for 34 moderate compression supine with bolster     Specific Instructions for next treatment: Progress knee flex ROM as lenny, take knee measurements     Eval 10/3 10/13 AROM 10/18 10/21   R knee Flex AROM 80/90 95/98 99 91 100/110   R knee Ext AROM 2/0 2/0 0 0 0       Treatment Charges: Mins Units   []  Modalities     [x]  Ther Exercise 45 3   []  Manual Therapy     []  Ther Activities     []  Aquatics     [x]  Vasocompression 15 1   []  Other     Total Treatment time 60 4       Assessment: [x] Progressing toward goals. Goal assessment performed this date, pt has currently met majority of goals including ROM and strength. Will continue to benefit from additional PT to strengthen LLE to prepare for future TKA. Continued with strengthening for BLE this date, most difficulty noted with LLE.     [] No change. [] Other:   [x] Patient would continue to benefit from skilled physical therapy services in order to: Increase R knee ROM and strength, will also work on increasing L knee strength as well d/t weakness leading to knee buckling. STG: (to be met in 10 treatments)  ? Pain: Decrease pain levels to 2/10 at worst in R knee MET (10/21)  ? ROM: Increase flexibility and AROM limitations throughout to equal bilat to reduce difficulty with ADLs, increase R knee flex AROM to 110deg, ext to 0deg MET (10/21, pt is currently 110 deg flex, 0deg ext)  ? Strength: Increase R knee ext to 4+/5, L knee ext to 4/5 MET (10/21)  ?  Function: Pt to report ambulating around house without any increase in knee pain (with RW) MET (10/21)  Independent with Home Exercise Programs MET (10/21)     LTG: (to be met in 20 treatments)  Improve score on assessment tool from 59% impaired to 25% impaired, demonstrating an improvement in ADLs Progressing (10/21) pt has only improved by one point to 58%, most likely d/t majority of issues are coming from non-surgical LE. Reduce pain levels to 0/10 Progressing (10/21)                    Patient goals:    Pt. Education:  [x] Yes  [] No  [] Reviewed Prior HEP/Ed  Method of Education: [x] Verbal  [x] Demo  [] Written  Comprehension of Education:  [] Verbalizes understanding. [] Demonstrates understanding. [] Needs review. [x] Demonstrates/verbalizes HEP/Ed previously given. Plan: [x] Continue current frequency toward long and short term goals.     [x] Specific Instructions for subsequent treatments: see above for specific instructions      Time In: 0781         Time Out: 65527    Electronically signed by:  Tu Rosales, PT

## 2022-10-25 ENCOUNTER — HOSPITAL ENCOUNTER (OUTPATIENT)
Dept: PHYSICAL THERAPY | Facility: CLINIC | Age: 82
Setting detail: THERAPIES SERIES
Discharge: HOME | End: 2022-10-25
Payer: MEDICARE

## 2022-10-25 PROCEDURE — 97016 VASOPNEUMATIC DEVICE THERAPY: CPT

## 2022-10-25 PROCEDURE — 97110 THERAPEUTIC EXERCISES: CPT

## 2022-10-25 NOTE — FLOWSHEET NOTE
[] [facility]) - [facility] &  Therapy  [address]  [phone]  F: [phone] [] [address]  [address]   [address]  P: [phone]  F: [phone] [] [place]  Therapy  [address]  P: [phone]  F: [phone] [x] [address]  P: [phone]  F: [phone] [] [address]  [facility]   [address]   [place]: [phone]  F: [phone]      Physical Therapy Daily Treatment Note    Date:  10/25/2022  Patient Name:  Shayan Cotter    :  1940  MRN: [id number]  Physician: CECILIA Brito                                 Insurance: Medicare (no auth required, BMN)  Medical Diagnosis: R TKA               Rehab Codes: U17.972  Onset date: DOS: 8/15/22                 Next 's appt.: 10/20- Dr. Tucker Galvan  Visit# / total visits: ; Progress note for Medicare patient due at visit 20    Cancels/No Shows: 0/0    Subjective:    Pain:  [x] Yes  [] No  Location: R knee Pain Rating: (0-10 scale) 2/10  Pain altered Tx:  [x] No  [] Yes  Action:  Comments:  Pt arrives reporting a burning sensation in lateral R knee.      Objective:  Modalities: Vasocompression: 10', low compression, 34°  Precautions:  Exercises:  Exercise  R TKA    Reps/ Time Weight/ Level Comments    NuStep 10'  L5    x              Calf S 3x30''   x   HS S 2x30'' ea   x          Supine heel slides 2x15        SAQ 2x15  5#      SLR 2x15   Cues to perform quad set prior to lifting leg    LAQ 2x15  5#      Bridges 2x15        SL Clamshells 2x15    R only               Prone HS curls 10x                   Standing           Weight shifts  10x ea    HEP     HS Curls  15x   x    Mini Squats  15x   x    Marching  15x ea     x   Step taps fwd 15x ea 4 in-LLE  6 in- RLE  x   Step up fwd 2x10 6 in Bilateral x   Step ups lat 10x 6 in  x   Hip flex  2x10  Bilat  x   Hip extension 2x10  Bilat  x   Hip abduction 2x10 2 in Bilat  x   Sit to stands 2x10  Normal chair with airex, UE use this date x   Other:      Vasocompression: 10'  for 34 moderate compression supine with bolster     Specific Instructions for next treatment: Progress knee flex ROM as lenny, take knee measurements     Eval 10/3 10/13 AROM 10/18 10/21   R knee Flex AROM 80/90 95/98 99 91 100/110   R knee Ext AROM 2/0 2/0 0 0 0       Treatment Charges: Mins Units   []  Modalities     [x]  Ther Exercise 40 3   []  Manual Therapy     []  Ther Activities     []  Aquatics     [x]  Vasocompression 15 1   []  Other     Total Treatment time 55 4       Assessment: [x] Progressing toward goals. Cont'd POC as mentioned above. Modified STS with use of one UE to assist pt w/ standing. Audible grinding of L knee present during lateral step ups - held this date and did only RLE. Vaso to end treatment to prevent post ex soreness. [] No change. [] Other:   [x] Patient would continue to benefit from skilled physical therapy services in order to: Increase R knee ROM and strength, will also work on increasing L knee strength as well d/t weakness leading to knee buckling. STG: (to be met in 10 treatments)  ? Pain: Decrease pain levels to 2/10 at worst in R knee MET (10/21)  ? ROM: Increase flexibility and AROM limitations throughout to equal bilat to reduce difficulty with ADLs, increase R knee flex AROM to 110deg, ext to 0deg MET (10/21, pt is currently 110 deg flex, 0deg ext)  ? Strength: Increase R knee ext to 4+/5, L knee ext to 4/5 MET (10/21)  ?  Function: Pt to report ambulating around house without any increase in knee pain (with RW) MET (10/21)  Independent with Home Exercise Programs MET (10/21)     LTG: (to be met in 20 treatments)  Improve score on assessment tool from 59% impaired to 25% impaired, demonstrating an improvement in ADLs Progressing (10/21) pt has only improved by one point to 58%, most likely d/t majority of issues are coming from non-surgical LE. Reduce pain levels to 0/10 Progressing (10/21)                    Patient goals:    Pt. Education:  [x] Yes  [] No  [] Reviewed Prior HEP/Ed  Method of Education: [x] Verbal  [x] Demo  [] Written  Comprehension of Education:  [] Verbalizes understanding. [] Demonstrates understanding. [] Needs review. [x] Demonstrates/verbalizes HEP/Ed previously given. Plan: [x] Continue current frequency toward long and short term goals.     [x] Specific Instructions for subsequent treatments: see above for specific instructions      Time In: 1200         Time Out: [address]    Electronically signed by:  Danielito Escalona PTA

## 2022-10-27 ENCOUNTER — HOSPITAL ENCOUNTER (OUTPATIENT)
Dept: PHYSICAL THERAPY | Facility: CLINIC | Age: 82
Setting detail: THERAPIES SERIES
Discharge: HOME | End: 2022-10-27
Payer: MEDICARE

## 2022-10-27 PROCEDURE — 97110 THERAPEUTIC EXERCISES: CPT

## 2022-10-27 PROCEDURE — 97016 VASOPNEUMATIC DEVICE THERAPY: CPT

## 2022-10-27 NOTE — PRE-CERTIFICATION NOTE
Medicare Cap   [x] Physical Therapy  [] Speech Therapy  [] Occupational therapy  *PT and Speech caps combine      $2150 Limit for PT and Speech combined  $2150 Limit for OT individually  At the beginning of the month where you expect to go over $2150, please add the [address] [place] [address] modifier      Patient Name: Murphy Brady: 1940    Note:  This is an estimate of charges billed.      Date of [address] Name # units/ charge $$$ charge Daily Total Charge Ongoing Total $$$   9/23 PT eval  Vaso  Therex 1+1+1 97.02+22.29+11.48 130.79 130.79   9/27 PTA 2TE, 1 VASO 2+1 25.10+19.62+7.91 52.63 183.42   9/30 PTA TE, Vaso 3+1 25.10+19.62*2+7.91 72.25 255.67   10/3 PT Therex, Vaso 3+1 28.50+22.29+22.29+8.99 82.07 337.74   10/5 PT \" \" \" \"    10/7 PTA  TE, Vaso 3+1  72.25    10/12 PTA TE, vaso 3+1  72.25    10/13 PTA TE, vaso 3+1  72.25 636.56   10/18 PTA TE, vaso 3+1  72.25 708.81   10/21 Therex (PT)  Vaso 3+1  82.07 790.88   10/25-PTA TE, Vaso  3+1  64.34 855.22   10/27-PTA  TE, Vaso  3+1 25.10+19.62+19.62 64.34 919.56

## 2022-10-27 NOTE — FLOWSHEET NOTE
[] [facility]) - [facility] &  Therapy  [address]  [phone]  F: [phone] [] [address]  [address]   [address]  P: [phone]  F: [phone] [] [place]  Therapy  [address]  P: [phone]  F: [phone] [x] [address]  P: [phone]  F: [phone] [] [address]  [facility]   [address]   [place]: [phone]  F: [phone]      Physical Therapy Daily Treatment Note    Date:  10/27/2022  Patient Name:  Claudia Robles    :  1940  MRN: [id number]  Physician: CECILIA Ramirez                                 Insurance: Medicare (no auth required, BMN)  Medical Diagnosis: R TKA               Rehab Codes: H71.154  Onset date: DOS: 8/15/22                 Next 's appt.: 10/20- Dr. Kenisha Garnica  Visit# / total visits: ; Progress note for Medicare patient due at visit 20    Cancels/No Shows: 0/0    Subjective:    Pain:  [x] Yes  [] No  Location: R knee Pain Rating: (0-10 scale) 0/10  Pain altered Tx:  [x] No  [] Yes  Action:  Comments:  Pt states R knee feels more stiff than anything, L knee pain rating at 2/10.      Objective:  Modalities: Vasocompression: 10', low compression, 34°  Precautions:  Exercises:  Exercise  R TKA    Reps/ Time Weight/ Level Comments    NuStep 10'  L5    x              Calf S 3x30''   x   HS S 2x30'' ea   x          Supine heel slides 2x15        SAQ 2x15  5#      SLR 2x15   Cues to perform quad set prior to lifting leg    LAQ 2x15  5#  5# with R 3# with L x   Bridges 2x15        SL Clamshells 2x15    R only               Prone HS curls 10x                   Standing           Weight shifts  10x ea    HEP     HS Curls  2x10 ea    x    Mini Squats  15x   -    Marching  2x10 Alternating x   Calf raises  2x10   x   Step taps fwd x15 ea 4 in-LLE  6 in- RLE  -   Step up fwd 2x10 6 in R only  x   Step ups lat 10x 6 in R only  x   Hip flex  2x10  WB on R only  x   Hip extension 2x10  WB on R only  x   Hip abduction 2x10 2 in WB on R only x   Sit to stands 2x10  Normal chair with airex, UE use this date -   Other:      Vasocompression: 10'  for 34 moderate compression supine with bolster     Specific Instructions for next treatment: Progress knee flex ROM as lenny, take knee measurements     Eval 10/3 10/13 AROM 10/18 10/21    R knee Flex AROM 80/90 95/98 99 91 100/110    R knee Ext AROM 2/0 2/0 0 0 0        Treatment Charges: Mins Units   [x]  Modalities: CP 10 N/C   [x]  Ther Exercise 40 3   []  Manual Therapy     []  Ther Activities     []  Aquatics     [x]  Vasocompression 15 1   []  Other     Total Treatment time 55 4       Assessment: [] Progressing toward goals. [] No change. [x] Other: Modified tx today to focus on non-WB strengthening of L LE. Pt noted to be putting increased weight through B UE with 6\" step ups, therefore PTA had pt perform with HHA, pt was surprised to see how much he was relying on his upper extremities. Pt was able to perform forward step ups with single UE A with L. Pt completed all exercises as listed above with reports of decreased pain in L post. Ended tx with vaso to R knee and CP to L x10'. [x] Patient would continue to benefit from skilled physical therapy services in order to: Increase R knee ROM and strength, will also work on increasing L knee strength as well d/t weakness leading to knee buckling. STG: (to be met in 10 treatments)  ? Pain: Decrease pain levels to 2/10 at worst in R knee MET (10/21)  ? ROM: Increase flexibility and AROM limitations throughout to equal bilat to reduce difficulty with ADLs, increase R knee flex AROM to 110deg, ext to 0deg MET (10/21, pt is currently 110 deg flex, 0deg ext)  ? Strength:  Increase R knee ext to 4+/5, L knee ext to 4/5 MET (10/21)  ? Function: Pt to report ambulating around house without any increase in knee pain (with RW) MET (10/21)  Independent with Home Exercise Programs MET (10/21)     LTG: (to be met in 20 treatments)  Improve score on assessment tool from 59% impaired to 25% impaired, demonstrating an improvement in ADLs Progressing (10/21) pt has only improved by one point to 58%, most likely d/t majority of issues are coming from non-surgical LE. Reduce pain levels to 0/10 Progressing (10/21)                    Patient goals:    Pt. Education:  [x] Yes  [] No  [] Reviewed Prior HEP/Ed  Method of Education: [x] Verbal  [x] Demo  [] Written  Comprehension of Education:  [] Verbalizes understanding. [] Demonstrates understanding. [] Needs review. [x] Demonstrates/verbalizes HEP/Ed previously given. Plan: [x] Continue current frequency toward long and short term goals.     [x] Specific Instructions for subsequent treatments: see above for specific instructions      Time In: 12:05pm         Time Out: 1:05pm    Electronically signed by:  Romana Robinson, PTA

## 2022-10-31 ENCOUNTER — HOSPITAL ENCOUNTER (OUTPATIENT)
Dept: PHYSICAL THERAPY | Facility: CLINIC | Age: 82
Setting detail: THERAPIES SERIES
Discharge: HOME | End: 2022-10-31
Payer: MEDICARE

## 2022-10-31 PROCEDURE — 97016 VASOPNEUMATIC DEVICE THERAPY: CPT

## 2022-10-31 PROCEDURE — 97110 THERAPEUTIC EXERCISES: CPT

## 2022-10-31 NOTE — FLOWSHEET NOTE
[] [address]  [address]  [phone]  F: [phone] [] [address]  [facility] [address]   [address]  P: [phone]  F: [phone] [] [place]  Therapy  [address]  P: [phone]  F: [phone] [x] [address]  P: [phone]  F: [phone] [] [address]  [facility]   [address]   [place]: [phone]  F: [phone]      Physical Therapy Daily Treatment Note    Date:  10/31/2022  Patient Name:  Ivonne Carrero    :  1940  MRN: [id number]  Physician: CECILIA Edwards                                 Insurance: Medicare (no auth required, BMN)  Medical Diagnosis: R TKA               Rehab Codes: O52.501  Onset date: DOS: 8/15/22                 Next 's appt.: 10/20- Dr. Nathalie Rivers  Visit# / total visits: ; Progress note for Medicare patient due at visit 20    Cancels/No Shows: 0/0    Subjective:    Pain:  [x] Yes  [] No  Location: R knee Pain Rating: (0-10 scale) 0/10  Pain altered Tx:  [x] No  [] Yes  Action:  Comments:  Pt denies increased pain or soreness upon arrival.     Objective:  Modalities: Vasocompression: 10', low compression, 34°  Precautions:  Exercises:  Exercise  R TKA    Reps/ Time Weight/ Level Comments    NuStep 10'  L5    x              Calf S 3x30''   x   HS S 2x30'' ea   x          Supine heel slides 2x15        SAQ 2x15  5#      SLR 2x10   Cues to perform quad set prior to lifting leg x   LAQ 2x15  5#  5# with R 3# with L x   Bridges 2x15        SL Clamshells 2x15    R only               Prone HS curls 10x                   Standing           Weight shifts  10x ea    HEP     HS Curls  2x10 ea    x    Mini Squats  15x   -    Marching  2x10    Alternating x   Calf raises 2x10   x   Step taps fwd x15 ea 4 in-LLE  6 in- RLE  -   Step up fwd 2x10 6 in R only  x   Step ups lat 10x 6 in R only  x   Hip flex  2x10  WB on R only  x   Hip extension 2x10  WB on R only  x   Hip abduction 2x10 2 in WB on R only x   Sit to stands 2x10  Normal chair with airex, UE use this date -   Other:      Vasocompression: 10'  for 34 moderate compression supine with bolster     Specific Instructions for next treatment: Progress knee flex ROM as lenny, take knee measurements     Eval 10/3 10/13 AROM 10/18 10/21    R knee Flex AROM 80/90 95/98 99 91 100/110    R knee Ext AROM 2/0 2/0 0 0 0        Treatment Charges: Mins Units   [x]  Modalities: CP 10 N/C   [x]  Ther Exercise 40 3   []  Manual Therapy     []  Ther Activities     []  Aquatics     [x]  Vasocompression 15 1   []  Other     Total Treatment time 55 4       Assessment: [x] Progressing toward goals. Pt continued focusing on R LE strengthening in WB and L LE in NWB. Good tolerance to standing exercises this date. Increased weight with LAQ and resumded SLR without c/o  incr pain. Ended with vaso to L knee and CP to R knee. [] No change. [] Other:   [x] Patient would continue to benefit from skilled physical therapy services in order to: Increase R knee ROM and strength, will also work on increasing L knee strength as well d/t weakness leading to knee buckling. STG: (to be met in 10 treatments)  ? Pain: Decrease pain levels to 2/10 at worst in R knee MET (10/21)  ? ROM: Increase flexibility and AROM limitations throughout to equal bilat to reduce difficulty with ADLs, increase R knee flex AROM to 110deg, ext to 0deg MET (10/21, pt is currently 110 deg flex, 0deg ext)  ? Strength: Increase R knee ext to 4+/5, L knee ext to 4/5 MET (10/21)  ?  Function: Pt to report ambulating around house without any increase in knee pain (with RW) MET (10/21)  Independent with Home Exercise Programs MET (10/21)     LTG: (to be met in 20 treatments)  Improve score on assessment tool from 59% impaired to 25% impaired, demonstrating an improvement in ADLs Progressing (10/21) pt has only improved by one point to 58%, most likely d/t majority of issues are coming from non-surgical LE. Reduce pain levels to 0/10 Progressing (10/21)                    Patient goals:    Pt. Education:  [x] Yes  [] No  [] Reviewed Prior HEP/Ed  Method of Education: [x] Verbal  [x] Demo  [] Written  Comprehension of Education:  [] Verbalizes understanding. [] Demonstrates understanding. [] Needs review. [x] Demonstrates/verbalizes HEP/Ed previously given. Plan: [x] Continue current frequency toward long and short term goals.     [x] Specific Instructions for subsequent treatments: see above for specific instructions      Time In: 1:00pm         Time Out: 2:00pm    Electronically signed by:  Clarissa Burk PTA

## 2022-10-31 NOTE — PRE-CERTIFICATION NOTE
Medicare Cap   [x] Physical Therapy  [] Speech Therapy  [] Occupational therapy  *PT and Speech caps combine      $2150 Limit for PT and Speech combined  $2150 Limit for OT individually  At the beginning of the month where you expect to go over $2150, please add the [address] [place] [address] modifier      Patient Name: Mirian Dent: 1940    Note:  This is an estimate of charges billed.      Date of [address] Name # units/ charge $$$ charge Daily Total Charge Ongoing Total $$$   9/23 PT eval  Vaso  Therex 1+1+1 97.02+22.29+11.48 130.79 130.79   9/27 PTA 2TE, 1 VASO 2+1 25.10+19.62+7.91 52.63 183.42   9/30 PTA TE, Vaso 3+1 25.10+19.62*2+7.91 72.25 255.67   10/3 PT Therex, Vaso 3+1 28.50+22.29+22.29+8.99 82.07 337.74   10/5 PT \" \" \" \"    10/7 PTA  TE, Vaso 3+1  72.25    10/12 PTA TE, vaso 3+1  72.25    10/13 PTA TE, vaso 3+1  72.25 636.56   10/18 PTA TE, vaso 3+1  72.25 708.81   10/21 Therex (PT)  Vaso 3+1  82.07 790.88   10/25-PTA TE, Vaso  3+1  64.34 855.22   10/27-PTA  TE, Vaso  3+1 25.10+19.62+19.62 64.34 919.56   10/31-PTA  TE, Vaso 3+1  64.34 983.90

## 2022-11-02 ENCOUNTER — HOSPITAL ENCOUNTER (OUTPATIENT)
Dept: PHYSICAL THERAPY | Facility: CLINIC | Age: 82
Setting detail: THERAPIES SERIES
Discharge: HOME | End: 2022-11-02
Payer: MEDICARE

## 2022-11-02 PROCEDURE — 97110 THERAPEUTIC EXERCISES: CPT

## 2022-11-02 PROCEDURE — 97016 VASOPNEUMATIC DEVICE THERAPY: CPT

## 2022-11-02 NOTE — FLOWSHEET NOTE
[] [facility]  [address] [place]  [phone]  F: [phone] [] [address]  [facility]   [address]  P: [phone]  F: [phone] [] [place]  Therapy  [address]  P: [phone]  F: [phone] [x] [email or web address]  P: [phone]  F: [phone] [] [address]  [facility]   [address]   [place]: [phone]  F: [phone]      Physical Therapy Daily Treatment Note    Date:  2022  Patient Name:  July Jeffries    :  1940  MRN: [id number]  Physician: CECILIA Villegas                                 Insurance: Medicare (no auth required, BMN)  Medical Diagnosis: R TKA               Rehab Codes: W25.034  Onset date: DOS: 8/15/22                 Next [facility] appt.: 10/20- Dr. Polo Villanueva  Visit# / total visits: ; Progress note for Medicare patient due at visit 20    Cancels/No Shows: 0/0    Subjective:    Pain:  [x] Yes  [] No  Location: L knee Pain Rating: (0-10 scale) 2/10  Pain altered Tx:  [x] No  [] Yes  Action:  Comments:  Pt arrives feeling great on surgical knee, continues to have increased soreness in L knee     Objective:  Modalities: Vasocompression: 10', low compression, 34°  Precautions:  Exercises:  Exercise  R TKA    Reps/ Time Weight/ Level Comments    NuStep 10'  L5    x              Calf S 3x30''   x   HS S 2x30'' ea   x          Supine heel slides 2x15        SAQ 2x15  5#      SLR 2x10   Cues to perform quad set prior to lifting leg x   LAQ 2x15  5#  5# with R 3# with L x   Bridges 2x15        SL Clamshells 2x15    R only               Prone HS curls 10x                   Standing           Weight shifts  10x ea    HEP     HS Curls  2x10 ea    x    Mini Squats  15x   -    Marching 2x10    Alternating x   Calf raises  2x10   x   Step taps fwd x15 ea 4 in-LLE  6 in- RLE  -   Step up fwd 2x10 6 in R only  x   Step ups lat 10x 6 in R only  x   Hip flex  2x10  WB on R only  x   Hip extension 2x10  WB on R only  x   Hip abduction 2x10 2 in WB on R only x   Sit to stands 2x10  Normal chair with airex, UE use this date -   Other:      Vasocompression: 10'  for 34 moderate compression supine with bolster     Specific Instructions for next treatment: Progress knee flex ROM as lenny, take knee measurements     Eval 10/3 10/13 AROM 10/18 10/21 11/2   R knee Flex AROM 80/90 95/98 99 91 100/110 105/110   R knee Ext AROM 2/0 2/0 0 0 0 0       Treatment Charges: Mins Units   [x]  Modalities: CP 10 N/C   [x]  Ther Exercise 40 3   []  Manual Therapy     []  Ther Activities     []  Aquatics     [x]  Vasocompression 15 1   []  Other     Total Treatment time 55 4       Assessment: [x] Progressing toward goals. Initiated treatment on NUSTEP followed by standing stretches, focusing strengthening on RLE which pt tolerated well. Ended with measurements, pt has met ROM goal and has reached maximum knee flex, will no longer need to continue measurements until next progress note. Ended with vasocompression to RLE and CP to LLE to decrease post exercise soreness and edema. [] No change. [] Other:   [x] Patient would continue to benefit from skilled physical therapy services in order to: Increase R knee ROM and strength, will also work on increasing L knee strength as well d/t weakness leading to knee buckling. STG: (to be met in 10 treatments)  ? Pain: Decrease pain levels to 2/10 at worst in R knee MET (10/21)  ? ROM: Increase flexibility and AROM limitations throughout to equal bilat to reduce difficulty with ADLs, increase R knee flex AROM to 110deg, ext to 0deg MET (10/21, pt is currently 110 deg flex, 0deg ext)  ? Strength: Increase R knee ext to 4+/5, L knee ext to 4/5 MET (10/21)  ?  Function: Pt to report ambulating around house without any increase in knee pain (with RW) MET (10/21)  Independent with Home Exercise Programs MET (10/21)     LTG: (to be met in 20 treatments)  Improve score on assessment tool from 59% impaired to 25% impaired, demonstrating an improvement in ADLs Progressing (10/21) pt has only improved by one point to 58%, most likely d/t majority of issues are coming from non-surgical LE. Reduce pain levels to 0/10 Progressing (10/21)                    Patient goals:    Pt. Education:  [x] Yes  [] No  [] Reviewed Prior HEP/Ed  Method of Education: [x] Verbal  [x] Demo  [] Written  Comprehension of Education:  [] Verbalizes understanding. [] Demonstrates understanding. [] Needs review. [x] Demonstrates/verbalizes HEP/Ed previously given. Plan: [x] Continue current frequency toward long and short term goals.     [x] Specific Instructions for subsequent treatments: see above for specific instructions      Time In: 1255pm         Time Out: 1:55pm    Electronically signed by:  Rico Kowalski PT

## 2022-11-02 NOTE — PRE-CERTIFICATION NOTE
Medicare Cap   [x] Physical Therapy  [] Speech Therapy  [] Occupational therapy  *PT and Speech caps combine      $2150 Limit for PT and Speech combined  $2150 Limit for OT individually  At the beginning of the month where you expect to go over $2150, please add the [address] [place] modifier      Patient Name: Anju Harm: 1940    Note:  This is an estimate of charges billed.      Date of [address] Name # units/ charge $$$ charge Daily Total Charge Ongoing Total $$$   9/23 PT eval  Vaso  Therex 1+1+1 97.02+22.29+11.48 130.79 130.79   9/27 PTA 2TE, 1 VASO 2+1 25.10+19.62+7.91 52.63 183.42   9/30 PTA TE, Vaso 3+1 25.10+19.62*2+7.91 72.25 255.67   10/3 PT Therex, Vaso 3+1 28.50+22.29+22.29+8.99 82.07 337.74   10/5 PT \" \" \" \"    10/7 PTA  TE, Vaso 3+1  72.25    10/12 PTA TE, vaso 3+1  72.25    10/13 PTA TE, vaso 3+1  72.25 636.56   10/18 PTA TE, vaso 3+1  72.25 708.81   10/21 Therex (PT)  Vaso 3+1  82.07 790.88   10/25-PTA TE, Vaso  3+1  64.34 855.22   10/27-PTA  TE, Vaso  3+1 25.10+19.62+19.62 64.34 919.56   10/31-PTA  TE, Vaso 3+1  64.34 983.90   11/2- PT Therex  Vaso 3+1  72.25 1056.15

## 2022-11-08 ENCOUNTER — HOSPITAL ENCOUNTER (OUTPATIENT)
Dept: PHYSICAL THERAPY | Facility: CLINIC | Age: 82
Setting detail: THERAPIES SERIES
Discharge: HOME | End: 2022-11-08
Payer: MEDICARE

## 2022-11-08 PROCEDURE — 97016 VASOPNEUMATIC DEVICE THERAPY: CPT

## 2022-11-08 PROCEDURE — 97110 THERAPEUTIC EXERCISES: CPT

## 2022-11-08 NOTE — FLOWSHEET NOTE
]  [] [facility]  [address]  [phone]  F: [phone] [] [address]  [facility] 36   [address]  P: [phone]  F: [phone] [] [place] [address]  Therapy  [address]  P: [phone]  F: [phone] [x] [address]  P: [phone]  F: [phone] [] [address]  [facility]   [address]   [place]: [phone]  F: [phone]      Physical Therapy Daily Treatment Note    Date:  2022  Patient Name:  Kenneth Bowser    :  1940  MRN: [id number]  Physician: CECILIA Vasquez                                 Insurance: Medicare (no auth required, BMN)  Medical Diagnosis: R TKA               Rehab Codes: K36.451  Onset date: DOS: 8/15/22                 Next 's appt.: 10/20- Dr. Joseph Chadwick  Visit# / total visits: ; Progress note for Medicare patient due at visit 20    Cancels/No Shows: 0/0    Subjective:    Pain:  [x] Yes  [] No  Location: L knee Pain Rating: (0-10 scale) 2/10  Pain altered Tx:  [x] No  [] Yes  Action:  Comments:  Pt arrives feeling great on surgical knee, continues to have increased soreness in L knee     Objective:  Modalities: Vasocompression: 10', low compression, 34°  Precautions:  Exercises:  Exercise  R TKA    Reps/ Time Weight/ Level Comments    NuStep 10'  L5    x              Calf S 3x30''   x   HS S 2x30'' ea   x          Supine heel slides 2x15        SAQ 2x15  5#      SLR 2x10   Cues to perform quad set prior to lifting leg x   LAQ 2x15  5#  5# with R 3# with L x   Bridges 2x15        SL Clamshells 2x15    R only               Prone HS curls 10x                   Standing           Weight shifts  10x ea    HEP     HS Curls  2x10 ea    x    Mini Squats  15x   -    Marching 2x10    Alternating x   Calf raises  2x10   x   Step taps fwd x15 ea 4 in-LLE  6 in- RLE  -   Step up fwd 2x10 6 in R only  x   Step ups lat 2x10 6 in R only  x   Hip flex  2x10 2 in WB on R only  x   Hip extension 2x10 2 in WB on R only  x   Hip abduction 2x10 2 in WB on R only x   Sit to stands 2x10  Normal chair with airex, UE use this date x   Other:      Vasocompression: 10'  for 34 moderate compression supine with bolster     Specific Instructions for next treatment: Progress knee flex ROM as lenny, take knee measurements     Eval 10/3 10/13 AROM 10/18 10/21 11/2   R knee Flex AROM 80/90 95/98 99 91 100/110 105/110   R knee Ext AROM 2/0 2/0 0 0 0 0       Treatment Charges: Mins Units   [x]  Modalities: CP 10 N/C   [x]  Ther Exercise 40 3   []  Manual Therapy     []  Ther Activities     []  Aquatics     [x]  Vasocompression 15 1   []  Other     Total Treatment time 55 4       Assessment: [x] Progressing toward goals. Pt began treatment on nustep to improve endurance and ROM of bilateral knees. Pt performed standing activities with good tolerance and range with no seated breaks. Increased reps for lateral step ups to improve hip abductors and stability. Resumed sit to stands to improve hamstring strength. Pt concluded session with vasocompression on RLE and CP on LLE to decrease soreness. [] No change. [] Other:   [x] Patient would continue to benefit from skilled physical therapy services in order to: Increase R knee ROM and strength, will also work on increasing L knee strength as well d/t weakness leading to knee buckling. STG: (to be met in 10 treatments)  ? Pain: Decrease pain levels to 2/10 at worst in R knee MET (10/21)  ? ROM: Increase flexibility and AROM limitations throughout to equal bilat to reduce difficulty with ADLs, increase R knee flex AROM to 110deg, ext to 0deg MET (10/21, pt is currently 110 deg flex, 0deg ext)  ? Strength:  Increase R knee ext to 4+/5, L knee ext to 4/5 MET (10/21)  ? Function: Pt to report ambulating around house without any increase in knee pain (with RW) MET (10/21)  Independent with Home Exercise Programs MET (10/21)     LTG: (to be met in 20 treatments)  Improve score on assessment tool from 59% impaired to 25% impaired, demonstrating an improvement in ADLs Progressing (10/21) pt has only improved by one point to 58%, most likely d/t majority of issues are coming from non-surgical LE. Reduce pain levels to 0/10 Progressing (10/21)                    Patient goals:    Pt. Education:  [x] Yes  [] No  [] Reviewed Prior HEP/Ed  Method of Education: [x] Verbal  [x] Demo  [] Written  Comprehension of Education:  [] Verbalizes understanding. [] Demonstrates understanding. [] Needs review. [x] Demonstrates/verbalizes HEP/Ed previously given. Plan: [x] Continue current frequency toward long and short term goals.     [x] Specific Instructions for subsequent treatments: see above for specific instructions      Time In: 1300         Time Out: 1405    Electronically signed by:  Jessy Nunez PTA

## 2022-11-08 NOTE — PRE-CERTIFICATION NOTE
Medicare Cap   [x] Physical Therapy  [] Speech Therapy  [] Occupational therapy  *PT and Speech caps combine      $2150 Limit for PT and Speech combined  $2150 Limit for OT individually  At the beginning of the month where you expect to go over $2150, please add the [address] [place] modifier      Patient Name: Anju Harm: 1940    Note:  This is an estimate of charges billed.      Date of [address] Name # units/ charge $$$ charge Daily Total Charge Ongoing Total $$$   9/23 PT eval  Vaso  Therex 1+1+1 97.02+22.29+11.48 130.79 130.79   9/27 PTA 2TE, 1 VASO 2+1 25.10+19.62+7.91 52.63 183.42   9/30 PTA TE, Vaso 3+1 25.10+19.62*2+7.91 72.25 255.67   10/3 PT Therex, Vaso 3+1 28.50+22.29+22.29+8.99 82.07 337.74   10/5 PT \" \" \" \"    10/7 PTA  TE, Vaso 3+1  72.25    10/12 PTA TE, vaso 3+1  72.25    10/13 PTA TE, vaso 3+1  72.25 636.56   10/18 PTA TE, vaso 3+1  72.25 708.81   10/21 Therex (PT)  Vaso 3+1  82.07 790.88   10/25-PTA TE, Vaso  3+1  64.34 855.22   10/27-PTA  TE, Vaso  3+1 25.10+19.62+19.62 64.34 919.56   10/31-PTA  TE, Vaso 3+1  64.34 983.90   11/2- PT Therex  Vaso 3+1  72.25 1056.15   11/8 PTA TE, VAso 3+1  64.34 1120.49

## 2022-11-10 ENCOUNTER — HOSPITAL ENCOUNTER (OUTPATIENT)
Dept: PHYSICAL THERAPY | Facility: CLINIC | Age: 82
Setting detail: THERAPIES SERIES
Discharge: HOME | End: 2022-11-10
Payer: MEDICARE

## 2022-11-10 PROCEDURE — 97110 THERAPEUTIC EXERCISES: CPT

## 2022-11-10 NOTE — FLOWSHEET NOTE
]  [] [facility]  [address]  [phone]  F: [phone] [] [address]  [facility]   [address]  P: [phone]  F: [phone] [] [address]  Therapy  [address] [facility]  P: [phone]  F: [phone] [x] [address]  P: [phone]  F: [phone] [] [address]  [facility]   [address]   [place]: [phone]  F: [phone]      Physical Therapy Daily Treatment Note    Date:  11/10/2022  Patient Name:  Riana Gould    :  1940  MRN: [id number]  Physician: CECILIA Krause                                 Insurance: Medicare (no auth required, BMN)  Medical Diagnosis: R TKA               Rehab Codes: G43.248  Onset date: DOS: 8/15/22                 Next Dr's appt.: 10/20- Dr. Pari Mckeon  Visit# / total visits: 15/20; Progress note for Medicare patient due at visit 20    Cancels/No Shows: 0/0    Subjective:    Pain:  [x] Yes  [] No  Location: L knee Pain Rating: (0-10 scale) 2/10  Pain altered Tx:  [x] No  [] Yes  Action:  Comments:  Pt arrives stating that surgical knee is great, again only pain is L knee. Pt has appt  to discusss TKA of L knee. Agreeable for today to be last day until second surgery.      Objective:  Modalities: Vasocompression: 10', low compression, 34°  Precautions:  Exercises:  Exercise  R TKA    Reps/ Time Weight/ Level Comments    NuStep 10'  L5    x              Calf S 3x30''   x   HS S 2x30'' ea   x          Supine heel slides 2x15        SAQ 2x15  5#      SLR 2x10   Cues to perform quad set prior to lifting leg -   LAQ 2x15  5#  5# with R 3# with L -   Bridges 2x15        SL Clamshells 2x15    R only               Prone HS curls 10x                   Standing Weight shifts  10x ea    HEP     HS Curls  2x10 ea    x    Mini Squats  15x   -    Marching  2x10    Alternating x   Calf raises  2x10   x   Step taps fwd x15 ea 4 in-LLE  6 in- RLE  -   Step up fwd 2x10 6 in R only  x   Step ups lat 2x10 6 in R only  x   Hip flex  2x10  WB on R only  x   Hip extension 2x10  WB on R only  x   Hip abduction 2x10  WB on R only x   Sit to stands 2x10  Normal chair with airex, UE use this date x   Other:      Vasocompression: 10'  for 34 moderate compression supine with bolster- not today      Specific Instructions for next treatment: Progress knee flex ROM as lenny, pt DC     Eval 10/3 10/13 AROM 10/18 10/21 11/2   R knee Flex AROM 80/90 95/98 99 91 100/110 105/110   R knee Ext AROM 2/0 2/0 0 0 0 0       Treatment Charges: Mins Units   []  Modalities: CP     [x]  Ther Exercise 40 3   []  Manual Therapy     []  Ther Activities     []  Aquatics     []  Vasocompression     []  Other     Total Treatment time 40 3       Assessment: [x] Progressing toward goals. Initiated treatment on Xiomara Saleh followed by review of all exercises pt should continue for HEP. Pt demonstrated good technique during all, voicing only discomfort in LLE. Pt agreeable for today to be last day of PT until other L knee replacement is completed. [] No change. [] Other:   [x] Patient would continue to benefit from skilled physical therapy services in order to: Increase R knee ROM and strength, will also work on increasing L knee strength as well d/t weakness leading to knee buckling. STG: (to be met in 10 treatments)  ? Pain: Decrease pain levels to 2/10 at worst in R knee MET (10/21)  ? ROM: Increase flexibility and AROM limitations throughout to equal bilat to reduce difficulty with ADLs, increase R knee flex AROM to 110deg, ext to 0deg MET (10/21, pt is currently 110 deg flex, 0deg ext)  ? Strength: Increase R knee ext to 4+/5, L knee ext to 4/5 MET (10/21)  ?  Function: Pt to report ambulating around house without any increase in knee pain (with RW) MET (10/21)  Independent with Home Exercise Programs MET (10/21)     LTG: (to be met in 20 treatments)  Improve score on assessment tool from 59% impaired to 25% impaired, demonstrating an improvement in ADLs Progressing (10/21) pt has only improved by one point to 58%, most likely d/t majority of issues are coming from non-surgical LE. Improved (11/10) to 44% demonstrating an improvement in pt's use of RLE. Goal not met d/t continued difficulty with use of LLE. Reduce pain levels to 0/10 Progressing (10/21)                    Patient goals:    Pt. Education:  [x] Yes  [] No  [] Reviewed Prior HEP/Ed  Method of Education: [x] Verbal  [x] Demo  [] Written  Comprehension of Education:  [] Verbalizes understanding. [] Demonstrates understanding. [] Needs review. [x] Demonstrates/verbalizes HEP/Ed previously given. Plan: [x] Continue current frequency toward long and short term goals.     [x] Specific Instructions for subsequent treatments: see above for specific instructions      Time In: 1355        Time Out: [address]    Electronically signed by:  Angelika Pollard, PT

## 2022-11-10 NOTE — DISCHARGE SUMMARY
[] [facility]) - [facility] &  Therapy  [address]  [phone]  F: [phone] [] [address]  [place] [address]   [address]  P: [phone]  F: [phone] [] [address] &  Therapy  [address]  P: [phone]  F: [phone] [x] [address]  P: [phone]  F: [phone] [] [address]  [facility]   [address]   [place]: [phone]  F: [phone]      Physical Therapy Discharge Note    Date: 11/10/2022      Patient: Mayra Valadez  : 1940  MRN: [id number]    Physician: CECILIA Nugent                                 Insurance: Medicare (no auth required, BMN)  Medical Diagnosis: R TKA               Rehab Codes: U32.487  Onset date: DOS: 8/15/22                 Next 's appt.: 10/20-  [facility] CONTINUING MED CARE [place]  Visit# / total visits: 15/20; Progress note for Medicare patient due at visit 20                      Cancels/No Shows: 0/0     Subjective:    Pain:  [x] Yes  [] No               Location: L knee         Pain Rating: (0-10 scale) 2/10  Pain altered Tx:  [x] No  [] Yes  Action:  Comments:  Pt arrives stating that surgical knee is great, again only pain is L knee. Pt has appt  to discusss TKA of L knee. Agreeable for today to be last day until second surgery        Assessment: [x] Progressing toward goals. Initiated treatment on Eliseo Alfaro followed by review of all exercises pt should continue for HEP. Pt demonstrated good technique during all, voicing only discomfort in LLE. Pt agreeable for today to be last day of PT until other L knee replacement is completed. [] No change. [] Other:   [x] Patient would continue to benefit from skilled physical therapy services in order to:  Increase R knee ROM and strength, will also work on increasing L knee strength as well d/t weakness leading to knee buckling. STG: (to be met in 10 treatments)  ? Pain: Decrease pain levels to 2/10 at worst in R knee MET (10/21)  ? ROM: Increase flexibility and AROM limitations throughout to equal bilat to reduce difficulty with ADLs, increase R knee flex AROM to 110deg, ext to 0deg MET (10/21, pt is currently 110 deg flex, 0deg ext)  ? Strength: Increase R knee ext to 4+/5, L knee ext to 4/5 MET (10/21)  ? Function: Pt to report ambulating around house without any increase in knee pain (with RW) MET (10/21)  Independent with Home Exercise Programs MET (10/21)     LTG: (to be met in 20 treatments)  Improve score on assessment tool from 59% impaired to 25% impaired, demonstrating an improvement in ADLs Progressing (10/21) pt has only improved by one point to 58%, most likely d/t majority of issues are coming from non-surgical LE. Improved (11/10) to 44% demonstrating an improvement in pt's use of RLE. Goal not met d/t continued difficulty with use of LLE. Reduce pain levels to 0/10 Progressing (10/21)    Treatment to Date:  [x] Therapeutic Exercise    [] Modalities:  [] Therapeutic Activity    [] Ultrasound  [] Electrical Stimulation  [x] Gait Training     [] Massage       [] Lumbar/Cervical Traction  [] Neuromuscular Re-education [x] Cold/hotpack [] Iontophoresis: 4 mg/mL  [] Instruction in Home Exercise Program                     Dexamethasone Sodium  [] Manual Therapy             Phosphate 40-80 mAmin  [] Aquatic Therapy                   [x] Vasocompression/    [] Other:             Game Ready    Discharge Status:     [x] Pt recovered from conditions. Treatment goals were met. [] Pt received maximum benefit. No further therapy indicated at this time. [] Pt to continue exercise/home instructions independently. [] Therapy interrupted due to:    [] Pt has 2 or more no shows/cancels, is discontinued per our policy.     [] Pt has completed prescribed number of treatment sessions. [] Other:         Electronically signed by Rosalio Porter PT on 11/10/2022 at 2:46 PM      If you have any questions or concerns, please don't hesitate to call.   Thank you for your referral.

## 2022-11-15 ENCOUNTER — APPOINTMENT (OUTPATIENT)
Dept: PHYSICAL THERAPY | Facility: CLINIC | Age: 82
End: 2022-11-15
Payer: MEDICARE

## 2022-11-17 ENCOUNTER — APPOINTMENT (OUTPATIENT)
Dept: PHYSICAL THERAPY | Facility: CLINIC | Age: 82
End: 2022-11-17
Payer: MEDICARE

## 2022-11-18 ENCOUNTER — OFFICE VISIT (OUTPATIENT)
Dept: ORTHOPEDIC SURGERY | Age: 82
End: 2022-11-18
Payer: MEDICARE

## 2022-11-18 DIAGNOSIS — Z96.651 H/O TOTAL KNEE REPLACEMENT, RIGHT: Primary | ICD-10-CM

## 2022-11-18 PROCEDURE — 1123F ACP DISCUSS/DSCN MKR DOCD: CPT | Performed by: ORTHOPAEDIC SURGERY

## 2022-11-18 PROCEDURE — 99213 OFFICE O/P EST LOW 20 MIN: CPT | Performed by: ORTHOPAEDIC SURGERY

## 2022-11-18 NOTE — PROGRESS NOTES
Ayan Dean M.D.            [address] [place] [address]., [address] [facility],[address], [facility] [address].           Dept Phone: [phone]           Dept Fax:  [address], [place]          Dept Phone: [phone]           Dept Fax:  [phone]      Chief Compliant:  Chief Complaint   Patient presents with    Pain     8/15/22 Rt TKA        History of Present Illness: This is a 80 y.o. male who presents to the clinic today for evaluation / follow up of a right total knee. He says his right total knee is doing great. 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 He states that he is completely finished his therapy on the right and is feels like he is ready physically and mentally did proceed with his left. .       Review of Systems   Constitutional: Negative for fever, chills, sweats. Eyes: Negative for changes in vision, or pain. HENT: Negative for ear ache, epistaxis, or sore throat. Respiratory/Cardio: Negative for Chest pain, palpitations, SOB, or cough. Gastrointestinal: Negative for abdominal pain, N/V/D. Genitourinary: Negative for dysuria, frequency, urgency, or hematuria. Neurological: Negative for headache, numbness, or weakness. Integumentary: Negative for rash, itching, laceration, or abrasion. Musculoskeletal: Positive for Pain (8/15/22 Rt TKA)       Physical Exam:  Constitutional: Patient is oriented to person, place, and time. Patient appears well-developed and well nourished. HENT: Negative otherwise noted  Head: Normocephalic and Atraumatic  Nose: Normal  Eyes: Conjunctivae and EOM are normal  Neck: Normal range of motion Neck supple. Respiratory/Cardio: Effort normal. No respiratory distress.   Musculoskeletal: Examination of his left knee notes with severe crepitus and grinding with motion he has a varus disposition motion is 5 to but 110 degrees ligamentously he is grossly intact. Severe patellofemoral pain with crepitus no other contributory findings. Patient's right knee noted to have good extension to full extension and flexion to about 120 degrees good varus valgus stability and good patellar tracking. Neurological: Patient is alert and oriented to person, place, and time. Normal strength. No sensory deficit. Skin: Skin is warm and dry  Psychiatric: Behavior is normal. Thought content normal.  Nursing note and vitals reviewed. Labs and Imaging:     XR taken today: No new x-rays taken today however previous x-rays prior to his right knee replacement show severe varus gonarthrosis of both knees with bone-on-bone apposition of the medial compartment the left knee and tricompartmental spur formation    No results found. No orders of the defined types were placed in this encounter. Assessment and Plan:  1. H/O total knee replacement, right    2. Severe degenerative joint disease left knee        This is a 80 y.o. male who presents to the clinic today for evaluation / follow up of severe degenerative joint disease left knee.      Past History:    Current Outpatient Medications:     amLODIPine (NORVASC) 10 MG tablet, Take 1 tablet by mouth daily, Disp: 90 tablet, Rfl: 0    Cholecalciferol (VITAMIN D3) 125 MCG (5000 UT) CAPS, Take 1 capsule by mouth Daily, Disp: 90 capsule, Rfl: 0    DULoxetine (CYMBALTA) 60 MG extended release capsule, Take 1 capsule by mouth daily, Disp: 90 capsule, Rfl: 0    liothyronine (CYTOMEL) 5 MCG tablet, Take 1 tablet by mouth daily, Disp: 90 tablet, Rfl: 0    chlorthalidone (HYGROTEN) 50 MG tablet, Take 1 tablet by mouth daily, Disp: 90 tablet, Rfl: 0    aspirin EC 81 MG EC tablet, Take 1 tablet by mouth in the morning and 1 tablet in the evening., Disp: 90 tablet, Rfl: 1    furosemide (LASIX) 40 MG tablet, Take 1 tablet by mouth in the morning., Disp: 90 tablet, Rfl: 0    potassium chloride (KLOR-CON M) 20 MEQ extended release tablet, Take 2 tablets by mouth in the morning., Disp: 180 tablet, Rfl: 0    Handicap Placard MISC, by Does not apply route Dx: Knee OA, Lumbar spine DDD  Duration: one year till 23, Disp: 1 each, Rfl: 0  Allergies   Allergen Reactions    Ace Inhibitors Other (See Comments)     cough    Voltaren [Diclofenac Sodium] Other (See Comments)     Elevated Creatinine     Social History     Socioeconomic History    Marital status:      Spouse name: Not on file    Number of children: Not on file    Years of education: Not on file    Highest education level: Not on file   Occupational History    Not on file   Tobacco Use    Smoking status: Former     Packs/day: 1.00     Years: 3.00     Pack years: 3.00     Types: Cigarettes     Quit date: 1961     Years since quittin.3    Smokeless tobacco: Never   Vaping Use    Vaping Use: Never used   Substance and Sexual Activity    Alcohol use: No     Alcohol/week: 0.0 standard drinks    Drug use: Never    Sexual activity: Not on file   Other Topics Concern    Not on file   Social History Narrative    Not on file     Social Determinants of Health     Financial Resource Strain: Not on file   Food Insecurity: Not on file   Transportation Needs: Not on file   Physical Activity: Not on file   Stress: Not on file   Social Connections: Not on file   Intimate Partner Violence: Not on file   Housing Stability: Not on file     Past Medical History:   Diagnosis Date    Abnormal renal function     Allergic contact dermatitis due to other agents 2020    Anemia     Cellulitis of buttock 2021    Depression     Dysmetabolic syndrome     Dysuria 2021    Edema     legs    Elevated glucose 2016    Erythrasma 2021    Health care maintenance 2016    Hypertension     Intertrigo 2021    Leukopenia     Obesity     Osteoarthritis     Other constipation 12/19/2019    Other specified counseling 03/03/2016    PVD (peripheral vascular disease) ([facility] [address]     Renal insufficiency 10/21/2015    Skin maceration 07/22/2021    Sleep apnea     with severe desaturations    Trigger finger of right thumb 02/03/2016    Has seen Ortho and got injection, doing better     Viral disease exposure 05/21/2020     Past Surgical History:   Procedure Laterality Date    BACK SURGERY  01/01/2005    CATARACT EXTRACTION Left     COLONOSCOPY      EYE SURGERY      cataract    HERNIA REPAIR      age 12    LUMBAR LAMINECTOMY  04/21/2022    with fusion    NECK SURGERY      not spine- hair follicle    TONSILLECTOMY      TOTAL KNEE ARTHROPLASTY Right 08/15/2022    RIGHT KNEE TOTAL ARTHROPLASTY WITH BIOMET AND GPS performed by Silke Moran MD at [address] [facility].     Family History   Problem Relation Age of Onset    High Blood Pressure Mother     Asthma Other    Plan  Patient wished to proceed with left total knee arthroplasty. He will require preoperative clearance again. He is aware of the risk and benefits the procedure as well as the details procedures having been through this before several months ago. We will get him scheduled accordingly      Provider Attestation:  Linda Mo, personally performed the services described in this documentation. All medical record entries made by the scribe were at my direction and in my presence. I have reviewed the chart and discharge instructions and agree that the records reflect my personal performance and is accurate and complete. Silke Moran MD. 11/18/22      Please note that this chart was generated using voice recognition Dragon dictation software. Although every effort was made to ensure the accuracy of this automated transcription, some errors in transcription may have occurred.

## 2022-11-21 ENCOUNTER — APPOINTMENT (OUTPATIENT)
Dept: PHYSICAL THERAPY | Facility: CLINIC | Age: 82
End: 2022-11-21
Payer: MEDICARE

## 2022-11-28 PROBLEM — Z13.31 ENCOUNTER FOR SCREENING FOR DEPRESSION: Status: ACTIVE | Noted: 2019-06-11

## 2022-11-28 PROBLEM — R10.2 PELVIC PAIN: Status: ACTIVE | Noted: 2022-11-28

## 2022-11-28 PROBLEM — E87.6 HYPOKALEMIA: Status: ACTIVE | Noted: 2022-11-28

## 2022-11-28 PROBLEM — Z13.220 SCREENING FOR LIPID DISORDERS: Status: ACTIVE | Noted: 2019-06-11

## 2022-11-29 ENCOUNTER — HOSPITAL ENCOUNTER (OUTPATIENT)
Dept: GENERAL RADIOLOGY | Age: 82
Discharge: HOME | End: 2022-12-01
Payer: MEDICARE

## 2022-11-29 ENCOUNTER — HOSPITAL ENCOUNTER (OUTPATIENT)
Age: 82
Discharge: HOME | End: 2022-11-29
Payer: MEDICARE

## 2022-11-29 ENCOUNTER — HOSPITAL ENCOUNTER (OUTPATIENT)
Age: 82
Discharge: HOME | End: 2022-12-01
Payer: MEDICARE

## 2022-11-29 DIAGNOSIS — Z13.220 SCREENING FOR LIPID DISORDERS: ICD-10-CM

## 2022-11-29 DIAGNOSIS — D50.9 NORMOCYTIC HYPOCHROMIC ANEMIA: ICD-10-CM

## 2022-11-29 DIAGNOSIS — E87.6 HYPOKALEMIA: ICD-10-CM

## 2022-11-29 DIAGNOSIS — N28.9 RENAL INSUFFICIENCY: ICD-10-CM

## 2022-11-29 DIAGNOSIS — R10.2 PELVIC PAIN: ICD-10-CM

## 2022-11-29 LAB
ABSOLUTE EOS #: 0.1 K/UL (ref 0–0.44)
ABSOLUTE IMMATURE GRANULOCYTE: <0.03 K/UL (ref 0–0.3)
ABSOLUTE LYMPH #: 1.22 K/UL (ref 1.1–3.7)
ABSOLUTE MONO #: 0.34 K/UL (ref 0.1–1.2)
ALBUMIN SERPL-MCNC: 3.7 G/DL (ref 3.5–5.2)
ALBUMIN/GLOBULIN RATIO: 1.1 (ref 1–2.5)
ALP BLD-CCNC: 77 U/L (ref 40–129)
ALT SERPL-CCNC: 11 U/L (ref 5–41)
ANION GAP SERPL CALCULATED.3IONS-SCNC: 12 MMOL/L (ref 9–17)
AST SERPL-CCNC: 17 U/L
BASOPHILS # BLD: 1 % (ref 0–2)
BASOPHILS ABSOLUTE: <0.03 K/UL (ref 0–0.2)
BILIRUB SERPL-MCNC: 0.4 MG/DL (ref 0.3–1.2)
BUN BLDV-MCNC: 14 MG/DL (ref 8–23)
CALCIUM SERPL-MCNC: 9 MG/DL (ref 8.6–10.4)
CHLORIDE BLD-SCNC: 104 MMOL/L (ref 98–107)
CHOLESTEROL/HDL RATIO: 3.4
CHOLESTEROL: 176 MG/DL
CO2: 25 MMOL/L (ref 20–31)
CREAT SERPL-MCNC: 1.02 MG/DL (ref 0.7–1.2)
EOSINOPHILS RELATIVE PERCENT: 3 % (ref 1–4)
FERRITIN: 271 NG/ML (ref 30–400)
GFR SERPL CREATININE-BSD FRML MDRD: >60 ML/MIN/1.73M2
GLUCOSE BLD-MCNC: 98 MG/DL (ref 70–99)
HCT VFR BLD CALC: 39.2 % (ref 40.7–50.3)
HDLC SERPL-MCNC: 52 MG/DL
HEMOGLOBIN: 12.3 G/DL (ref 13–17)
IMMATURE GRANULOCYTES: 0 %
IRON SATURATION: 42 % (ref 20–55)
IRON: 89 UG/DL (ref 59–158)
LDL CHOLESTEROL: 109 MG/DL (ref 0–130)
LYMPHOCYTES # BLD: 31 % (ref 24–43)
MCH RBC QN AUTO: 28.9 PG (ref 25.2–33.5)
MCHC RBC AUTO-ENTMCNC: 31.4 G/DL (ref 28.4–34.8)
MCV RBC AUTO: 92.2 FL (ref 82.6–102.9)
MONOCYTES # BLD: 9 % (ref 3–12)
NRBC AUTOMATED: 0 PER 100 WBC
PDW BLD-RTO: 12.7 % (ref 11.8–14.4)
PLATELET # BLD: 184 K/UL (ref 138–453)
PMV BLD AUTO: 10.3 FL (ref 8.1–13.5)
POTASSIUM SERPL-SCNC: 4.2 MMOL/L (ref 3.7–5.3)
RBC # BLD: 4.25 M/UL (ref 4.21–5.77)
SEG NEUTROPHILS: 56 % (ref 36–65)
SEGMENTED NEUTROPHILS ABSOLUTE COUNT: 2.3 K/UL (ref 1.5–8.1)
SODIUM BLD-SCNC: 141 MMOL/L (ref 135–144)
TOTAL IRON BINDING CAPACITY: 212 UG/DL (ref 250–450)
TOTAL PROTEIN: 7.1 G/DL (ref 6.4–8.3)
TRANSFERRIN: 186 MG/DL (ref 200–360)
TRIGL SERPL-MCNC: 77 MG/DL
UNSATURATED IRON BINDING CAPACITY: 123 UG/DL (ref 112–347)
WBC # BLD: 4 K/UL (ref 3.5–11.3)

## 2022-11-29 PROCEDURE — 83550 IRON BINDING TEST: CPT

## 2022-11-29 PROCEDURE — 72190 X-RAY EXAM OF PELVIS: CPT

## 2022-11-29 PROCEDURE — 83540 ASSAY OF IRON: CPT

## 2022-11-29 PROCEDURE — 36415 COLL VENOUS BLD VENIPUNCTURE: CPT

## 2022-11-29 PROCEDURE — 80053 COMPREHEN METABOLIC PANEL: CPT

## 2022-11-29 PROCEDURE — 82728 ASSAY OF FERRITIN: CPT

## 2022-11-29 PROCEDURE — 80061 LIPID PANEL: CPT

## 2022-11-29 PROCEDURE — 84466 ASSAY OF TRANSFERRIN: CPT

## 2022-11-29 PROCEDURE — 85025 COMPLETE CBC W/AUTO DIFF WBC: CPT

## 2022-12-05 NOTE — H&P
HISTORY and [facility] JASON [address]       NAME:  Darrel Kennedy  MRN: [id number]   YOB: 1940   Date: 12/6/2022   Age: 80 y.o. Gender: male       COMPLAINT AND PRESENT HISTORY:   Darrel Kennedy is 80 y.o.,  male, undergoing preadmission testing for  DJD LEFT KNEE. Patient will be having:   KNEE TOTAL ARTHROPLASTY-LEFT. See office notes of   History of Present Illness: This is a 80 y.o. male who presents to the clinic today for evaluation / follow up of a right total knee. He says his right total knee is doing great. 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 He states that he is completely finished his therapy on the right and is feels like he is ready physically and mentally did proceed with his left. .     Patient had injury to the left knee. Patient denies any prior knee surgery. Patient is s/p Right knee Total  arthoplasty on 8/15/2022    Patient c/o pain , swelling, popping and cracking in the left Knee. Pt describes the pain as 5/10 in intensity at times. Onset of symptoms started 2 years ago. Patient has been using  icing,  tylenol and wore brace to help in pain relief. But states that he has since stopped, he states it was flimsy anyway. Patient reports that  walking , up and down stairs or turning in different directions. aggravates sxs     Pt reports that  the knee does  experience a buckling or given away sensation  under the knee. No locking up of the knee. Pt states around the first of year, he had about 8 falls. Patient uses a walker at all times. Patient denies any joint warmth, redness, fever or chills. Significant medical history:  HTN, JEMAL-on CPAP, Anemia. Associated medications: Norvasc, lasix , potassium, aspirin    Anticoagulants or blood thinners: aspirin    PatPt denies any chest pain or SOB. No recent URI . ient denies any personal hx of blood clots. Functional Capacity per patient:              1. Patient is able to walk 2 city blocks on level ground without SOB. 2. Patient is able to climb 2 flights of stairs without SOB. Patient denies any personal or family problems with anesthesia.     Xrays or Imagin22    XR taken today: No new x-rays taken today however previous x-rays prior to his right knee replacement show severe varus gonarthrosis of both knees with bone-on-bone apposition of the medial compartment the left knee and tricompartmental spur formation      PAST MEDICAL HISTORY     Past Medical History:   Diagnosis Date    Abnormal renal function     Allergic contact dermatitis due to other agents 2020    Anemia     Cellulitis of buttock 2021    Depression     Dysmetabolic syndrome     Dysuria 2021    Edema     legs    Elevated glucose 2016    Erythrasma 2021    Health care maintenance 2016    Hypertension     Intertrigo 2021    Leukopenia     Obesity     Osteoarthritis     Other constipation 2019    Other specified counseling 2016    PVD (peripheral vascular disease) ([facility] [address]     Renal insufficiency 10/21/2015    Skin maceration 2021    Sleep apnea     with severe desaturations    Trigger finger of right thumb 2016    Has seen Ortho and got injection, doing better     Viral disease exposure 2020         SURGICAL HISTORY       Past Surgical History:   Procedure Laterality Date    BACK SURGERY  2005    CATARACT EXTRACTION Left     COLONOSCOPY      EYE SURGERY      cataract    FOOT SURGERY Right     toenail removal great toe    HERNIA REPAIR      age 12    LUMBAR LAMINECTOMY  2022    with fusion    NECK SURGERY      not spine- hair follicle    TONSILLECTOMY      TOTAL KNEE ARTHROPLASTY Right 08/15/2022    RIGHT KNEE TOTAL ARTHROPLASTY WITH BIOMET AND GPS performed by Linette Peña MD at [facility] Family History   Problem Relation Age of Onset    High Blood Pressure Mother     Asthma Other        SOCIAL HISTORY       Social History     Socioeconomic History    Marital status:      Spouse name: None    Number of children: None    Years of education: None    Highest education level: None   Tobacco Use    Smoking status: Former     Packs/day: 1.00     Years: 3.00     Pack years: 3.00     Types: Cigarettes     Quit date: 1961     Years since quittin.3    Smokeless tobacco: Never   Vaping Use    Vaping Use: Never used   Substance and Sexual Activity    Alcohol use: No     Alcohol/week: 0.0 standard drinks    Drug use: Never     Social Determinants of Health     Physical Activity: Inactive    Days of Exercise per Week: 0 days    Minutes of Exercise per Session: 0 min           REVIEW OF SYSTEMS      Allergies   Allergen Reactions    Ace Inhibitors Other (See Comments)     cough    Voltaren [Diclofenac Sodium] Other (See Comments)     Elevated Creatinine       Current Outpatient Medications on File Prior to Encounter   Medication Sig Dispense Refill    acetaminophen (TYLENOL) 500 MG tablet Take 500 mg by mouth every 6 hours as needed for Pain      oxyCODONE-acetaminophen (PERCOCET) 5-325 MG per tablet Take 1 tablet by mouth every 4 hours as needed for Pain. amLODIPine (NORVASC) 10 MG tablet Take 1 tablet by mouth daily 90 tablet 0    Cholecalciferol (VITAMIN D3) 125 MCG (5000 UT) CAPS Take 1 capsule by mouth Daily 90 capsule 0    DULoxetine (CYMBALTA) 60 MG extended release capsule Take 1 capsule by mouth daily 90 capsule 0    chlorthalidone (HYGROTEN) 50 MG tablet Take 1 tablet by mouth daily 90 tablet 0    aspirin EC 81 MG EC tablet Take 1 tablet by mouth in the morning and 1 tablet in the evening. 90 tablet 1    furosemide (LASIX) 40 MG tablet Take 1 tablet by mouth in the morning.  90 tablet 0    potassium chloride (KLOR-CON M) 20 MEQ extended release tablet Take 2 tablets by mouth in the morning. 180 tablet 0    Handicap Placard MISC by Does not apply route Dx: Knee OA, Lumbar spine DDD    Duration: one year till 8/12/23 1 each 0     Current Facility-Administered Medications on File Prior to Encounter   Medication Dose Route Frequency Provider Last Rate Last Admin    triamcinolone acetonide (KENALOG-40) injection 40 mg  40 mg IntraMUSCular Once Cameron Potter MD            General health:  Fairly good. No fever or chills. Skin:  No itching, redness or rash. HEENT:  No headache, epistaxis or sore throat. Neck:  No pain, stiffness or masses. Cardiovascular/Respiratory system:  No chest pain, palpitation or shortness of breath. Gastrointestinal tract: No abdominal pain, Dysphagia, nausea, vomiting, diarrhea or constipation. Genitourinary:  No burning on micturition. No hesitancy, urgency, frequency or discoloration of urine. Locomotor:  See HPI. Neuropsychiatric:  No referable complaints. GENERAL PHYSICAL EXAM:     Vitals: /74   Pulse 91   Temp 98.5 °F (36.9 °C) (Infrared)   Resp 18   Ht 5' 9\" (1.753 m)   Wt 279 lb (126.6 kg)   SpO2 100%   BMI 41.20 kg/m²  Body mass index is 41.2 kg/m². GENERAL APPEARANCE:   Moody Larios is 80 y.o., male, moderately obese, nourished, conscious, alert. Does not appear to be distress or pain at this time. SKIN:  Warm, dry, no cyanosis or jaundice. HEAD:  Normocephalic, atraumatic, no swelling or tenderness. EYES:  Pupils equal, reactive to light. EARS:  No discharge, no marked hearing loss. NOSE:  No rhinorrhea, epistaxis or septal deformity. THROAT:  Not congested. No ulceration bleeding or discharge.                   NECK:  No stiffness, trachea central.                  CHEST:  Symmetrical and equal on expansion. HEART:  RRR S1 > S2. No audible murmurs or gallops. LUNGS:  Equal on expansion, normal breath sounds. No adventitious sounds. ABDOMEN:  Obese. Soft on palpation. No localized tenderness. No guarding or rigidity. LYMPHATICS:  No palpable cervical lymphadenopathy. LOCOMOTOR, BACK AND SPINE:  No tenderness or deformities. EXTREMITIES:  Symmetrical, no pretibial edema. No calf tenderness. No discoloration or ulcerations. Tenderness on palpation of the left Knee joint space . Pt using walker to aide in ambulation. NEUROLOGIC:  The patient is conscious, alert, oriented,Cranial nerve II-XII intact, taste and smell were not examined. No apparent focal sensory or motor deficits.                                                                     LAB REVIEW            Lab Results   Component Value Date    WBC 4.0 11/29/2022    HGB 12.3 (L) 11/29/2022    HCT 39.2 (L) 11/29/2022    MCV 92.2 11/29/2022     11/29/2022     Lab Results   Component Value Date/Time     11/29/2022 10:53 AM    K 4.2 11/29/2022 10:53 AM     11/29/2022 10:53 AM    CO2 25 11/29/2022 10:53 AM    BUN 14 11/29/2022 10:53 AM    CREATININE 1.02 11/29/2022 10:53 AM    GLUCOSE 98 11/29/2022 10:53 AM    CALCIUM 9.0 11/29/2022 10:53 AM                                            EKG REVIEW        Last EKG was on 8/2/22 reading  Normal sinus rhythm  Nonspecific T wave abnormality  Abnormal ECG               PROVISIONAL DIAGNOSES / SURGERY:      KNEE TOTAL ARTHROPLASTY    DJD LEFT KNEE      Patient Active Problem List    Diagnosis Date Noted    Hypokalemia 11/28/2022    Pelvic pain 11/28/2022    Primary osteoarthritis of right knee 08/15/2022    Normocytic hypochromic anemia 08/12/2022    Status post lumbar laminectomy 08/08/2022    Subclinical hypothyroidism 05/30/2022    Cold intolerance 04/12/2022    Peripheral vascular disease, unspecified ([address] 03/22/2022    Other chest pain 02/22/2022    Stage 3b chronic kidney disease ([facility] [address] 12/13/2021    Primary osteoarthritis of left hip 12/06/2021    Ingrown toenail 11/02/2021    Spondylosis of thoracic region without myelopathy or radiculopathy 10/29/2021    Localized edema 09/20/2021    Osteoarthritis of spine with radiculopathy, lumbar region 09/20/2021    Numbness 09/13/2021    Vitamin B12 deficiency 05/25/2021    Cervical stenosis of spinal canal 03/17/2021    DDD (degenerative disc disease), cervical 03/17/2021    Trapezius muscle spasm 02/03/2021    Thoracic spine pain 02/03/2021    Other constipation 12/19/2019    Black stool 12/19/2019    Primary osteoarthritis of right hip 09/06/2019    Acquired trigger finger 08/07/2019    Vitamin D deficiency 08/07/2019    Medicare annual wellness visit, subsequent 06/11/2019    Prediabetes 06/10/2019    Primary osteoarthritis of left knee 05/01/2019    Depression 12/27/2018    Positive depression screening 12/27/2018    Morbid obesity with BMI of 40.0-44.9, adult ([facility] [address] 10/15/2018    Lumbar radiculopathy 10/15/2018    Arthritis of right knee 09/17/2018    JEMAL on CPAP 09/17/2018    Bloating 06/21/2018    On potassium wasting diuretic therapy 04/24/2018    Edema 04/24/2018    Primary osteoarthritis of both knees 03/03/2016    Fatigue 02/07/2016    Hypertension, essential 10/21/2015    Renal insufficiency 10/21/2015       CLEARANCE:   Dr. Too Villatoro, anesthesia, was contacted and informed of patient's history and planned surgery. Orders received and no clearance required. Patient was just seen for medicare visit on 11/28/22 and referenced to this surgery.      BRISEIDA WONG, APRN - CNP on 12/6/2022 at 11:19 AM    Total time spent on encounter- PAT provider minutes: 31-40 minutes

## 2022-12-05 NOTE — H&P (VIEW-ONLY)
HISTORY and Tre[facility] JASON [id number]       NAME:  Gemma Nelson  MRN: [id number]   YOB: 1940   Date: 12/6/2022   Age: 80 y.o. Gender: male       COMPLAINT AND PRESENT HISTORY:   Gemma Nelson is 80 y.o.,  male, undergoing preadmission testing for  DJD LEFT KNEE. Patient will be having:   KNEE TOTAL ARTHROPLASTY-LEFT. See office notes of   History of Present Illness: This is a 80 y.o. male who presents to the clinic today for evaluation / follow up of a right total knee. He says his right total knee is doing great. 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 He states that he is completely finished his therapy on the right and is feels like he is ready physically and mentally did proceed with his left. .     Patient had injury to the left knee. Patient denies any prior knee surgery. Patient is s/p Right knee Total  arthoplasty on 8/15/2022    Patient c/o pain , swelling, popping and cracking in the left Knee. Pt describes the pain as 5/10 in intensity at times. Onset of symptoms started 2 years ago. Patient has been using  icing,  tylenol and wore brace to help in pain relief. But states that he has since stopped, he states it was flimsy anyway. Patient reports that  walking , up and down stairs or turning in different directions. aggravates sxs     Pt reports that  the knee does  experience a buckling or given away sensation  under the knee. No locking up of the knee. Pt states around the first of year, he had about 8 falls. Patient uses a walker at all times. Patient denies any joint warmth, redness, fever or chills. Significant medical history:  HTN, JEMAL-on CPAP, Anemia. Associated medications: Norvasc, lasix , potassium, aspirin    Anticoagulants or blood thinners: aspirin    PatPt denies any chest pain or SOB. No recent URI . ient denies any personal hx of blood clots. Functional Capacity per patient:              1. Patient is able to walk 2 city blocks on level ground without SOB. 2. Patient is able to climb 2 flights of stairs without SOB. Patient denies any personal or family problems with anesthesia.     Xrays or Imagin22    XR taken today: No new x-rays taken today however previous x-rays prior to his right knee replacement show severe varus gonarthrosis of both knees with bone-on-bone apposition of the medial compartment the left knee and tricompartmental spur formation      PAST MEDICAL HISTORY     Past Medical History:   Diagnosis Date    Abnormal renal function     Allergic contact dermatitis due to other agents 2020    Anemia     Cellulitis of buttock 2021    Depression     Dysmetabolic syndrome     Dysuria 2021    Edema     legs    Elevated glucose 2016    Erythrasma 2021    Health care maintenance 2016    Hypertension     Intertrigo 2021    Leukopenia     Obesity     Osteoarthritis     Other constipation 2019    Other specified counseling 2016    PVD (peripheral vascular disease) ([facility] [address]     Renal insufficiency 10/21/2015    Skin maceration 2021    Sleep apnea     with severe desaturations    Trigger finger of right thumb 2016    Has seen Ortho and got injection, doing better     Viral disease exposure 2020         SURGICAL HISTORY       Past Surgical History:   Procedure Laterality Date    BACK SURGERY  2005    CATARACT EXTRACTION Left     COLONOSCOPY      EYE SURGERY      cataract    FOOT SURGERY Right     toenail removal great toe    HERNIA REPAIR      age 12    LUMBAR LAMINECTOMY  2022    with fusion    NECK SURGERY      not spine- hair follicle    TONSILLECTOMY      TOTAL KNEE ARTHROPLASTY Right 08/15/2022    RIGHT KNEE TOTAL ARTHROPLASTY WITH BIOMET AND GPS performed by Khurram Lewis MD at [address] Family History   Problem Relation Age of Onset    High Blood Pressure Mother     Asthma Other        SOCIAL HISTORY       Social History     Socioeconomic History    Marital status:      Spouse name: None    Number of children: None    Years of education: None    Highest education level: None   Tobacco Use    Smoking status: Former     Packs/day: 1.00     Years: 3.00     Pack years: 3.00     Types: Cigarettes     Quit date: 1961     Years since quittin.3    Smokeless tobacco: Never   Vaping Use    Vaping Use: Never used   Substance and Sexual Activity    Alcohol use: No     Alcohol/week: 0.0 standard drinks    Drug use: Never     Social Determinants of Health     Physical Activity: Inactive    Days of Exercise per Week: 0 days    Minutes of Exercise per Session: 0 min           REVIEW OF SYSTEMS      Allergies   Allergen Reactions    Ace Inhibitors Other (See Comments)     cough    Voltaren [Diclofenac Sodium] Other (See Comments)     Elevated Creatinine       Current Outpatient Medications on File Prior to Encounter   Medication Sig Dispense Refill    acetaminophen (TYLENOL) 500 MG tablet Take 500 mg by mouth every 6 hours as needed for Pain      oxyCODONE-acetaminophen (PERCOCET) 5-325 MG per tablet Take 1 tablet by mouth every 4 hours as needed for Pain. amLODIPine (NORVASC) 10 MG tablet Take 1 tablet by mouth daily 90 tablet 0    Cholecalciferol (VITAMIN D3) 125 MCG (5000 UT) CAPS Take 1 capsule by mouth Daily 90 capsule 0    DULoxetine (CYMBALTA) 60 MG extended release capsule Take 1 capsule by mouth daily 90 capsule 0    chlorthalidone (HYGROTEN) 50 MG tablet Take 1 tablet by mouth daily 90 tablet 0    aspirin EC 81 MG EC tablet Take 1 tablet by mouth in the morning and 1 tablet in the evening. 90 tablet 1    furosemide (LASIX) 40 MG tablet Take 1 tablet by mouth in the morning.  90 tablet 0    potassium chloride (KLOR-CON M) 20 MEQ extended release tablet Take 2 tablets by mouth in the morning. 180 tablet 0    Handicap Placard MISC by Does not apply route Dx: Knee OA, Lumbar spine DDD    Duration: one year till 8/12/23 1 each 0     Current Facility-Administered Medications on File Prior to Encounter   Medication Dose Route Frequency Provider Last Rate Last Admin    triamcinolone acetonide (KENALOG-40) injection 40 mg  40 mg IntraMUSCular Once Rubens Ewing MD            General health:  Fairly good. No fever or chills. Skin:  No itching, redness or rash. HEENT:  No headache, epistaxis or sore throat. Neck:  No pain, stiffness or masses. Cardiovascular/Respiratory system:  No chest pain, palpitation or shortness of breath. Gastrointestinal tract: No abdominal pain, Dysphagia, nausea, vomiting, diarrhea or constipation. Genitourinary:  No burning on micturition. No hesitancy, urgency, frequency or discoloration of urine. Locomotor:  See HPI. Neuropsychiatric:  No referable complaints. GENERAL PHYSICAL EXAM:     Vitals: /74   Pulse 91   Temp 98.5 °F (36.9 °C) (Infrared)   Resp 18   Ht 5' 9\" (1.753 m)   Wt 279 lb (126.6 kg)   SpO2 100%   BMI 41.20 kg/m²  Body mass index is 41.2 kg/m². GENERAL APPEARANCE:   Jamaica Sanches is 80 y.o., male, moderately obese, nourished, conscious, alert. Does not appear to be distress or pain at this time. SKIN:  Warm, dry, no cyanosis or jaundice. HEAD:  Normocephalic, atraumatic, no swelling or tenderness. EYES:  Pupils equal, reactive to light. EARS:  No discharge, no marked hearing loss. NOSE:  No rhinorrhea, epistaxis or septal deformity. THROAT:  Not congested. No ulceration bleeding or discharge.                   NECK:  No stiffness, trachea central.                  CHEST:  Symmetrical and equal on expansion. HEART:  RRR S1 > S2. No audible murmurs or gallops. LUNGS:  Equal on expansion, normal breath sounds. No adventitious sounds. ABDOMEN:  Obese. Soft on palpation. No localized tenderness. No guarding or rigidity. LYMPHATICS:  No palpable cervical lymphadenopathy. LOCOMOTOR, BACK AND SPINE:  No tenderness or deformities. EXTREMITIES:  Symmetrical, no pretibial edema. No calf tenderness. No discoloration or ulcerations. Tenderness on palpation of the left Knee joint space . Pt using walker to aide in ambulation. NEUROLOGIC:  The patient is conscious, alert, oriented,Cranial nerve II-XII intact, taste and smell were not examined. No apparent focal sensory or motor deficits.                                                                     LAB REVIEW            Lab Results   Component Value Date    WBC 4.0 11/29/2022    HGB 12.3 (L) 11/29/2022    HCT 39.2 (L) 11/29/2022    MCV 92.2 11/29/2022     11/29/2022     Lab Results   Component Value Date/Time     11/29/2022 10:53 AM    K 4.2 11/29/2022 10:53 AM     11/29/2022 10:53 AM    CO2 25 11/29/2022 10:53 AM    BUN 14 11/29/2022 10:53 AM    CREATININE 1.02 11/29/2022 10:53 AM    GLUCOSE 98 11/29/2022 10:53 AM    CALCIUM 9.0 11/29/2022 10:53 AM                                            EKG REVIEW        Last EKG was on 8/2/22 reading  Normal sinus rhythm  Nonspecific T wave abnormality  Abnormal ECG               PROVISIONAL DIAGNOSES / SURGERY:      KNEE TOTAL ARTHROPLASTY    DJD LEFT KNEE      Patient Active Problem List    Diagnosis Date Noted    Hypokalemia 11/28/2022    Pelvic pain 11/28/2022    Primary osteoarthritis of right knee 08/15/2022    Normocytic hypochromic anemia 08/12/2022    Status post lumbar laminectomy 08/08/2022    Subclinical hypothyroidism 05/30/2022    Cold intolerance 04/12/2022    Peripheral vascular disease, unspecified ([address] 03/22/2022    Other chest pain 02/22/2022    Stage 3b chronic kidney disease ([facility] [address] 12/13/2021    Primary osteoarthritis of left hip 12/06/2021    Ingrown toenail 11/02/2021    Spondylosis of thoracic region without myelopathy or radiculopathy 10/29/2021    Localized edema 09/20/2021    Osteoarthritis of spine with radiculopathy, lumbar region 09/20/2021    Numbness 09/13/2021    Vitamin B12 deficiency 05/25/2021    Cervical stenosis of spinal canal 03/17/2021    DDD (degenerative disc disease), cervical 03/17/2021    Trapezius muscle spasm 02/03/2021    Thoracic spine pain 02/03/2021    Other constipation 12/19/2019    Black stool 12/19/2019    Primary osteoarthritis of right hip 09/06/2019    Acquired trigger finger 08/07/2019    Vitamin D deficiency 08/07/2019    Medicare annual wellness visit, subsequent 06/11/2019    Prediabetes 06/10/2019    Primary osteoarthritis of left knee 05/01/2019    Depression 12/27/2018    Positive depression screening 12/27/2018    Morbid obesity with BMI of 40.0-44.9, adult ([facility] [address] 10/15/2018    Lumbar radiculopathy 10/15/2018    Arthritis of right knee 09/17/2018    JEMAL on CPAP 09/17/2018    Bloating 06/21/2018    On potassium wasting diuretic therapy 04/24/2018    Edema 04/24/2018    Primary osteoarthritis of both knees 03/03/2016    Fatigue 02/07/2016    Hypertension, essential 10/21/2015    Renal insufficiency 10/21/2015       CLEARANCE:   Dr. Browne Ek, anesthesia, was contacted and informed of patient's history and planned surgery. Orders received and no clearance required. Patient was just seen for medicare visit on 11/28/22 and referenced to this surgery.      BRISEIDA WONG, APRN - CNP on 12/6/2022 at 11:19 AM    Total time spent on encounter- PAT provider minutes: 31-40 minutes

## 2022-12-06 ENCOUNTER — HOSPITAL ENCOUNTER (OUTPATIENT)
Dept: PREADMISSION TESTING | Age: 82
Discharge: HOME | End: 2022-12-10
Payer: MEDICARE

## 2022-12-06 VITALS
RESPIRATION RATE: 18 BRPM | WEIGHT: 279 LBS | TEMPERATURE: 98.5 F | HEART RATE: 91 BPM | OXYGEN SATURATION: 100 % | BODY MASS INDEX: 41.32 KG/M2 | HEIGHT: 69 IN | SYSTOLIC BLOOD PRESSURE: 132 MMHG | DIASTOLIC BLOOD PRESSURE: 74 MMHG

## 2022-12-06 LAB
BILIRUBIN URINE: NEGATIVE
COLOR: YELLOW
COMMENT UA: ABNORMAL
GLUCOSE URINE: NEGATIVE
KETONES, URINE: ABNORMAL
LEUKOCYTE ESTERASE, URINE: NEGATIVE
NITRITE, URINE: NEGATIVE
PH UA: 5 (ref 5–8)
PROTEIN UA: NEGATIVE
SPECIFIC GRAVITY UA: 1.03 (ref 1–1.03)
TURBIDITY: CLEAR
URINE HGB: NEGATIVE
UROBILINOGEN, URINE: NORMAL

## 2022-12-06 PROCEDURE — APPSS45 APP SPLIT SHARED TIME 31-45 MINUTES: Performed by: NURSE PRACTITIONER

## 2022-12-06 PROCEDURE — 87641 MR-STAPH DNA AMP PROBE: CPT

## 2022-12-06 PROCEDURE — 81003 URINALYSIS AUTO W/O SCOPE: CPT

## 2022-12-06 RX ORDER — OXYCODONE HYDROCHLORIDE AND ACETAMINOPHEN 5; 325 MG/1; MG/1
1 TABLET ORAL EVERY 4 HOURS PRN
COMMUNITY

## 2022-12-06 RX ORDER — ACETAMINOPHEN 500 MG
500 TABLET ORAL EVERY 6 HOURS PRN
COMMUNITY

## 2022-12-06 NOTE — DISCHARGE INSTRUCTIONS
Pre-op Instructions For Out-Patient Surgery    Medication Instructions:  Please stop herbs and any supplements now (includes vitamins and minerals). Please contact your surgeon and prescribing physician for pre-op instructions for any blood thinners. Aspirin as directed. If you have inhalers/aerosol treatments at home, please use them the morning of your surgery and bring the inhalers with you to the hospital.    Please take the following medications the morning of your surgery with a sip of water:    amlodipine  Surgery Instructions:  After midnight before surgery:  Do not eat or drink anything, including water, mints, gum, and hard candy. You may brush your teeth without swallowing. No smoking, chewing tobacco, or street drugs. Please shower or bathe before surgery. If you were given Surgical Scrub Chlorhexidine Gluconate Liquid (CHG), please shower the night before and the morning of your surgery following the detailed instructions you received during your pre-admission visit. Please do not wear any cologne, lotion, powder, deodorant, jewelry, piercings, perfume, makeup, nail polish, hair accessories, or hair spray on the day of surgery. Wear loose comfortable clothing. Leave your valuables at home. Bring a storage case for any glasses/contacts. An adult who is responsible for you MUST drive you home and should be with you for the first 24 hours after surgery. If having out-patient knee and foot surgeries, please arrange for planned crutches, walker, or wheelchair before arriving to the hospital.    The Day of Surgery:  Arrive at [facility] Surgery Entrance at the time directed by your surgeon and check in at the desk. If you have a living will or healthcare power of , please bring a copy. You will be taken to the pre-op holding area where you will be prepared for surgery.   A physical assessment will be performed by a nurse practitioner or house officer. Your IV will be started and you will meet your anesthesiologist.    When you go to surgery, your family will be directed to the surgical waiting room, where the doctor should speak with them after your surgery. After surgery, you will be taken to the recovery room then when you are awake and stable you will go to the short stay unit for preparation to be discharged. If you use a Bi-PAP or C-PAP machine, please bring it with you and leave it in the car in case it is needed in recovery room.

## 2022-12-07 LAB
MRSA, DNA, NASAL: NEGATIVE
SPECIMEN DESCRIPTION: NORMAL

## 2022-12-15 PROBLEM — D64.9 NORMOCHROMIC NORMOCYTIC ANEMIA: Status: ACTIVE | Noted: 2022-12-15

## 2022-12-15 PROBLEM — M25.552 LEFT HIP PAIN: Status: ACTIVE | Noted: 2022-12-15

## 2022-12-15 PROBLEM — Z01.818 PREOPERATIVE CLEARANCE: Status: ACTIVE | Noted: 2022-12-15

## 2022-12-19 ENCOUNTER — ANESTHESIA EVENT (OUTPATIENT)
Dept: OPERATING ROOM | Age: 82
DRG: 470 | End: 2022-12-19
Payer: MEDICARE

## 2022-12-19 NOTE — PRE-PROCEDURE INSTRUCTIONS
No answer, left message ? Unable to leave message ? When were you told to arrive at hospital ?  1015    Do you have a  ?yes    Are you on any blood thinners ? no                If yes when did you stop taking ? Do you have your prep Rx filled and instruction ? N/a    Nothing to eat the day before , only clear liquids. N/a  Are you experiencing any covid symptoms ? N/o    Do you have any infections or rash we should be aware of ?no      Do you have the Hibiclens soap to use the night before and the morning of surgery ? yes    Nothing to eat or drink after midnight, only a sip of water to take any medication instructed to take the night before. yes  Wear comfortable clothing, leave any valuables at home, remove any jewelry and body piercing .  yes

## 2022-12-20 ENCOUNTER — HOSPITAL ENCOUNTER (INPATIENT)
Age: 82
LOS: 1 days | Discharge: HOME HEALTH | DRG: 470 | End: 2022-12-21
Attending: ORTHOPAEDIC SURGERY | Admitting: ORTHOPAEDIC SURGERY
Payer: MEDICARE

## 2022-12-20 ENCOUNTER — APPOINTMENT (OUTPATIENT)
Dept: GENERAL RADIOLOGY | Age: 82
DRG: 470 | End: 2022-12-20
Attending: ORTHOPAEDIC SURGERY
Payer: MEDICARE

## 2022-12-20 ENCOUNTER — ANESTHESIA (OUTPATIENT)
Dept: OPERATING ROOM | Age: 82
DRG: 470 | End: 2022-12-20
Payer: MEDICARE

## 2022-12-20 DIAGNOSIS — M17.12 PRIMARY OSTEOARTHRITIS OF LEFT KNEE: Primary | ICD-10-CM

## 2022-12-20 PROCEDURE — 2709999900 HC NON-CHARGEABLE SUPPLY: Performed by: ORTHOPAEDIC SURGERY

## 2022-12-20 PROCEDURE — 27447 TOTAL KNEE ARTHROPLASTY: CPT | Performed by: ORTHOPAEDIC SURGERY

## 2022-12-20 PROCEDURE — 3600000013 HC SURGERY LEVEL 3 ADDTL 15MIN: Performed by: ORTHOPAEDIC SURGERY

## 2022-12-20 PROCEDURE — 76942 ECHO GUIDE FOR BIOPSY: CPT | Performed by: ANESTHESIOLOGY

## 2022-12-20 PROCEDURE — 2500000003 HC RX 250 WO HCPCS: Performed by: ORTHOPAEDIC SURGERY

## 2022-12-20 PROCEDURE — 7100000000 HC PACU RECOVERY - FIRST 15 MIN: Performed by: ORTHOPAEDIC SURGERY

## 2022-12-20 PROCEDURE — 3700000001 HC ADD 15 MINUTES (ANESTHESIA): Performed by: ORTHOPAEDIC SURGERY

## 2022-12-20 PROCEDURE — A4216 STERILE WATER/SALINE, 10 ML: HCPCS | Performed by: ORTHOPAEDIC SURGERY

## 2022-12-20 PROCEDURE — 6370000000 HC RX 637 (ALT 250 FOR IP): Performed by: ORTHOPAEDIC SURGERY

## 2022-12-20 PROCEDURE — 2580000003 HC RX 258: Performed by: ORTHOPAEDIC SURGERY

## 2022-12-20 PROCEDURE — 6360000002 HC RX W HCPCS: Performed by: ANESTHESIOLOGY

## 2022-12-20 PROCEDURE — 7100000001 HC PACU RECOVERY - ADDTL 15 MIN: Performed by: ORTHOPAEDIC SURGERY

## 2022-12-20 PROCEDURE — 6360000002 HC RX W HCPCS: Performed by: ORTHOPAEDIC SURGERY

## 2022-12-20 PROCEDURE — 97535 SELF CARE MNGMENT TRAINING: CPT

## 2022-12-20 PROCEDURE — 1200000000 HC SEMI PRIVATE

## 2022-12-20 PROCEDURE — 97162 PT EVAL MOD COMPLEX 30 MIN: CPT

## 2022-12-20 PROCEDURE — 2720000010 HC SURG SUPPLY STERILE: Performed by: ORTHOPAEDIC SURGERY

## 2022-12-20 PROCEDURE — 73560 X-RAY EXAM OF KNEE 1 OR 2: CPT

## 2022-12-20 PROCEDURE — 97166 OT EVAL MOD COMPLEX 45 MIN: CPT

## 2022-12-20 PROCEDURE — C1776 JOINT DEVICE (IMPLANTABLE): HCPCS | Performed by: ORTHOPAEDIC SURGERY

## 2022-12-20 PROCEDURE — 3700000000 HC ANESTHESIA ATTENDED CARE: Performed by: ORTHOPAEDIC SURGERY

## 2022-12-20 PROCEDURE — G0378 HOSPITAL OBSERVATION PER HR: HCPCS

## 2022-12-20 PROCEDURE — 3600000003 HC SURGERY LEVEL 3 BASE: Performed by: ORTHOPAEDIC SURGERY

## 2022-12-20 PROCEDURE — 2500000003 HC RX 250 WO HCPCS: Performed by: ANESTHESIOLOGY

## 2022-12-20 PROCEDURE — 2500000003 HC RX 250 WO HCPCS: Performed by: NURSE ANESTHETIST, CERTIFIED REGISTERED

## 2022-12-20 PROCEDURE — 0SRD0J9 REPLACEMENT OF LEFT KNEE JOINT WITH SYNTHETIC SUBSTITUTE, CEMENTED, OPEN APPROACH: ICD-10-PCS | Performed by: ORTHOPAEDIC SURGERY

## 2022-12-20 PROCEDURE — C1713 ANCHOR/SCREW BN/BN,TIS/BN: HCPCS | Performed by: ORTHOPAEDIC SURGERY

## 2022-12-20 PROCEDURE — 6360000002 HC RX W HCPCS: Performed by: NURSE ANESTHETIST, CERTIFIED REGISTERED

## 2022-12-20 PROCEDURE — C9290 INJ, BUPIVACAINE LIPOSOME: HCPCS | Performed by: ANESTHESIOLOGY

## 2022-12-20 PROCEDURE — 97116 GAIT TRAINING THERAPY: CPT

## 2022-12-20 PROCEDURE — 2580000003 HC RX 258: Performed by: ANESTHESIOLOGY

## 2022-12-20 DEVICE — INSERT TIB L79MM THK16MM 0DEG UNIV POLY ANT KNEE PRI STBL: Type: IMPLANTABLE DEVICE | Site: KNEE | Status: FUNCTIONAL

## 2022-12-20 DEVICE — COMPONENT PAT DIA37MM THK8.6MM THN KNEE POLY 3 PEG SER A: Type: IMPLANTABLE DEVICE | Site: KNEE | Status: FUNCTIONAL

## 2022-12-20 DEVICE — TRAY TIB L79MM KNEE CO CHROM FIN MOD INTLOK VANGUARD: Type: IMPLANTABLE DEVICE | Site: KNEE | Status: FUNCTIONAL

## 2022-12-20 DEVICE — IMPLANTABLE DEVICE: Type: IMPLANTABLE DEVICE | Site: KNEE | Status: FUNCTIONAL

## 2022-12-20 DEVICE — CEMENT BNE 40GM HI VISC RADPQ FOR REV SURG: Type: IMPLANTABLE DEVICE | Site: KNEE | Status: FUNCTIONAL

## 2022-12-20 RX ORDER — ASPIRIN 81 MG/1
81 TABLET ORAL 2 TIMES DAILY
Status: DISCONTINUED | OUTPATIENT
Start: 2022-12-20 | End: 2022-12-21 | Stop reason: HOSPADM

## 2022-12-20 RX ORDER — MIDAZOLAM HYDROCHLORIDE 1 MG/ML
INJECTION INTRAMUSCULAR; INTRAVENOUS PRN
Status: DISCONTINUED | OUTPATIENT
Start: 2022-12-20 | End: 2022-12-20 | Stop reason: SDUPTHER

## 2022-12-20 RX ORDER — SODIUM CHLORIDE 0.9 % (FLUSH) 0.9 %
5-40 SYRINGE (ML) INJECTION PRN
Status: DISCONTINUED | OUTPATIENT
Start: 2022-12-20 | End: 2022-12-20 | Stop reason: HOSPADM

## 2022-12-20 RX ORDER — METOCLOPRAMIDE HYDROCHLORIDE 5 MG/ML
10 INJECTION INTRAMUSCULAR; INTRAVENOUS
Status: DISCONTINUED | OUTPATIENT
Start: 2022-12-20 | End: 2022-12-20 | Stop reason: HOSPADM

## 2022-12-20 RX ORDER — SODIUM CHLORIDE 0.9 % (FLUSH) 0.9 %
5-40 SYRINGE (ML) INJECTION EVERY 12 HOURS SCHEDULED
Status: DISCONTINUED | OUTPATIENT
Start: 2022-12-20 | End: 2022-12-21 | Stop reason: HOSPADM

## 2022-12-20 RX ORDER — BUPIVACAINE HYDROCHLORIDE 5 MG/ML
INJECTION, SOLUTION EPIDURAL; INTRACAUDAL
Status: DISCONTINUED | OUTPATIENT
Start: 2022-12-20 | End: 2022-12-20 | Stop reason: SDUPTHER

## 2022-12-20 RX ORDER — SODIUM CHLORIDE, SODIUM LACTATE, POTASSIUM CHLORIDE, CALCIUM CHLORIDE 600; 310; 30; 20 MG/100ML; MG/100ML; MG/100ML; MG/100ML
INJECTION, SOLUTION INTRAVENOUS CONTINUOUS
Status: DISCONTINUED | OUTPATIENT
Start: 2022-12-20 | End: 2022-12-20 | Stop reason: HOSPADM

## 2022-12-20 RX ORDER — SODIUM CHLORIDE 9 MG/ML
INJECTION, SOLUTION INTRAVENOUS PRN
Status: DISCONTINUED | OUTPATIENT
Start: 2022-12-20 | End: 2022-12-20 | Stop reason: HOSPADM

## 2022-12-20 RX ORDER — MEPERIDINE HYDROCHLORIDE 25 MG/ML
12.5 INJECTION INTRAMUSCULAR; INTRAVENOUS; SUBCUTANEOUS EVERY 5 MIN PRN
Status: DISCONTINUED | OUTPATIENT
Start: 2022-12-20 | End: 2022-12-20 | Stop reason: HOSPADM

## 2022-12-20 RX ORDER — ROCURONIUM BROMIDE 10 MG/ML
INJECTION, SOLUTION INTRAVENOUS PRN
Status: DISCONTINUED | OUTPATIENT
Start: 2022-12-20 | End: 2022-12-20 | Stop reason: SDUPTHER

## 2022-12-20 RX ORDER — OXYCODONE HYDROCHLORIDE 5 MG/1
5 TABLET ORAL EVERY 4 HOURS PRN
Status: DISCONTINUED | OUTPATIENT
Start: 2022-12-20 | End: 2022-12-21 | Stop reason: HOSPADM

## 2022-12-20 RX ORDER — ACETAMINOPHEN 500 MG
1000 TABLET ORAL ONCE
Status: COMPLETED | OUTPATIENT
Start: 2022-12-20 | End: 2022-12-20

## 2022-12-20 RX ORDER — DEXAMETHASONE SODIUM PHOSPHATE 10 MG/ML
10 INJECTION, SOLUTION INTRAMUSCULAR; INTRAVENOUS ONCE
Status: COMPLETED | OUTPATIENT
Start: 2022-12-20 | End: 2022-12-20

## 2022-12-20 RX ORDER — SODIUM CHLORIDE 0.9 % (FLUSH) 0.9 %
5-40 SYRINGE (ML) INJECTION EVERY 12 HOURS SCHEDULED
Status: DISCONTINUED | OUTPATIENT
Start: 2022-12-20 | End: 2022-12-20 | Stop reason: HOSPADM

## 2022-12-20 RX ORDER — ACETAMINOPHEN 325 MG/1
650 TABLET ORAL
Status: DISCONTINUED | OUTPATIENT
Start: 2022-12-20 | End: 2022-12-20 | Stop reason: HOSPADM

## 2022-12-20 RX ORDER — SODIUM CHLORIDE 0.9 % (FLUSH) 0.9 %
5-40 SYRINGE (ML) INJECTION PRN
Status: DISCONTINUED | OUTPATIENT
Start: 2022-12-20 | End: 2022-12-21 | Stop reason: HOSPADM

## 2022-12-20 RX ORDER — OXYCODONE HYDROCHLORIDE 10 MG/1
10 TABLET ORAL EVERY 4 HOURS PRN
Status: DISCONTINUED | OUTPATIENT
Start: 2022-12-20 | End: 2022-12-21 | Stop reason: HOSPADM

## 2022-12-20 RX ORDER — FENTANYL CITRATE 50 UG/ML
INJECTION, SOLUTION INTRAMUSCULAR; INTRAVENOUS PRN
Status: DISCONTINUED | OUTPATIENT
Start: 2022-12-20 | End: 2022-12-20 | Stop reason: SDUPTHER

## 2022-12-20 RX ORDER — LIDOCAINE HYDROCHLORIDE 20 MG/ML
INJECTION, SOLUTION EPIDURAL; INFILTRATION; INTRACAUDAL; PERINEURAL PRN
Status: DISCONTINUED | OUTPATIENT
Start: 2022-12-20 | End: 2022-12-20 | Stop reason: SDUPTHER

## 2022-12-20 RX ORDER — PROPOFOL 10 MG/ML
INJECTION, EMULSION INTRAVENOUS PRN
Status: DISCONTINUED | OUTPATIENT
Start: 2022-12-20 | End: 2022-12-20 | Stop reason: SDUPTHER

## 2022-12-20 RX ORDER — OXYCODONE HYDROCHLORIDE AND ACETAMINOPHEN 5; 325 MG/1; MG/1
1-2 TABLET ORAL EVERY 4 HOURS PRN
Qty: 42 TABLET | Refills: 0 | Status: SHIPPED | OUTPATIENT
Start: 2022-12-20 | End: 2022-12-27

## 2022-12-20 RX ORDER — FENTANYL CITRATE 0.05 MG/ML
25 INJECTION, SOLUTION INTRAMUSCULAR; INTRAVENOUS EVERY 5 MIN PRN
Status: COMPLETED | OUTPATIENT
Start: 2022-12-20 | End: 2022-12-20

## 2022-12-20 RX ORDER — ONDANSETRON 2 MG/ML
INJECTION INTRAMUSCULAR; INTRAVENOUS PRN
Status: DISCONTINUED | OUTPATIENT
Start: 2022-12-20 | End: 2022-12-20 | Stop reason: SDUPTHER

## 2022-12-20 RX ORDER — LIDOCAINE HYDROCHLORIDE 10 MG/ML
1 INJECTION, SOLUTION EPIDURAL; INFILTRATION; INTRACAUDAL; PERINEURAL
Status: COMPLETED | OUTPATIENT
Start: 2022-12-20 | End: 2022-12-20

## 2022-12-20 RX ORDER — ONDANSETRON 2 MG/ML
4 INJECTION INTRAMUSCULAR; INTRAVENOUS
Status: DISCONTINUED | OUTPATIENT
Start: 2022-12-20 | End: 2022-12-20 | Stop reason: HOSPADM

## 2022-12-20 RX ORDER — SCOLOPAMINE TRANSDERMAL SYSTEM 1 MG/1
1 PATCH, EXTENDED RELEASE TRANSDERMAL ONCE
Status: DISCONTINUED | OUTPATIENT
Start: 2022-12-20 | End: 2022-12-20

## 2022-12-20 RX ORDER — LABETALOL HYDROCHLORIDE 5 MG/ML
10 INJECTION, SOLUTION INTRAVENOUS
Status: DISCONTINUED | OUTPATIENT
Start: 2022-12-20 | End: 2022-12-20 | Stop reason: HOSPADM

## 2022-12-20 RX ORDER — DIPHENHYDRAMINE HYDROCHLORIDE 50 MG/ML
12.5 INJECTION INTRAMUSCULAR; INTRAVENOUS
Status: DISCONTINUED | OUTPATIENT
Start: 2022-12-20 | End: 2022-12-20 | Stop reason: HOSPADM

## 2022-12-20 RX ORDER — HYDRALAZINE HYDROCHLORIDE 20 MG/ML
10 INJECTION INTRAMUSCULAR; INTRAVENOUS
Status: DISCONTINUED | OUTPATIENT
Start: 2022-12-20 | End: 2022-12-20 | Stop reason: HOSPADM

## 2022-12-20 RX ORDER — CALCIUM CHLORIDE 100 MG/ML
INJECTION INTRAVENOUS; INTRAVENTRICULAR PRN
Status: DISCONTINUED | OUTPATIENT
Start: 2022-12-20 | End: 2022-12-20 | Stop reason: ALTCHOICE

## 2022-12-20 RX ORDER — ACETAMINOPHEN 325 MG/1
650 TABLET ORAL EVERY 6 HOURS
Status: DISCONTINUED | OUTPATIENT
Start: 2022-12-20 | End: 2022-12-21 | Stop reason: HOSPADM

## 2022-12-20 RX ORDER — SODIUM CHLORIDE 9 MG/ML
INJECTION, SOLUTION INTRAVENOUS PRN
Status: DISCONTINUED | OUTPATIENT
Start: 2022-12-20 | End: 2022-12-21 | Stop reason: HOSPADM

## 2022-12-20 RX ORDER — TRANEXAMIC ACID 100 MG/ML
INJECTION, SOLUTION INTRAVENOUS PRN
Status: DISCONTINUED | OUTPATIENT
Start: 2022-12-20 | End: 2022-12-20 | Stop reason: SDUPTHER

## 2022-12-20 RX ORDER — SODIUM CHLORIDE, SODIUM LACTATE, POTASSIUM CHLORIDE, CALCIUM CHLORIDE 600; 310; 30; 20 MG/100ML; MG/100ML; MG/100ML; MG/100ML
INJECTION, SOLUTION INTRAVENOUS CONTINUOUS
Status: DISCONTINUED | OUTPATIENT
Start: 2022-12-20 | End: 2022-12-21 | Stop reason: HOSPADM

## 2022-12-20 RX ORDER — ONDANSETRON 2 MG/ML
4 INJECTION INTRAMUSCULAR; INTRAVENOUS EVERY 6 HOURS PRN
Status: DISCONTINUED | OUTPATIENT
Start: 2022-12-20 | End: 2022-12-21 | Stop reason: HOSPADM

## 2022-12-20 RX ORDER — KETOROLAC TROMETHAMINE 30 MG/ML
15 INJECTION, SOLUTION INTRAMUSCULAR; INTRAVENOUS EVERY 6 HOURS PRN
Status: DISCONTINUED | OUTPATIENT
Start: 2022-12-20 | End: 2022-12-21 | Stop reason: HOSPADM

## 2022-12-20 RX ORDER — GABAPENTIN 600 MG/1
300 TABLET ORAL ONCE
Status: COMPLETED | OUTPATIENT
Start: 2022-12-20 | End: 2022-12-20

## 2022-12-20 RX ADMIN — FENTANYL CITRATE 25 MCG: 0.05 INJECTION, SOLUTION INTRAMUSCULAR; INTRAVENOUS at 14:42

## 2022-12-20 RX ADMIN — BUPIVACAINE 10 ML: 13.3 INJECTION, SUSPENSION, LIPOSOMAL INFILTRATION at 14:34

## 2022-12-20 RX ADMIN — DEXAMETHASONE SODIUM PHOSPHATE 10 MG: 10 INJECTION INTRAMUSCULAR; INTRAVENOUS at 11:17

## 2022-12-20 RX ADMIN — ONDANSETRON 4 MG: 2 INJECTION INTRAMUSCULAR; INTRAVENOUS at 12:47

## 2022-12-20 RX ADMIN — PROPOFOL 200 MG: 10 INJECTION, EMULSION INTRAVENOUS at 12:13

## 2022-12-20 RX ADMIN — LIDOCAINE HYDROCHLORIDE 40 MG: 20 INJECTION, SOLUTION EPIDURAL; INFILTRATION; INTRACAUDAL; PERINEURAL at 12:13

## 2022-12-20 RX ADMIN — GABAPENTIN 300 MG: 600 TABLET, FILM COATED ORAL at 10:38

## 2022-12-20 RX ADMIN — Medication 3000 MG: at 12:24

## 2022-12-20 RX ADMIN — FENTANYL CITRATE 25 MCG: 0.05 INJECTION, SOLUTION INTRAMUSCULAR; INTRAVENOUS at 14:26

## 2022-12-20 RX ADMIN — LIDOCAINE HYDROCHLORIDE 1 ML: 10 INJECTION, SOLUTION EPIDURAL; INFILTRATION; INTRACAUDAL; PERINEURAL at 11:14

## 2022-12-20 RX ADMIN — ROCURONIUM BROMIDE 25 MG: 10 INJECTION, SOLUTION INTRAVENOUS at 12:13

## 2022-12-20 RX ADMIN — ACETAMINOPHEN 650 MG: 325 TABLET ORAL at 22:22

## 2022-12-20 RX ADMIN — Medication 15 ML: at 14:34

## 2022-12-20 RX ADMIN — SODIUM CHLORIDE, POTASSIUM CHLORIDE, SODIUM LACTATE AND CALCIUM CHLORIDE: 600; 310; 30; 20 INJECTION, SOLUTION INTRAVENOUS at 11:12

## 2022-12-20 RX ADMIN — SUGAMMADEX 200 MG: 100 INJECTION, SOLUTION INTRAVENOUS at 13:37

## 2022-12-20 RX ADMIN — ASPIRIN 81 MG: 81 TABLET, COATED ORAL at 20:18

## 2022-12-20 RX ADMIN — FENTANYL CITRATE 50 MCG: 50 INJECTION, SOLUTION INTRAMUSCULAR; INTRAVENOUS at 12:46

## 2022-12-20 RX ADMIN — ACETAMINOPHEN 1000 MG: 500 TABLET ORAL at 10:38

## 2022-12-20 RX ADMIN — Medication 3000 MG: at 20:19

## 2022-12-20 RX ADMIN — FENTANYL CITRATE 100 MCG: 50 INJECTION, SOLUTION INTRAMUSCULAR; INTRAVENOUS at 12:13

## 2022-12-20 RX ADMIN — TRANEXAMIC ACID 1000 MG: 100 INJECTION, SOLUTION INTRAVENOUS at 12:25

## 2022-12-20 RX ADMIN — MIDAZOLAM 1 MG: 1 INJECTION INTRAMUSCULAR; INTRAVENOUS at 12:09

## 2022-12-20 ASSESSMENT — PAIN DESCRIPTION - DESCRIPTORS
DESCRIPTORS: ACHING

## 2022-12-20 ASSESSMENT — PAIN SCALES - GENERAL
PAINLEVEL_OUTOF10: 5
PAINLEVEL_OUTOF10: 8
PAINLEVEL_OUTOF10: 10
PAINLEVEL_OUTOF10: 10
PAINLEVEL_OUTOF10: 0
PAINLEVEL_OUTOF10: 3

## 2022-12-20 ASSESSMENT — PAIN DESCRIPTION - ORIENTATION
ORIENTATION: LEFT

## 2022-12-20 ASSESSMENT — PAIN DESCRIPTION - LOCATION
LOCATION: KNEE

## 2022-12-20 ASSESSMENT — PAIN DESCRIPTION - PAIN TYPE
TYPE: SURGICAL PAIN
TYPE: SURGICAL PAIN

## 2022-12-20 ASSESSMENT — ENCOUNTER SYMPTOMS
SHORTNESS OF BREATH: 0
STRIDOR: 0

## 2022-12-20 ASSESSMENT — LIFESTYLE VARIABLES: SMOKING_STATUS: 0

## 2022-12-20 ASSESSMENT — PAIN - FUNCTIONAL ASSESSMENT: PAIN_FUNCTIONAL_ASSESSMENT: 0-10

## 2022-12-20 NOTE — OP NOTE
Operative Note      Patient: Jitendra Horne  YOB: 1940  MRN: [id number]    Date of Procedure: 12/20/2022    Pre-Op Diagnosis: DJD LEFT KNEE    Post-Op Diagnosis: Same       Procedure(s):  KNEE TOTAL ARTHROPLASTY left    Surgeon(s):  Radha Powell MD    Assistant:   Resident: DO Teofilo Chan, JANE    Anesthesia: General    Estimated Blood Loss (mL): Minimal    Complications: None    Specimens:   * No specimens in log *    Implants:  Implant Name Type Inv. Item Serial No.  Lot No. LRB No. Used Action   CEMENT BNE 40GM HI VISC RADPQ FOR REV SURG - [id number]  CEMENT BNE 40GM HI VISC RADPQ FOR REV SURG  [employer]- [id number] Left 2 Implanted   PLATE KNEE TIB TRAY CRUC COBLT 79MM - [id number]  PLATE KNEE TIB TRAY CRUC COBLT 79MM  [employer]- [id number] Left 1 Implanted   IMPL KNEE VANGUARD CR FEM LT INTERLOK 70MM - [id number]  IMPL KNEE VANGUARD CR FEM LT INTERLOK 70MM  [employer]- [id number] Left 1 Implanted   IMPL KNEE SERIES A PATELLA THIN 3 PEG 37X8. 6MM - [id number]  IMPL KNEE SERIES A PATELLA THIN 3 PEG 37X8. 6MM  [employer]- [id number] Left 1 Implanted   IMPL KNEE TIB BEARING ANT VANGRD 57V41JX - [id number]  IMPL KNEE TIB BEARING ANT VANGRD [id number]  [employer]- [id number] Left 1 Implanted         Drains: * No LDAs found *    Findings: Severe DJD left knee    Detailed Description of Procedure:       Patient is a [de-identified] y.o. male with a long standing history of left DJD of the knee. Patient had his right total knee performed this past August and is doing very well. Patient has failed all types of conservative treatment included physical therapy, weight-loss counseling, NSAIDS, and injections. The patient has significant restriction of activities of daily living and/or work/job place abilities, and, therefore, has been counseled for TKA.  Patient is aware of all the risks and benefits as highlighted in the surgical consent form. The patient was given 3 grams of Ancef in the holding area as well as an adductor canal block. The patient was then taken to the operating suite where general anesthesia was administered. A tourniquet was applied to effected leg and then prepped and draped in the usual sterile fashion. Time out was called to verify laterality. The leg was examined   and the tourniquet inflated to 350 mm of Hg. Midline incision was utilized and taken down to the joint capsule where a mid-vastus approach to the knee was performed. After a complete synovectomy, the patella was elevated, calibrated for thickness, and the above sized, patellar triple pegged drills guide positioned medially and then drilled. Atrial patellar button was positioned in order to re-established the original thickness. This was then replaced with a protective patellar plate. The knee was then flexed and revealed significant  arthrosis. Osteophytes were removed via rongeur. A drill hole was made in the distal femur and the femoral canal was then irrigated and suctioned. A fluted IM gale was inserted and a distal femoral cut was made at 5 degrees of valgus at the +4 setting. The proximal tibia was then exposed after resection of the ACL and lateral meniscus. An extra-medullary tibial cutting jug was then aligned in reference to the tibia tubercle and ankle mortise and positional with a slight posterior slope. The cut was made with minimal cutting of the lowest depth of the tibial wear and the fragment removed. The distal femur was then approached and femoral sizing guide with 3 degrees of external rotation was placed flush with the surface and drill holes made and femoral size determined. The appropriate size cutting jig was then placed and anterior, posterior, and chamfer cuts made with an oscillating saw. A laminar  was inserted with care to avoid damaging the MCL.  Posterior osteophytes were removed and the capsule stripped from the posterior femoral condyles. The capsule was then injected with the orthopedic cocktail at this time. The tibial cut was then assessed with a baseplate and alignment gale to assure proper cut. Spacer blocks were then inserted and the knee was assessed to determine the amount of soft tissue release and osteophyte removal necessary  to establish symmetric flexion and extension gaps. The tibia was then elevated, and the above sized tibial plate was positioned for proper external rotation with an alignment gale down the middle-third of the tibial tubercles. This was pinned in place, the proximal tibialis reamed, the fins were then repacked. The appropriate size femur was positioned and various size of the polyethylene trials were inserted. The knee was assessed for  ROM and patellar tracking. Satisfied with this, this distal femoral logs were drilled and then all the trial components were removed. The knee was irrigated and dried while the cement was prepared. Cement was applied to to both implant and cut surfaces and the implants impacted and the compression with one size larger poly inserted and excess cement removed. The patella was placed and compressed as well. The knee was placed in full extension and then injected with the remainder  of the 100ml of the orthopedic cocktail. The Irrisept lavage was carried out for the 1 minutes. This was then irrigated and a permanent polyethylene was insert was injected with platelet rich/poor plasma and the tourniquet was released at 54 minutes. Hemostasis was excellent. The knee was closed with a #1 Strata-Fix and vastus with a double-layer of O-Vicryl suture. The subcutaneous tissue closed with a 2-0 Monocryl Strata-Fix  and then a Zip-line used for the skin. This was covered with adaptic and Aquacel dressing and dressed with a large 6-inch Ace dressing from toes to mid-thigh.  The patient was awakened and taken to PACU in good condition.       Electronically signed by Felice Graf MD on 12/20/2022 at 1:48 PM

## 2022-12-20 NOTE — ANESTHESIA PRE PROCEDURE
Department of Anesthesiology  Preprocedure Note       Name:  Shayan Cotter   Age:  [de-identified] y.o.  :  1940                                          MRN:  [id number]         Date:  2022      Surgeon: Bella Barakat):  Reema Contreras MD    Procedure: Procedure(s):  KNEE TOTAL ARTHROPLASTY    Medications prior to admission:   Prior to Admission medications    Medication Sig Start Date End Date Taking? Authorizing Provider   acetaminophen (TYLENOL) 500 MG tablet Take 500 mg by mouth every 6 hours as needed for Pain    Historical Provider, MD   oxyCODONE-acetaminophen (PERCOCET) 5-325 MG per tablet Take 1 tablet by mouth every 4 hours as needed for Pain. Historical Provider, MD   amLODIPine (NORVASC) 10 MG tablet Take 1 tablet by mouth daily 10/24/22 1/22/23  Lily Radford MD   Cholecalciferol (VITAMIN D3) 125 MCG (5000 UT) CAPS Take 1 capsule by mouth Daily 10/24/22 1/22/23  Lily Radford MD   DULoxetine (CYMBALTA) 60 MG extended release capsule Take 1 capsule by mouth daily 10/24/22 1/22/23  Lily Radford MD   chlorthalidone (HYGROTEN) 50 MG tablet Take 1 tablet by mouth daily 10/24/22 1/22/23  Lily Radford MD   aspirin EC 81 MG EC tablet Take 1 tablet by mouth in the morning and 1 tablet in the evening. 8/15/22   Reema Contreras MD   furosemide (LASIX) 40 MG tablet Take 1 tablet by mouth in the morning. 8/12/22 11/10/22  Lily Radford MD   potassium chloride (KLOR-CON M) 20 MEQ extended release tablet Take 2 tablets by mouth in the morning.  8/12/22 11/10/22  Lily Radofrd MD   Handicap Placard MISC by Does not apply route Dx: Knee OA, Lumbar spine DDD    Duration: one year till 23   Lily Radford MD       Current medications:    Current Facility-Administered Medications   Medication Dose Route Frequency Provider Last Rate Last Admin    sodium chloride flush 0.9 % injection 5-40 mL  5-40 mL IntraVENous 2 times per day Reema Contreras MD        sodium chloride flush 0.9 % injection 5-40 mL  5-40 mL IntraVENous PRN Fabian Berrios MD        0.9 % sodium chloride infusion   IntraVENous PRN Fabian Berrios MD        ceFAZolin (ANCEF) 3000 mg in dextrose 5 % 100 mL IVPB  3,000 mg IntraVENous On Call to Yogesh Martinez MD        acetaminophen (TYLENOL) tablet 1,000 mg  1,000 mg Oral Once Fabian Berrios MD        dexamethasone (PF) (DECADRON) injection 10 mg  10 mg IntraVENous Once Fabian Berrios MD        gabapentin (NEURONTIN) tablet 300 mg  300 mg Oral Once Fabian Berrios MD        scopolamine (TRANSDERM-SCOP) transdermal patch 1 patch  1 patch TransDERmal Once Fabian Berrios MD        sodium chloride flush 0.9 % injection 5-40 mL  5-40 mL IntraVENous 2 times per day Anival Rose MD        sodium chloride flush 0.9 % injection 5-40 mL  5-40 mL IntraVENous PRN Sadi Ho MD        0.9 % sodium chloride infusion   IntraVENous PRN Anival Rose MD        lactated ringers infusion   IntraVENous Continuous Sadi Pate MD        lidocaine PF 1 % injection 1 mL  1 mL IntraDERmal Once PRN Anival Rose MD           Allergies: Allergies   Allergen Reactions    Ace Inhibitors Other (See Comments)     cough    Voltaren [Diclofenac Sodium] Other (See Comments)     Elevated Creatinine       Problem List:    Patient Active Problem List   Diagnosis Code    Hypertension, essential I10    Renal insufficiency N28.9    Fatigue R53.83    Primary osteoarthritis of both knees M17.0    On potassium wasting diuretic therapy Z79.899    Edema R60.9    Bloating R14.0    Arthritis of right knee M17.11    JEMAL on CPAP G47.33, Z99.89    Morbid obesity with BMI of 40.0-44.9, adult (HCC) E66.01, Z68.41    Lumbar radiculopathy M54.16    Depression F32. A    Positive depression screening Z13.31    Primary osteoarthritis of left knee M17.12    Prediabetes R73.03    Medicare annual wellness visit, subsequent Z00.00    Acquired trigger finger M65.30    Vitamin D deficiency E55.9    Primary osteoarthritis of right hip M16.11    Other constipation K59.09    Black stool K92.1    Trapezius muscle spasm M62.838    Thoracic spine pain M54.6    Cervical stenosis of spinal canal M48.02    DDD (degenerative disc disease), cervical M50.30    Vitamin B12 deficiency E53.8    Numbness R20.0    Localized edema R60.0    Osteoarthritis of spine with radiculopathy, lumbar region M47.26    Spondylosis of thoracic region without myelopathy or radiculopathy M47.814    Ingrown toenail L60.0    Primary osteoarthritis of left hip M16.12    Stage 3b chronic kidney disease (HCC) N18.32    Other chest pain R07.89    Peripheral vascular disease, unspecified (HCC) I73.9    Cold intolerance R68.89    Subclinical hypothyroidism E03.8    Status post lumbar laminectomy Z98.890    Normocytic hypochromic anemia D50.9    Primary osteoarthritis of right knee M17.11    Hypokalemia E87.6    Pelvic pain R10.2    Left hip pain M25.552    Preoperative clearance Z01.818    Normochromic normocytic anemia D64.9       Past Medical History:        Diagnosis Date    Abnormal renal function     Allergic contact dermatitis due to other agents 08/27/2020    Anemia     Cellulitis of buttock 07/22/2021    Depression     Dysmetabolic syndrome     Dysuria 06/28/2021    Edema     legs    Elevated glucose 02/07/2016    Erythrasma 07/22/2021    Health care maintenance 04/14/2016    Hypertension     Intertrigo 08/05/2021    Leukopenia     Obesity     Osteoarthritis     Other constipation 12/19/2019    Other specified counseling 03/03/2016    PVD (peripheral vascular disease) ([facility] [address]     Renal insufficiency 10/21/2015    Skin maceration 07/22/2021    Sleep apnea     with severe desaturations    Trigger finger of right thumb 02/03/2016    Has seen Ortho and got injection, doing better     Viral disease exposure 05/21/2020       Past Surgical History:        Procedure Laterality Date    BACK SURGERY  2005    CATARACT EXTRACTION Left     COLONOSCOPY      EYE SURGERY      cataract    FOOT SURGERY Right     toenail removal great toe    HERNIA REPAIR      age 12- right inguinal    LUMBAR LAMINECTOMY  2022    with fusion    NECK SURGERY      not spine- hair follicle    TONSILLECTOMY      TOTAL KNEE ARTHROPLASTY Right 08/15/2022    RIGHT KNEE TOTAL ARTHROPLASTY WITH BIOMET AND GPS performed by Valeria Gutierrez MD at [address] [place] [address] History:    Social History     Tobacco Use    Smoking status: Former     Packs/day: 1.00     Years: 3.00     Pack years: 3.00     Types: Cigarettes     Quit date: 1961     Years since quittin.4    Smokeless tobacco: Never   Substance Use Topics    Alcohol use: No     Alcohol/week: 0.0 standard drinks                                Counseling given: Not Answered      Vital Signs (Current): There were no vitals filed for this visit.                                            BP Readings from Last 3 Encounters:   12/15/22 125/79   22 132/74   22 131/79       NPO Status:                                                                                 BMI:   Wt Readings from Last 3 Encounters:   12/15/22 280 lb (127 kg)   22 279 lb (126.6 kg)   22 284 lb (128.8 kg)     There is no height or weight on file to calculate BMI.    CBC:   Lab Results   Component Value Date/Time    WBC 4.0 2022 10:53 AM    RBC 4.25 2022 10:53 AM    HGB 12.3 2022 10:53 AM    HCT 39.2 2022 10:53 AM    MCV 92.2 2022 10:53 AM    RDW 12.7 2022 10:53 AM     2022 10:53 AM       CMP:   Lab Results   Component Value Date/Time     2022 10:53 AM    K 4.2 2022 10:53 AM     2022 10:53 AM    CO2 25 2022 10:53 AM    BUN 14 2022 10:53 AM    CREATININE 1.02 2022 10:53 AM    GFRAA >60 2022 04:05 PM    LABGLOM >60 2022 10:53 AM    GLUCOSE 98 2022 10:53 AM    PROT 7.1 11/29/2022 10:53 AM    CALCIUM 9.0 11/29/2022 10:53 AM    BILITOT 0.4 11/29/2022 10:53 AM    ALKPHOS 77 11/29/2022 10:53 AM    AST 17 11/29/2022 10:53 AM    ALT 11 11/29/2022 10:53 AM       POC Tests: No results for input(s): POCGLU, POCNA, POCK, POCCL, POCBUN, POCHEMO, POCHCT in the last 72 hours.     Coags: No results found for: PROTIME, INR, APTT    HCG (If Applicable): No results found for: PREGTESTUR, PREGSERUM, HCG, HCGQUANT     ABGs: No results found for: PHART, PO2ART, EOE0FXY, MBV4JNT, BEART, Y4YABTGH     Type & Screen (If Applicable):  No results found for: LABABO, LABRH    Drug/Infectious Status (If Applicable):  Lab Results   Component Value Date/Time    HEPCAB NONREACTIVE 03/22/2021 01:07 PM       COVID-19 Screening (If Applicable): No results found for: COVID19        Anesthesia Evaluation  Patient summary reviewed and Nursing notes reviewed no history of anesthetic complications:   Airway: Mallampati: III  TM distance: >3 FB   Neck ROM: full  Mouth opening: > = 3 FB   Dental:    (+) partials      Pulmonary:normal exam  breath sounds clear to auscultation  (+) sleep apnea:      (-) pneumonia, COPD, asthma, shortness of breath, recent URI, rhonchi, wheezes, rales, stridor, not a current smoker and no decreased breath sounds                           Cardiovascular:  Exercise tolerance: good (>4 METS),   (+) hypertension: no interval change,     (-) pacemaker, valvular problems/murmurs, past MI, CAD, CABG/stent, dysrhythmias,  angina,  CHF, orthopnea, PND,  [place], murmur, weak pulses,  friction rub, systolic click, carotid bruit,  JVD, peripheral edema, no pulmonary hypertension and no hyperlipidemia    ECG reviewed  Rhythm: regular  Rate: normal           Beta Blocker:  Not on Beta Blocker         Neuro/Psych:   (+) neuromuscular disease:, psychiatric history: stable with treatmentdepression/anxiety    (-) seizures, TIA, CVA and headaches           GI/Hepatic/Renal:        (-) hiatal hernia, GERD, PUD, hepatitis, liver disease, no renal disease, bowel prep and no morbid obesity       Endo/Other:    (+) hypothyroidism::., no malignancy/cancer. (-) diabetes mellitus, hyperthyroidism, blood dyscrasia, arthritis, no electrolyte abnormalities, no malignancy/cancer               Abdominal:             Vascular: negative vascular ROS. - PVD, DVT and PE. Other Findings:           Anesthesia Plan      general     ASA 3       Induction: intravenous. MIPS: Postoperative opioids intended and Prophylactic antiemetics administered. Anesthetic plan and risks discussed with patient. Plan discussed with CRNA.                     Royce Simmons MD   12/20/2022

## 2022-12-20 NOTE — PROGRESS NOTES
Physical Therapy  Facility/Department: [facility] [[email or web address]] MED SURG  Physical Therapy Initial Assessment    Name: Delmi Rand  : 1940  MRN: [id number]  Date of Service: 2022    Discharge Recommendations:  Therapy recommended at discharge, Patient would benefit from continued therapy after discharge          Patient Diagnosis(es): The encounter diagnosis was Primary osteoarthritis of left knee. Past Medical History:  has a past medical history of Abnormal EKG, Abnormal renal function, Allergic contact dermatitis due to other agents, Anemia, Cellulitis of buttock, Depression, Dysmetabolic syndrome, Dysuria, Edema, Elevated glucose, Erythrasma, Health care maintenance, Hypertension, Intertrigo, Leukopenia, Obesity, Osteoarthritis, Other constipation, Other specified counseling, Primary osteoarthritis of left knee, PVD (peripheral vascular disease) ([facility] [address] Renal insufficiency, Skin maceration, Sleep apnea, Trigger finger of right thumb, and Viral disease exposure. Past Surgical History:  has a past surgical history that includes back surgery (2005); Neck surgery; lumbar laminectomy (2022); Colonoscopy; Cataract extraction (Left); eye surgery; hernia repair; Tonsillectomy; Total knee arthroplasty (Right, 08/15/2022); Foot surgery (Right); and Total knee arthroplasty (Left, 2022). Assessment   Assessment: Pt requires mod ax 2 to stand from bed height, ambulates 30 ft x1 with RW, min A+ SBA of 2nd person for safety. Unbale to progress to stair training today. Will benefit from continued therapy atleast another day to improve fucntion and progress to steps for safe DC home. Treatment Diagnosis: Diffuclty walking  Specific Instructions for Next Treatment: Increased gait distnce, progress to steps. Pt staying over night and plan for DC on 22.   Therapy Prognosis: Good  Decision Making: Medium Complexity  History: R TKA, CKD  Barriers to Learning: Needs encouragement to increase activity  Requires PT Follow-Up: Yes  Activity Tolerance  Activity Tolerance: Patient limited by pain     Plan   Physcial Therapy Plan  General Plan:  (BID)  Specific Instructions for Next Treatment: Increased gait distnce, progress to steps. Pt staying over night and plan for DC on 22. Current Treatment Recommendations: Strengthening, ROM, Balance training, Functional mobility training, Transfer training, Endurance training, Gait training, Stair training, Home exercise program, Safety education & training, Patient/Caregiver education & training, Positioning, Equipment evaluation, education, & procurement, Therapeutic activities  Safety Devices  Type of Devices: All fall risk precautions in place, Call light within reach, Gait belt, Left in chair, Nurse notified  Restraints  Restraints Initially in Place: No     Restrictions  Restrictions/Precautions  Restrictions/Precautions: Weight Bearing, Fall Risk, Surgical Protocols, General Precautions, Up as Tolerated  Required Braces or Orthoses?: No  Implants present? : Metal implants (B TKA, spinal surgery)  Lower Extremity Weight Bearing Restrictions  Left Lower Extremity Weight Bearing: Weight Bearing As Tolerated  Position Activity Restriction  Other position/activity restrictions: OT/PT eval and treat     Subjective   Pain: 5/10 pain in L knee  General  Patient assessed for rehabilitation services?: Yes  Additional Pertinent Hx: L TKA 22  Family / Caregiver Present: Yes (Spouse)  Referring Practitioner: Dr Ric Acuña  Referral Date : 22  Diagnosis: L TKA  Follows Commands: Within Functional Limits  Subjective  Subjective: \"Not today\" per pt, in referance to DC. Pt reports he is not ready to go home yet, will see how he does tomorrow.          Social/Functional History  Social/Functional History  Lives With: Spouse  Type of Home: House  Home Layout: One level  Home Access: Stairs to enter without rails  Entrance Stairs - Number of Steps: 2 CRISTIAN  Bathroom Shower/Tub: Walk-in shower, Doors, Shower chair with back  Bathroom Toilet: Standard  Bathroom Equipment: Grab bars in shower, Toilet raiser, Shower chair  Bathroom Accessibility: Accessible  Home Equipment: Walker, rolling, Cane, Rollator  Has the patient had two or more falls in the past year or any fall with injury in the past year?: No  ADL Assistance: Independent  Homemaking Assistance: Independent  Homemaking Responsibilities: Yes  Ambulation Assistance: Independent (using rollator)  Transfer Assistance: Independent  Active : Yes  Mode of Transportation: Car  IADL Comments: Pt reports sleeping in regular flat bed  Additional Comments: Pt reports wife will be home for two weeks to assist  Vision/Hearing  Vision  Vision: Impaired  Vision Exceptions: Wears glasses at all times  Hearing  Hearing: Exceptions to WFL  Hearing Exceptions: Hard of hearing/hearing concerns;Bilateral hearing aid    Cognition   Orientation  Overall Orientation Status: Within Functional Limits  Orientation Level: Oriented X4  Cognition  Overall Cognitive Status: [facility]     Objective   Heart Rate: 95  Heart Rate Source: Monitor  BP: (!) 120/59  BP Location: Left upper arm  Patient Position: Supine  MAP (Calculated): 79  Resp: 13  SpO2: 100 %  O2 Device: None (Room air)     Observation/Palpation  Observation: Peripheral IV L dorsal hand, ace wrap to LLE        AROM RLE (degrees)  RLE AROM: WFL  RLE General AROM: Swelling noted on Right foot  AROM LLE (degrees)  LLE General AROM: PROM L knee 9 degrees to 70 degrees, swelling at foot noted-per spouse , pt has swelling B feet at baseline.  Strength RLE  Comment: 4/5     Sensation  Overall Sensation Status: WFL     Bed mobility  Supine to Sit: Minimal assistance;2 Person assistance  Sit to Supine: Unable to assess (Pt left up in chair)  Scooting: Stand by assistance  Bed Mobility Comments: HOB slightly elevated, use of hand rails. Min A to bring BLE out of bed  Transfers  Sit to Stand: 2 Person  Assistance; Moderate Assistance  Stand to Sit: Moderate Assistance  Bed to Chair: Moderate assistance;2 Person Assistance (RW)  Comment: Decreased eccentric controll during stand >sit. Ambulation  WB Status: WBAT L LE  Ambulation  Surface: Level tile  Device: Rolling Walker  Assistance: Minimal assistance (2nd person clsoe by SBA for safety)  Quality of Gait: Decreased WB tolerance to L LE, step to gati pattern initailly, slight improved pattern for step through with cues, although smaller step length on R LE. Gait Deviations: Decreased step length;Decreased step height  Distance: 30 ft  Comments: Pt stood at the toilet to urinate, hank to stand at [facility] while perform hand washing. Balance  Posture: Fair  Sitting - Static: Good  Sitting - Dynamic: Fair  Standing - Static: Fair  Standing - Dynamic: Fair;-  Comments: Standing with rolling walker  Exercise Treatment: Reviewed quad sets, ankle pumps, seated heel slides  and LAQ's        AM-PAC Score  AM-PAC Inpatient Mobility Raw Score : 9 (12/20/22 1752)  AM-PAC Inpatient T-Scale Score : 30.55 (12/20/22 1752)  Mobility Inpatient CMS 0-100% Score: 81.38 (12/20/22 1752)  Mobility Inpatient CMS G-Code Modifier : CM (12/20/22 1752)          Tinneti Score       Goals  Short Term Goals  Time Frame for Short Term Goals: 5 visits  Short Term Goal 1: Pt able to perform supine<>sit at SBA  Short Term Goal 2: Pt able to perform sit<>stand at CGA/SBA  Short Term Goal 3: Ambulate with rolling walker distance of 100 ft x 2, SBA/CGA  Short Term Goal 4: Pt able to perform 6\" steps X 2 with B UE support, min A x 1  Short Term Goal 5: Improve L knee PROM 7 to 80 degrees for ease for transfers/stairs. Short Term Goal 6: Pt to demonstrte fair  technique to conitnue HEP at Home. Patient Goals   Patient Goals : Get better, move better befor going home. Education  Patient Education  Education Given To: Patient; Family (Spouse)  Education Provided: Role of Therapy;Plan of Care;Precautions;Transfer Training;Fall Prevention Strategies  Education Provided Comments: Safe transfers, mobility with RW, importance ot TKA ex's  Education Method: Demonstration;Verbal  Education Outcome: Continued education needed;Verbalized understanding      Therapy Time   Individual Concurrent Group Co-treatment   Time In 1617         Time Out 1650         Minutes 33                 Genevieve Dorsey, PT

## 2022-12-20 NOTE — DISCHARGE INSTR - COC
Continuity of Care Form    Patient Name: Sekou Anderson   :  1940  MRN:  [id number]    Admit date:  2022  Discharge date:  22    Code Status Order: Full Code   Advance Directives:   [facility] Documentation       Date/Time Healthcare Directive Type of Healthcare Directive Copy in [address] Agent's Name Healthcare Agent's Phone Number    22 1028 No, patient does not have an advance directive for healthcare treatment  pt declines -- -- -- -- --            Admitting Physician:  Carrillo Reaves MD  PCP: Shi Mendez MD    Discharging Nurse: [facility] Unit/Room#: 43 [facility]  Discharging Unit Phone Number: [phone]    Emergency Contact:   Extended Emergency Contact Information  Primary Emergency Contact: KaylahLeonidgiovany  Address: [address] [place] [address], [facility] [place] Mor[address] [facility] Phone: [phone]  Mobile Phone: [phone]  Relation: Spouse    Past Surgical History:  Past Surgical History:   Procedure Laterality Date    BACK SURGERY  2005    CATARACT EXTRACTION Left     COLONOSCOPY      EYE SURGERY      cataract    FOOT SURGERY Right     toenail removal great toe    HERNIA REPAIR      age 12- right inguinal    LUMBAR LAMINECTOMY  2022    with fusion    NECK SURGERY      not spine- hair follicle    TONSILLECTOMY      TOTAL KNEE ARTHROPLASTY Right 08/15/2022    RIGHT KNEE TOTAL ARTHROPLASTY WITH BIOMET AND GPS performed by Carrillo Reaves MD at [address] [place] [address].       Immunization History:   Immunization History   Administered Date(s) Administered    COVID-19, MODERNA BLUE border, Primary or Immunocompromised, (age 12y+), IM, 100 mcg/0.5mL 2021, 2021, 2021    Influenza, FLUAD, (age 72 y+), Adjuvanted, 0.5mL 2020, 10/14/2022    Influenza, FLUARIX, FLULAVAL, FLUZONE (age 10 mo+) AND AFLURIA, (age 1 y+), PF, 0.5mL 2020    Influenza, High Dose (Fluzone 65 yrs and older) 10/19/2017, 11/10/2018, 08/07/2019    Influenza, Triv, inactivated, subunit, adjuvanted, IM (Fluad 65 yrs and older) 11/10/2018, 08/07/2019    Pneumococcal Conjugate 13-valent ([id number]) 02/27/2018    Pneumococcal Polysaccharide ([id number]) 06/17/2019    Zoster Recombinant (Shingrix) 02/27/2018, 04/30/2018, 07/27/2021, 10/03/2021       Active Problems:  Patient Active Problem List   Diagnosis Code    Hypertension, essential I10    Renal insufficiency N28.9    Fatigue R53.83    Primary osteoarthritis of both knees M17.0    On potassium wasting diuretic therapy Z79.899    Edema R60.9    Bloating R14.0    Arthritis of right knee M17.11    JEMAL on CPAP G47.33, Z99.89    Morbid obesity with BMI of 40.0-44.9, adult ([facility]) E66.01, Z68.41    Lumbar radiculopathy M54.16    Depression F32. A    Positive depression screening Z13.31    Primary osteoarthritis of left knee M17.12    Prediabetes R73.03    Medicare annual wellness visit, subsequent Z00.00    Acquired trigger finger M65.30    Vitamin D deficiency E55.9    Primary osteoarthritis of right hip M16.11    Other constipation K59.09    Black stool K92.1    Trapezius muscle spasm M62.838    Thoracic spine pain M54.6    Cervical stenosis of spinal canal M48.02    DDD (degenerative disc disease), cervical M50.30    Vitamin B12 deficiency E53.8    Numbness R20.0    Localized edema R60.0    Osteoarthritis of spine with radiculopathy, lumbar region M47.26    Spondylosis of thoracic region without myelopathy or radiculopathy M47.814    Ingrown toenail L60.0    Primary osteoarthritis of left hip M16.12    Stage 3b chronic kidney disease (HCC) N18.32    Other chest pain R07.89    Peripheral vascular disease, unspecified ([facility]) I73.9    Cold intolerance R68.89    Subclinical hypothyroidism E03.8    Status post lumbar laminectomy Z98.890    Normocytic hypochromic anemia D50.9    Primary osteoarthritis of right knee M17.11    Hypokalemia E87.6    Pelvic pain R10.2    Left hip pain M25.552    Preoperative clearance Z01.818    Normochromic normocytic anemia D64.9       Isolation/Infection:   Isolation            No Isolation          Patient Infection Status       None to display            Nurse Assessment:  Last Vital Signs: BP (!) 128/46   Pulse 85   Temp 97.3 °F (36.3 °C) (Infrared)   Resp 16   Ht 5' 9\" (1.753 m)   Wt 285 lb (129.3 kg)   SpO2 98%   BMI 42.09 kg/m²     Last documented pain score (0-10 scale): Pain Level: 0  Last Weight:   Wt Readings from Last 1 Encounters:   12/20/22 285 lb (129.3 kg)     Mental Status:  oriented and alert    IV Access:  - None    Nursing Mobility/ADLs:  Walking   Assisted  Transfer  Assisted  Bathing  Assisted  Dressing  Assisted  Toileting  Assisted  Feeding  Independent  Med Admin  Independent  Med Delivery   whole    Wound Care Documentation and Therapy:        Elimination:  Continence: Bowel: Yes  Bladder: Yes  Urinary Catheter: None   Colostomy/Ileostomy/Ileal Conduit: No       Date of Last BM:   No intake or output data in the 24 hours ending 12/20/22 1204  No intake/output data recorded. Safety Concerns: At Risk for Falls    Impairments/Disabilities:      Vision    Nutrition Therapy:  Current Nutrition Therapy:   - Oral Diet:  General    Routes of Feeding: Oral  Liquids: No Restrictions  Daily Fluid Restriction: no  Last Modified Barium Swallow with Video (Video Swallowing Test): not done    Treatments at the Time of Hospital Discharge:   Respiratory Treatments: none  Oxygen Therapy:  is not on home oxygen therapy. Ventilator:    - No ventilator support    Rehab Therapies: Physical Therapy and Occupational Therapy  Weight Bearing Status/Restrictions: No weight bearing restrictions  Other Medical Equipment (for information only, NOT a DME order):  walker  Other Treatments: Skilled Nursing assessment and monitoring. Medication education and monitoring per protocol.       TOTAL JOINT REPLACEMENT PATIENT HOME HEALTH CARE PROTOCOL  This patient is a post-operative total joint replacement patient. Expectations for this patients care are as follows:      Goals:  DailyTherapy for rehabilitative purposes for two weeks. Increased level of activity and ambulation each day. Well-controlled pain. Free from infection. Encourage patient to provide self-care when possible. Ambulation with a rolling walker. Activity & Diet:  Therapy performed daily. (Instruct patient to take pain meds. 1 hour prior to therapy.)  Up in chair for all meals and majority of day. Range of motion for TKA patients. Hip precautions for JACOBY patients. Incentive Spirometer: 3- 4 inhalations every twenty minutes, during the day, while awake, the first week home after discharge  Increase oral intake of fruits, fiber and water and take a daily stool softener to prevent constipation. Consider stronger bowel protocol if no bowel movement is achieved after three days home. Increase protein intake and reduce sugar intake to promote healing and prevent infection. No pillow under the knee for TKA patients. [facility] go on in the a.m. and come off in the p.m. (Hand wash them every two or three days, roll in towel to remove most of moisture, and hang to dry.)    Incision Care: If patient has ACE bandage it may be removed POD #2  Keep incisional dressing intact until seen and removed by surgeon, unless saturated, in which case, call surgeon and request instructions. If dressing falls off, call surgeon. If using the Prevena dressing, with battery pack, place battery pack in waterproof bag during shower. If using Aquacel dressing then dressing is waterproof and patient may shower. If patient has a Prevena remove on POD #5 and replace with Aquacel dressing (patient was provided with dressing in hospital)  Elevated the leg and ice the affected area four times a day, for twenty minutes each time and after every physical therapy session.     Normal Conditions - Will improve with provided comfort measures and time:  Some swelling in the operative leg is normal - this should reduce over time. Some post-operative pain is normal.  This will improve with prescribed medication, provided comfort measures and time. Constipation related to pain medications & decreased mobility is a common occurrence. (Increase your fiber & water intake and take a stool softener.)   Slight warmth of operative site is normal and will diminish with time. Fatigue and moderate pain after therapy is normal and will improve with time. Numbness near the incision site is normal and will improve with time. NOTE: Ensure/Remind patient to go to their follow-up appointment with their orthopedic surgeon, which is scheduled: 1/5/2023 @2:20 pm    Abnormal Conditions - When to call the Surgeon:  Increasing/excessive redness, warmth or swelling at the incisional site not relieved with ice and elevation. Increasing/excessive pain not well-controlled by prescribed medications. Drainage or odor from or around the incision site.  (A little blood showing through the bandage is ok, but active leaking, of any color, coming out of the bandage is NOT normal.)  Temperature above 101 degrees. (A mild temp of 99 - 100 is normal - if temp gets to 101 you may use Tylenol once - if it does not improve, you may use a 2nd dose of Tylenol after 5 hours - if temperature is still at or above 101 degrees then call the surgeon.)  Calf tenderness, swelling, or redness or numbness of the foot/lower leg. Shortness of breath or chest pain. Any other incision or surgical-related concerns.   CALL SURGEON with concerns PRIOR TO sending patient to hospital.     Patient's personal belongings (please select all that are sent with patient):  Glasses    RN SIGNATURE:  Electronically signed by Leah Gallegos RN on 12/20/22 at 3:43 PM EST    CASE MANAGEMENT/SOCIAL WORK SECTION    Inpatient Status Date:     Readmission Risk Assessment Score:  Readmission Risk              Risk of Unplanned Readmission:  0           Discharging to Facility/ Agency   Name:   Address:  Phone:  Fax:    Dialysis Facility (if applicable)   Name:  Address:  Dialysis Schedule:  Phone:  Fax:    / signature: Electronically signed by Calderon De Anda RN on 12/20/22 at 3:43 PM EST    PHYSICIAN SECTION    Prognosis: Good    Condition at Discharge: Stable    Rehab Potential (if transferring to Rehab): Good    Recommended Labs or Other Treatments After Discharge:     Physician Certification: I certify the above information and transfer of Sekou Anderson  is necessary for the continuing treatment of the diagnosis listed and that he requires Home Care for less 30 days.      Update Admission H&P: No change in H&P    PHYSICIAN SIGNATURE:  Electronically signed by Carrillo Reaves MD on 12/20/22 at 12:04 PM EST

## 2022-12-20 NOTE — INTERVAL H&P NOTE
Update History & Physical     The patient's History and Physical of 12-6-22 was reviewed with the patient. I personally examined the patient, I concur with above findings, there was no change EXCEPT:  Denies any recent falls/traumas. Physical exam was completed today and unchanged from source note except:  +2 pitting edema to b/l LE's (patient states swelling has been an issue for about a year). No erythema, warmth to b/l LE's, negative Meir's sign b/l. Partial dentures to upper and lower. Heart rhythm and rate remains regular and normal, all lung fields CTA as noted per source H&P.    NPO status: Patient states they have been NPO since before midnight. Medications taken TODAY (w/sip of water): Amlodipine. Blood thinners: None- states hasn't taken ASA in about a month. Personal or family hx of complications w/anesthesia: Denies. Nicotine status: Former. Denies personal hx of MRSA. Denies personal hx of blood clots. Denies current CP, palpitations, dizziness, SOB, URI s/s, GI issues, fever/chills. Review current vital signs per RN flow sheet. (Notation: Medications listed are not currently reconciled at the signing of this H&P note, to be reconciled in pre-op per RN)    Most recent lab work reviewed:  Lab Results   Component Value Date     11/29/2022    K 4.2 11/29/2022     11/29/2022    CO2 25 11/29/2022    BUN 14 11/29/2022    CREATININE 1.02 11/29/2022    GLUCOSE 98 11/29/2022    CALCIUM 9.0 11/29/2022    PROT 7.1 11/29/2022    LABALBU 3.7 11/29/2022    BILITOT 0.4 11/29/2022    ALKPHOS 77 11/29/2022    AST 17 11/29/2022    ALT 11 11/29/2022    LABGLOM >60 11/29/2022    GFRAA >60 08/02/2022       Lab Results   Component Value Date    WBC 4.0 11/29/2022    HGB 12.3 (L) 11/29/2022    HCT 39.2 (L) 11/29/2022    MCV 92.2 11/29/2022     11/29/2022       Surgical site was confirmed per myself and the patient.   Electronically signed by EMMA Marin CNP on 12/20/2022 at 10:14 AM

## 2022-12-20 NOTE — CARE COORDINATION
Continuity of Care Form    Patient Name: Brandon Chopra   :  1940  MRN:  [id number]    Admit date:  2022  Discharge date:  22    Code Status Order: Full Code   Advance Directives:   [address] [facility] Documentation       Date/Time Healthcare Directive Type of Healthcare Directive Copy in [address] [facility] [address] Agent's Name Healthcare Agent's Phone Number    22 1020 No, patient does not have an advance directive for healthcare treatment  pt declines -- -- -- -- --            Admitting Physician:  Lazarus Lax, MD  PCP: Nicole Saunders MD    Discharging Nurse: [facility] Unit/Room#: 43 [facility]  Discharging Unit Phone Number: [phone]    Emergency Contact:   Extended Emergency Contact Information  Primary Emergency Contact: Jessica Adrian  Address: [address] [place] [address] Stacey Ashley [address] Phone: [phone]  Mobile Phone: [phone]  Relation: Spouse    Past Surgical History:  Past Surgical History:   Procedure Laterality Date    BACK SURGERY  2005    CATARACT EXTRACTION Left     COLONOSCOPY      EYE SURGERY      cataract    FOOT SURGERY Right     toenail removal great toe    HERNIA REPAIR      age 12- right inguinal    LUMBAR LAMINECTOMY  2022    with fusion    NECK SURGERY      not spine- hair follicle    TONSILLECTOMY      TOTAL KNEE ARTHROPLASTY Right 08/15/2022    RIGHT KNEE TOTAL ARTHROPLASTY WITH BIOMET AND GPS performed by Lazarus Lax, MD at [address] [place] [address].       Immunization History:   Immunization History   Administered Date(s) Administered    COVID-19, MODERNA BLUE border, Primary or Immunocompromised, (age 12y+), IM, 100 mcg/0.5mL 2021, 2021, 2021    Influenza, FLUAD, (age 72 y+), Adjuvanted, 0.5mL 2020, 10/14/2022    Influenza, FLUARIX, FLULAVAL, FLUZONE (age 10 mo+) AND AFLURIA, (age 1 y+), PF, 0.5mL 2020    Influenza, High Dose (Fluzone 65 yrs and older) 10/19/2017, 11/10/2018, 08/07/2019    Influenza, Triv, inactivated, subunit, adjuvanted, IM (Fluad 65 yrs and older) 11/10/2018, 08/07/2019    Pneumococcal Conjugate 13-valent (Dogaujk40) 02/27/2018    Pneumococcal Polysaccharide (Kynspiihp43) 06/17/2019    Zoster Recombinant (Shingrix) 02/27/2018, 04/30/2018, 07/27/2021, 10/03/2021       Active Problems:  Patient Active Problem List   Diagnosis Code    Hypertension, essential I10    Renal insufficiency N28.9    Fatigue R53.83    Primary osteoarthritis of both knees M17.0    On potassium wasting diuretic therapy Z79.899    Edema R60.9    Bloating R14.0    Arthritis of right knee M17.11    JEMAL on CPAP G47.33, Z99.89    Morbid obesity with BMI of 40.0-44.9, adult ([facility]) E66.01, Z68.41    Lumbar radiculopathy M54.16    Depression F32. A    Positive depression screening Z13.31    Primary osteoarthritis of left knee M17.12    Prediabetes R73.03    Medicare annual wellness visit, subsequent Z00.00    Acquired trigger finger M65.30    Vitamin D deficiency E55.9    Primary osteoarthritis of right hip M16.11    Other constipation K59.09    Black stool K92.1    Trapezius muscle spasm M62.838    Thoracic spine pain M54.6    Cervical stenosis of spinal canal M48.02    DDD (degenerative disc disease), cervical M50.30    Vitamin B12 deficiency E53.8    Numbness R20.0    Localized edema R60.0    Osteoarthritis of spine with radiculopathy, lumbar region M47.26    Spondylosis of thoracic region without myelopathy or radiculopathy M47.814    Ingrown toenail L60.0    Primary osteoarthritis of left hip M16.12    Stage 3b chronic kidney disease (HCC) N18.32    Other chest pain R07.89    Peripheral vascular disease, unspecified ([facility]) I73.9    Cold intolerance R68.89    Subclinical hypothyroidism E03.8    Status post lumbar laminectomy Z98.890    Normocytic hypochromic anemia D50.9    Primary osteoarthritis of right knee M17.11    Hypokalemia E87.6    Pelvic pain R10.2    Left hip pain M25.552    Preoperative clearance Z01.818    Normochromic normocytic anemia D64.9       Isolation/Infection:   Isolation            No Isolation          Patient Infection Status       None to display            Nurse Assessment:  Last Vital Signs: BP (!) 128/46   Pulse 85   Temp 97.3 °F (36.3 °C) (Infrared)   Resp 16   Ht 5' 9\" (1.753 m)   Wt 285 lb (129.3 kg)   SpO2 98%   BMI 42.09 kg/m²     Last documented pain score (0-10 scale): Pain Level: 0  Last Weight:   Wt Readings from Last 1 Encounters:   12/20/22 285 lb (129.3 kg)     Mental Status:  oriented and alert    IV Access:  - None    Nursing Mobility/ADLs:  Walking   Assisted  Transfer  Assisted  Bathing  Assisted  Dressing  Assisted  Toileting  Assisted  Feeding  Independent  Med Admin  Independent  Med Delivery   whole    Wound Care Documentation and Therapy:        Elimination:  Continence: Bowel: Yes  Bladder: Yes  Urinary Catheter: None   Colostomy/Ileostomy/Ileal Conduit: No       Date of Last BM:   No intake or output data in the 24 hours ending 12/20/22 1204  No intake/output data recorded. Safety Concerns: At Risk for Falls    Impairments/Disabilities:      Vision    Nutrition Therapy:  Current Nutrition Therapy:   - Oral Diet:  General    Routes of Feeding: Oral  Liquids: No Restrictions  Daily Fluid Restriction: no  Last Modified Barium Swallow with Video (Video Swallowing Test): not done    Treatments at the Time of Hospital Discharge:   Respiratory Treatments: none  Oxygen Therapy:  is not on home oxygen therapy. Ventilator:    - No ventilator support    Rehab Therapies: Physical Therapy and Occupational Therapy  Weight Bearing Status/Restrictions: No weight bearing restrictions  Other Medical Equipment (for information only, NOT a DME order):  walker  Other Treatments: Skilled Nursing assessment and monitoring. Medication education and monitoring per protocol.       TOTAL JOINT REPLACEMENT PATIENT HOME HEALTH CARE PROTOCOL  This patient is a post-operative total joint replacement patient. Expectations for this patients care are as follows:      Goals:  DailyTherapy for rehabilitative purposes for two weeks. Increased level of activity and ambulation each day. Well-controlled pain. Free from infection. Encourage patient to provide self-care when possible. Ambulation with a rolling walker. Activity & Diet:  Therapy performed daily. (Instruct patient to take pain meds. 1 hour prior to therapy.)  Up in chair for all meals and majority of day. Range of motion for TKA patients. Hip precautions for JACOBY patients. Incentive Spirometer: 3- 4 inhalations every twenty minutes, during the day, while awake, the first week home after discharge  Increase oral intake of fruits, fiber and water and take a daily stool softener to prevent constipation. Consider stronger bowel protocol if no bowel movement is achieved after three days home. Increase protein intake and reduce sugar intake to promote healing and prevent infection. No pillow under the knee for TKA patients. [facility] go on in the a.m. and come off in the p.m. (Hand wash them every two or three days, roll in towel to remove most of moisture, and hang to dry.)    Incision Care: If patient has ACE bandage it may be removed POD #2  Keep incisional dressing intact until seen and removed by surgeon, unless saturated, in which case, call surgeon and request instructions. If dressing falls off, call surgeon. If using the Prevena dressing, with battery pack, place battery pack in waterproof bag during shower. If using Aquacel dressing then dressing is waterproof and patient may shower. If patient has a Prevena remove on POD #5 and replace with Aquacel dressing (patient was provided with dressing in hospital)  Elevated the leg and ice the affected area four times a day, for twenty minutes each time and after every physical therapy session.     Normal Conditions - Will improve with provided comfort measures and time:  Some swelling in the operative leg is normal - this should reduce over time. Some post-operative pain is normal.  This will improve with prescribed medication, provided comfort measures and time. Constipation related to pain medications & decreased mobility is a common occurrence. (Increase your fiber & water intake and take a stool softener.)   Slight warmth of operative site is normal and will diminish with time. Fatigue and moderate pain after therapy is normal and will improve with time. Numbness near the incision site is normal and will improve with time. NOTE: Ensure/Remind patient to go to their follow-up appointment with their orthopedic surgeon, which is scheduled: 1/5/2023 @2:20 pm    Abnormal Conditions - When to call the Surgeon:  Increasing/excessive redness, warmth or swelling at the incisional site not relieved with ice and elevation. Increasing/excessive pain not well-controlled by prescribed medications. Drainage or odor from or around the incision site.  (A little blood showing through the bandage is ok, but active leaking, of any color, coming out of the bandage is NOT normal.)  Temperature above 101 degrees. (A mild temp of 99 - 100 is normal - if temp gets to 101 you may use Tylenol once - if it does not improve, you may use a 2nd dose of Tylenol after 5 hours - if temperature is still at or above 101 degrees then call the surgeon.)  Calf tenderness, swelling, or redness or numbness of the foot/lower leg. Shortness of breath or chest pain. Any other incision or surgical-related concerns.   CALL SURGEON with concerns PRIOR TO sending patient to hospital.     Patient's personal belongings (please select all that are sent with patient):  Glasses    RN SIGNATURE:  Electronically signed by Jeremiah Zimmer RN on 12/20/22 at 3:43 PM EST    CASE MANAGEMENT/SOCIAL WORK SECTION    Inpatient Status Date:     Readmission Risk Assessment Score:  Readmission Risk              Risk of Unplanned Readmission:  0           Discharging to Facility/ Agency   Name:   Address:  Phone:  Fax:    Dialysis Facility (if applicable)   Name:  Address:  Dialysis Schedule:  Phone:  Fax:    / signature: Electronically signed by Jeremiah Zimmer RN on 12/20/22 at 3:43 PM EST    PHYSICIAN SECTION    Prognosis: Good    Condition at Discharge: Stable    Rehab Potential (if transferring to Rehab): Good    Recommended Labs or Other Treatments After Discharge:     Physician Certification: I certify the above information and transfer of Awilda Freeman  is necessary for the continuing treatment of the diagnosis listed and that he requires Home Care for less 30 days. Update Admission H&P: No change in H&P    PHYSICIAN SIGNATURE:  Electronically signed by Jamel Winter MD on 12/20/22 at 12:04 PM EST      CHANGE how you take these medications    CHANGE how you take these medications   * oxyCODONE-acetaminophen 5-325 MG per tablet  Commonly known as: PERCOCET  Take 1 tablet by mouth every 4 hours as needed for Pain. What changed: Another medication with the same name was added. Make sure you understand how and when to take each. * oxyCODONE-acetaminophen 5-325 MG per tablet  Commonly known as: Percocet  Take 1-2 tablets by mouth every 4 hours as needed for Pain for up to 7 days. What changed: You were already taking a medication with the same name, and this prescription was added. Make sure you understand how and when to take each. very important  * This list has 2 medication(s) that are the same as other medications prescribed for you. Read the directions carefully, and ask your doctor or other care provider to review them with you.      CONTINUE taking these medications    CONTINUE taking these medications   acetaminophen 500 MG tablet  Commonly known as: TYLENOL  Take 500 mg by mouth every 6 hours as needed for Pain   amLODIPine 10 MG tablet  Commonly known as: NORVASC  Take 1 tablet by mouth daily   aspirin EC 81 MG EC tablet  Take 1 tablet by mouth in the morning and 1 tablet in the evening. DULoxetine 60 MG extended release capsule  Commonly known as: Cymbalta  Take 1 capsule by mouth daily   furosemide 40 MG tablet  Commonly known as: Lasix  Take 1 tablet by mouth in the morning. Handicap Placard Misc  by Does not apply route Dx: Knee OA, Lumbar spine DDD     Duration: one year till 8/12/23   potassium chloride 20 MEQ extended release tablet  Commonly known as: KLOR-CON M  Take 2 tablets by mouth in the morning.    vitamin D3 125 MCG (5000 UT) Caps  Take 1 capsule by mouth Daily     ASK your doctor about these medications    ASK your doctor about these medications   chlorthalidone 50 MG tablet  Commonly known as: HYGROTEN  Take 1 tablet by mouth daily

## 2022-12-20 NOTE — ANESTHESIA POSTPROCEDURE EVALUATION
POST- ANESTHESIA EVALUATION       Pt Name: Claudia Robles  MRN: [id number]  YOB: 1940  Date of evaluation: 12/20/2022  Time:  2:52 PM      BP (!) 116/57   Pulse 91   Temp 97.3 °F (36.3 °C) (Infrared)   Resp 16   Ht 5' 9\" (1.753 m)   Wt 285 lb (129.3 kg)   SpO2 98%   BMI 42.09 kg/m²      Consciousness Level  Awake  Cardiopulmonary Status  Stable  Pain Adequately Treated YES  Nausea / Vomiting  NO  Adequate Hydration  YES  Anesthesia Related Complications NONE      Electronically signed by Wang Fairbanks MD on 12/20/2022 at 2:52 PM       Department of Anesthesiology  Postprocedure Note    Patient: Claudia Robles  MRN: [id number]  YOB: 1940  Date of evaluation: 12/20/2022      Procedure Summary     Date: 12/20/22 Room / Location: [address] [place] [address] 03 / [address]  [facility]     Anesthesia Start: 1209 Anesthesia Stop: 9083    Procedure: KNEE TOTAL ARTHROPLASTY (Left: Knee) Diagnosis:       Osteoarthritis of left knee, unspecified osteoarthritis type      (DJD LEFT KNEE)    Surgeons: Fabian Berrios MD Responsible Provider: Wang Fairbanks MD    Anesthesia Type: general ASA Status: 3          Anesthesia Type: No value filed.     Shawnee Phase I: Shawnee Score: 8    Shawnee Phase II:        Anesthesia Post Evaluation

## 2022-12-20 NOTE — ANESTHESIA PROCEDURE NOTES
Peripheral Block    Patient location during procedure: PACU  Reason for block: post-op pain management and at surgeon's request  Start time: 12/20/2022 2:34 PM  End time: 12/20/2022 2:44 PM  Staffing  Performed: anesthesiologist   Anesthesiologist: Shantelle Frazier MD  Preanesthetic Checklist  Completed: patient identified, IV checked, site marked, risks and benefits discussed, surgical/procedural consents, equipment checked, pre-op evaluation, timeout performed, anesthesia consent given, oxygen available, monitors applied/VS acknowledged, fire risk safety assessment completed and verbalized and blood product R/B/A discussed and consented  Peripheral Block   Patient position: supine  Prep: ChloraPrep  Provider prep: mask and sterile gloves  Patient monitoring: cardiac monitor, continuous pulse ox, continuous capnometry, frequent blood pressure checks, IV access, oxygen and responsive to questions  Block type: Femoral  Adductor canal  Laterality: left  Injection technique: single-shot  Guidance: ultrasound guided  Local infiltration: lidocaine  Infiltration strength: 1 %  Local infiltration: lidocaine  Dose: 5 mL    Needle   Needle type: pencil-tip   Needle gauge: 22 G  Needle localization: ultrasound guidance  Test dose: negative  Needle length: 10 cm  Assessment   Injection assessment: negative aspiration for heme, no paresthesia on injection, local visualized surrounding nerve on ultrasound, no intravascular symptoms and low pressure verified by pressure monitor  Paresthesia pain: none  Slow fractionated injection: yes  Hemodynamics: stableno    Medications Administered  bupivacaine (PF) 0.5 % - Perineural   15 mL - 12/20/2022 2:34:00 PM  bupivacaine liposome 1.3 % - Perineural   10 mL - 12/20/2022 2:34:00 PM

## 2022-12-20 NOTE — CARE COORDINATION
Joint Replacement Discharge Planning Note:    Admission Date:  12/20/2022 Maria Elena Dunlap is a [de-identified] y.o.  male    Admitted for : Osteoarthritis of left knee, unspecified osteoarthritis type [M17.12]    Met with:  Patient    PCP:  Mor Castaneda MD              Insurance:  Medicare    Current Residence/ Living Arrangements:  independently at home             Current Services PTA:  Yes    Is patient agreeable to [address]: Yes    Is patient agreeable to outpatient physical therapy:  Yes    Freedom of choice provided: Yes         [address] Agency/Outpatient Therapy chosen:  [address] needed: No    Current home DME:  walker    Pharmacy:  [facility] on [address]    Does Patient want to use MEDS to BEDS?([facility] only) Yes    Transportation Provider:  Family                       Discharge Plan:   Patient intends to discharge to Home    Patient does not need a wheeled walker.      Anticipated discharge date 12/20/22      Readmission Risk              Risk of Unplanned Readmission:  0           Electronically signed by: Jocelynn Panchal RN on 12/20/2022 at 3:39 PM

## 2022-12-20 NOTE — DISCHARGE INSTRUCTIONS
DISCHARGE INSTRUCTIONS  Caring for yourself after joint replacement surgery (Total Hip and Total Knee Replacement)    Activity and Therapy  Receive physical therapy three times per week. (Pain medication one hour prior to therapy)   Perform PT exercises on own when not receiving home or outpatient PT. Ideally exercises should be at least two times a day. Increase level of activity and ambulation each day. Perform deep breathing exercises daily. Patient provides self-care when possible. Work on Range of motion for Total knee patients. No pillow under the knee for Total knee patients. Elevate the surgical leg when seated. No driving until cleared by surgeon      Diet:  Increase oral intake of fruits, fiber and water to prevent constipation. Drink fluids frequently and take stool softeners to aid in bowel motility. Increase protein intake/reduce high-sugar intake to help promote healing and prevent infection. Incision Care:  Keep Aquacel or other dressing intact until seen and removed by surgeon, unless saturated, in which case, call surgeon and request instructions. If dressing falls off, call surgeon. [facility] on in the am and off in the pm to reduce swelling. Ice affected area four times a day, for twenty minutes. Pain Medications and Anticoagulant  You have been place on an anticoagulant to prevent blood clots. Take this medication exactly as prescribed. Be alert for signs of bleeding. Take care not to injure yourself. You have been provided pain medicine to control your pain. Do not take more narcotics than prescribed. You may begin weaning from narcotics as your pain level improves by decreasing the amount or frequency of the narcotics. You may also take plain acetaminophen as an alternate to the narcotics. Never exceed the recommended dosage. Ice, rest and elevating the surgical limb also help with pain control.       When to call the Surgeon:  Increased redness, warmth, drainage, swelling or odor from incision site. Temperature above 101 degrees. Pain not controlled by prescribed medications. Calf tenderness, swelling, or redness. Shortness of breath or chest pain. If you cannot urinate and have been consuming liquids  Any incision or surgical-related concerns. Call surgeon with concerns PRIOR TO going to hospital.    Normal Conditions:  Swelling in the operative leg: this should reduce over time. Bruising behind the knee and around surgical area. Some post-operative pain. Constipation related to pain medications/decreased mobility. (Increase fiber & water intake.)   Slight warmth of operative leg. Fatigue and moderate pain after therapy. Numbness near the incision site. Nausea - take pain medications with food. Cut back on pain medication.     NOTE: Remember to go to follow-up orthopedic appointment with surgeon

## 2022-12-20 NOTE — PROGRESS NOTES
CLINICAL PHARMACY NOTE: MEDS TO BEDS    Total # of Prescriptions Filled: 0   The following medications were delivered to the patient:  Oxycodone-acetaminophen 5-325    Additional Documentation:delivered 1 med to patient in room 4;40 12/20/22 KIERSTEN

## 2022-12-20 NOTE — PROGRESS NOTES
[address]    Occupational Therapy Evaluation  Date: 22  Patient Name: Farrukh Simth       Room: 7906/1084-78  MRN: [id number]  Account: [de-identified]   : 1940  ([de-identified] y.o.) Gender: male     Discharge Recommendations:  Further Occupational Therapy is recommended upon facility discharge. Referring Practitioner: Harvey Ferrer MD  Diagnosis: Primary osteoarthritis of L knee      Treatment Diagnosis: Impaired self-care status    Past Medical History:  has a past medical history of Abnormal EKG, Abnormal renal function, Allergic contact dermatitis due to other agents, Anemia, Cellulitis of buttock, Depression, Dysmetabolic syndrome, Dysuria, Edema, Elevated glucose, Erythrasma, Health care maintenance, Hypertension, Intertrigo, Leukopenia, Obesity, Osteoarthritis, Other constipation, Other specified counseling, Primary osteoarthritis of left knee, PVD (peripheral vascular disease) ([facility] [address] Renal insufficiency, Skin maceration, Sleep apnea, Trigger finger of right thumb, and Viral disease exposure. Past Surgical History:   has a past surgical history that includes back surgery (2005); Neck surgery; lumbar laminectomy (2022); Colonoscopy; Cataract extraction (Left); eye surgery; hernia repair; Tonsillectomy; Total knee arthroplasty (Right, 08/15/2022); Foot surgery (Right); and Total knee arthroplasty (Left, 2022). Restrictions  Restrictions/Precautions  Restrictions/Precautions: Weight Bearing; Fall Risk;Surgical Protocols; General Precautions; Up as Tolerated  Required Braces or Orthoses?: No  Implants present? : Metal implants (B TKA, spinal surgery)  Lower Extremity Weight Bearing Restrictions  Left Lower Extremity Weight Bearing: Weight Bearing As Tolerated  Position Activity Restriction  Other position/activity restrictions: OT/PT eval and treat      Vitals  Vitals  Heart Rate: 95  Heart Rate Source: Monitor  BP: (!) 120/59  Patient Position: Supine  MAP (Calculated): 79  Resp: 13  SpO2: 100 %  O2 Device: None (Room air)     Subjective  Comments: Okay for OT/PT per SUNNY George  Subjective  Pain: 5/10 pain in L knee      Social/Functional History  Social/Functional History  Lives With: Spouse  Type of Home: House  Home Layout: One level  Home Access: Stairs to enter without rails  Entrance Stairs - Number of Steps: 2 CRISTIAN  Bathroom Shower/Tub: Walk-in shower, Doors, Shower chair with back  [employer]: Standard  Bathroom Equipment: Grab bars in shower, Toilet raiser, Shower chair  Bathroom Accessibility: Accessible  Home Equipment: Lynnda Leventhal, rolling, [address]  Has the patient had two or more falls in the past year or any fall with injury in the past year?: No  ADL Assistance: [address]: Independent  Homemaking Responsibilities: Yes  Ambulation Assistance: Independent (using rollator)  Transfer Assistance: Independent  Active : Yes  Mode of Transportation: Car  IADL Comments: Pt reports sleeping in regular flat bed  Additional Comments: Pt reports wife will be home for two weeks to assist      Objective  Orientation  Overall Orientation Status: Within Functional Limits  Orientation Level: Oriented X4  Cognition  Overall Cognitive Status: WFL   Sensation  Overall Sensation Status: WFL    ADL  Feeding: Setup  Feeding Skilled Clinical Factors: Per pt report  Grooming: Stand by assistance  UE Bathing: Minimal assistance  LE Bathing: Moderate assistance  UE Dressing: Minimal assistance  LE Dressing: Moderate assistance  Toileting: Minimal assistance  Toileting Skilled Clinical Factors: Min A in stance to void at toilet  Additional Comments: ADL scores are based on skilled observation and clinical reasoning unless otherwise noted.  Pt currently limited by decreased strength, balance, activity tolerance, increased pain which impacts the pt's ability to safely and independently complete self-care and mobility    Gross Assessment  AROM: Within functional limits  Strength: Within functional limits  Coordination: Generally decreased, functional  Tone: Normal  Sensation: Intact    UE Function  LUE AROM (degrees)  LUE AROM : WFL  Left Hand AROM (degrees)  Left Hand AROM: WFL  Tone LUE  LUE Tone: Normotonic  LUE Strength  Gross LUE Strength: WFL  L Hand General: 4/5  LUE Strength Comment: Grossly 4/5    RUE AROM (degrees)  RUE AROM : WFL  Right Hand AROM (degrees)  Right Hand AROM: WFL  Tone RUE  RUE Tone: Normotonic  RUE Strength  Gross RUE Strength: WFL  R Hand General: 4/5  RUE Strength Comment: Grossly 4/5    Fine Motor Skills/Coordination  Coordination  Movements Are Fluid And Coordinated: No  Coordination and Movement Description: Decreased speed, Decreased accuracy, Right UE, Left UE, Fine motor impairments     Bed Mobility  Bed mobility  Supine to Sit: Minimal assistance, 2 Person assistance  Sit to Supine: Unable to assess (Pt left up in chair)  Scooting: Stand by assistance  Bed Mobility Comments: HOB slightly elevated, use of hand rails. Min A to bring BLE out of bed    Balance  Balance  Sitting Balance: Stand by assistance  Standing Balance: Contact guard assistance  Standing Balance  Time: 3-4 minutes  Activity: Functional transfers, functional mobility  Comment: BUE support on RW    Transfers  Transfers  Sit to stand:  Moderate assistance, 2 Person assistance  Stand to sit: Moderate assistance, 2 Person assistance    Functional Mobility  Functional - Mobility Device: Rolling Walker  Activity: To/from bathroom  Assist Level: Minimal assistance  Functional Mobility Comments: BUE support on RW    Assessment  Assessment  Performance deficits / Impairments: Decreased functional mobility , Decreased ADL status, Decreased ROM, Decreased strength, Decreased endurance, Decreased balance, Decreased high-level IADLs, Decreased fine motor control, Decreased coordination  Treatment Diagnosis: Impaired self-care status  Prognosis: Good  Decision Making: Medium Complexity  Discharge Recommendations: Patient would benefit from continued therapy after discharge    Activity Tolerance  Activity Tolerance: Patient limited by pain, Patient Tolerated treatment well    Safety Devices  Type of Devices:  All fall risk precautions in place, Call light within reach, Gait belt, Left in chair, Nurse notified    Patient Education  Patient Education  Education Given To: Patient, Family (Wife)  Education Provided: Role of Therapy, Plan of Care, Home Exercise Program, Precautions, ADL Adaptive Strategies, Transfer Training  Education Method: Demonstration, Verbal  Barriers to Learning: Hearing  Education Outcome: Verbalized understanding, Continued education needed      Functional Outcome Measures  AM-PAC Daily Activity Inpatient   How much help for putting on and taking off regular lower body clothing?: A Lot  How much help for Bathing?: A Lot  How much help for Toileting?: A Little  How much help for putting on and taking off regular upper body clothing?: A Little  How much help for taking care of personal grooming?: A Little  How much help for eating meals?: A Little  [facility] Daily Activity Raw Score: 16  AM-PAC Inpatient ADL T-Scale Score : 35.96  ADL Inpatient CMS 0-100% Score: 53.32  ADL Inpatient CMS G-Code Modifier : CK       Goals     Short Term Goals  Time Frame for Short Term Goals: By discharge  Short Term Goal 1: Pt will perform UB ADLs with Mod I and LB ADLs with SBA, good safety, and use of AE/DME/Modified techniques as needed  Short Term Goal 2: Pt will perform functional transfers/mobility with SBA, good safety, and use of least restrictive device  Short Term Goal 3: Pt will actively participate in 15+ minutes of therapeutic exercise/functional activity to improve safety and independence with self-care and mobility  Short Term Goal 4: Pt will be educated on and explore use of AE/DME/Modified techniques to improve safety and independence with self-care  Short Term Goal 5: Pt will tolerate standing for 8+ minutes, SBA, with 0-1 UE support and no LOB during self-care/functional activity  Additional Goals?: Yes  Short Term Goal 6: Pt will verbalize/demonstrate good understanding of home safety/fall prevention techniques to improve safety and indepenence with self-care    Plan  Occupational Therapy Plan  Times Per Week: 5-7 (1-2x/day)  Current Treatment Recommendations: Strengthening, ROM, Balance training, Functional mobility training, Endurance training, Pain management, Safety education & training, Patient/Caregiver education & training, Equipment evaluation, education, & procurement, Positioning, Self-Care / ADL, Home management training, Coordination training      OT Individual Minutes  OT Individual Minutes  Time In: 1022  Time Out: 1282  Minutes: 33  Time Code Minutes   Timed Code Treatment Minutes: 20 Minutes    Electronically signed by ARLENE Aceves on 12/20/22 at 5:31 PM EST

## 2022-12-20 NOTE — FLOWSHEET NOTE
Patient admitted to room 2042 per bed from PACU.  VS, assessment, orientation to the room and call system completed. Plan for the day and orders reviewed with the patient and his wife.

## 2022-12-21 VITALS
RESPIRATION RATE: 18 BRPM | SYSTOLIC BLOOD PRESSURE: 120 MMHG | BODY MASS INDEX: 42.21 KG/M2 | WEIGHT: 285 LBS | HEART RATE: 85 BPM | DIASTOLIC BLOOD PRESSURE: 60 MMHG | OXYGEN SATURATION: 98 % | TEMPERATURE: 98.1 F | HEIGHT: 69 IN

## 2022-12-21 PROCEDURE — 97116 GAIT TRAINING THERAPY: CPT

## 2022-12-21 PROCEDURE — 2580000003 HC RX 258: Performed by: ORTHOPAEDIC SURGERY

## 2022-12-21 PROCEDURE — G0378 HOSPITAL OBSERVATION PER HR: HCPCS

## 2022-12-21 PROCEDURE — 6370000000 HC RX 637 (ALT 250 FOR IP): Performed by: ORTHOPAEDIC SURGERY

## 2022-12-21 PROCEDURE — 97110 THERAPEUTIC EXERCISES: CPT

## 2022-12-21 PROCEDURE — 6360000002 HC RX W HCPCS: Performed by: ORTHOPAEDIC SURGERY

## 2022-12-21 PROCEDURE — 97530 THERAPEUTIC ACTIVITIES: CPT

## 2022-12-21 RX ADMIN — SODIUM CHLORIDE, POTASSIUM CHLORIDE, SODIUM LACTATE AND CALCIUM CHLORIDE: 600; 310; 30; 20 INJECTION, SOLUTION INTRAVENOUS at 06:12

## 2022-12-21 RX ADMIN — SODIUM CHLORIDE, PRESERVATIVE FREE 10 ML: 5 INJECTION INTRAVENOUS at 08:29

## 2022-12-21 RX ADMIN — ACETAMINOPHEN 650 MG: 325 TABLET ORAL at 13:40

## 2022-12-21 RX ADMIN — Medication 3000 MG: at 04:25

## 2022-12-21 RX ADMIN — ACETAMINOPHEN 650 MG: 325 TABLET ORAL at 04:22

## 2022-12-21 RX ADMIN — ASPIRIN 81 MG: 81 TABLET, COATED ORAL at 08:29

## 2022-12-21 ASSESSMENT — PAIN DESCRIPTION - LOCATION
LOCATION: KNEE
LOCATION: KNEE

## 2022-12-21 ASSESSMENT — PAIN SCALES - GENERAL
PAINLEVEL_OUTOF10: 3
PAINLEVEL_OUTOF10: 3

## 2022-12-21 ASSESSMENT — PAIN DESCRIPTION - ORIENTATION: ORIENTATION: LEFT

## 2022-12-21 NOTE — ANESTHESIA POST-OP
Patient doing well  Pain 3/10 tolerable  Will be discharged this afternoon  No post op anesthesia complications

## 2022-12-21 NOTE — CARE COORDINATION
Discharge paperwork went over with patient. All questions and concerns answered. Pt waiting on wife for ride home.

## 2022-12-21 NOTE — PROGRESS NOTES
Physical Therapy  Facility/Department: [facility] MED SURG  Daily Treatment Note  NAME: Ginette Milan  : 1940  MRN: [id number]    Date of Service: 2022    Discharge Recommendations:  Therapy recommended at discharge, Patient would benefit from continued therapy after discharge        Patient Diagnosis(es): The encounter diagnosis was Primary osteoarthritis of left knee. Assessment   Activity Tolerance: Patient limited by pain; Patient tolerated treatment well     Plan    Physcial Therapy Plan  General Plan: 2 times a day 7 days a week (BID)  Current Treatment Recommendations: Strengthening;ROM;Balance training;Functional mobility training;Transfer training; Endurance training;Gait training;Stair training;Home exercise program;Safety education & training;Patient/Caregiver education & training;Positioning;Equipment evaluation, education, & procurement; Therapeutic activities     Restrictions  Restrictions/Precautions  Restrictions/Precautions: Weight Bearing, Fall Risk, Surgical Protocols, General Precautions, Up as Tolerated  Required Braces or Orthoses?: No  Implants present? : Metal implants (B TKA, spinal surgery)  Lower Extremity Weight Bearing Restrictions  Left Lower Extremity Weight Bearing: Weight Bearing As Tolerated  Position Activity Restriction  Other position/activity restrictions: OT/PT eval and treat     Subjective    Subjective  Subjective: Pt is sitting in chair eating breakfast at 0848 today, returned later at [phone] for PT tx.   Pain: 3/10 Left knee  Orientation  Overall Orientation Status: Within Functional Limits  Orientation Level: Oriented X4  Cognition  Overall Cognitive Status: WFL     Objective   Vitals     Bed Mobility Training  Bed Mobility Training: No (Sitting up in recliner at start / end of tx session.)  Balance  Sitting: Impaired  Sitting - Static: Good (unsupported)  Sitting - Dynamic: Fair (occasional) (posterior lean)  Standing: Impaired  Standing - Static: Fair  Standing - Dynamic: Fair  Transfer Training  Transfer Training: Yes  Overall Level of Assistance: Moderate assistance  Sit to Stand: Moderate assistance;Assist X1;Additional time; Adaptive equipment (pt uses lift chair at home)  Stand to Sit: Moderate assistance;Assist X1;Additional time; Adaptive equipment  Stand Pivot Transfers: Contact-guard assistance;Assist X1;Additional time  Gait Training  Gait Training: Yes  Gait  Overall Level of Assistance: Minimum assistance  Interventions: Verbal cues; Safety awareness training;Demonstration  Base of Support: Widened  Swing Pattern: Left asymmetrical;Right symmetrical  Stance: Right increased; Left decreased  Gait Abnormalities: Antalgic;Decreased step clearance;Circumduction;Path deviations;Trunk sway increased; Step to gait  Distance (ft): 80 Feet (80ft, 60ftx2, 40ftx2)  Assistive Device: Walker, rolling;Gait belt  Rail Use: Right  Stairs - Level of Assistance: Moderate assistance  Number of Stairs Trained: 3     PT Exercises  Exercise Treatment: Measured and don thigh high compression stockings with education on using EZ slide to assist.  A/AROM Exercises: Reclined in chair: ankle pumps, quad sets, heel slides x 15 reps; Seated LAQ's x 15, towel slides with 5 second hold x 6.  (AAROM left knee flexion 80 degrees)  Circulation/Endurance Exercises: HEP Handout for Supine/Seated/Standing total knee exercises and strengthing  Breathing Techniques: Breathing with exertion, pt has a tendency to hold breath  Disease-specific Exercises: Cryotherapy for 20 min on and recommend removal for 90 min.s         AM-PAC Mobility Inpatient   How much difficulty turning over in bed?: A Lot  How much difficulty sitting down on / standing up from a chair with arms?: A Lot  How much difficulty moving from lying on back to sitting on side of bed?: Unable  How much help from another person moving to and from a bed to a chair?: A Little  How much help from another person needed to walk in hospital room?: A Little  How much help from another person for climbing 3-5 steps with a railing?: A Lot  AM-PAC Inpatient Mobility Raw Score : 13  AM-PAC Inpatient T-Scale Score : 36.74  Mobility Inpatient CMS 0-100% Score: 64.91  Mobility Inpatient CMS G-Code Modifier : CL        Goals  Short Term Goals  Time Frame for Short Term Goals: 5 visits  Short Term Goal 1: Pt able to perform supine<>sit at SBA  Short Term Goal 2: Pt able to perform sit<>stand at CGA/SBA  Short Term Goal 3: Ambulate with rolling walker distance of 100 ft x 2, SBA/CGA  Short Term Goal 4: Pt able to perform 6\" steps X 2 with B UE support, min A x 1  Short Term Goal 5: Improve L knee PROM 7 to 80 degrees for ease for transfers/stairs. Short Term Goal 6: Pt to demonstrte fair  technique to conitnue HEP at Home. Patient Goals   Patient Goals : Get better, move better befor going home. Education  Patient Education  Education Given To: Patient; Family (youngest son)  Education Provided: Role of Therapy;Plan of Care;Precautions;Transfer Training; Fall Prevention Strategies; Family Education;Home Exercise Program  Education Provided Comments: Safe use of RW, safety use of stairs, safe transfers  Education Method: Demonstration;Verbal;Printed Information/Hand-outs  Barriers to Learning: None  Education Outcome: Continued education needed;Verbalized understanding;Demonstrated understanding    Therapy Time     12/21/22 1049 12/21/22 1333   PT Individual Minutes   Time In [address]   Time Out 1200 1350   Minutes [address] [place], [place]

## 2022-12-21 NOTE — DISCHARGE SUMMARY
Discharge Summary     Patient ID:  Richa Ward  [id number]  80 y.o.  1940    Admit date: 12/20/2022    Discharge date and time: 12/21/2022  2:19 PM     Admitting Physician: Sudhakar Monte MD     Admission Diagnoses: Osteoarthritis of left knee, unspecified osteoarthritis type [M17.12]  Primary osteoarthritis of left knee [M17.12]    Discharge Diagnoses: Same    Admission Condition: good    Discharged Condition: good    Indication for Admission: Status post left total knee    Hospital Course: No postoperative complications    Consults: none    Treatments: surgery and PT    Disposition: home    Patient Instructions:   Discharge Medication List as of 12/21/2022  1:12 PM        START taking these medications    Details   !! oxyCODONE-acetaminophen (PERCOCET) 5-325 MG per tablet Take 1-2 tablets by mouth every 4 hours as needed for Pain for up to 7 days. , Disp-42 tablet, R-0Print       !! - Potential duplicate medications found. Please discuss with provider. CONTINUE these medications which have NOT CHANGED    Details   acetaminophen (TYLENOL) 500 MG tablet Take 500 mg by mouth every 6 hours as needed for PainHistorical Med      !! oxyCODONE-acetaminophen (PERCOCET) 5-325 MG per tablet Take 1 tablet by mouth every 4 hours as needed for Pain. Historical Med      amLODIPine (NORVASC) 10 MG tablet Take 1 tablet by mouth daily, Disp-90 tablet, R-0Normal      Cholecalciferol (VITAMIN D3) 125 MCG (5000 UT) CAPS Take 1 capsule by mouth Daily, Disp-90 capsule, R-0Normal      DULoxetine (CYMBALTA) 60 MG extended release capsule Take 1 capsule by mouth daily, Disp-90 capsule, R-0Normal      chlorthalidone (HYGROTEN) 50 MG tablet Take 1 tablet by mouth daily, Disp-90 tablet, R-0Normal      aspirin EC 81 MG EC tablet Take 1 tablet by mouth in the morning and 1 tablet in the evening., Disp-90 tablet, R-1Normal      furosemide (LASIX) 40 MG tablet Take 1 tablet by mouth in the morning., Disp-90 tablet, R-0Normal potassium chloride (KLOR-CON M) 20 MEQ extended release tablet Take 2 tablets by mouth in the morning., Disp-180 tablet, R-0Normal      Handicap Placard [facility] Starting Fri 8/12/2022, Disp-1 each, R-0, PrintDx: Knee OA, Lumbar spine DDD  Duration: one year till 8/12/23       ! ! - Potential duplicate medications found. Please discuss with provider. Activity: activity as tolerated  Diet: regular diet  Wound Care: keep wound clean and dry    Follow-up with Dr. Adriane Krishna in 2 weeks.     Electronically signed by Linda Farmer MD on 12/21/2022 at 3:07 PM

## 2022-12-21 NOTE — CARE COORDINATION
ONGOING DISCHARGE PLAN:    Patient is alert and oriented x4. Spoke with patient regarding discharge plan and patient confirms that plan is still home with [facility]. Notified Khloe Alvarado from [facility] that pt will be d/c'ed home today. POD#1 left total knee arthroplasty. Will continue to follow for additional discharge needs.     Electronically signed by Shahab Kincaid RN on 12/21/2022 at 11:26 AM

## 2022-12-22 ENCOUNTER — CARE COORDINATION (OUTPATIENT)
Dept: CASE MANAGEMENT | Age: 82
End: 2022-12-22

## 2022-12-22 RX ORDER — ASPIRIN 81 MG/1
81 TABLET ORAL DAILY
Qty: 90 TABLET | Refills: 1 | Status: SHIPPED | OUTPATIENT
Start: 2022-12-22

## 2022-12-22 NOTE — TELEPHONE ENCOUNTER
Nani Rubinstein RN [facility] called as per patient's request and inquired if Dr. Joseph Chadwick would give a prescription for low dose aspirin. If so, please send to [facility] on [address]. S/P Left knee total arthroplasty 12/20/2022. Patient may be reached at [phone]. Thank you.

## 2022-12-22 NOTE — CARE COORDINATION
[facility] Care Transitions Initial Follow Up Call    Call within 2 business days of discharge: Yes    Care Transition Nurse contacted the patient by telephone to perform post hospital discharge assessment. Verified name and  with patient as identifiers. Provided introduction to self, and explanation of the Care Transition Nurse role. Patient: Moody Larios Patient : 1940   MRN: [id number]  Reason for Admission: Left total knee  Discharge Date: 22 RARS: Readmission Risk Score: 9.3      Last Discharge  Street       Date Complaint Diagnosis Description Type Department Provider    22  Primary osteoarthritis of left knee Admission (Discharged) Willis Serrano MD            Was this an external facility discharge? No Discharge Facility: [facility]    Challenges to be reviewed by the provider   Additional needs identified to be addressed with provider: No  none               Method of communication with provider: none. Spoke with patient who said he was feeling kind of \"rough\" today. He recently had replacement of his right knee, states this one is a little rougher than the last one he had. He rates his pain 3-4/10 today, is taking Percocet for pain. He has some swelling to his left knee, states no drainage or redness noted. Denies any f/c, n/v or other s/s of infection. He is passing gas, has not had bowel movement since surgery and is on bowel regimen. Discharge instructions reviewed, patient has post op appointment and PCP appointments scheduled, [facility] is following, nurse was in home at time of call. Denies any needs other than would like script for low dose ASA sent in to [facility] on [address]. TC to Dr. Clem Doyle office requesting script, spoke with Ramsey Contreras who will send message to provider. Care Transition Nurse reviewed discharge instructions with patient who verbalized understanding.  The patient was given an opportunity to ask questions and does not have any further questions or concerns at this time. Were discharge instructions available to patient? Yes. Reviewed appropriate site of care based on symptoms and resources available to patient including: PCP  Specialist. The patient agrees to contact the PCP office for questions related to their healthcare. Advance Care Planning:   Does patient have an Advance Directive: not on file; education provided. Medication reconciliation was performed with patient, who verbalizes understanding of administration of home medications. Medications reviewed, 1111F entered: N/A    Was patient discharged with a pulse oximeter? no    Non-face-to-face services provided:  Obtained and reviewed discharge summary and/or continuity of care documents    Offered patient enrollment in the Remote Patient Monitoring (RPM) program for in-home monitoring: Patient is not eligible for RPM program.    Care Transitions 24 Hour Call    Schedule Follow Up Appointment with PCP: Completed  Do you have a copy of your discharge instructions?: Yes  Do you have all of your prescriptions and are they filled?: No  Have you been contacted by a [facility] Pharmacist?: No  Have you scheduled your follow up appointment?: Yes  How are you going to get to your appointment?: Car - family or friend to transport  Do you feel like you have everything you need to keep you well at home?: Yes  Care Transitions Interventions         Follow Up  Future Appointments   Date Time Provider Nancy Johnson   1/3/2023 11:30 AM MD celian Fuchs  [facility]   1/5/2023  2:20 PM MD MICAH Rodriguez 99   1/18/2023  1:00 PM MD celina Fuchs  consu [facility]   10/27/2023 10:45 AM Ebenezer De Jesus MD Resp Spec [facility] 110 Transition Nurse provided contact information. No further follow-up call indicated based on severity of symptoms and risk factors.   Plan for next call:  Patient denies needs, states has everything needed at home, had recent knee replacement couple of months ago.      Ju Zaldivar, RN

## 2022-12-27 ENCOUNTER — TELEPHONE (OUTPATIENT)
Dept: ORTHOPEDIC SURGERY | Age: 82
End: 2022-12-27

## 2022-12-27 DIAGNOSIS — M79.662 PAIN OF LEFT CALF: Primary | ICD-10-CM

## 2022-12-27 NOTE — TELEPHONE ENCOUNTER
I spoke with the patient today regarding his left knee. He stated that he is still having pain and it it is not feeling any better. I advised him to continue to RICE. Do you have any suggestions for this patient?

## 2022-12-27 NOTE — TELEPHONE ENCOUNTER
Spoke with patient he has significant tenderness to the touch in the calf up to the thigh.  Patient tested positive for Covid today as well

## 2022-12-27 NOTE — TELEPHONE ENCOUNTER
Order in called scheduling and all locations is unable to get the patient in. Patient advised to go to ED preferably [facility]. Patient voiced understanding

## 2022-12-27 NOTE — TELEPHONE ENCOUNTER
Pt called in stating that his left knee has been very swollen and painful. Pt is having a hard time getting around.  Lt TKA 12/20/22  Please contact pt at [phone]

## 2022-12-28 PROBLEM — Z00.00 MEDICARE ANNUAL WELLNESS VISIT, SUBSEQUENT: Status: RESOLVED | Noted: 2019-06-11 | Resolved: 2022-12-28

## 2023-01-03 PROBLEM — E66.813 OBESITY, CLASS III, BMI 40-49.9 (MORBID OBESITY) (HCC): Status: ACTIVE | Noted: 2018-10-15

## 2023-01-05 ENCOUNTER — OFFICE VISIT (OUTPATIENT)
Dept: ORTHOPEDIC SURGERY | Age: 83
End: 2023-01-05

## 2023-01-05 DIAGNOSIS — Z96.652 STATUS POST LEFT KNEE REPLACEMENT: Primary | ICD-10-CM

## 2023-01-05 PROCEDURE — 99024 POSTOP FOLLOW-UP VISIT: CPT | Performed by: ORTHOPAEDIC SURGERY

## 2023-01-05 RX ORDER — CEFDINIR 300 MG/1
300 CAPSULE ORAL 2 TIMES DAILY
Qty: 20 CAPSULE | Refills: 0 | Status: SHIPPED | OUTPATIENT
Start: 2023-01-05 | End: 2023-01-15

## 2023-01-05 NOTE — PROGRESS NOTES
Mikayla Jackson M.D.            [address] [facility] [address] [facility], [address] [facility]           Dept Phone: [phone]           Dept Fax:  [phone]           [place]          Dept Phone: [phone]           Dept Fax:  [phone]      Chief Compliant:  Chief Complaint   Patient presents with    Post-Op Check     LT tka 12/20/22        History of Present Illness:  Patient returns today status post left TKA times 2 weeks. Patient has no major complaints patient is only complaint has to do with the swelling in his leg. He had a previous bout with cellulitis. He also had a negative Doppler he says his knee is actually not that bad be doing he is home therapy for now    Review of Systems   Constitutional: Negative for fever, chills, sweats, recent injury, recent illness  Neurological: Negative for Headaches, numbness, or weakness. Integumentary: Negative for rash, itching, ecchymosis, or wounds. Musculoskeletal: Positive for Post-Op Check (LT tka 12/20/22)       Physical Exam:  Constitutional: Patient is oriented to person, place, and time. Patient appears well-developed and well nourished. Musculoskeletal: Normal gait. Motion 0-100 degrees with expected pain with ROM. No Calf tenderness, Negative Meir's sign. Neurovascular intact. Neurological: Patient is alert and oriented to person, place, and time. Normal strenght. No sensory deficit. Skin: Skin is warm and dry. Incision is healing well without signs of redness or drainage  Nursing note and vitals reviewed. Labs and Imaging:     XR taken today:  XR KNEE LEFT (1-2 VIEWS)    Result Date: 1/5/2023  X-rays taken today reviewed by me show standing AP of both knees and a lateral of the left knee. Patient is status post recent left total knee arthroplasty. Femoral tibial components.   Be in excellent alignment position on both AP lateral views without any periprosthetic complications lateral view indicates the patella at appropriate height. Patient also had a previous right total knee arthroplasty for which there appears to be no obvious complication         Orders Placed This Encounter   Procedures    XR KNEE LEFT (1-2 VIEWS)     Standing Status:   Future     Number of Occurrences:   1     Standing Expiration Date:   1/5/2024       Assessment and Plan:  1. Status post left knee replacement        2 weeks status post left TKA        This is a [de-identified] y.o. male who presents to the clinic today status post left TKA. Remain on Omnicef 300 mg p.o. twice daily also initiate physical therapy we will see him back in 2 weeks for check is his leg and his wound. He is to call if he develops any problems prior to that time    Provider Attestation:  Ruth Rojas, personally performed the services described in this documentation. All medical record entries made by the scribe were at my direction and in my presence. I have reviewed the chart and discharge instructions and agree that the records reflect my personal performance and is accurate and complete. Malou Lorenzo MD. 01/05/23        Please note that this chart was generated using voice recognition Dragon dictation software. Although every effort was made to ensure the accuracy of this automated transcription, some errors in transcription may have occurred.

## 2023-01-13 DIAGNOSIS — Z96.652 STATUS POST LEFT KNEE REPLACEMENT: Primary | ICD-10-CM

## 2023-01-13 NOTE — TELEPHONE ENCOUNTER
Patient is requesting a refill on Percocet to be sent to his pharmacy on file. Lt TKA 12/20/22  Next appt 1/20/22    Please advise if any concerns. Thank you.

## 2023-01-14 PROBLEM — Z01.818 PREOPERATIVE CLEARANCE: Status: RESOLVED | Noted: 2022-12-15 | Resolved: 2023-01-14

## 2023-01-15 RX ORDER — OXYCODONE HYDROCHLORIDE AND ACETAMINOPHEN 5; 325 MG/1; MG/1
1 TABLET ORAL EVERY 4 HOURS PRN
Qty: 42 TABLET | Refills: 0 | Status: SHIPPED | OUTPATIENT
Start: 2023-01-15 | End: 2023-01-29

## 2023-01-20 ENCOUNTER — OFFICE VISIT (OUTPATIENT)
Dept: ORTHOPEDIC SURGERY | Age: 83
End: 2023-01-20

## 2023-01-20 DIAGNOSIS — Z96.652 STATUS POST LEFT KNEE REPLACEMENT: Primary | ICD-10-CM

## 2023-01-20 PROCEDURE — 99024 POSTOP FOLLOW-UP VISIT: CPT | Performed by: ORTHOPAEDIC SURGERY

## 2023-01-20 NOTE — PROGRESS NOTES
Ita Zhang returns today status post left total knee. He was seen approximately 2 weeks ago postoperatively and was noted to have what appeared to be early cellulitis around the knee and a little bit below this. He was started on Omnicef and is here for follow-up. Physical examination notes his wound is pristine. The areas that he had cellulitis markings have not receded basically completely been eliminated. His motion is 3 to about 105 degrees. He has no calf tenderness. No new x-rays today today    Impression  History of right total knee arthroplasty doing well  Recent left total knee arthroplasty resolved cellulitis    Plan  Patient be placed in outpatient physical therapy as he still does getting home therapy now.   We will see him back here in 2 months

## 2023-02-20 ENCOUNTER — HOSPITAL ENCOUNTER (OUTPATIENT)
Dept: PHYSICAL THERAPY | Facility: CLINIC | Age: 83
Setting detail: THERAPIES SERIES
Discharge: HOME | End: 2023-02-20
Payer: MEDICARE

## 2023-02-20 PROCEDURE — 97161 PT EVAL LOW COMPLEX 20 MIN: CPT

## 2023-02-20 PROCEDURE — 97110 THERAPEUTIC EXERCISES: CPT

## 2023-02-20 PROCEDURE — 97016 VASOPNEUMATIC DEVICE THERAPY: CPT

## 2023-02-20 NOTE — PRE-CERTIFICATION NOTE
Medicare Cap   [] Physical Therapy  [] Speech Therapy  [] Occupational therapy  *PT and Speech caps combine      $2230 Limit for PT and Speech combined  $2230 Limit for OT individually  At the beginning of the month where you expect to go over $2150, please add the [address] [place] modifier      Patient Name: Sally Apo: 1940    Note:  This is an estimate of charges billed.      Date of M[address] Name # units/ charge $$$ charge Daily Total Charge Ongoing Total $$$   2/20 PT eval  Therex  Vaso 1+1+1 97.02+22.29+8.99 128.3 128.3

## 2023-02-20 NOTE — CONSULTS
[] [facility]) - [facility] &  Therapy  [address]  [phone]  F: [phone] [] [address]  [facility]   [address]  P: [phone]  F: [phone] [] [address] &  Therapy  [address] [facility]  P: [phone]  F: [phone] [] [address]  [facility]   [address]   [place]: [phone]  F: [phone]  [x] [address]: [phone]  F: [phone]      Physical Therapy Lower Extremity Evaluation    Date:  2023  Patient: Laura Prather  :  1940  MRN: [id number]  Physician: Dr. Maki Velarde: [facility] Medicare (no Gail Boy)  Medical Diagnosis: S/p L TKA  Rehab Codes: M25. 562 (L knee pain)  Onset date:  DOS: 22  Next 's appt. : 2 weeks from today    Subjective:   CC: Pt arrives with RW, was receiving Home health PT 2x/wk up until 2/15. Pt had previous R TKA last year, states that this knee feels \"harder\" in comparison. Pt notes that pain is tolerable, and only takes pain meds as needed; did take one today prior to PT.         PMHx: [] Unremarkable [] Diabetes [] HTN  [] Pacemaker   [] MI/Heart Problems [] Cancer [] Arthritis [] Other:              [x] Refer to full medical chart  In EPIC         Tests: [] X-Ray: [] MRI:  [] Other:    Medications: [x] Refer to full medical record [] None [] Other:  Allergies:      [x] Refer to full medical record [] None [] Other:        Marital Status    Home type One story    Stairs from outside Single 4\" step            Hand rail    Stairs inside            Hand rail    Employment Retired    Job status    Work Activities/duties     Recreational Activities      ADL/IADL Previous level of function Current level of function Who currently assists the patient with task   Bathing [x] Independent  [] Assist [x] Independent  [] Assist    Dress/grooming [x] Independent  [] Assist [x] Independent  [] Assist    Transfer/mobility [x] Independent  [] Assist [x] Independent  [] Assist    Feeding [x] Independent  [] Assist [x] Independent  [] Assist    Toileting [x] Independent  [] Assist [x] Independent  [] Assist    Driving [x] Independent  [] Assist [x] Independent  [] Assist    Housekeeping [x] Independent  [] Assist [x] Independent  [] Assist    Grocery shop/meal prep [x] Independent  [] Assist [] Independent  [x] Assist Wife assists      Gait Prior level of function Current level of function    [x] Independent  [] Assist [x] Independent  [] Assist   Device: [] Independent [] Independent    [] Straight Cane [] Quad cane [] Straight Cane [] Quad cane    [] Standard walker [x] Rolling walker   [] 4 wheeled walker [] Standard walker [x] Rolling walker   [] 4 wheeled walker    [] Wheelchair [] Wheelchair       Pain present?  Yes   Location L knee   Pain Rating currently 1/10   Pain at worse 5/10   Pain at best 1/10   Description of pain Ache   Altered Sensation Denies    What makes it worse Movement   What makes it better Pain medication    Symptom progression Improving   Sleep No difficutly                Objective:    ROM  ° A/P STRENGTH    Left Right Left Right   Hip Flex   4+/5 4+/5   Ext       ER       IR       ABD       ADD       Knee Flex 80/85 90 3/5 3/5   Ext 2/1 0 3/5 3/5   Ankle DF knee ext   4/5 4/5   Ankle DF knee flex        PF   4/5 4/5   INV       EVER                  OBSERVATION No Deficit Deficit Not Tested Comments   Posture       Forward Head [] [] []    Rounded Shoulders [] [] []    Observation           Palpation [] [] []    Sensation [] [] []    Edema [] [] []    Neurological [] [] []    Patellar Mobility [] [] []    Patellar Orientation [] [] []    Gait [] [] [] Analysis: Decreased step length bilaterally, decreased michelle, slight forward posture         FUNCTION Normal Difficult Unable   Sitting [x] [] []   Standing [] [x] []   Ambulation [] [x] []   Groom/Dress [] [x] []   Lift/Carry [] [x] []   Stairs [] [x] []   Bending [] [x] []   Squat [] [] [x]   Kneel [] [] [x]                                Functional Test: LEFS Score: 57% functionally impaired         Assessment:   Patient presents with signs and symptoms consistent with s/p L TKA, evidenced by decrease ROM and strength. Patient would benefit from skilled physical therapy services in order to: Increase ROM and strength in LLE, increase gait to progress off RW and onto cane once able. Goals:        STG: (to be met in 10 treatments)  ? Pain: Decrease pain levels to 4/10 at worst in L knee  ? ROM: Increase flexibility and AROM limitations throughout to equal bilat to reduce difficulty with ADLs, increase L knee flex AROM to 100deg, ext to 0deg  ? Strength: Increase L quad strength to 4-/5  ? Function: Pt to ambulate with straight cane small distances without gait deficits   Independent with Home Exercise Programs  Demonstrate Knowledge of fall prevention    LTG: (to be met in 20 treatments)  Improve score on assessment tool from 57% impaired to 25% impaired, demonstrating an improvement in ADLs  Reduce pain levels to 0/10                   Patient goals: Learn to walk with a cane, more motion in L knee    Rehab Potential:  [x] Good  [] Fair  [] Poor   Suggested Professional Referral:  [x] No  [] Yes:  Barriers to Goal Achievement[de-identified]  [x] No  [] Yes:  Domestic Concerns:  [x] No  [] Yes:    Pt. Education:  [x] Plans/Goals, Risks/Benefits discussed  [x] Home exercise program    Method of Education: [x] Verbal  [x] Demo  [x] Written  Comprehension of Education:  [x] Verbalizes understanding. [x] Demonstrates understanding. [x] Needs Review. [] Demonstrates/verbalizes understanding of HEP/Ed previously given. Access Code: [email or web address]  URL: [email or web address]  Date: 62/03/4624  Prepared by: Tisha Betancourt Skorich    Exercises  Standing Hip Abduction with Counter Support - 1 x daily - 7 x weekly - 2 sets - 10 reps  Mini Squat with Counter Support - 1 x daily - 7 x weekly - 2 sets - 10 reps  Small Range Straight Leg Raise - 1 x daily - 7 x weekly - 2 sets - 10 reps  Standing Terminal Knee Extension with Resistance - 1 x daily - 7 x weekly - 2 sets - 10 reps      Treatment Plan:  [x] Therapeutic Exercise (33710 )             [] Aquatic Therapy (60643)  [x] Manual Therapy (69229)              [] Electrical Stimulation- Unattended (08450)  [] Integrative Dry Needling                                      [] Electrical Stimulation- Attended (62846)  [x] Instruction in HEP                             [] Lumbar/Cervical Traction (40113)  [] Neuromuscular Re-education (57369)            [x] Cold/hotpack    [x] Vasocompression (72374)                                   [] Ultrasound (75731)                      [x] Gait Training (95025)                                                          [] Therapeutic Activity (57848)               [] Iontophoresis (30844): 4 mg/mL Dexamethasone Sodium Phosphate 40-80 mAmin            []  Medication allergies reviewed for use of    Dexamethasone Sodium Phosphate 4mg/ml     with iontophoresis treatments. Pt is not allergic.     Frequency:  2 x/week for 20 visits    Todays Treatment:    Exercises:  Exercise  L TKA (DOS 12/20/22)   Reps/ Time Weight/ Level Comments   NUSTEP 5' L4          Hip abd 2x10  All standing in // bars   Mini squat 2x10     TKE 2x10 lime                      SLR 2x10  Cues for quad set prior                                        Other:    Vasocompression: 15' LLE supien with bolster 34deg medium pressure    Specific Instructions for next treatment:  Continue to progress quad strengthening, standing exercises (step ups, HS curl, Hip ext, etc)    Evaluation Complexity:  History (Personal factors, comorbidities) [x] 0 [] 1-2 [] 3+   Exam (limitations, restrictions) [x] 1-2 [] 3 [] 4+   Clinical presentation (progression) [x] Stable [] Evolving  [] Unstable   Decision Making [x] Low [] Moderate [] High    [x] Low Complexity [] Moderate Complexity [] High Complexity       Treatment Charges: Mins Units   [x] Evaluation       [x]  Low       []  Moderate       []  High 15 1   []  Modalities     [x]  Ther Exercise 10 1   []  Manual Therapy     []  Ther Activities     []  Aquatics     [x]  Vasocompression 15 1   []  Other       TOTAL TREATMENT TIME: 40 minutes    Time in:1040   Time Out: 2009    Electronically signed by: Lopez Sher PT        Physician Signature:________________________________Date:__________________  By signing above or cosigning this note, I have reviewed this plan of care and certify a need for medically necessary rehabilitation services.      *PLEASE SIGN ABOVE AND FAX BACK ALL PAGES*

## 2023-02-20 NOTE — PRE-CERTIFICATION NOTE
[place] Fall Risk Assessment    Patient Name:  Johanna Bae  : 1940    Risk Factor Scale  Score   History of Falls [] Yes  [x] No 25  0 0   Secondary Diagnosis [] Yes  [x] No 15  0 0   Ambulatory Aid [] Furniture  [x] Crutches/cane/walker  [] None/bedrest/wheelchair/nurse 30  15  0 15   IV/Heparin Lock [] Yes  [x] No 20  0 0   Gait/Transferring [] Impaired  [x] Weak  [] Normal/bedrest/immobile 20  10  0 10   Mental Status [] Forgets limitations  [x] Oriented to own ability 15  0 0      Total:     Based on the Assessment score: check the appropriate box.     []  No intervention needed   Low =   Score of 0-24    [x]  Use standard prevention interventions Moderate =  Score of 24-44   [x] Give patient handout and discuss fall prevention strategies   [x] Establish goal of education for patient/family RE: fall prevention strategies    []  Use high risk prevention interventions High = Score of 45 and higher   [] Give patient handout and discuss fall prevention strategies   [] Establish goal of education for patient/family Re: fall prevention strategies   [] Discuss lifeline / other resources    Electronically signed by:   Letitia Durham, PT  Date: 2023

## 2023-02-23 ENCOUNTER — HOSPITAL ENCOUNTER (OUTPATIENT)
Dept: PHYSICAL THERAPY | Facility: CLINIC | Age: 83
Setting detail: THERAPIES SERIES
Discharge: HOME | End: 2023-02-23
Payer: MEDICARE

## 2023-02-23 PROCEDURE — 97016 VASOPNEUMATIC DEVICE THERAPY: CPT

## 2023-02-23 PROCEDURE — 97110 THERAPEUTIC EXERCISES: CPT

## 2023-02-23 NOTE — PRE-CERTIFICATION NOTE
Medicare Cap   [x] Physical Therapy  [] Speech Therapy  [] Occupational therapy  *PT and Speech caps combine      $2230 Limit for PT and Speech combined  $2230 Limit for OT individually  At the beginning of the month where you expect to go over $2150, please add the [address] [place] 22 modifier      Patient Name: Steff Lisa: 1940    Note:  This is an estimate of charges billed.      Date of [address] 63 Name # units/ charge $$$ charge Daily Total Charge Ongoing Total $$$   2/20 PT eval  Therex  Vaso 1+1+1 97.02+22.29+8.99 128.3 128.3   2/23 PTA TE, Vaso 3+1 25.10+19.62*2+7.91 72.25 200.55

## 2023-02-23 NOTE — FLOWSHEET NOTE
[] [facility]) - [facility] &  Therapy  [address]  [phone]  F: [phone] [] [address]  [place] [address]   [address]  P: [phone]  F: [phone] [] [place] &  Therapy  [address] [facility]  P: [phone]  F: [phone] [x] [address]  P: [phone]  F: [phone] [] [address]  [facility]   [address]   [place]: [phone]  F: [phone]      Physical Therapy Daily Treatment Note    Date:  2023  Patient Name:  Alison Sharif    :  1940  MRN: [id number]  Physician: Dr. Storm Patel: [place] Medicare (no Mary Abernathy)  Medical Diagnosis: S/p L TKA                     Rehab Codes: M25. 562 (L knee pain)  Onset date:  DOS: 22              Next Dr's appt. : 2 weeks from today  Visit# / total visits: ; Progress note for Medicare patient due at visit 10     Cancels/No Shows: 0/0    Subjective:    Pain:  [x] Yes  [] No Location: L TKA Pain Rating: (0-10 scale) 3-5/10  Pain altered Tx:  [] No  [] Yes  Action:  Comments: Pt reported that he is doing well. Noted minimal pain but noted more stiffness.      Objective:  Modalities:   Precautions:  Exercises:  Exercise  L TKA (DOS 22)    Reps/ Time Weight/ Level Comments   NUSTEP 5' L4               Hip abd 2x10   All standing in // bars   Hip ext  2x10     Mini squat 2x10       TKE 2x10 lime      Marching  2x10        Step ups fwd/lat 2x10     add lat next visit   HS curl 2x10     Heel raises 2x10     Sit to stands 9x                        Heel slides   2x10       SLR 2x10   Cues for quad set prior     SAQ  2x10        Quad stretch  2x30\"        Calf stretch  2x30\"        Hamstring stretch 2x30\"                           Other: Vasocompression: 15' LLE supien with bolster 34deg medium pressure     Specific Instructions for next treatment:  Continue to progress quad strengthening, standing exercises       Treatment Charges: Mins Units   []  Modalities     [x]  Ther Exercise 43 3   []  Manual Therapy     []  Ther Activities     []  Aquatics     [x]  Vasocompression 15 1   []  Other     Total Treatment time 58 4       Assessment: [x] Progressing toward goals. Pt initiated treatment with nustep to improve ROM. Pt then began standing LE strengthening program with good tolerance. Pt with minor rest breaks in between activities due to fatigue but was able to perform all exercises. Pt then performed seated and supine activities with good tolerance. Pt is lacking range for knee flexion. Pt concluded treatment with vasocompression to decrease pain and swelling. [] No change. [] Other:  [x] Patient would continue to benefit from skilled physical therapy services in order to: Increase ROM and strength in LLE, increase gait to progress off RW and onto cane once able. STG/LTG    STG: (to be met in 10 treatments)  ? Pain: Decrease pain levels to 4/10 at worst in L knee  ? ROM: Increase flexibility and AROM limitations throughout to equal bilat to reduce difficulty with ADLs, increase L knee flex AROM to 100deg, ext to 0deg  ? Strength: Increase L quad strength to 4-/5  ? Function: Pt to ambulate with straight cane small distances without gait deficits   Independent with Home Exercise Programs  Demonstrate Knowledge of fall prevention     LTG: (to be met in 20 treatments)  Improve score on assessment tool from 57% impaired to 25% impaired, demonstrating an improvement in ADLs  Reduce pain levels to 0/10                    Patient goals: Learn to walk with a cane, more motion in L knee       Pt.  Education:  [x] Yes  [] No  [x] Reviewed Prior HEP/Ed  Method of Education: [x] Verbal  [x] Demo  [x] Written  Comprehension of Education:  [x] Verbalizes understanding. [x] Demonstrates understanding. [] Needs review. [x] Demonstrates/verbalizes HEP/Ed previously given. Access Code: [email or web address]  URL: [email or web address]  Date: 02/23/2023  Prepared by: [id number] [facility] and Therapy    Exercises  Standing Hip Abduction with Counter Support - 1 x daily - 7 x weekly - 2 sets - 10 reps  Mini Squat with Counter Support - 1 x daily - 7 x weekly - 2 sets - 10 reps  Small Range Straight Leg Raise - 1 x daily - 7 x weekly - 2 sets - 10 reps  Standing Terminal Knee Extension with Resistance - 1 x daily - 7 x weekly - 2 sets - 10 reps  Standing Hip Extension - 2-3 x daily - 7 x weekly - 2-3 sets - 10 reps  Standing Marching - 2-3 x daily - 7 x weekly - 2-3 sets - 10 reps  Standing Knee Flexion - 2-3 x daily - 7 x weekly - 2-3 sets - 10 reps  Heel Raise - 2-3 x daily - 7 x weekly - 2-3 sets - 10 reps  Supine Heel Slides - 2-3 x daily - 7 x weekly - 2-3 sets - 10 reps  Supine Quadriceps Stretch with Strap on Table - 2-3 x daily - 7 x weekly - 2-3 sets - 10 reps  Supine Hamstring Stretch with Strap - 2-3 x daily - 7 x weekly - 2-3 sets - 10 reps  Supine Calf Stretch with Strap - 2-3 x daily - 7 x weekly - 2-3 sets - 10 reps       Plan: [x] Continue current frequency toward long and short term goals.     [x] Specific Instructions for subsequent treatments: Continue to progress quad strengthening, standing exercises   Frequency:  2 x/week for 20 visits      Time In:1400            Time Out: 1503    Electronically signed by:  Ankur Garcia PTA

## 2023-02-27 ENCOUNTER — HOSPITAL ENCOUNTER (OUTPATIENT)
Dept: PHYSICAL THERAPY | Facility: CLINIC | Age: 83
Setting detail: THERAPIES SERIES
Discharge: HOME | End: 2023-02-27
Payer: MEDICARE

## 2023-02-27 PROCEDURE — 97016 VASOPNEUMATIC DEVICE THERAPY: CPT

## 2023-02-27 PROCEDURE — 97110 THERAPEUTIC EXERCISES: CPT

## 2023-02-27 NOTE — FLOWSHEET NOTE
[] [facility]  [address]  [phone]  F: [phone] [] [address]  [facility]   [address]  P: [phone]  F: [phone] [] [place] &  Therapy  [address]  P: [phone]  F: [phone] [x] [address]  P: [phone]  F: [phone] [] [address]  Psychiatric   [address]   [place]: [phone]  F: [phone]      Physical Therapy Daily Treatment Note    Date:  2023  Patient Name:  Jorge Khan    :  1940  MRN: [id number]  Physician: Dr. Dara Alvarez: [facility] Medicare (no [facility])  Medical Diagnosis: S/p L TKA                     Rehab Codes: M25. 562 (L knee pain)  Onset date:  DOS: 22              Next 's appt. : 2 weeks from today  Visit# / total visits: 3/20; Progress note for Medicare patient due at visit 10     Cancels/No Shows: 0/0    Subjective:    Pain:  [x] Yes  [] No Location: L TKA Pain Rating: (0-10 scale) 1-2/10  Pain altered Tx:  [] No  [] Yes  Action:  Comments: Pt arrives noting that L knee feels \"weak\", states that pain has been improving.      Objective:  Modalities:   Precautions:  Exercises:  Exercise  L TKA (DOS 22)    Reps/ Time Weight/ Level Comments   NUSTEP 8' L4               Hip abd 2x10   All standing in // bars   Hip ext  2x10     Mini squat 2x10       TKE 2x10 lime      Marching  2x10        Step ups fwd/lat 2x10   4\"  add lat next visit   HS curl 2x10     Heel raises 2x10     Sit to stands 10           Seated      LAQ 2x10 2#    Marches 2x10 2#          Supine       Heel slides   2x10       SLR 2x10   Cues for quad set prior            Sidelying        Clamshells 2x10   Tactile cues to decrease pelvic rotation Other:     Vasocompression: 15' LLE supien with bolster 34deg medium pressure     Specific Instructions for next treatment:  Continue to progress quad strengthening, standing exercises       Treatment Charges: Mins Units   []  Modalities     [x]  Ther Exercise 40 3   []  Manual Therapy     []  Ther Activities     []  Aquatics     [x]  Vasocompression 15 1   []  Other     Total Treatment time 55 4       Assessment: [x] Progressing toward goals. Pt initiated treatment with nustep to improve ROM. Continued onto standing exercise program, progressing step ups from fwd only to include lateral as well. Pt was able to perform without any increase in pain. During heel slides noted with ER hip compensation d/t weakness in hip abd, therefore added clamshells into program. Pt also noted to have decreased knee ext, improved after completing supine knee ext stretch. Ended with vasocompression to decrease post exercise soreness and edema. Also distributed updated HEP. [] No change. [] Other:  [x] Patient would continue to benefit from skilled physical therapy services in order to: Increase ROM and strength in LLE, increase gait to progress off RW and onto cane once able. STG/LTG    STG: (to be met in 10 treatments)  ? Pain: Decrease pain levels to 4/10 at worst in L knee  ? ROM: Increase flexibility and AROM limitations throughout to equal bilat to reduce difficulty with ADLs, increase L knee flex AROM to 100deg, ext to 0deg  ? Strength: Increase L quad strength to 4-/5  ?  Function: Pt to ambulate with straight cane small distances without gait deficits   Independent with Home Exercise Programs  Demonstrate Knowledge of fall prevention     LTG: (to be met in 20 treatments)  Improve score on assessment tool from 57% impaired to 25% impaired, demonstrating an improvement in ADLs  Reduce pain levels to 0/10                    Patient goals: Learn to walk with a cane, more motion in L knee Pt. Education:  [x] Yes  [] No  [x] Reviewed Prior HEP/Ed  Method of Education: [x] Verbal  [x] Demo  [x] Written  Comprehension of Education:  [x] Verbalizes understanding. [x] Demonstrates understanding. [] Needs review. [x] Demonstrates/verbalizes HEP/Ed previously given. Access Code: [email or web address]  URL: [email or web address]  Date: 13/47/5151  Prepared by: Graciela Rasmussen    Exercises  Supine Knee Extension Stretch on Towel Roll - 1 x daily - 7 x weekly - 3 sets - 3\" hold  Clamshell - 1 x daily - 7 x weekly - 2 sets - 10 reps         Plan: [x] Continue current frequency toward long and short term goals.     [x] Specific Instructions for subsequent treatments: Continue to progress quad strengthening, standing exercises   Frequency:  2 x/week for 20 visits      Time In:1400            Time Out: 1500    Electronically signed by:  Padmaja Mohan, PT

## 2023-02-27 NOTE — PRE-CERTIFICATION NOTE
Medicare Cap   [x] Physical Therapy  [] Speech Therapy  [] Occupational therapy  *PT and Speech caps combine      $2230 Limit for PT and Speech combined  $2230 Limit for OT individually  At the beginning of the month where you expect to go over $2150, please add the [address] [place] modifier      Patient Name: Hernandez Ernandez: 1940    Note:  This is an estimate of charges billed.      Date of [address] Name # units/ charge $$$ charge Daily Total Charge Ongoing Total $$$   2/20 PT eval  Therex  Vaso 1+1+1 97.02+22.29+8.99 128.3 128.3   2/23 PTA TE, Vaso 3+1 25.10+19.62*2+7.91 72.25 200.55   2/27 PT Therex  Vaso 3+1 28.50+22.29+22.29+8.99 82.07 282.62

## 2023-03-02 ENCOUNTER — HOSPITAL ENCOUNTER (OUTPATIENT)
Dept: PHYSICAL THERAPY | Facility: CLINIC | Age: 83
Setting detail: THERAPIES SERIES
Discharge: HOME | End: 2023-03-02
Payer: MEDICARE

## 2023-03-02 PROCEDURE — 97016 VASOPNEUMATIC DEVICE THERAPY: CPT

## 2023-03-02 PROCEDURE — 97110 THERAPEUTIC EXERCISES: CPT

## 2023-03-02 NOTE — FLOWSHEET NOTE
[] [facility]) - [facility]  [facility]  [address]  [phone]  F: [phone] [] [address]  [facility]   [address]  P: [phone]  F: [phone] [] [place] &  Therapy  [address]  P: [phone]  F: [phone] [x] [address]  P: [phone]  F: [phone] [] [address]  [place]   [address]   [place]: [phone]  F: [phone]      Physical Therapy Daily Treatment Note    Date:  3/2/2023  Patient Name:  Prince Begum    :  1940  MRN: [id number]  Physician: Dr. Quispe Roots: [facility] Medicare (no Lucrecia Cortes)  Medical Diagnosis: S/p L TKA                     Rehab Codes: M25. 562 (L knee pain)  Onset date:  DOS: 22              Next 's appt. : 2 weeks from today  Visit# / total visits: 3/20; Progress note for Medicare patient due at visit 10     Cancels/No Shows: 0/0    Subjective:    Pain:  [x] Yes  [] No Location: L TKA Pain Rating: (0-10 scale) 1/10  Pain altered Tx:  [] No  [] Yes  Action:  Comments: Pt arrives with minimal pain. Objective:  Modalities:   Precautions:  Exercises:  Exercise  L TKA (DOS 22)    Reps/ Time Weight/ Level Comments   NUSTEP 8' L4               Hip abd 2x10   All standing in // bars   Hip ext  2x10     Mini squat 2x10       TKE 2x10 lime      Marching  2x10        Step ups fwd/lat 2x10   4\"    HS curl 2x10     Heel raises 2x10     Sit to stands 10           Seated      LAQ 2x10 2#    Marches 2x10 2#          Supine       Heel slides   2x10       SLR 2x10  2# Cues for quad set prior.  Progressed to 2# 3/2           Sidelying        Clamshells 2x10   Tactile cues to decrease pelvic rotation            Gait training x   One lap around clinic, emphasizing larger steps along with wider ASHLEY with RW. Other:     Vasocompression: 15' LLE supien with bolster 34deg medium pressure     Specific Instructions for next treatment:  Continue to progress quad strengthening, standing exercises       Treatment Charges: Mins Units   []  Modalities     [x]  Ther Exercise 40 3   []  Manual Therapy     []  Ther Activities     []  Aquatics     [x]  Vasocompression 15 1   []  Other     Total Treatment time 55 4       Assessment: [x] Progressing toward goals. Pt initiated treatment with nustep to improve ROM. Continued onto standing exercise program, continued with step ups with one standing rest break between. Added in gait training to emphasize larger steps and wider ASHLEY to decrease fall risk, as pt tends to cross at midline. Pt also continues to wear slip on shoes, educated on importance of stable tennis shoes. Pt states unsure if feet can fit d/t swelling, but will try for next visit. Continued with heel slides and clamshells for hip abd strengthening. [] No change. [] Other:  [x] Patient would continue to benefit from skilled physical therapy services in order to: Increase ROM and strength in LLE, increase gait to progress off RW and onto cane once able. STG/LTG    STG: (to be met in 10 treatments)  ? Pain: Decrease pain levels to 4/10 at worst in L knee  ? ROM: Increase flexibility and AROM limitations throughout to equal bilat to reduce difficulty with ADLs, increase L knee flex AROM to 100deg, ext to 0deg  ? Strength: Increase L quad strength to 4-/5  ?  Function: Pt to ambulate with straight cane small distances without gait deficits   Independent with Home Exercise Programs  Demonstrate Knowledge of fall prevention     LTG: (to be met in 20 treatments)  Improve score on assessment tool from 57% impaired to 25% impaired, demonstrating an improvement in ADLs  Reduce pain levels to 0/10                    Patient goals: Learn to walk with a cane, more motion in L knee       Pt. Education:  [x] Yes  [] No  [x] Reviewed Prior HEP/Ed  Method of Education: [x] Verbal  [x] Demo  [x] Written  Comprehension of Education:  [x] Verbalizes understanding. [x] Demonstrates understanding. [] Needs review. [x] Demonstrates/verbalizes HEP/Ed previously given. Access Code: [email or web address]  URL: [email or web address]  Date: 83/80/5759  Prepared by: Denita Fink    Exercises  Supine Knee Extension Stretch on Towel Roll - 1 x daily - 7 x weekly - 3 sets - 3\" hold  Clamshell - 1 x daily - 7 x weekly - 2 sets - 10 reps         Plan: [x] Continue current frequency toward long and short term goals.     [x] Specific Instructions for subsequent treatments: Continue to progress quad strengthening, standing exercises   Frequency:  2 x/week for 20 visits      Time In:1300            Time Out: 1400    Electronically signed by:  Rudy Wilson PT

## 2023-03-02 NOTE — PRE-CERTIFICATION NOTE
Medicare Cap   [x] Physical Therapy  [] Speech Therapy  [] Occupational therapy  *PT and Speech caps combine      $2230 Limit for PT and Speech combined  $2230 Limit for OT individually  At the beginning of the month where you expect to go over $2150, please add the [place] [place] modifier      Patient Name: Tristen Forrester: 1940    Note:  This is an estimate of charges billed.      Date of [address] Name # units/ charge $$$ charge Daily Total Charge Ongoing Total $$$   2/20 PT eval  Therex  Vaso 1+1+1 97.02+22.29+8.99 128.3 128.3   2/23 PTA TE, Vaso 3+1 25.10+19.62*2+7.91 72.25 200.55   2/27 PT Therex  Vaso 3+1 28.50+22.29+22.29+8.99 82.07 282.62   3/2 PT Therex  Vaso 3+1  82.07 364.69

## 2023-03-06 ENCOUNTER — HOSPITAL ENCOUNTER (OUTPATIENT)
Dept: PHYSICAL THERAPY | Facility: CLINIC | Age: 83
Setting detail: THERAPIES SERIES
Discharge: HOME | End: 2023-03-06
Payer: MEDICARE

## 2023-03-06 PROCEDURE — 97016 VASOPNEUMATIC DEVICE THERAPY: CPT

## 2023-03-06 PROCEDURE — 97110 THERAPEUTIC EXERCISES: CPT

## 2023-03-06 NOTE — PRE-CERTIFICATION NOTE
Medicare Cap   [x] Physical Therapy  [] Speech Therapy  [] Occupational therapy  *PT and Speech caps combine      $2230 Limit for PT and Speech combined  $2230 Limit for OT individually  At the beginning of the month where you expect to go over $2150, please add the [place] [place] 22 modifier      Patient Name: Aicha Aus: 1940    Note:  This is an estimate of charges billed.      Date of [address] Name # units/ charge $$$ charge Daily Total Charge Ongoing Total $$$   2/20 PT eval  Therex  Vaso 1+1+1 97.02+22.29+8.99 128.3 128.3   2/23 PTA TE, Vaso 3+1 25.10+19.62*2+7.91 72.25 200.55   2/27 PT Therex  Vaso 3+1 28.50+22.29+22.29+8.99 82.07 282.62   3/2 PT Therex  Vaso 3+1  82.07 364.69   3/6 PT Therex  Vaso 3+1  82.07 446.76

## 2023-03-06 NOTE — FLOWSHEET NOTE
[] [facility]) [facility]  [facility]  [address]  [phone]  F: [phone] [] [address]  [place] [address]   [address]  P: [phone]  F: [phone] [] [place] &  Therapy  [address]  P: [phone]  F: [phone] [x] [address]  P: [phone]  F: [phone] [] [address]  [facility]   [address]   [address]: [phone]  F: [phone]      Physical Therapy Daily Treatment Note    Date:  3/6/2023  Patient Name:  Narcisa Mccormack    :  1940  MRN: [id number]  Physician: Dr. Castellanos Form: [place] Medicare (no Yudith Leach)  Medical Diagnosis: S/p L TKA                     Rehab Codes: M25. 562 (L knee pain)  Onset date:  DOS: 22              Next 's appt. : 2 weeks from today  Visit# / total visits: ; Progress note for Medicare patient due at visit 10     Cancels/No Shows: 0/0    Subjective:    Pain:  [x] Yes  [] No Location: L TKA Pain Rating: (0-10 scale) 0/10  Pain altered Tx:  [] No  [] Yes  Action:  Comments: Pt arrives without pain, states to feeling good overall. Objective:  Modalities:   Precautions:  Exercises:  Exercise  L TKA (DOS 22)    Reps/ Time Weight/ Level Comments   NUSTEP 8' L4               Hip abd 2x10   All standing in // bars   Hip ext  2x10     Mini squat 2x10       TKE 2x10 lime      Marching  2x10        Step ups fwd/lat 2x10   4\"    HS curl 2x10     Heel raises 2x10     Sit to stands 10           Seated      LAQ 2x10 2#    Marches 2x10 2#          Supine       Heel slides   2x10       SLR 2x10  2# Cues for quad set prior.  Progressed to 2# 3/2           Sidelying        Clamshells 2x10   Tactile cues to decrease pelvic rotation            Gait training x One lap around clinic, emphasizing larger steps along with wider ASHLEY with RW. Other:     Vasocompression: 15' LLE supien with bolster 34deg medium pressure     Specific Instructions for next treatment:  Continue to progress quad strengthening, standing exercises FOLLOW UP ON PITTING EDEMA LLE      Treatment Charges: Mins Units   []  Modalities     [x]  Ther Exercise 40 3   []  Manual Therapy     []  Ther Activities     []  Aquatics     [x]  Vasocompression 15 1   []  Other     Total Treatment time 55 4       Assessment: [x] Progressing toward goals. Pt initiated treatment with nustep to improve ROM. Continued with standing exercises as described above, pt noting increased difficulty with step ups compared to previous dates. Pt at first relates to wearing diabetic tennis shoes as the cause for increased difficulty, however once returning to mat pt admits to having significant pitting edema in entire LLE. Pt states has had edema bilaterally for years, but notes that over the weekend LLE feels \"as stiff as it ever has\", even after taking two water pills. Pt noting to having approx Grade 2+ pitting edema in LLE lower leg, denies tenderness along gastroc. Pt has dr monroy scheduled for next week, however advices pt to call to get in sooner d/t edema. [] No change. [] Other:  [x] Patient would continue to benefit from skilled physical therapy services in order to: Increase ROM and strength in LLE, increase gait to progress off RW and onto cane once able. STG/LTG    STG: (to be met in 10 treatments)  ? Pain: Decrease pain levels to 4/10 at worst in L knee  ? ROM: Increase flexibility and AROM limitations throughout to equal bilat to reduce difficulty with ADLs, increase L knee flex AROM to 100deg, ext to 0deg  ? Strength: Increase L quad strength to 4-/5  ?  Function: Pt to ambulate with straight cane small distances without gait deficits   Independent with Home Exercise Programs  Demonstrate Knowledge of fall prevention     LTG: (to be met in 20 treatments)  Improve score on assessment tool from 57% impaired to 25% impaired, demonstrating an improvement in ADLs  Reduce pain levels to 0/10                    Patient goals: Learn to walk with a cane, more motion in L knee       Pt. Education:  [x] Yes  [] No  [x] Reviewed Prior HEP/Ed  Method of Education: [x] Verbal  [x] Demo  [x] Written  Comprehension of Education:  [x] Verbalizes understanding. [x] Demonstrates understanding. [] Needs review. [x] Demonstrates/verbalizes HEP/Ed previously given. Access Code: [email or web address]  URL: [email or web address]  Date: 32/86/9940  Prepared by: Miki Marquez    Exercises  Supine Knee Extension Stretch on Towel Roll - 1 x daily - 7 x weekly - 3 sets - 3\" hold  Clamshell - 1 x daily - 7 x weekly - 2 sets - 10 reps         Plan: [x] Continue current frequency toward long and short term goals.     [x] Specific Instructions for subsequent treatments: Continue to progress quad strengthening, standing exercises   Frequency:  2 x/week for 20 visits      Time In:1300            Time Out: 2079    Electronically signed by:  Vanderbilt Libman, PT

## 2023-03-09 ENCOUNTER — HOSPITAL ENCOUNTER (OUTPATIENT)
Dept: PHYSICAL THERAPY | Facility: CLINIC | Age: 83
Setting detail: THERAPIES SERIES
Discharge: HOME | End: 2023-03-09
Payer: MEDICARE

## 2023-03-09 PROCEDURE — 97110 THERAPEUTIC EXERCISES: CPT

## 2023-03-09 PROCEDURE — 97016 VASOPNEUMATIC DEVICE THERAPY: CPT

## 2023-03-09 NOTE — PRE-CERTIFICATION NOTE
Medicare Cap   [x] Physical Therapy  [] Speech Therapy  [] Occupational therapy  *PT and Speech caps combine      $2230 Limit for PT and Speech combined  $2230 Limit for OT individually  At the beginning of the month where you expect to go over $2150, please add the [place] [place] modifier      Patient Name: Alba Zurita: 1940    Note:  This is an estimate of charges billed.      Date of [address] Name # units/ charge $$$ charge Daily Total Charge Ongoing Total $$$   2/20 PT eval  Therex  Vaso 1+1+1 97.02+22.29+8.99 128.3 128.3   2/23 PTA TE, Vaso 3+1 25.10+19.62*2+7.91 72.25 200.55   2/27 PT Therex  Vaso 3+1 28.50+22.29+22.29+8.99 82.07 282.62   3/2 PT Therex  Vaso 3+1  82.07 364.69   3/6 PT Therex  Vaso 3+1  82.07 446.76   3/9 PTA TE, Vaso 3+1 25.10+19.62*2+7.91 72.25 519.01

## 2023-03-09 NOTE — FLOWSHEET NOTE
[] [address] [facility] &  Therapy  [address]  [phone]  F: [phone] [] [address]  [facility] [address]   [address]  P: [phone]  F: [phone] [] [place] &  Therapy  [address]  P: [phone]  F: [phone] [x] [address]  P: [phone]  F: [phone] [] [address]  [facility]   [address]   [place]: [phone]  F: [phone]      Physical Therapy Daily Treatment Note    Date:  3/9/2023  Patient Name:  Eric Chris    :  1940  MRN: [id number]  Physician: Dr. Carter Honour: [place] Medicare (no [facility])  Medical Diagnosis: S/p L TKA                     Rehab Codes: M25. 562 (L knee pain)  Onset date:  DOS: 22              Next 's appt. : 2 weeks from today  Visit# / total visits: ; Progress note for Medicare patient due at visit 10     Cancels/No Shows: 0/0    Subjective:    Pain:  [x] Yes  [] No Location: L TKA Pain Rating: (0-10 scale) 0/10  Pain altered Tx:  [] No  [] Yes  Action:  Comments: Pt arrives without pain, states to feeling good overall. Objective:  Modalities:   Precautions: check on pitting edema  Exercises:  Exercise  L TKA (DOS 22)    Reps/ Time Weight/ Level Comments   NUSTEP 8' L4               Hip abd 2x10   All standing in // bars   Hip ext  2x10     Mini squat 2x10       TKE 2x10 lime      Marching  2x10        Step ups fwd/lat 2x10   4\"    HS curl 2x10     Heel raises 2x10     Sit to stands 2x10           Seated      LAQ 2x10 3#    Marches 2x10 3#          Supine       Heel slides   2x10       SLR 2x10  3# Cues for quad set prior.  Progressed to 2# 3/2           Sidelying        Clamshells 2x10   Tactile cues to decrease pelvic rotation Gait training x   One lap around clinic, emphasizing larger steps along with wider ASHLEY with RW. Other:     Vasocompression: 15' LLE supien with bolster 34deg medium pressure     Specific Instructions for next treatment:  Continue to progress quad strengthening, standing exercises FOLLOW UP ON PITTING EDEMA LLE      Treatment Charges: Mins Units   []  Modalities     [x]  Ther Exercise 47 3   []  Manual Therapy     []  Ther Activities     []  Aquatics     [x]  Vasocompression 15 1   []  Other     Total Treatment time 62 4       Assessment: [x] Progressing toward goals. Pt presents with no change in pitting edema, pt continues to note no additional pain or pressure pain. Pt stated that he is taking water pills. Pt is scheduled to see doctor 3/14. Pt with good tolerance to activities noting the most difficulty with step ups but was able to complete all reps. Increased ankle weights to improve LE strength. [] No change. [] Other:  [x] Patient would continue to benefit from skilled physical therapy services in order to: Increase ROM and strength in LLE, increase gait to progress off RW and onto cane once able. STG/LTG    STG: (to be met in 10 treatments)  ? Pain: Decrease pain levels to 4/10 at worst in L knee  ? ROM: Increase flexibility and AROM limitations throughout to equal bilat to reduce difficulty with ADLs, increase L knee flex AROM to 100deg, ext to 0deg  ? Strength: Increase L quad strength to 4-/5  ? Function: Pt to ambulate with straight cane small distances without gait deficits   Independent with Home Exercise Programs  Demonstrate Knowledge of fall prevention     LTG: (to be met in 20 treatments)  Improve score on assessment tool from 57% impaired to 25% impaired, demonstrating an improvement in ADLs  Reduce pain levels to 0/10                    Patient goals: Learn to walk with a cane, more motion in L knee       Pt.  Education:  [x] Yes  [] No  [x] Reviewed Prior HEP/Ed  Method of Education: [x] Verbal  [x] Demo  [x] Written  Comprehension of Education:  [x] Verbalizes understanding. [x] Demonstrates understanding. [] Needs review. [x] Demonstrates/verbalizes HEP/Ed previously given. Access Code: [email or web address]  URL: [email or web address]  Date: 64/53/8558  Prepared by: Eleonora Caceres    Exercises  Supine Knee Extension Stretch on Towel Roll - 1 x daily - 7 x weekly - 3 sets - 3\" hold  Clamshell - 1 x daily - 7 x weekly - 2 sets - 10 reps         Plan: [x] Continue current frequency toward long and short term goals.     [x] Specific Instructions for subsequent treatments: Continue to progress quad strengthening, standing exercises   Frequency:  2 x/week for 20 visits      Time In:1300            Time Out: 1410    Electronically signed by:  Grady Stover PTA

## 2023-03-13 ENCOUNTER — HOSPITAL ENCOUNTER (OUTPATIENT)
Dept: PHYSICAL THERAPY | Facility: CLINIC | Age: 83
Setting detail: THERAPIES SERIES
Discharge: HOME | End: 2023-03-13
Payer: MEDICARE

## 2023-03-13 PROCEDURE — 97110 THERAPEUTIC EXERCISES: CPT

## 2023-03-13 PROCEDURE — 97016 VASOPNEUMATIC DEVICE THERAPY: CPT

## 2023-03-13 NOTE — PRE-CERTIFICATION NOTE
Medicare Cap   [x] Physical Therapy  [] Speech Therapy  [] Occupational therapy  *PT and Speech caps combine      $2230 Limit for PT and Speech combined  $2230 Limit for OT individually  At the beginning of the month where you expect to go over $2150, please add the [address] [place] modifier      Patient Name: Anju Harm: 1940    Note:  This is an estimate of charges billed.      Date of [address] Name # units/ charge $$$ charge Daily Total Charge Ongoing Total $$$   2/20 PT eval  Therex  Vaso 1+1+1 97.02+22.29+8.99 128.3 128.3   2/23 PTA TE, Vaso 3+1 25.10+19.62*2+7.91 72.25 200.55   2/27 PT Therex  Vaso 3+1 28.50+22.29+22.29+8.99 82.07 282.62   3/2 PT Therex  Vaso 3+1  82.07 364.69   3/6 PT Therex  Vaso 3+1  82.07 446.76   3/9 PTA TE, Vaso 3+1 25.10+19.62*2+7.91 72.25 519.01   3/13 PT Therex  Vaso 3+1  82.07 601.08

## 2023-03-13 NOTE — FLOWSHEET NOTE
[] [facility]) - [facility]  [address]  [phone]  F: [phone] [] [address]  [place] [address]   [address]  P: [phone]  F: [phone] [] [place] &  Therapy  [address] [facility]  P: [phone]  F: [phone] [x] [address]  P: [phone]  F: [phone] [] [address]  [facility]   [address]   [place]: [phone]  F: [phone]      Physical Therapy Daily Treatment Note    Date:  3/13/2023  Patient Name:  Mango Rivera    :  1940  MRN: [id number]  Physician: Dr. Ordonez Genera: [facility] Medicare (no Mickeal Mediate)  Medical Diagnosis: S/p L TKA                     Rehab Codes: M25. 562 (L knee pain)  Onset date:  DOS: 22              Next 's appt. : 2 weeks from today  Visit# / total visits: ; Progress note for Medicare patient due at visit 10     Cancels/No Shows: 0/0    Subjective:    Pain:  [x] Yes  [] No Location: L TKA Pain Rating: (0-10 scale) 0/10  Pain altered Tx:  [] No  [] Yes  Action:  Comments: Pt arrives without pain. [facility] has an appt tomorrow to discuss worsening edema in LLE with PCP.     Objective:  Modalities:   Precautions: check on pitting edema  Exercises:  Exercise  L TKA (DOS 22)    Reps/ Time Weight/ Level Comments   NUSTEP 8' L4               Hip abd 2x10   All standing in // bars   Hip ext  2x10     Mini squat 2x10       TKE 2x10 lime      Marching  2x10        Step ups fwd/lat 2x10   4\"    HS curl 2x10     Heel raises 2x10     Sit to stands 2x10           Seated      LAQ 2x10 3#    Marches 2x10 3#          Supine       Heel slides   2x10       SLR 2x10 Active  Regressed to without weight 3/ d/t pain and quad lag with wt on            Sidelying Jung 2x10   Tactile cues to decrease pelvic rotation- HELD 3/13            Gait training x   One lap around clinic, emphasizing larger steps along with wider ASHLEY with RW. Other:     Vasocompression: 15' LLE supien with bolster 34deg medium pressure     Specific Instructions for next treatment:  Continue to progress quad strengthening, standing exercises FOLLOW UP ON PITTING EDEMA LLE-pt has dr. Kishor Faust with PCP tomorrow (3/14)      Treatment Charges: Mins Units   []  Modalities     [x]  Ther Exercise 40 3   []  Manual Therapy     []  Ther Activities     []  Aquatics     [x]  Vasocompression 15 1   []  Other     Total Treatment time         Assessment: [x] Progressing toward goals. Pt again arrives with no change in edema, tightness noted in LLE compared with RLE; has follow up tomorrow with PCP. Held sidelying exercises this date d/t large mat table unavailable. Ended with vasocompression to decrease post exercise soreness and edema. [] No change. [] Other:  [x] Patient would continue to benefit from skilled physical therapy services in order to: Increase ROM and strength in LLE, increase gait to progress off RW and onto cane once able. STG/LTG    STG: (to be met in 10 treatments)  ? Pain: Decrease pain levels to 4/10 at worst in L knee  ? ROM: Increase flexibility and AROM limitations throughout to equal bilat to reduce difficulty with ADLs, increase L knee flex AROM to 100deg, ext to 0deg  ? Strength: Increase L quad strength to 4-/5  ? Function: Pt to ambulate with straight cane small distances without gait deficits   Independent with Home Exercise Programs  Demonstrate Knowledge of fall prevention     LTG: (to be met in 20 treatments)  Improve score on assessment tool from 57% impaired to 25% impaired, demonstrating an improvement in ADLs  Reduce pain levels to 0/10                    Patient goals: Learn to walk with a cane, more motion in L knee       Pt.  Education:  [x] Yes  [] No  [x] Reviewed Prior HEP/Ed  Method of Education: [x] Verbal  [x] Demo  [x] Written  Comprehension of Education:  [x] Verbalizes understanding. [x] Demonstrates understanding. [] Needs review. [x] Demonstrates/verbalizes HEP/Ed previously given. Access Code: [email or web address]  URL: [email or web address]  Date: 04/00/3553  Prepared by: Dionicio Londono    Exercises  Supine Knee Extension Stretch on Towel Roll - 1 x daily - 7 x weekly - 3 sets - 3\" hold  Clamshell - 1 x daily - 7 x weekly - 2 sets - 10 reps         Plan: [x] Continue current frequency toward long and short term goals.     [x] Specific Instructions for subsequent treatments: Continue to progress quad strengthening, standing exercises   Frequency:  2 x/week for 20 visits      Time In:1400            Time Out: 2880    Electronically signed by:  Amada Sadler, PT

## 2023-03-15 PROBLEM — L98.491 SKIN ULCER OF SCALP, LIMITED TO BREAKDOWN OF SKIN (HCC): Status: ACTIVE | Noted: 2023-03-15

## 2023-03-16 ENCOUNTER — HOSPITAL ENCOUNTER (OUTPATIENT)
Dept: PHYSICAL THERAPY | Facility: CLINIC | Age: 83
Setting detail: THERAPIES SERIES
Discharge: HOME | End: 2023-03-16
Payer: MEDICARE

## 2023-03-16 PROCEDURE — 97110 THERAPEUTIC EXERCISES: CPT

## 2023-03-16 PROCEDURE — 97016 VASOPNEUMATIC DEVICE THERAPY: CPT

## 2023-03-16 NOTE — PRE-CERTIFICATION NOTE
Medicare Cap   [x] Physical Therapy  [] Speech Therapy  [] Occupational therapy  *PT and Speech caps combine      $2230 Limit for PT and Speech combined  $2230 Limit for OT individually  At the beginning of the month where you expect to go over $2150, please add the [address] [place] modifier      Patient Name: Nicola Riddle: 1940    Note:  This is an estimate of charges billed.      Date of [address] Name # units/ charge $$$ charge Daily Total Charge Ongoing Total $$$   2/20 PT eval  Therex  Vaso 1+1+1 97.02+22.29+8.99 128.3 128.3   2/23 PTA TE, Vaso 3+1 25.10+19.62*2+7.91 72.25 200.55   2/27 PT Therex  Vaso 3+1 28.50+22.29+22.29+8.99 82.07 282.62   3/2 PT Therex  Vaso 3+1  82.07 364.69   3/6 PT Therex  Vaso 3+1  82.07 446.76   3/9 PTA TE, Vaso 3+1 25.10+19.62*2+7.91 72.25 519.01   3/13 PT Therex  Vaso 3+1  82.07 601.08   3/16 TE + Vaso 3+1  72.25 673.33

## 2023-03-16 NOTE — FLOWSHEET NOTE
[] [facility]) [facility] &  Therapy  [address]  [phone]  F: [phone] [] [address]  [facility]   [address]  P: [phone]  F: [phone] [] [place] &  Therapy  [address]  P: [phone]  F: [phone] [x] [address]  P: [phone]  F: [phone] [] [address]  [facility]   [address]   [place]: [phone]  F: [phone]      Physical Therapy Daily Treatment Note    Date:  3/16/2023  Patient Name:  Rony Vasquez    :  1940  MRN: [id number]  Physician: Dr. Angi Schmitz: [facility] Medicare (no [place] Notice)  Medical Diagnosis: S/p L TKA                     Rehab Codes: M25. 562 (L knee pain)  Onset date:  DOS: 22              Next 's appt. : 2 weeks from today  Visit# / total visits: ; Progress note for Medicare patient due at visit 10     Cancels/No Shows: 0/0    Subjective:    Pain:  [x] Yes  [] No Location: L TKA Pain Rating: (0-10 scale) 0/10  Pain altered Tx:  [] No  [] Yes  Action:  Comments: Pt reported no pain. Pt did not see doctor, now scheduled for 3/24.      Objective:  Modalities:   Precautions: check on pitting edema  Exercises:  Exercise  L TKA (DOS 22)    Reps/ Time Weight/ Level Comments   NUSTEP 5' L4  recumbent bike 3/16 due to availability             Hip abd 2x10  1 lbs All standing in // bars   Hip ext  2x10 1 lbs    Mini squat 2x10       TKE 2x10 lime      Marching 2x10  1 lbs      Step ups fwd/lat 2x10   4\"    HS curl 2x10 1 lbs    Heel raises 2x10 1 lbs    Sit to stands 2x10  10x this date due to increased L knee pain         Seated      LAQ 2x10 3#    Marches 2x10 3#          Supine       Heel slides   2x10       SLR 2x10 Active  Regressed to without weight 3/13 d/t pain and quad lag with wt on            Sidelying        Clamshells    Tactile cues to decrease pelvic rotation- HELD 3/13            Gait training x   One lap around clinic, emphasizing larger steps along with wider ASHLEY with RW. Other:     Vasocompression: 15' LLE supien with bolster 34deg medium pressure     Specific Instructions for next treatment:  Continue to progress quad strengthening, standing exercises FOLLOW UP ON PITTING EDEMA LLE-pt has dr. Edgar Kim with PCP (3/24)      Treatment Charges: Mins Units   []  Modalities     [x]  Ther Exercise 42 3   []  Manual Therapy     []  Ther Activities     []  Aquatics     [x]  Vasocompression 15 1   []  Other     Total Treatment time 57 4   5 minutes not charged. Waited on vaso at pt request.     Assessment: [x] Progressing toward goals. Pt continues to present with pitting edema. Emphasized trying to get into doctor sooner than currently scheduled. Pt was able to complete all activities as listed above. Increased resistance to standing activities to improve LE strength. Pt noted increase L knee pain with sit to stands only able to complete 10x. Pt concluded session with vasocompression to decrease swelling. [] No change. [] Other:  [x] Patient would continue to benefit from skilled physical therapy services in order to: Increase ROM and strength in LLE, increase gait to progress off RW and onto cane once able. STG/LTG    STG: (to be met in 10 treatments)  ? Pain: Decrease pain levels to 4/10 at worst in L knee  ? ROM: Increase flexibility and AROM limitations throughout to equal bilat to reduce difficulty with ADLs, increase L knee flex AROM to 100deg, ext to 0deg  ? Strength: Increase L quad strength to 4-/5  ?  Function: Pt to ambulate with straight cane small distances without gait deficits   Independent with Home Exercise Programs  Demonstrate Knowledge of fall prevention     LTG: (to be met in 20 treatments)  Improve score on assessment tool from 57% impaired to 25% impaired, demonstrating an improvement in ADLs  Reduce pain levels to 0/10                    Patient goals: Learn to walk with a cane, more motion in L knee       Pt. Education:  [x] Yes  [] No  [x] Reviewed Prior HEP/Ed  Method of Education: [x] Verbal  [x] Demo  [x] Written  Comprehension of Education:  [x] Verbalizes understanding. [x] Demonstrates understanding. [] Needs review. [x] Demonstrates/verbalizes HEP/Ed previously given. Access Code: [email or web address]  URL: [email or web address]  Date: 11/92/7914  Prepared by: Wright Primrose    Exercises  Supine Knee Extension Stretch on Towel Roll - 1 x daily - 7 x weekly - 3 sets - 3\" hold  Clamshell - 1 x daily - 7 x weekly - 2 sets - 10 reps         Plan: [x] Continue current frequency toward long and short term goals.     [x] Specific Instructions for subsequent treatments: Continue to progress quad strengthening, standing exercises   Frequency:  2 x/week for 20 visits      Time In:1400            Time Out: 4638    Electronically signed by:  Tahir Walker PTA

## 2023-03-21 ENCOUNTER — HOSPITAL ENCOUNTER (OUTPATIENT)
Dept: PHYSICAL THERAPY | Facility: CLINIC | Age: 83
Setting detail: THERAPIES SERIES
Discharge: HOME | End: 2023-03-21
Payer: MEDICARE

## 2023-03-21 PROCEDURE — 97110 THERAPEUTIC EXERCISES: CPT

## 2023-03-21 PROCEDURE — 97016 VASOPNEUMATIC DEVICE THERAPY: CPT

## 2023-03-21 NOTE — PRE-CERTIFICATION NOTE
Medicare Cap   [x] Physical Therapy  [] Speech Therapy  [] Occupational therapy  *PT and Speech caps combine      $2230 Limit for PT and Speech combined  $2230 Limit for OT individually  At the beginning of the month where you expect to go over $2150, please add the [address] [place] modifier      Patient Name: Tami Asencio: 1940    Note:  This is an estimate of charges billed.      Date of [address] Name # units/ charge $$$ charge Daily Total Charge Ongoing Total $$$   2/20 PT eval  Therex  Vaso 1+1+1 97.02+22.29+8.99 128.3 128.3   2/23 PTA TE, Vaso 3+1 25.10+19.62*2+7.91 72.25 200.55   2/27 PT Therex  Vaso 3+1 28.50+22.29+22.29+8.99 82.07 282.62   3/2 PT Therex  Vaso 3+1  82.07 364.69   3/6 PT Therex  Vaso 3+1  82.07 446.76   3/9 PTA TE, Vaso 3+1 25.10+19.62*2+7.91 72.25 519.01   3/13 PT Therex  Vaso 3+1  82.07 601.08   3/16 TE + Vaso 3+1  72.25 673.33   3/21 TE + Vaso 3+1  72.25 745.58

## 2023-03-21 NOTE — FLOWSHEET NOTE
[] [facility]) [facility] &  Therapy  [address]  [phone]  F: [phone] [] [address]  [facility]   [address]  P: [phone]  F: [phone] [] [place] &  Therapy  [address]  P: [phone]  F: [phone] [x] [address]  P: [phone]  F: [phone] [] [address]  [facility]   [address]   [place]: [phone]  F: [phone]      Physical Therapy Daily Treatment Note    Date:  3/21/2023  Patient Name:  Enedelia Patterson    :  1940  MRN: [id number]  Physician: Dr. Amena Rubio: [place] Medicare (no [facility])  Medical Diagnosis: S/p L TKA                     Rehab Codes: M25. 562 (L knee pain)  Onset date:  DOS: 22              Next Dr's appt. : 2 weeks from today  Visit# / total visits: ; Progress note for Medicare patient due at visit 10     Cancels/No Shows: 0/0    Subjective:    Pain:  [x] Yes  [] No Location: L TKA Pain Rating: (0-10 scale) 0/10  Pain altered Tx:  [] No  [] Yes  Action:  Comments: Pt reported no pain and has not been able to get into the doctor. Pt still has pitting edema bilaterally below knees.      Objective:  Modalities:   Precautions: check on pitting edema  Exercises:  Exercise  L TKA (DOS 22)    Reps/ Time Weight/ Level Comments   NUSTEP 5' L4               Hip abd 2x10  1 lbs All standing in // bars   Hip ext  2x10 1 lbs    Mini squat 2x10       TKE 2x10 blue      Marching 2x10  1 lbs      Step ups fwd/lat 2x10   4\"    HS curl 2x10 1 lbs    Heel raises 2x10 1 lbs    Sit to stands 2x10  10x this date due to increased L knee pain         Seated      LAQ 2x10 3#    Marches 2x10 3#          Supine       Heel slides   2x10       SLR 2x10 Active

## 2023-03-23 ENCOUNTER — HOSPITAL ENCOUNTER (OUTPATIENT)
Dept: PHYSICAL THERAPY | Facility: CLINIC | Age: 83
Setting detail: THERAPIES SERIES
Discharge: HOME | End: 2023-03-23
Payer: MEDICARE

## 2023-03-23 PROCEDURE — 97110 THERAPEUTIC EXERCISES: CPT

## 2023-03-23 PROCEDURE — 97016 VASOPNEUMATIC DEVICE THERAPY: CPT

## 2023-03-23 NOTE — PRE-CERTIFICATION NOTE
Medicare Cap   [x] Physical Therapy  [] Speech Therapy  [] Occupational therapy  *PT and Speech caps combine      $2230 Limit for PT and Speech combined  $2230 Limit for OT individually  At the beginning of the month where you expect to go over $2150, please add the [address] [place] modifier      Patient Name: Adriana aSntiago: 1940    Note:  This is an estimate of charges billed.      Date of [address] Name # units/ charge $$$ charge Daily Total Charge Ongoing Total $$$   2/20 PT eval  Therex  Vaso 1+1+1 97.02+22.29+8.99 128.3 128.3   2/23 PTA TE, Vaso 3+1 25.10+19.62*2+7.91 72.25 200.55   2/27 PT Therex  Vaso 3+1 28.50+22.29+22.29+8.99 82.07 282.62   3/2 PT Therex  Vaso 3+1  82.07 364.69   3/6 PT Therex  Vaso 3+1  82.07 446.76   3/9 PTA TE, Vaso 3+1 25.10+19.62*2+7.91 72.25 519.01   3/13 PT Therex  Vaso 3+1  82.07 601.08   3/16 TE + Vaso 3+1  72.25 673.33   3/21 TE + Vaso 3+1  72.25 745.58   3/23 Therex  Vaso PT 3+1  82.07 827.65

## 2023-03-23 NOTE — FLOWSHEET NOTE
3/13 d/t pain and quad lag with wt on            Sidelying        Clamshells    Tactile cues to decrease pelvic rotation- HELD 3/13            Gait training -   One lap around clinic, emphasizing larger steps along with wider ASHLEY with RW. Other:     Vasocompression: 15' LLE supien with bolster 34deg medium pressure     Specific Instructions for next treatment:  Continue to progress quad strengthening, standing exercises FOLLOW UP ON PITTING EDEMA LLE-pt has dr. Kishor Faust with ortho (3/24)      Treatment Charges: Mins Units   []  Modalities     [x]  Ther Exercise 40 3   []  Manual Therapy     []  Ther Activities     []  Aquatics     [x]  Vasocompression 15 1   []  Other     Total Treatment time 55 4       Assessment: [x] Progressing toward goals. Initiated on Michelle Frazier followed by standing exercises, pt with noted increased endurance and only required one sitting rest break vs mulitple in previous visits. Continued with supine exercises, pt with greatest difficulty noted in L knee flex. [] No change. [] Other:  [x] Patient would continue to benefit from skilled physical therapy services in order to: Increase ROM and strength in LLE, increase gait to progress off RW and onto cane once able. STG/LTG    STG: (to be met in 10 treatments)  ? Pain: Decrease pain levels to 4/10 at worst in L knee  ? ROM: Increase flexibility and AROM limitations throughout to equal bilat to reduce difficulty with ADLs, increase L knee flex AROM to 100deg, ext to 0deg  ? Strength: Increase L quad strength to 4-/5  ?  Function: Pt to ambulate with straight cane small distances without gait deficits   Independent with Home Exercise Programs  Demonstrate Knowledge of fall prevention     LTG: (to be met in 20 treatments)  Improve score on assessment tool from 57% impaired to 25% impaired, demonstrating an improvement in ADLs  Reduce pain levels to 0/10                    Patient goals: Learn to walk with a cane, more motion in L

## 2023-03-24 ENCOUNTER — OFFICE VISIT (OUTPATIENT)
Dept: ORTHOPEDIC SURGERY | Age: 83
End: 2023-03-24

## 2023-03-24 DIAGNOSIS — Z96.652 STATUS POST LEFT KNEE REPLACEMENT: Primary | ICD-10-CM

## 2023-03-24 PROCEDURE — 99024 POSTOP FOLLOW-UP VISIT: CPT | Performed by: ORTHOPAEDIC SURGERY

## 2023-03-24 NOTE — PROGRESS NOTES
Patient returns today he is 3-month status post left total knee. Patient states that he really not having much pain but he just has a hard time going up on the step with that. He still working with physical therapy on his strength as well as his motion. Patient states that his right total knee is doing great    Examination the patient's left knee is most recent knee notes his incision pristine he can get all the way straight now as this was an issue before he flexes back to about 110 degrees. He has good stability throughout still can use some strength however. Impression  3-month status post left total knee  Previous right total knee    Plan  Encourage patient continue with his exercise program did feel this was limited with the steps otherwise he is to maintain good motion. I also suggested he get back to wearing his compression hose as he does have some mild edema in his legs.   We will see him back here in 3 months

## 2023-03-27 ENCOUNTER — HOSPITAL ENCOUNTER (OUTPATIENT)
Age: 83
Discharge: HOME | End: 2023-03-29

## 2023-03-28 ENCOUNTER — HOSPITAL ENCOUNTER (OUTPATIENT)
Dept: PHYSICAL THERAPY | Facility: CLINIC | Age: 83
Setting detail: THERAPIES SERIES
Discharge: HOME | End: 2023-03-28
Payer: MEDICARE

## 2023-03-28 PROCEDURE — 97110 THERAPEUTIC EXERCISES: CPT

## 2023-03-28 NOTE — FLOWSHEET NOTE
impaired, demonstrating an improvement in ADLs  Reduce pain levels to 0/10                    Patient goals: Learn to walk with a cane, more motion in L knee       Pt. Education:  [x] Yes  [] No  [x] Reviewed Prior HEP/Ed  Method of Education: [x] Verbal  [x] Demo  [x] Written  Comprehension of Education:  [x] Verbalizes understanding. [x] Demonstrates understanding. [] Needs review. [x] Demonstrates/verbalizes HEP/Ed previously given. Access Code: [email or web address]  URL: [email or web address]  Date: 35/41/0416  Prepared by: Sabiha Capmos    Exercises  Supine Knee Extension Stretch on Towel Roll - 1 x daily - 7 x weekly - 3 sets - 3\" hold  Clamshell - 1 x daily - 7 x weekly - 2 sets - 10 reps         Plan: [x] Continue current frequency toward long and short term goals.     [x] Specific Instructions for subsequent treatments: UPDATE HEP, PRIMARY PT TO PERFORM PROGRESS NOTE   Frequency:  2 x/week for 20 visits      Time In: 12:10pm           Time Out: 1:00pm    Electronically signed by:  Oren Gonzalez PTA

## 2023-03-30 ENCOUNTER — HOSPITAL ENCOUNTER (OUTPATIENT)
Dept: PHYSICAL THERAPY | Facility: CLINIC | Age: 83
Setting detail: THERAPIES SERIES
Discharge: HOME | End: 2023-03-30
Payer: MEDICARE

## 2023-03-30 PROCEDURE — 97116 GAIT TRAINING THERAPY: CPT

## 2023-03-30 PROCEDURE — 97110 THERAPEUTIC EXERCISES: CPT

## 2023-03-30 NOTE — FLOWSHEET NOTE
[] [address] [facility]EP [facility] &  Therapy  [address]  [phone]  F: [phone] [] [address]  [facility]   [address]  P: [phone]  F: [phone] [] [place] &  Therapy  [address]  P: [phone]  F: [phone] [x] [address]  P: [phone]  F: [phone] [] [address]  [facility]   [address]   [place]: [phone]  F: [phone]      Physical Therapy Daily Treatment Note    Date:  3/30/2023  Patient Name:  Awilda Freeman    :  1940  MRN: [id number]  Physician: Dr. Alvaro Bishop: [facility] Medicare (no Carlton De Leon)  Medical Diagnosis: S/p L TKA                     Rehab Codes: M25. 562 (L knee pain)  Onset date:  DOS: 22              Next 's appt. : 2 weeks from today  Visit# / total visits: ; Progress note for Medicare patient due at visit 10     Cancels/No Shows: 0/0    Subjective:    Pain:  [x] Yes  [] No Location: L TKA Pain Rating: (0-10 scale) 0/10  Pain altered Tx:  [] No  [] Yes  Action:  Comments: Pt reports no pain in L knee upon arrival. States he felt good after last visit.      Objective:  Modalities:   Precautions: check on pitting edema  Exercises:  Exercise  L TKA (DOS 22)    Reps/ Time Weight/ Level Comments    NUSTEP 10' L5   x              Heel raises 3x10   x   Marches  3x10   x   HS curls  3x10   x          3 way hip  2x15  Bilat  x   Step ups  2x10 6\" Fwd x          Sit to stands 2x10  10x this date due to increased L knee pain --   TRX squats  2x10  SBA AND CHAIR BEHIND PT FOR SAFETY  x   Seated       LAQ 3x10 5# Bilat  x   HS curls  2x10 Blue   x   Marches 2x10 3#  -                    Gait training 1 large lap ~100 ft    SPC, CGA to SBA with gait belt

## 2023-03-30 NOTE — PROGRESS NOTES
[] [facility]) [facility]  [facility]  [address]  [phone]  F: [phone] [] [address]  [facility]   [address]  P: [phone]  F: [phone] [] [address] &  Therapy  [address] [facility] [address]  P: [phone]  F: [phone] [x] [address]  P: [phone]  F: [phone] [] [address]  [facility]   [address]   [place]: [phone]  F: [phone]      Physical Therapy Progress Note    Date: 3/30/2023      Patient: Richa Ward  : 1940  MRN: [id number]    Physician: Dr. Orlando Jenkins: [facility] Medicare (no [facility])  Medical Diagnosis: S/p L TKA                     Rehab Codes: M25. 562 (L knee pain)  Onset date:  DOS: 22              Next 's appt. : 2 weeks from today  Visit# / total visits: ; Progress note for Medicare patient due at visit 20                               Cancels/No Shows: 0/0      Subjective:  Subjective:    Pain:  [x] Yes  [] No   Location: L TKA          Pain Rating: (0-10 scale) 0/10  Pain altered Tx:  [] No  [] Yes  Action:  Comments: Pt reports no pain in L knee upon arrival. States he felt good after last visit. Assessment:  [x] Progressing toward goals. When pt was questioned on why he was leaning forward after standing exercises, he states, \"I'm not sure\". Discussed standing upright at all times to correct bad habit of leaning forward. Pt requires minimal to moderate cueing to keep knees in line with toes with step ups and with TRX squats. Able to complete Gait training in // bars followed by gait training with SPC in clinic with good lenny, however SBA for safety. Updated HEP as listed below. STG/LTG:  STG: (to be met in 10 treatments)  ?  Pain: Decrease

## 2023-03-30 NOTE — PRE-CERTIFICATION NOTE
Medicare Cap   [x] Physical Therapy  [] Speech Therapy  [] Occupational therapy  *PT and Speech caps combine      $2230 Limit for PT and Speech combined  $2230 Limit for OT individually  At the beginning of the month where you expect to go over $2150, please add the [address] [place] 22 modifier      Patient Name: Cristino Griffin: 1940    Note:  This is an estimate of charges billed.      Date of [address] Name # units/ charge $$$ charge Daily Total Charge Ongoing Total $$$   2/20 PT eval  Therex  Vaso 1+1+1 97.02+22.29+8.99 128.3 128.3   2/23 PTA TE, Vaso 3+1 25.10+19.62*2+7.91 72.25 200.55   2/27 PT Therex  Vaso 3+1 28.50+22.29+22.29+8.99 82.07 282.62   3/2 PT Therex  Vaso 3+1  82.07 364.69   3/6 PT Therex  Vaso 3+1  82.07 446.76   3/9 PTA TE, Vaso 3+1 25.10+19.62*2+7.91 72.25 519.01   3/13 PT Therex  Vaso 3+1  82.07 601.08   3/16 TE + Vaso 3+1  72.25 673.33   3/21 TE + Vaso 3+1  72.25 745.58   3/23 Therex  Vaso PT 3+1  82.07 827.65   3/28 PTA TE  3 25.10+19.62*2 64.34 891.99   3/30 PTA TE, Gait  3+1 25.10+19.62*3 83.96 975.95

## 2023-04-04 ENCOUNTER — HOSPITAL ENCOUNTER (OUTPATIENT)
Dept: PHYSICAL THERAPY | Facility: CLINIC | Age: 83
Setting detail: THERAPIES SERIES
Discharge: HOME | End: 2023-04-04
Payer: MEDICARE

## 2023-04-04 PROCEDURE — 97110 THERAPEUTIC EXERCISES: CPT

## 2023-04-04 NOTE — FLOWSHEET NOTE
[] [facility]) - [facility] &  Therapy  [address]  [phone]  F: [phone] [] [address]  [facility]   [address]  P: [phone]  F: [phone] [] [place] &  Therapy  [address]  P: [phone]  F: [phone] [x] [address]  P: [phone]  F: [phone] [] [address]  [facility]   [address]   [place]: [phone]  F: [phone]      Physical Therapy Daily Treatment Note    Date:  2023  Patient Name:  Luis Manuel Chance    :  1940  MRN: [id number]  Physician: Dr. Yahir Scanlon: [facility] Medicare (no Sam Sanchez)  Medical Diagnosis: S/p L TKA                     Rehab Codes: M25. 562 (L knee pain)  Onset date:  DOS: 22              Next 's appt. : 2 weeks from today  Visit# / total visits: ; Progress note for Medicare patient due at visit 10     Cancels/No Shows: 0/0    Subjective:    Pain:  [x] Yes  [] No Location: L TKA Pain Rating: (0-10 scale) 0/10  Pain altered Tx:  [] No  [] Yes  Action:  Comments: Pt denies pain upon arrival, or increased pain after last visit. Pt reports L knee feels a little stronger. Pt amb into clinic with [facility] with wide base.      Objective:  Modalities:   Precautions: check on pitting edema  Exercises:  Exercise  L TKA (DOS 22)    Reps/ Time Weight/ Level Comments    NUSTEP 10' L5   x              Heel raises 3x10   x   Marches  3x10   x   HS curls  3x10   x          3 way hip  2x15  Bilat  x   Step ups  2x10 6\" Fwd x          Sit to stands 2x10  10x this date due to increased L knee pain --   TRX squats  2x10  SBA AND CHAIR BEHIND PT FOR SAFETY  x   Seated       LAQ 3x10 5# Bilat  -   HS curls  2x10 Blue   -   Marches 2x10 3#  -

## 2023-04-06 ENCOUNTER — HOSPITAL ENCOUNTER (OUTPATIENT)
Dept: PHYSICAL THERAPY | Facility: CLINIC | Age: 83
Setting detail: THERAPIES SERIES
Discharge: HOME | End: 2023-04-06
Payer: MEDICARE

## 2023-04-06 PROCEDURE — 97016 VASOPNEUMATIC DEVICE THERAPY: CPT

## 2023-04-06 PROCEDURE — 97110 THERAPEUTIC EXERCISES: CPT

## 2023-04-06 NOTE — FLOWSHEET NOTE
given.    Access Code: [email or web address]  URL: [email or web address]  Date: 03/30/2023  Prepared by: Romana Robinson    Exercises  - Standing March with Counter Support  - 1 x daily - 7 x weekly - 3 sets - 10 reps  - Heel Raises with Counter Support  - 1 x daily - 7 x weekly - 3 sets - 10 reps  - Standing Hamstring Curl with Chair Support  - 1 x daily - 7 x weekly - 3 sets - 10 reps  - Standing Hip Abduction with Counter Support  - 1 x daily - 7 x weekly - 3 sets - 10 reps  - Standing Hip Flexion with Counter Support  - 1 x daily - 7 x weekly - 3 sets - 10 reps  - Standing Hip Extension with Counter Support  - 1 x daily - 7 x weekly - 3 sets - 10 reps       Plan: [x] Continue current frequency toward long and short term goals.     [x] Specific Instructions for subsequent treatments:   Frequency:  2 x/week for 20 visits      Time In: 0472           Time Out: 4317    Electronically signed by:  Mavis Rosales PTA

## 2023-04-18 ENCOUNTER — HOSPITAL ENCOUNTER (OUTPATIENT)
Dept: PHYSICAL THERAPY | Facility: CLINIC | Age: 83
Setting detail: THERAPIES SERIES
Discharge: HOME | End: 2023-04-18
Payer: MEDICARE

## 2023-04-18 PROCEDURE — 97116 GAIT TRAINING THERAPY: CPT

## 2023-04-18 PROCEDURE — 97110 THERAPEUTIC EXERCISES: CPT

## 2023-04-18 PROCEDURE — 97016 VASOPNEUMATIC DEVICE THERAPY: CPT

## 2023-04-18 NOTE — FLOWSHEET NOTE
8-102        3/30/23 LEFS: 37% Impaired     Specific Instructions for next treatment:   Treatment Charges: Mins Units   []  Modalities     [x]  Ther Exercise 20 1   []  Manual Therapy     []  Ther Activities     []  Aquatics     [x]  Vasocompression 15 1   [x]  Other: Gait  10 1   Total Treatment time 45 3   10' on NuStep not charged    Assessment: [x] Progressing toward goals. Initiated treatment w/ warm-up on NuStep followed by standing strengthening as mentioned above. Progressed to asc/descending flight of stairs at 4'' height alternating steps w/ good technique demonstrated per patient. Ambulated around clinic w/ heel pause on LLE to encourage heel contact and toe clearance 1L, second lap gait was improved. Vasocompression to end treatment for symptom control. [] No change. [x] Patient would continue to benefit from skilled physical therapy services in order to: Increase ROM and strength in LLE, increase gait to progress off RW and onto cane once able. STG/LTG:  STG: (to be met in 10 treatments)  ? Pain: Decrease pain levels to 4/10 at worst in L knee  ? ROM: Increase flexibility and AROM limitations throughout to equal bilat to reduce difficulty with ADLs, increase L knee flex AROM to 100deg, ext to 0deg  ? Strength: Increase L quad strength to 4-/5  ? Function: Pt to ambulate with straight cane small distances without gait deficits   Independent with Home Exercise Programs  Demonstrate Knowledge of fall prevention     LTG: (to be met in 20 treatments)  Improve score on assessment tool from 57% impaired to 25% impaired, demonstrating an improvement in ADLs  Reduce pain levels to 0/10                    Patient goals: Learn to walk with a cane, more motion in L knee       Pt. Education:  [x] Yes  [] No  [x] Reviewed Prior HEP/Ed  Method of Education: [x] Verbal  [x] Demo  [x] Written  Comprehension of Education:  [x] Verbalizes understanding. [x] Demonstrates understanding. [] Needs review.   [x]

## 2023-04-18 NOTE — PRE-CERTIFICATION NOTE
Medicare Cap   [x] Physical Therapy  [] Speech Therapy  [] Occupational therapy  *PT and Speech caps combine      $2230 Limit for PT and Speech combined  $2230 Limit for OT individually  At the beginning of the month where you expect to go over $2150, please add the [place] [place] modifier      Patient Name: Joselin Mcmahan: 1940    Note:  This is an estimate of charges billed. Date of [address] Name # units/ charge $$$ charge Daily Total Charge Ongoing Total $$$   2/20 PT eval  Therex  Vaso 1+1+1 97.02+22.29+8.99 128.3 128.3   2/23 PTA TE, Vaso 3+1 25.10+19.62*2+7.91 72.25 200.55   2/27 PT Therex  Vaso 3+1 28.50+22.29+22.29+8.99 82.07 282.62   3/2 PT Therex  Vaso 3+1  82.07 364.69   3/6 PT Therex  Vaso 3+1  82.07 446.76   3/9 PTA TE, Vaso 3+1 25.10+19.62*2+7.91 72.25 519.01   3/13 PT Therex  Vaso 3+1  82.07 601.08   3/16 TE + Vaso 3+1  72.25 673.33   3/21 TE + Vaso 3+1  72.25 745.58   3/23 Therex  Vaso PT 3+1  82.07 827.65   3/28 PTA TE  3 25.10+19.62*2 64.34 891.99   3/30 PTA TE, Gait  3+1 25.10+19.62*3 83.96 975.95   4/4 PTA TE 4  81.15 1,057.10   4/6 PTA TE, Vaso 3+1  81.15 1,138.25   4/11 PT TE, Vaso 2+1  63.70 1,201.95   4/18 PTA TE, Gait, Vaso 1+1+1  52.63 1,254. [facility]

## 2023-04-20 ENCOUNTER — HOSPITAL ENCOUNTER (OUTPATIENT)
Dept: PHYSICAL THERAPY | Facility: CLINIC | Age: 83
Setting detail: THERAPIES SERIES
Discharge: HOME | End: 2023-04-20
Payer: MEDICARE

## 2023-04-20 PROCEDURE — 97116 GAIT TRAINING THERAPY: CPT

## 2023-04-20 PROCEDURE — 97110 THERAPEUTIC EXERCISES: CPT

## 2023-04-20 PROCEDURE — 97016 VASOPNEUMATIC DEVICE THERAPY: CPT

## 2023-04-20 NOTE — FLOWSHEET NOTE
[] [facility] [address]  [address]  [phone]  F: [phone] [] [address]  [facility] 36   [address]  P: [phone]  F: [phone] [] [address]  [address]  P: [phone]  F: [phone] [x] [address]  P: [phone]  F: [phone] [] [address]  [facility]   [address]   [place]: [phone]  F: [phone]      Physical Therapy Daily Treatment Note    Date:  2023  Patient Name:  Marco Mclean    :  1940  MRN: [id number]  Physician: Dr. Cheryl Avila: [facility] Medicare (no Margo Black)  Medical Diagnosis: S/p L TKA                     Rehab Codes: M25. 562 (L knee pain)  Onset date:  DOS: 22              Next 's appt.: TBD  Visit# / total visits: ; Progress note for Medicare patient due at visit 20     Cancels/No Shows: 1/0    Subjective:    Pain:  [] Yes  [x] No Location: L TKA Pain Rating: (0-10 scale) 0/10  Pain altered Tx:  [] No  [] Yes  Action:  Comments: Pt reported he is having no pain, stated he is still a little weak but is doing great other than this.      Objective:  Modalities:   Precautions: check on pitting edema  Exercises:  Exercise  L TKA (DOS 22)    Reps/ Time Weight/ Level Comments    NUSTEP 5' L5  reduced time due to availability  x              Heel raises 3x10   x   Marches  3x10   x   HS curls  3x10   x          3 way hip  3x10  Bilat, one UE assist x   Stairs 5x 4\" Fwd x          Sit to stands 2x10   --   TRX squats  2x10  SBA AND CHAIR BEHIND PT FOR SAFETY  x   Seated       LAQ 3x10 5# Bilat  -   HS curls  2x10 Blue   -   Marches 2x10 3#  -                    Gait training 1 large lap ~150 ft    SPC, CGA to SBA with

## 2023-04-20 NOTE — PRE-CERTIFICATION NOTE
Medicare Cap   [x] Physical Therapy  [] Speech Therapy  [] Occupational therapy  *PT and Speech caps combine      $2230 Limit for PT and Speech combined  $2230 Limit for OT individually  At the beginning of the month where you expect to go over $2150, please add the [place] [place] modifier      Patient Name: Kourtney Galan: 1940    Note:  This is an estimate of charges billed.      Date of [address] Name # units/ charge $$$ charge Daily Total Charge Ongoing Total $$$   2/20 PT eval  Therex  Vaso 1+1+1 97.02+22.29+8.99 128.3 128.3   2/23 PTA TE, Vaso 3+1 25.10+19.62*2+7.91 72.25 200.55   2/27 PT Therex  Vaso 3+1 28.50+22.29+22.29+8.99 82.07 282.62   3/2 PT Therex  Vaso 3+1  82.07 364.69   3/6 PT Therex  Vaso 3+1  82.07 446.76   3/9 PTA TE, Vaso 3+1 25.10+19.62*2+7.91 72.25 519.01   3/13 PT Therex  Vaso 3+1  82.07 601.08   3/16 TE + Vaso 3+1  72.25 673.33   3/21 TE + Vaso 3+1  72.25 745.58   3/23 Therex  Vaso PT 3+1  82.07 827.65   3/28 PTA TE  3 25.10+19.62*2 64.34 891.99   3/30 PTA TE, Gait  3+1 25.10+19.62*3 83.96 975.95   4/4 PTA TE 4  81.15 1,057.10   4/6 PTA TE, Vaso 3+1  81.15 1,138.25   4/11 PT TE, Vaso 2+1  63.70 1,201.95   4/18 PTA TE, Gait, Vaso 1+1+1  52.63 1,254.58   4/20 TE, Gait, Vaso 1+1+1  52.63 1307.21

## 2023-04-25 ENCOUNTER — HOSPITAL ENCOUNTER (OUTPATIENT)
Dept: PHYSICAL THERAPY | Facility: CLINIC | Age: 83
Setting detail: THERAPIES SERIES
Discharge: HOME | End: 2023-04-25
Payer: MEDICARE

## 2023-04-25 PROCEDURE — 97116 GAIT TRAINING THERAPY: CPT

## 2023-04-25 PROCEDURE — 97110 THERAPEUTIC EXERCISES: CPT

## 2023-04-25 NOTE — FLOWSHEET NOTE
2 large laps ~300 ft    SPC, CGA to SBA with gait belt on x    Balance           Feet together - EC  2x30\"   x   Tandem stance  2x30\" ea    x          Other:      2/20/23 3/28/23   L Knee AAROM  1-85 8-102        3/30/23 LEFS: 37% Impaired     Specific Instructions for next treatment:   Treatment Charges: Mins Units   []  Modalities     [x]  Ther Exercise 40 2   []  Manual Therapy     []  Ther Activities     []  Aquatics     []  Vasocompression     [x]  Other: Gait  10 1   Total Treatment time 50 3       Assessment: [x] Progressing toward goals. Increased distance to 300 ft for walking to improve walking tolerance. Bilateral glut med weakness noted with Trendelenburg (L>R) during ambulation and with step ups; therefore added clamshells and side stepping with good lenny, but weakness noted. Updated HEP as listed below with handout given and also sent via text. [] No change. [x] Patient would continue to benefit from skilled physical therapy services in order to: Increase ROM and strength in LLE, increase gait to progress off RW and onto cane once able. STG/LTG:  STG: (to be met in 10 treatments)  ? Pain: Decrease pain levels to 4/10 at worst in L knee  ? ROM: Increase flexibility and AROM limitations throughout to equal bilat to reduce difficulty with ADLs, increase L knee flex AROM to 100deg, ext to 0deg  ? Strength: Increase L quad strength to 4-/5  ? Function: Pt to ambulate with straight cane small distances without gait deficits   Independent with Home Exercise Programs  Demonstrate Knowledge of fall prevention     LTG: (to be met in 20 treatments)  Improve score on assessment tool from 57% impaired to 25% impaired, demonstrating an improvement in ADLs  Reduce pain levels to 0/10                    Patient goals: Learn to walk with a cane, more motion in L knee       Pt.  Education:  [x] Yes  [] No  [x] Reviewed Prior HEP/Ed  Method of Education: [x] Verbal  [x] Demo  [x] Written  Comprehension of

## 2023-04-27 PROBLEM — S76.012A: Status: ACTIVE | Noted: 2023-04-27

## 2023-04-28 ENCOUNTER — HOSPITAL ENCOUNTER (OUTPATIENT)
Dept: PHYSICAL THERAPY | Facility: CLINIC | Age: 83
Setting detail: THERAPIES SERIES
Discharge: HOME | End: 2023-04-28
Payer: MEDICARE

## 2023-04-28 PROCEDURE — 97110 THERAPEUTIC EXERCISES: CPT

## 2023-04-28 PROCEDURE — 97016 VASOPNEUMATIC DEVICE THERAPY: CPT

## 2023-04-28 PROCEDURE — 97530 THERAPEUTIC ACTIVITIES: CPT

## 2023-04-28 NOTE — FLOWSHEET NOTE
[] [facility]  [address]  [phone]  F: [phone] [] [address]  [place] 36   [address]  P: [phone]  F: [phone] [] [address]  [address]  P: [phone]  F: [phone] [x] [address]  P: [phone]  F: [phone] [] [address]  [facility]   [address]   [place]: [phone]  F: [phone]      Physical Therapy Daily Treatment Note    Date:  2023  Patient Name:  Riana Gould    :  1940  MRN: [id number]  Physician: Dr. Mak Foil: [facility] Medicare (no Jerrald Mundo)  Medical Diagnosis: S/p L TKA                     Rehab Codes: M25. 562 (L knee pain) New referral for L glut tear   Onset date:  DOS: 22              Next 's appt.: TBD  Visit# / total visits: ; Progress note for Medicare patient due at visit 30     Cancels/No Shows: 1/0    Subjective:    Pain:  [] Yes  [x] No Location: L TKA Pain Rating: (0-10 scale) 0/10  Pain altered Tx:  [] No  [] Yes  Action:  Comments: Pt arrives with new referral for L glut tear. Pt states in  had a fall getting into truck, and has L hip pain since. Had MRI completed this month which showed minor glut tear (see below) Pt arrives with new referral for glut tear. States that L knee is feeling good and is getting stronger.      L Hip flex: 4/5  L hip abd: 3-/5   L glut TTP along ischial tuberosity     R hip flex: 4+/5  R hip abd: 4-/5    Objective:  Modalities:   Precautions: check on pitting edema  Exercises:  Exercise  L TKA (DOS 22)    Reps/ Time Weight/ Level Comments    NUSTEP 5' L5 To end session this date  x    Supine          Hip flexor stretch 3'   x   Hip abd slide

## 2023-05-02 ENCOUNTER — HOSPITAL ENCOUNTER (OUTPATIENT)
Dept: PHYSICAL THERAPY | Facility: CLINIC | Age: 83
Setting detail: THERAPIES SERIES
Discharge: HOME | End: 2023-05-02
Payer: MEDICARE

## 2023-05-02 PROCEDURE — 97110 THERAPEUTIC EXERCISES: CPT

## 2023-05-02 NOTE — FLOWSHEET NOTE
laps    x   TrX squats  2x10  SBA AND CHAIR BEHIND PT FOR SAFETY  x          Seated       LAQ 2x10 3# LLE -   HS curls  2x10 [place]  -   Marches 2x10 3#  -          Sidelying        Clamshells  2x10    -   Gait training 2 large laps ~300 ft    SPC, CGA to SBA with gait belt on -          Balance           Feet together - EC  2x30\"   -   Tandem stance  2x30\" ea    -          Other:  DI/IASTM to L piriformis, glut max, glut med      2/20/23 3/28/23 4/28/23   L Knee AAROM  1-85 8-102 0-108        3/30/23 LEFS: 37% Impaired   4/28/23: 55% impaired     Specific Instructions for next treatment: Continue to progress hip strengthening as tolerated     Treatment Charges: Mins Units   []  Modalities     [x]  Ther Exercise 40 3   [x]  Manual Therapy     []  Ther Activities     []  Aquatics     []  Vasocompression     []  Other: Total Treatment time 40 3       Assessment: [] Progressing toward goals. [x] No change. Initiated treatment w/ warmup on NuStep. DI/IASTM to L piriformis/glut med/glut max. Cont'd POC as mentioned above. Pt reports decr pain during squats post manual and mat exercises. [x] Patient would continue to benefit from skilled physical therapy services in order to: Increase ROM and strength in LLE, increase gait to progress off RW and onto cane once able. STG/LTG:  STG: (to be met in 10 treatments)  ? Pain: Decrease pain levels to 4/10 at worst in L knee MET (4/28)  ? ROM: Increase flexibility and AROM limitations throughout to equal bilat to reduce difficulty with ADLs, increase L knee flex AROM to 100deg, ext to 0deg   ? Strength: Increase L quad strength to 4-/5 MET (4/28)  ?  Function: Pt to ambulate with straight cane small distances without gait deficits MET (4/28)  Independent with Home Exercise ProgramsMET (4/28)  Demonstrate Knowledge of fall prevention MET (4/28)     LTG: (to be met in 20 treatments)  Improve score on assessment tool from 57% impaired to 25% impaired, demonstrating an

## 2023-05-02 NOTE — PRE-CERTIFICATION NOTE
Medicare Cap   [x] Physical Therapy  [] Speech Therapy  [] Occupational therapy  *PT and Speech caps combine      $2230 Limit for PT and Speech combined  $2230 Limit for OT individually  At the beginning of the month where you expect to go over $2150, please add the [place] [place] modifier      Patient Name: Jagdeep Martinez: 1940    Note:  This is an estimate of charges billed.      Date of [address] Name # units/ charge $$$ charge Daily Total Charge Ongoing Total $$$   2/20 PT eval  Therex  Vaso 1+1+1 97.02+22.29+8.99 128.3 128.3   2/23 PTA TE, Vaso 3+1 25.10+19.62*2+7.91 72.25 200.55   2/27 PT Therex  Vaso 3+1 28.50+22.29+22.29+8.99 82.07 282.62   3/2 PT Therex  Vaso 3+1  82.07 364.69   3/6 PT Therex  Vaso 3+1  82.07 446.76   3/9 PTA TE, Vaso 3+1 25.10+19.62*2+7.91 72.25 519.01   3/13 PT Therex  Vaso 3+1  82.07 601.08   3/16 TE + Vaso 3+1  72.25 673.33   3/21 TE + Vaso 3+1  72.25 745.58   3/23 Therex  Vaso PT 3+1  82.07 827.65   3/28 PTA TE  3 25.10+19.62*2 64.34 891.99   3/30 PTA TE, Gait  3+1 25.10+19.62*3 83.96 975.95   4/4 PTA TE 4  81.15 1,057.10   4/6 PTA TE, Vaso 3+1  81.15 1,138.25   4/11 PT TE, Vaso 2+1  63.70 1,201.95   4/18 PTA TE, Gait, Vaso 1+1+1  52.63 1,254.58   4/20 TE, Gait, Vaso 1+1+1  52.63 1,307.21   4/25 PTA TE, gait 2+1  64.34    4/28 PT TE, TA, Vaso 1+1+1  70.30    5/2 PTA TE 3  64.34 1,506.19

## 2023-05-03 PROBLEM — M48.00 SPINAL STENOSIS: Status: ACTIVE | Noted: 2021-03-17

## 2023-05-03 PROBLEM — N40.0 PROSTATE ENLARGEMENT: Status: ACTIVE | Noted: 2023-05-03

## 2023-05-04 ENCOUNTER — HOSPITAL ENCOUNTER (OUTPATIENT)
Dept: PHYSICAL THERAPY | Facility: CLINIC | Age: 83
Setting detail: THERAPIES SERIES
Discharge: HOME | End: 2023-05-04
Payer: MEDICARE

## 2023-05-04 PROCEDURE — 97110 THERAPEUTIC EXERCISES: CPT

## 2023-05-04 NOTE — FLOWSHEET NOTE
[] [address]  [address]  [phone]  F: [phone] [] [address]  [facility]   [address]  P: [phone]  F: [phone] [] [address]  [address]  P: [phone]  F: [phone] [x] [address]  P: [phone]  F: [phone] [] [address]  [facility]   [address]   [place]: [phone]  F: [phone]      Physical Therapy Daily Treatment Note    Date:  2023  Patient Name:  Myrna Veras    :  1940  MRN: [id number]  Physician: Dr. Merchant Cons: [facility] Medicare (no Cory Bright)  Medical Diagnosis: S/p L TKA                     Rehab Codes: M25. 562 (L knee pain) New referral for L glut tear   Onset date:  DOS: 22              Next 's appt.: TBD  Visit# / total visits: ; Progress note for Medicare patient due at visit 30     Cancels/No Shows: 1/0    Subjective:    Pain:  [] Yes  [x] No Location: L TKA Pain Rating: (0-10 scale) 2-3/10  Pain altered Tx:  [] No  [] Yes  Action:  Comments: Pt reported no change in pain, stating that he continues to have gluteal pain after long periods of sitting.        L Hip flex: 4/5  L hip abd: 3-/5   L glut TTP along ischial tuberosity     R hip flex: 4+/5  R hip abd: 4-/5    Objective:  Modalities:   Precautions: check on pitting edema  Exercises:  Exercise  L TKA (DOS 22)    Reps/ Time Weight/ Level Comments    NuStep 5' L5  x          Supine          Hip flexor stretch 3'   x   Hip abd slide board 2x10   x   Clamshells 2x10 lime  x   Glut set 20x5\" hold   x   Bridges 2x10   x          Gym       3 way hip  20x  L CKC, one UE assist x   Step ups  10x ea  6\"  x   Side stepping in // bars  4 laps    x

## 2023-05-04 NOTE — PRE-CERTIFICATION NOTE
Medicare Cap   [x] Physical Therapy  [] Speech Therapy  [] Occupational therapy  *PT and Speech caps combine      $2230 Limit for PT and Speech combined  $2230 Limit for OT individually  At the beginning of the month where you expect to go over $2150, please add the [address] [place] modifier      Patient Name: Sharad Up: 1940    Note:  This is an estimate of charges billed.      Date of [address] Name # units/ charge $$$ charge Daily Total Charge Ongoing Total $$$   2/20 PT eval  Therex  Vaso 1+1+1 97.02+22.29+8.99 128.3 128.3   2/23 PTA TE, Vaso 3+1 25.10+19.62*2+7.91 72.25 200.55   2/27 PT Therex  Vaso 3+1 28.50+22.29+22.29+8.99 82.07 282.62   3/2 PT Therex  Vaso 3+1  82.07 364.69   3/6 PT Therex  Vaso 3+1  82.07 446.76   3/9 PTA TE, Vaso 3+1 25.10+19.62*2+7.91 72.25 519.01   3/13 PT Therex  Vaso 3+1  82.07 601.08   3/16 TE + Vaso 3+1  72.25 673.33   3/21 TE + Vaso 3+1  72.25 745.58   3/23 Therex  Vaso PT 3+1  82.07 827.65   3/28 PTA TE  3 25.10+19.62*2 64.34 891.99   3/30 PTA TE, Gait  3+1 25.10+19.62*3 83.96 975.95   4/4 PTA TE 4  81.15 1,057.10   4/6 PTA TE, Vaso 3+1  81.15 1,138.25   4/11 PT TE, Vaso 2+1  63.70 1,201.95   4/18 PTA TE, Gait, Vaso 1+1+1  52.63 1,254.58   4/20 TE, Gait, Vaso 1+1+1  52.63 1,307.21   4/25 PTA TE, gait 2+1  64.34    4/28 PT TE, TA, Vaso 1+1+1  70.30    5/2 PTA TE 3  64.34 1,506.19   5/4 PTA TE 3  64.34 1570.53

## 2023-05-09 ENCOUNTER — HOSPITAL ENCOUNTER (OUTPATIENT)
Dept: PHYSICAL THERAPY | Facility: CLINIC | Age: 83
Setting detail: THERAPIES SERIES
Discharge: HOME | End: 2023-05-09
Payer: MEDICARE

## 2023-05-09 PROCEDURE — 97110 THERAPEUTIC EXERCISES: CPT

## 2023-05-09 PROCEDURE — 97140 MANUAL THERAPY 1/> REGIONS: CPT

## 2023-05-09 NOTE — FLOWSHEET NOTE
[] [facility]  [address]  [phone]  F: [phone] [] [address]  [facility]   [address]  P: [phone]  F: [phone] [] [address]  [address]  P: [phone]  F: [phone] [x] [address]  P: [phone]  F: [phone] [] [address]  [facility]   [address]   [place]: [phone]  F: [phone]      Physical Therapy Daily Treatment Note    Date:  2023  Patient Name:  Allyn Brar    :  1940  MRN: [id number]  Physician: Dr. Jocelyne Coleman: [facility] Medicare [facility]  Medical Diagnosis: S/p L TKA                     Rehab Codes: M25. 562 (L knee pain) New referral for L glut tear   Onset date:  DOS: 22              Next 's appt.: TBD  Visit# / total visits: ; Progress note for Medicare patient due at visit 30     Cancels/No Shows: 1/0    Subjective:    Pain:  [] Yes  [x] No Location: L TKA Pain Rating: (0-10 scale) 2-3/10  Pain altered Tx:  [] No  [] Yes  Action:  Comments: Pt reported no change in pain, stating that he continues to have gluteal pain after long periods of sitting.        L Hip flex: 4/5  L hip abd: 3-/5   L glut TTP along ischial tuberosity     R hip flex: 4+/5  R hip abd: 4-/5    Objective:  Modalities:   Precautions: check on pitting edema  Exercises:  Exercise  L TKA (DOS 22)    Reps/ Time Weight/ Level Comments    NuStep 5' L5  x          Supine          Hip flexor stretch 3'   x   Hip abd slide board 2x10   -   Clamshells 2x10 lime  x   Glut set 20x5\" hold   x   Bridges 2x10   x          Gym       3 way hip  20x  L CKC, one UE assist x   Step ups  10x ea  6\"  x   Side stepping in // bars  4 laps    -

## 2023-05-11 ENCOUNTER — HOSPITAL ENCOUNTER (OUTPATIENT)
Dept: PHYSICAL THERAPY | Facility: CLINIC | Age: 83
Setting detail: THERAPIES SERIES
Discharge: HOME | End: 2023-05-11
Payer: MEDICARE

## 2023-05-11 PROCEDURE — 97110 THERAPEUTIC EXERCISES: CPT

## 2023-05-11 PROCEDURE — 97140 MANUAL THERAPY 1/> REGIONS: CPT

## 2023-05-11 NOTE — FLOWSHEET NOTE
? Strength: Increase L quad strength to 4-/5 MET (4/28)  ? Function: Pt to ambulate with straight cane small distances without gait deficits MET (4/28)  Independent with Home Exercise ProgramsMET (4/28)  Demonstrate Knowledge of fall prevention MET (4/28)     LTG: (to be met in 20 treatments)  Improve score on assessment tool from 57% impaired to 25% impaired, demonstrating an improvement in ADLs  Reduce pain levels to 0/10 MET (4/28)  Pt to have increased L hip abd strength increased from 3-/5 to 4-/5                     Patient goals: Learn to walk with a cane, more motion in L knee       Pt. Education:  [x] Yes  [] No  [x] Reviewed Prior HEP/Ed  Method of Education: [x] Verbal  [x] Demo  [x] Written  Comprehension of Education:  [x] Verbalizes understanding. [x] Demonstrates understanding. [] Needs review. [x] Demonstrates/verbalizes HEP/Ed previously given. Access Code: [email or web address]  URL: [employer]/  Date: 46/37/8604  Prepared by: Spring Chavarria    Exercises  - Supine Gluteal Sets  - 1 x daily - 7 x weekly - 2 sets - 10 reps - 5\" hold  - Bent Knee Fallouts  - 1 x daily - 7 x weekly - 2 sets - 10 reps  - Modified Christian Stretch  - 1 x daily - 7 x weekly - 1 sets - 1 reps - 3' hold       Plan: [x] Continue current frequency toward long and short term goals. [x] Specific Instructions for subsequent treatments: progress hip and glute strength.    Frequency:  2 x/week for 20 visits      Time In:  1300         Time Out: 1400    Electronically signed by:  Royal Rendon PTA

## 2023-05-16 ENCOUNTER — APPOINTMENT (OUTPATIENT)
Dept: PHYSICAL THERAPY | Facility: CLINIC | Age: 83
End: 2023-05-16
Payer: MEDICARE

## 2023-05-16 ENCOUNTER — HOSPITAL ENCOUNTER (OUTPATIENT)
Dept: PHYSICAL THERAPY | Facility: CLINIC | Age: 83
Setting detail: THERAPIES SERIES
Discharge: HOME | End: 2023-05-16
Payer: MEDICARE

## 2023-05-16 PROCEDURE — 97110 THERAPEUTIC EXERCISES: CPT

## 2023-05-16 PROCEDURE — 97140 MANUAL THERAPY 1/> REGIONS: CPT

## 2023-05-16 NOTE — FLOWSHEET NOTE
in // bars  4 laps    x   TrX squats  2x10  SBA AND CHAIR BEHIND PT FOR SAFETY  x   Hip hikes CKC 2x10   x   Lat step downs CKC 2x10   x          Seated       LAQ 2x10 3# LLE -   HS curls  2x10 [place]  -   Marches 2x10 3#  -          Sidelying        Clamshells  2x10    -   Gait training 2 large laps ~300 ft    SPC, CGA to SBA with gait belt on -          Balance           Feet together - EC  2x30\"   -   Tandem stance  2x30\" ea    -          Other:  DI/IASTM to L piriformis, glut max, glut med      2/20/23 3/28/23 4/28/23   L Knee AAROM  1-85 8-102 0-108        3/30/23 LEFS: 37% Impaired   4/28/23: 55% impaired     Specific Instructions for next treatment: Continue to progress hip strengthening as tolerated     Treatment Charges: Mins Units   []  Modalities     [x]  Ther Exercise 32 2   [x]  Manual Therapy 10 1   []  Ther Activities     []  Aquatics     []  Vasocompression     []  Other: Total Treatment time 42 3       Assessment: [x] Progressing toward goals. Pt continued with activities listed above noting fatigue with standing activities needing one seated break. Progressed reps for both standing and supine activities to improve LE strength. Pt concluded treatment with manual therapy via hypervolt to decrease muscle soreness. [] No change. [x] Patient would continue to benefit from skilled physical therapy services in order to: Increase ROM and strength in LLE, increase gait to progress off RW and onto cane once able. STG/LTG:  STG: (to be met in 10 treatments)  ? Pain: Decrease pain levels to 4/10 at worst in L knee MET (4/28)  ? ROM: Increase flexibility and AROM limitations throughout to equal bilat to reduce difficulty with ADLs, increase L knee flex AROM to 100deg, ext to 0deg   ? Strength: Increase L quad strength to 4-/5 MET (4/28)  ?  Function: Pt to ambulate with straight cane small distances without gait deficits MET (4/28)  Independent with Home Exercise ProgramsMET (4/28)  Demonstrate

## 2023-05-16 NOTE — PRE-CERTIFICATION NOTE
Medicare Cap   [x] Physical Therapy  [] Speech Therapy  [] Occupational therapy  *PT and Speech caps combine      $2230 Limit for PT and Speech combined  $2230 Limit for OT individually  At the beginning of the month where you expect to go over $2150, please add the [address] [place] modifier      Patient Name: Martha Stallings: 1940    Note:  This is an estimate of charges billed.      Date of [address] Name # units/ charge $$$ charge Daily Total Charge Ongoing Total $$$   2/20 PT eval  Therex  Vaso 1+1+1 97.02+22.29+8.99 128.3 128.3   2/23 PTA TE, Vaso 3+1 25.10+19.62*2+7.91 72.25 200.55   2/27 PT Therex  Vaso 3+1 28.50+22.29+22.29+8.99 82.07 282.62   3/2 PT Therex  Vaso 3+1  82.07 364.69   3/6 PT Therex  Vaso 3+1  82.07 446.76   3/9 PTA TE, Vaso 3+1 25.10+19.62*2+7.91 72.25 519.01   3/13 PT Therex  Vaso 3+1  82.07 601.08   3/16 TE + Vaso 3+1  72.25 673.33   3/21 TE + Vaso 3+1  72.25 745.58   3/23 Therex  Vaso PT 3+1  82.07 827.65   3/28 PTA TE  3 25.10+19.62*2 64.34 891.99   3/30 PTA TE, Gait  3+1 25.10+19.62*3 83.96 975.95   4/4 PTA TE 4  81.15 1,057.10   4/6 PTA TE, Vaso 3+1  81.15 1,138.25   4/11 PT TE, Vaso 2+1  63.70 1,201.95   4/18 PTA TE, Gait, Vaso 1+1+1  52.63 1,254.58   4/20 TE, Gait, Vaso 1+1+1  52.63 1,307.21   4/25 PTA TE, gait 2+1  64.34    4/28 PT TE, TA, Vaso 1+1+1  70.30    5/2 PTA TE 3  64.34 1,506.19   5/4 PTA TE 3  64.34 1570.53   5/9 PTA TE+MT 2+1 25.10+19.62+18.34 63.06 1633.59   5/11 PTA TE 3+1 25.10+19.62*2+18.34 82.68 1716.27   5/16 PTA TE 2+1 25.10+19.62+18.34 63.06 1779.33

## 2023-05-18 ENCOUNTER — HOSPITAL ENCOUNTER (OUTPATIENT)
Dept: PHYSICAL THERAPY | Facility: CLINIC | Age: 83
Setting detail: THERAPIES SERIES
Discharge: HOME | End: 2023-05-18
Payer: MEDICARE

## 2023-05-18 PROCEDURE — 97140 MANUAL THERAPY 1/> REGIONS: CPT

## 2023-05-18 PROCEDURE — 97110 THERAPEUTIC EXERCISES: CPT

## 2023-05-18 NOTE — FLOWSHEET NOTE
[] [facility]  [address]  [phone]  F: [phone] [] [address]  [place] [address]   [address]  P: [phone]  F: [phone] [] [address]  [address]  P: [phone]  F: [phone] [x] [address]  P: [phone]  F: [phone] [] [address]  [facility]   [address]   [place]: [phone]  F: [phone]      Physical Therapy Daily Treatment Note    Date:  2023  Patient Name:  Sujatha Martinez    :  1940  MRN: [id number]  Physician: Dr. Garcia Setting: [employer] Medicare (no [facility])  Medical Diagnosis: S/p L TKA                     Rehab Codes: M25. 562 (L knee pain) New referral for L glut tear   Onset date:  DOS: 22              Next 's appt.: TBD  Visit# / total visits: ; Progress note for Medicare patient due at visit 30     Cancels/No Shows: 1/0    Subjective:    Pain:  [] Yes  [x] No Location: L glute  Pain Rating: (0-10 scale) 2-3/10  Pain altered Tx:  [] No  [] Yes  Action:  Comments: Pt reported minor soreness In L glute after last visit but nothing painful. Not minimal to no pain currently.        L Hip flex: 4/5  L hip abd: 3-/5   L glut TTP along ischial tuberosity     R hip flex: 4+/5  R hip abd: 4-/5    Objective:  Modalities:   Precautions: check on pitting edema  Exercises:  Exercise  L TKA (DOS 22)    Reps/ Time Weight/ Level Comments    NuStep 10' L5  x          Supine          Hip flexor stretch 3'   -   Hip abd slide board 2x10   -   Clamshells 2x15 lime  x   Hip abd 2x15   x   Glut set 20x5\" hold   x   Bridges 2x15   x          Gym       3 way hip  20x  L CKC, one UE assist x   Step ups  20x ea  6\"  x   Side stepping

## 2023-05-18 NOTE — PRE-CERTIFICATION NOTE
Medicare Cap   [x] Physical Therapy  [] Speech Therapy  [] Occupational therapy  *PT and Speech caps combine      $2230 Limit for PT and Speech combined  $2230 Limit for OT individually  At the beginning of the month where you expect to go over $2150, please add the [address] [place] modifier      Patient Name: Jayne Pleitez: 1940    Note:  This is an estimate of charges billed.      Date of [address] Name # units/ charge $$$ charge Daily Total Charge Ongoing Total $$$   2/20 PT eval  Therex  Vaso 1+1+1 97.02+22.29+8.99 128.3 128.3   2/23 PTA TE, Vaso 3+1 25.10+19.62*2+7.91 72.25 200.55   2/27 PT Therex  Vaso 3+1 28.50+22.29+22.29+8.99 82.07 282.62   3/2 PT Therex  Vaso 3+1  82.07 364.69   3/6 PT Therex  Vaso 3+1  82.07 446.76   3/9 PTA TE, Vaso 3+1 25.10+19.62*2+7.91 72.25 519.01   3/13 PT Therex  Vaso 3+1  82.07 601.08   3/16 TE + Vaso 3+1  72.25 673.33   3/21 TE + Vaso 3+1  72.25 745.58   3/23 Therex  Vaso PT 3+1  82.07 827.65   3/28 PTA TE  3 25.10+19.62*2 64.34 891.99   3/30 PTA TE, Gait  3+1 25.10+19.62*3 83.96 975.95   4/4 PTA TE 4  81.15 1,057.10   4/6 PTA TE, Vaso 3+1  81.15 1,138.25   4/11 PT TE, Vaso 2+1  63.70 1,201.95   4/18 PTA TE, Gait, Vaso 1+1+1  52.63 1,254.58   4/20 TE, Gait, Vaso 1+1+1  52.63 1,307.21   4/25 PTA TE, gait 2+1  64.34    4/28 PT TE, TA, Vaso 1+1+1  70.30    5/2 PTA TE 3  64.34 1,506.19   5/4 PTA TE 3  64.34 1570.53   5/9 PTA TE+MT 2+1 25.10+19.62+18.34 63.06 1633.59   5/11 PTA TE+MT 3+1 25.10+19.62*2+18.34 82.68 1716.27   5/16 PTA TE+MT 2+1 25.10+19.62+18.34 63.06 1779.33   5/18 PTA TE+MT   63.06 1842.39

## 2023-05-23 ENCOUNTER — HOSPITAL ENCOUNTER (OUTPATIENT)
Dept: PHYSICAL THERAPY | Facility: CLINIC | Age: 83
Setting detail: THERAPIES SERIES
Discharge: HOME | End: 2023-05-23
Payer: MEDICARE

## 2023-05-23 PROCEDURE — 97110 THERAPEUTIC EXERCISES: CPT

## 2023-05-23 NOTE — PRE-CERTIFICATION NOTE
Medicare Cap   [x] Physical Therapy  [] Speech Therapy  [] Occupational therapy  *PT and Speech caps combine      $2230 Limit for PT and Speech combined  $2230 Limit for OT individually  At the beginning of the month where you expect to go over $2150, please add the [address] [place] modifier      Patient Name: Amada Aguero: 1940    Note:  This is an estimate of charges billed.      Date of [address] Name # units/ charge $$$ charge Daily Total Charge Ongoing Total $$$   2/20 PT eval  Therex  Vaso 1+1+1 97.02+22.29+8.99 128.3 128.3   2/23 PTA TE, Vaso 3+1 25.10+19.62*2+7.91 72.25 200.55   2/27 PT Therex  Vaso 3+1 28.50+22.29+22.29+8.99 82.07 282.62   3/2 PT Therex  Vaso 3+1  82.07 364.69   3/6 PT Therex  Vaso 3+1  82.07 446.76   3/9 PTA TE, Vaso 3+1 25.10+19.62*2+7.91 72.25 519.01   3/13 PT Therex  Vaso 3+1  82.07 601.08   3/16 TE + Vaso 3+1  72.25 673.33   3/21 TE + Vaso 3+1  72.25 745.58   3/23 Therex  Vaso PT 3+1  82.07 827.65   3/28 PTA TE  3 25.10+19.62*2 64.34 891.99   3/30 PTA TE, Gait  3+1 25.10+19.62*3 83.96 975.95   4/4 PTA TE 4  81.15 1,057.10   4/6 PTA TE, Vaso 3+1  81.15 1,138.25   4/11 PT TE, Vaso 2+1  63.70 1,201.95   4/18 PTA TE, Gait, Vaso 1+1+1  52.63 1,254.58   4/20 TE, Gait, Vaso 1+1+1  52.63 1,307.21   4/25 PTA TE, gait 2+1  64.34    4/28 PT TE, TA, Vaso 1+1+1  70.30    5/2 PTA TE 3  64.34 1,506.19   5/4 PTA TE 3  64.34 1570.53   5/9 PTA TE+MT 2+1 25.10+19.62+18.34 63.06 1633.59   5/11 PTA TE+MT 3+1 25.10+19.62*2+18.34 82.68 1716.27   5/16 PTA TE+MT 2+1 25.10+19.62+18.34 63.06 1779.33   5/18 PTA TE+MT   63.06 1842.39   5/23 PTA TE 3 25.10+19.62*2 64.34 1906.73

## 2023-05-23 NOTE — FLOWSHEET NOTE
[] [facility]  [address]  [phone]  F: [phone] [] [address]  [facility]   [address]  P: [phone]  F: [phone] [] [address]  [address]  P: [phone]  F: [phone] [x] [address]  P: [phone]  F: [phone] [] [address]  [facility]   [address]   [place]: [phone]  F: [phone]      Physical Therapy Daily Treatment Note    Date:  2023  Patient Name:  Radha Danielle    :  1940  MRN: [id number]  Physician: Dr. Yenny Logan: [facility] Medicare (no Uday Juan)  Medical Diagnosis: S/p L TKA                     Rehab Codes: M25. 562 (L knee pain) New referral for L glut tear   Onset date:  DOS: 22              Next 's appt.: TBD  Visit# / total visits: ; Progress note for Medicare patient due at visit 30     Cancels/No Shows: 1/0    Subjective:    Pain:  [] Yes  [x] No Location: L glute  Pain Rating: (0-10 scale) 2-3/10  Pain altered Tx:  [] No  [] Yes  Action:  Comments: Pt reported minor soreness In L glute after last visit but nothing painful. Not minimal to no pain currently.        L Hip flex: 4/5  L hip abd: 3-/5   L glut TTP along ischial tuberosity     R hip flex: 4+/5  R hip abd: 4-/5    Objective:  Modalities:   Precautions: check on pitting edema  Exercises:  Exercise  L TKA (DOS 22)    Reps/ Time Weight/ Level Comments    NuStep 10' L5  x          Supine          Hip flexor stretch 3'   -   Hip abd slide board 2x10   -   Clamshells 2x15 lime  x   Hip abd 2x15   x   Glut set 20x5\" hold   x   Bridges 2x15   x          Gym       3 way hip  20x  L CKC, one UE assist x   Step ups  20x ea  6\"  x   Side stepping

## 2023-05-25 ENCOUNTER — HOSPITAL ENCOUNTER (OUTPATIENT)
Dept: PHYSICAL THERAPY | Facility: CLINIC | Age: 83
Setting detail: THERAPIES SERIES
Discharge: HOME | End: 2023-05-25
Payer: MEDICARE

## 2023-05-25 ENCOUNTER — HOSPITAL ENCOUNTER (OUTPATIENT)
Age: 83
Setting detail: SPECIMEN
Discharge: HOME | End: 2023-05-25
Payer: MEDICARE

## 2023-05-25 ENCOUNTER — HOSPITAL ENCOUNTER (OUTPATIENT)
Age: 83
Discharge: HOME | End: 2023-05-27
Payer: MEDICARE

## 2023-05-25 ENCOUNTER — HOSPITAL ENCOUNTER (OUTPATIENT)
Dept: GENERAL RADIOLOGY | Age: 83
Discharge: HOME | End: 2023-05-27
Payer: MEDICARE

## 2023-05-25 DIAGNOSIS — K59.01 SLOW TRANSIT CONSTIPATION: ICD-10-CM

## 2023-05-25 LAB
ALBUMIN SERPL-MCNC: 3.7 G/DL (ref 3.5–5.2)
ALBUMIN/GLOB SERPL: 0.9 {RATIO} (ref 1–2.5)
ALP SERPL-CCNC: 81 U/L (ref 40–129)
ALT SERPL-CCNC: 13 U/L (ref 5–41)
ANION GAP SERPL CALCULATED.3IONS-SCNC: 14 MMOL/L (ref 9–17)
AST SERPL-CCNC: 23 U/L
BASOPHILS # BLD: 0.04 K/UL (ref 0–0.2)
BASOPHILS NFR BLD: 1 % (ref 0–2)
BILIRUB SERPL-MCNC: 0.4 MG/DL (ref 0.3–1.2)
BUN SERPL-MCNC: 20 MG/DL (ref 8–23)
CALCIUM SERPL-MCNC: 9.5 MG/DL (ref 8.6–10.4)
CHLORIDE SERPL-SCNC: 99 MMOL/L (ref 98–107)
CO2 SERPL-SCNC: 28 MMOL/L (ref 20–31)
CREAT SERPL-MCNC: 1.17 MG/DL (ref 0.7–1.2)
EOSINOPHIL # BLD: 0.11 K/UL (ref 0–0.44)
EOSINOPHILS RELATIVE PERCENT: 3 % (ref 1–4)
ERYTHROCYTE [DISTWIDTH] IN BLOOD BY AUTOMATED COUNT: 12.2 % (ref 11.8–14.4)
GFR SERPL CREATININE-BSD FRML MDRD: >60 ML/MIN/1.73M2
GLUCOSE SERPL-MCNC: 100 MG/DL (ref 70–99)
HCT VFR BLD AUTO: 35.9 % (ref 40.7–50.3)
HGB BLD-MCNC: 11.7 G/DL (ref 13–17)
IMM GRANULOCYTES # BLD AUTO: <0.03 K/UL (ref 0–0.3)
IMM GRANULOCYTES NFR BLD: 0 %
LYMPHOCYTES # BLD: 25 % (ref 24–43)
LYMPHOCYTES NFR BLD: 0.93 K/UL (ref 1.1–3.7)
MCH RBC QN AUTO: 29.8 PG (ref 25.2–33.5)
MCHC RBC AUTO-ENTMCNC: 32.6 G/DL (ref 28.4–34.8)
MCV RBC AUTO: 91.6 FL (ref 82.6–102.9)
MONOCYTES NFR BLD: 0.38 K/UL (ref 0.1–1.2)
MONOCYTES NFR BLD: 10 % (ref 3–12)
NEUTROPHILS NFR BLD: 61 % (ref 36–65)
NEUTS SEG NFR BLD: 2.29 K/UL (ref 1.5–8.1)
NRBC AUTOMATED: 0 PER 100 WBC
PLATELET # BLD AUTO: 192 K/UL (ref 138–453)
PMV BLD AUTO: 9.9 FL (ref 8.1–13.5)
POTASSIUM SERPL-SCNC: 3.6 MMOL/L (ref 3.7–5.3)
PROT SERPL-MCNC: 7.9 G/DL (ref 6.4–8.3)
RBC # BLD AUTO: 3.92 M/UL (ref 4.21–5.77)
SODIUM SERPL-SCNC: 141 MMOL/L (ref 135–144)
TSH SERPL-MCNC: 1.29 UIU/ML (ref 0.3–5)
WBC OTHER # BLD: 3.8 K/UL (ref 3.5–11.3)

## 2023-05-25 PROCEDURE — 36415 COLL VENOUS BLD VENIPUNCTURE: CPT

## 2023-05-25 PROCEDURE — 97140 MANUAL THERAPY 1/> REGIONS: CPT

## 2023-05-25 PROCEDURE — 84443 ASSAY THYROID STIM HORMONE: CPT

## 2023-05-25 PROCEDURE — 80053 COMPREHEN METABOLIC PANEL: CPT

## 2023-05-25 PROCEDURE — 85025 COMPLETE CBC W/AUTO DIFF WBC: CPT

## 2023-05-25 PROCEDURE — 74018 RADEX ABDOMEN 1 VIEW: CPT

## 2023-05-25 PROCEDURE — 97110 THERAPEUTIC EXERCISES: CPT

## 2023-05-25 NOTE — FLOWSHEET NOTE
[] [facility]  [address]  [phone]  F: [phone] [] [address]  [facility]   [address]  P: [phone]  F: [phone] [] [address]  [address]  P: [phone]  F: [phone] [x] [address]  P: [phone]  F: [phone] [] [address]  [facility]   [address]   [place]: [phone]  F: [phone]      Physical Therapy Daily Treatment Note    Date:  2023  Patient Name:  Matthew Hernandez    :  1940  MRN: [id number]  Physician: Dr. Brett Chavez: [facility] Medicare (no Vivia Kulwinder)  Medical Diagnosis: S/p L TKA                     Rehab Codes: M25. 562 (L knee pain) New referral for L glut tear   Onset date:  DOS: 22              Next 's appt.: TBD  Visit# / total visits: ; Progress note for Medicare patient due at visit 30     Cancels/No Shows: 1/0    Subjective:    Pain:  [] Yes  [x] No Location: L glute  Pain Rating: (0-10 scale) 2-3/10  Pain altered Tx:  [x] No  [] Yes  Action:  Comments: Pt reported that he continues to slowly improve and is able to sit longer without having to shift weight or stand.        L Hip flex: 4/5  L hip abd: 3-/5   L glut TTP along ischial tuberosity     R hip flex: 4+/5  R hip abd: 4-/5    Objective:  Modalities:   Precautions: check on pitting edema  Exercises:  Exercise  L TKA (DOS 22)    Reps/ Time Weight/ Level Comments    NuStep 10' L5  x          Supine          Hip flexor stretch 3'   -   Hip abd slide board 2x10   -   Clamshells 2x15 lime  x   Hip abd 2x15   x   Glut set 20x5\" hold   x   Bridges 2x15   x          Gym       3 way hip  20x  L CKC, one UE assist x   Step ups  20x ea  6\"  x   Side

## 2023-05-25 NOTE — PRE-CERTIFICATION NOTE
Medicare Cap   [x] Physical Therapy  [] Speech Therapy  [] Occupational therapy  *PT and Speech caps combine      $2230 Limit for PT and Speech combined  $2230 Limit for OT individually  At the beginning of the month where you expect to go over $2150, please add the [address] [place] 22 modifier      Patient Name: Ele Expose: 1940    Note:  This is an estimate of charges billed.      Date of [address] Name # units/ charge $$$ charge Daily Total Charge Ongoing Total $$$   2/20 PT eval  Therex  Vaso 1+1+1 97.02+22.29+8.99 128.3 128.3   2/23 PTA TE, Vaso 3+1 25.10+19.62*2+7.91 72.25 200.55   2/27 PT Therex  Vaso 3+1 28.50+22.29+22.29+8.99 82.07 282.62   3/2 PT Therex  Vaso 3+1  82.07 364.69   3/6 PT Therex  Vaso 3+1  82.07 446.76   3/9 PTA TE, Vaso 3+1 25.10+19.62*2+7.91 72.25 519.01   3/13 PT Therex  Vaso 3+1  82.07 601.08   3/16 TE + Vaso 3+1  72.25 673.33   3/21 TE + Vaso 3+1  72.25 745.58   3/23 Therex  Vaso PT 3+1  82.07 827.65   3/28 PTA TE  3 25.10+19.62*2 64.34 891.99   3/30 PTA TE, Gait  3+1 25.10+19.62*3 83.96 975.95   4/4 PTA TE 4  81.15 1,057.10   4/6 PTA TE, Vaso 3+1  81.15 1,138.25   4/11 PT TE, Vaso 2+1  63.70 1,201.95   4/18 PTA TE, Gait, Vaso 1+1+1  52.63 1,254.58   4/20 TE, Gait, Vaso 1+1+1  52.63 1,307.21   4/25 PTA TE, gait 2+1  64.34    4/28 PT TE, TA, Vaso 1+1+1  70.30    5/2 PTA TE 3  64.34 1,506.19   5/4 PTA TE 3  64.34 1570.53   5/9 PTA TE+MT 2+1 25.10+19.62+18.34 63.06 1633.59   5/11 PTA TE+MT 3+1 25.10+19.62*2+18.34 82.68 1716.27   5/16 PTA TE+MT 2+1 25.10+19.62+18.34 63.06 1779.33   5/18 PTA TE+MT   63.06 1842.39   5/23 PTA TE 3 25.10+19.62*2 64.34 1906.73   5/25 PTA TE+MT 2+1 25.10+19.62+18.34 63.06 1969.79

## 2023-05-26 ENCOUNTER — OFFICE VISIT (OUTPATIENT)
Dept: DERMATOLOGY | Age: 83
End: 2023-05-26
Payer: MEDICARE

## 2023-05-26 VITALS
SYSTOLIC BLOOD PRESSURE: 133 MMHG | HEIGHT: 69 IN | HEART RATE: 87 BPM | BODY MASS INDEX: 41.03 KG/M2 | TEMPERATURE: 99 F | DIASTOLIC BLOOD PRESSURE: 70 MMHG | WEIGHT: 277 LBS | OXYGEN SATURATION: 100 %

## 2023-05-26 DIAGNOSIS — L73.0 ACNE KELOIDALIS NUCHAE: ICD-10-CM

## 2023-05-26 DIAGNOSIS — L73.1 INGROWN HAIR: Primary | ICD-10-CM

## 2023-05-26 PROCEDURE — 3078F DIAST BP <80 MM HG: CPT | Performed by: PHYSICIAN ASSISTANT

## 2023-05-26 PROCEDURE — 1123F ACP DISCUSS/DSCN MKR DOCD: CPT | Performed by: PHYSICIAN ASSISTANT

## 2023-05-26 PROCEDURE — 3075F SYST BP GE 130 - 139MM HG: CPT | Performed by: PHYSICIAN ASSISTANT

## 2023-05-26 PROCEDURE — 99203 OFFICE O/P NEW LOW 30 MIN: CPT | Performed by: PHYSICIAN ASSISTANT

## 2023-05-26 RX ORDER — CLINDAMYCIN PHOSPHATE 10 UG/ML
LOTION TOPICAL
Qty: 60 ML | Refills: 3 | Status: SHIPPED | OUTPATIENT
Start: 2023-05-26

## 2023-05-26 NOTE — PROGRESS NOTES
Dermatology Patient Note  [address] DERMATOLOGY  [address] [address]  Dept: [phone]  Dept Fax: [phone]      VISITDATE: 5/26/2023   REFERRING PROVIDER: Ronnie Larkin MD      Azeem Antoine is a [de-identified] y.o. 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 Since that time, pt notes intermittent tender nodules, which rupture and bleed, in this area.   Pt states that this seems to be getting worse    MEDICAL PROBLEMS:  Patient Active Problem List    Diagnosis Date Noted    Skin ulcer of scalp, limited to breakdown of skin ([address] 03/15/2023     Priority: Medium    Left hip pain 12/15/2022     Priority: Medium    Normochromic normocytic anemia 12/15/2022     Priority: Medium    Hypokalemia 11/28/2022     Priority: Medium    Pelvic pain 11/28/2022     Priority: Medium    Primary osteoarthritis of right knee 08/15/2022     Priority: Medium    Normocytic hypochromic anemia 08/12/2022     Priority: Medium    Status post lumbar laminectomy 08/08/2022     Priority: Medium    Subclinical hypothyroidism 05/30/2022     Priority: Medium    Prostate enlargement 05/03/2023    Tear of left gluteus jez muscle 04/27/2023    Cold intolerance 04/12/2022    Peripheral vascular disease, unspecified ([address] 03/22/2022    Other chest pain 02/22/2022    Stage 3b chronic kidney disease ([address] 12/13/2021    Primary osteoarthritis of left hip 12/06/2021    Ingrown toenail 11/02/2021    Spondylosis of thoracic region without myelopathy or radiculopathy 10/29/2021    Localized edema 09/20/2021    Osteoarthritis of spine with radiculopathy, lumbar region 09/20/2021    Numbness 09/13/2021    Vitamin B12 deficiency 05/25/2021    Spinal stenosis 03/17/2021    DDD (degenerative disc disease), cervical

## 2023-05-30 ENCOUNTER — HOSPITAL ENCOUNTER (OUTPATIENT)
Dept: PHYSICAL THERAPY | Facility: CLINIC | Age: 83
Setting detail: THERAPIES SERIES
Discharge: HOME | End: 2023-05-30
Payer: MEDICARE

## 2023-05-30 PROCEDURE — 97140 MANUAL THERAPY 1/> REGIONS: CPT

## 2023-05-30 PROCEDURE — 97110 THERAPEUTIC EXERCISES: CPT

## 2023-05-30 NOTE — FLOWSHEET NOTE
[] [facility] [facility]  [address]  [phone]  F: [phone] [] [address]  [facility]   [address]  P: [phone]  F: [phone] [] [address]  [address]  P: [phone]  F: [phone] [x] [address]  P: [phone]  F: [phone] [] [address]  [facility]   [address]   [place]: [phone]  F: [phone]      Physical Therapy Daily Treatment Note    Date:  2023  Patient Name:  Paco Mcbride    :  1940  MRN: [id number]  Physician: Dr. Godoy Line: [employer] Medicare (no Jennifer Alford)  Medical Diagnosis: S/p L TKA                     Rehab Codes: M25. 562 (L knee pain) New referral for L glut tear   Onset date:  DOS: 22              Next 's appt.: TBD  Visit# / total visits: ; Progress note for Medicare patient due at visit 30     Cancels/No Shows: 1/0    Subjective:    Pain:  [] Yes  [x] No Location: L glute  Pain Rating: (0-10 scale) 2-3/10  Pain altered Tx:  [x] No  [] Yes  Action:  Comments: Pt reported improved seated tolerance of 15-20 minutes. Pt continues to use cane for ambulation.        L Hip flex: 4/5  L hip abd: 3-/5   L glut TTP along ischial tuberosity     R hip flex: 4+/5  R hip abd: 4-/5    Objective:  Modalities:   Precautions: check on pitting edema  Exercises:  Exercise  L TKA (DOS 22)    Reps/ Time Weight/ Level Comments    NuStep 10' L5  x          Supine          Hip flexor stretch 3'   -   Hip abd slide board 2x10   -   Clamshells 2x15 lime  x   Hip abd 2x15   x   Glut set 20x5\" hold   x   Bridges 2x15   x          Gym       3 way hip  20x  L CKC, one UE assist x   Step ups  20x ea  6\"  x   Side stepping in // bars

## 2023-05-30 NOTE — PRE-CERTIFICATION NOTE
Medicare Cap   [x] Physical Therapy  [] Speech Therapy  [] Occupational therapy  *PT and Speech caps combine      $2230 Limit for PT and Speech combined  $2230 Limit for OT individually  At the beginning of the month where you expect to go over $2150, please add the [address] [place] modifier      Patient Name: Kristy Amanda: 1940    Note:  This is an estimate of charges billed.      Date of [address] Name # units/ charge $$$ charge Daily Total Charge Ongoing Total $$$   2/20 PT eval  Therex  Vaso 1+1+1 97.02+22.29+8.99 128.3 128.3   2/23 PTA TE, Vaso 3+1 25.10+19.62*2+7.91 72.25 200.55   2/27 PT Therex  Vaso 3+1 28.50+22.29+22.29+8.99 82.07 282.62   3/2 PT Therex  Vaso 3+1  82.07 364.69   3/6 PT Therex  Vaso 3+1  82.07 446.76   3/9 PTA TE, Vaso 3+1 25.10+19.62*2+7.91 72.25 519.01   3/13 PT Therex  Vaso 3+1  82.07 601.08   3/16 TE + Vaso 3+1  72.25 673.33   3/21 TE + Vaso 3+1  72.25 745.58   3/23 Therex  Vaso PT 3+1  82.07 827.65   3/28 PTA TE  3 25.10+19.62*2 64.34 891.99   3/30 PTA TE, Gait  3+1 25.10+19.62*3 83.96 975.95   4/4 PTA TE 4  81.15 1,057.10   4/6 PTA TE, Vaso 3+1  81.15 1,138.25   4/11 PT TE, Vaso 2+1  63.70 1,201.95   4/18 PTA TE, Gait, Vaso 1+1+1  52.63 1,254.58   4/20 TE, Gait, Vaso 1+1+1  52.63 1,307.21   4/25 PTA TE, gait 2+1  64.34    4/28 PT TE, TA, Vaso 1+1+1  70.30    5/2 PTA TE 3  64.34 1,506.19   5/4 PTA TE 3  64.34 1570.53   5/9 PTA TE+MT 2+1 25.10+19.62+18.34 63.06 1633.59   5/11 PTA TE+MT 3+1 25.10+19.62*2+18.34 82.68 1716.27   5/16 PTA TE+MT 2+1 25.10+19.62+18.34 63.06 1779.33   5/18 PTA TE+MT   63.06 1842.39   5/23 PTA TE 3 25.10+19.62*2 64.34 1906.73   5/25 PTA TE+MT 2+1 25.10+19.62+18.34 63.06 1969.79   5/30 PTA TE+MT 2+1  63.06 2032.85   START USING KX

## 2023-06-01 ENCOUNTER — HOSPITAL ENCOUNTER (OUTPATIENT)
Dept: PHYSICAL THERAPY | Facility: CLINIC | Age: 83
Setting detail: THERAPIES SERIES
Discharge: HOME | End: 2023-06-01
Payer: MEDICARE

## 2023-06-01 NOTE — FLOWSHEET NOTE
[] [facility] [facility]  [address]    [phone]  F: [phone]   [] [address]  [facility]   [address]  P: [phone]  F: [phone]  [] [address]  [address]  P: [phone]  F: [phone] [x] [address]  P: [phone]  F: [phone]  [] [address]  [address] [facility] [address]   [address]   [facility] [place]: [phone]  F: [phone]   [] [place] [id number] [facility] [address]  [facility] [place]: [phone]   F: [phone]     Physical Therapy Cancel/No Show note    Date: 2023  Patient: Justice Tunisian  : 1940  MRN: [id number]    Cancels/No Shows to date: 0    For today's appointment patient:    [x]  Cancelled    [] Rescheduled appointment    [] No-show     Reason given by patient:    []  Patient ill    [x]  Conflicting appointment    [] No transportation      [] Conflict with work    [] No reason given    [] Weather related    [] [id number]    [] Other:      Comments:       [x] Next appointment was confirmed    Electronically signed by: Valdemar Mendoza

## 2023-06-02 ENCOUNTER — HOSPITAL ENCOUNTER (OUTPATIENT)
Dept: PHYSICAL THERAPY | Facility: CLINIC | Age: 83
Setting detail: THERAPIES SERIES
Discharge: HOME | End: 2023-06-02
Payer: MEDICARE

## 2023-06-02 PROCEDURE — 97110 THERAPEUTIC EXERCISES: CPT

## 2023-06-02 NOTE — PRE-CERTIFICATION NOTE
Medicare Cap   [x] Physical Therapy  [] Speech Therapy  [] Occupational therapy  *PT and Speech caps combine      $2230 Limit for PT and Speech combined  $2230 Limit for OT individually  At the beginning of the month where you expect to go over $2150, please add the [address] [place] modifier      Patient Name: Giovanny Marks: 1940    Note:  This is an estimate of charges billed.      Date of [address] Name # units/ charge $$$ charge Daily Total Charge Ongoing Total $$$   2/20 PT eval  Therex  Vaso 1+1+1 97.02+22.29+8.99 128.3 128.3   2/23 PTA TE, Vaso 3+1 25.10+19.62*2+7.91 72.25 200.55   2/27 PT Therex  Vaso 3+1 28.50+22.29+22.29+8.99 82.07 282.62   3/2 PT Therex  Vaso 3+1  82.07 364.69   3/6 PT Therex  Vaso 3+1  82.07 446.76   3/9 PTA TE, Vaso 3+1 25.10+19.62*2+7.91 72.25 519.01   3/13 PT Therex  Vaso 3+1  82.07 601.08   3/16 TE + Vaso 3+1  72.25 673.33   3/21 TE + Vaso 3+1  72.25 745.58   3/23 Therex  Vaso PT 3+1  82.07 827.65   3/28 PTA TE  3 25.10+19.62*2 64.34 891.99   3/30 PTA TE, Gait  3+1 25.10+19.62*3 83.96 975.95   4/4 PTA TE 4  81.15 1,057.10   4/6 PTA TE, Vaso 3+1  81.15 1,138.25   4/11 PT TE, Vaso 2+1  63.70 1,201.95   4/18 PTA TE, Gait, Vaso 1+1+1  52.63 1,254.58   4/20 TE, Gait, Vaso 1+1+1  52.63 1,307.21   4/25 PTA TE, gait 2+1  64.34    4/28 PT TE, TA, Vaso 1+1+1  70.30    5/2 PTA TE 3  64.34 1,506.19   5/4 PTA TE 3  64.34 1570.53   5/9 PTA TE+MT 2+1 25.10+19.62+18.34 63.06 1633.59   5/11 PTA TE+MT 3+1 25.10+19.62*2+18.34 82.68 1716.27   5/16 PTA TE+MT 2+1 25.10+19.62+18.34 63.06 1779.33   5/18 PTA TE+MT   63.06 1842.39   5/23 PTA TE 3 25.10+19.62*2 64.34 1906.73   5/25 PTA TE+MT 2+1 25.10+19.62+18.34 63.06 1969.79   5/30 PTA TE+MT 2+1  63.06 2032.85   START USING KX        6/2 KX 3TE   64.34 2097.19

## 2023-06-02 NOTE — FLOWSHEET NOTE
[] [facility] [address]  [address]  [phone]  F: [phone] [] [address]  [place] [address]   [address]  P: [phone]  F: [phone] [] [address]  [address]  P: [phone]  F: [phone] [x] [address]  P: [phone]  F: [phone] [] [address]  [facility]   [address]   [place]: [phone]  F: [phone]      Physical Therapy Daily Treatment Note    Date:  2023  Patient Name:  Anastacio Prabhakar    :  1940  MRN: [id number]  Physician: Dr. Guerrero Clamp: [facility] Medicare (no [place])  Medical Diagnosis: S/p L TKA                     Rehab Codes: M25. 562 (L knee pain) New referral for L glut tear   Onset date:  DOS: 22              Next Dr's appt.: 23  Visit# / total visits: ; Progress note for Medicare patient due at visit 30     Cancels/No Shows: 1/0    Subjective:    Pain:  [] Yes  [x] No Location: L glute  Pain Rating: (0-10 scale) 2/10  Pain altered Tx:  [x] No  [] Yes  Action:  Comments: Pt arrives [facility] - states he is able to sit for longer periods of time without needing to re-adjust.       L Hip flex: 4/5  L hip abd: 3-/5   L glut TTP along ischial tuberosity     R hip flex: 4+/5  R hip abd: 4-/5    Objective:  Modalities:   Precautions: check on pitting edema  Exercises:  Exercise  L TKA (DOS 22)    Reps/ Time Weight/ Level Comments    NuStep 10' L5  x          Mat          Hip flexor stretch 3'   -   Hip abd slide board 2x10   -   Clamshells 2x15 lime L only, w/ slide board and tactile cues x   Hip abd 2x15 AROM L only x   Glut set 20x5\" hold   -   Bridges 2x15   x   SLR 10x  L only x          Gym       3 way hip  20x ea

## 2023-06-02 NOTE — PRE-CERTIFICATION NOTE
Medicare Cap   [x] Physical Therapy  [] Speech Therapy  [] Occupational therapy  *PT and Speech caps combine      $2230 Limit for PT and Speech combined  $2230 Limit for OT individually  At the beginning of the month where you expect to go over $2150, please add the [address] [place] [address] modifier      Patient Name: Becky Craft: 1940    Note:  This is an estimate of charges billed.      Date of [address] Name # units/ charge $$$ charge Daily Total Charge Ongoing Total $$$   2/20 PT eval  Therex  Vaso 1+1+1 97.02+22.29+8.99 128.3 128.3   2/23 PTA TE, Vaso 3+1 25.10+19.62*2+7.91 72.25 200.55   2/27 PT Therex  Vaso 3+1 28.50+22.29+22.29+8.99 82.07 282.62   3/2 PT Therex  Vaso 3+1  82.07 364.69   3/6 PT Therex  Vaso 3+1  82.07 446.76   3/9 PTA TE, Vaso 3+1 25.10+19.62*2+7.91 72.25 519.01   3/13 PT Therex  Vaso 3+1  82.07 601.08   3/16 TE + Vaso 3+1  72.25 673.33   3/21 TE + Vaso 3+1  72.25 745.58   3/23 Therex  Vaso PT 3+1  82.07 827.65   3/28 PTA TE  3 25.10+19.62*2 64.34 891.99   3/30 PTA TE, Gait  3+1 25.10+19.62*3 83.96 975.95   4/4 PTA TE 4  81.15 1,057.10   4/6 PTA TE, Vaso 3+1  81.15 1,138.25   4/11 PT TE, Vaso 2+1  63.70 1,201.95   4/18 PTA TE, Gait, Vaso 1+1+1  52.63 1,254.58   4/20 TE, Gait, Vaso 1+1+1  52.63 1,307.21   4/25 PTA TE, gait 2+1  64.34    4/28 PT TE, TA, Vaso 1+1+1  70.30    5/2 PTA TE 3  64.34 1,506.19   5/4 PTA TE 3  64.34 1570.53   5/9 PTA TE+MT 2+1 25.10+19.62+18.34 63.06 1633.59   5/11 PTA TE+MT 3+1 25.10+19.62*2+18.34 82.68 1716.27   5/16 PTA TE+MT 2+1 25.10+19.62+18.34 63.06 1779.33   5/18 PTA TE+MT   63.06 1842.39   5/23 PTA TE 3 25.10+19.62*2 64.34 1906.73   5/25 PTA TE+MT 2+1 25.10+19.62+18.34 63.06 1969.79   5/30 PTA TE+MT 2+1  63.06 2032.85   START USING KX

## 2023-06-06 ENCOUNTER — HOSPITAL ENCOUNTER (OUTPATIENT)
Dept: PHYSICAL THERAPY | Facility: CLINIC | Age: 83
Setting detail: THERAPIES SERIES
Discharge: HOME | End: 2023-06-06
Payer: MEDICARE

## 2023-06-06 PROCEDURE — 97110 THERAPEUTIC EXERCISES: CPT

## 2023-06-06 PROCEDURE — 97530 THERAPEUTIC ACTIVITIES: CPT

## 2023-06-06 NOTE — PRE-CERTIFICATION NOTE
Medicare Cap   [x] Physical Therapy  [] Speech Therapy  [] Occupational therapy  *PT and Speech caps combine      $2230 Limit for PT and Speech combined  $2230 Limit for OT individually  At the beginning of the month where you expect to go over $2150, please add the [address] [place] 22 modifier      Patient Name: Montana Caputo: 1940    Note:  This is an estimate of charges billed.      Date of [address] Name # units/ charge $$$ charge Daily Total Charge Ongoing Total $$$   2/20 PT eval  Therex  Vaso 1+1+1 97.02+22.29+8.99 128.3 128.3   2/23 PTA TE, Vaso 3+1 25.10+19.62*2+7.91 72.25 200.55   2/27 PT Therex  Vaso 3+1 28.50+22.29+22.29+8.99 82.07 282.62   3/2 PT Therex  Vaso 3+1  82.07 364.69   3/6 PT Therex  Vaso 3+1  82.07 446.76   3/9 PTA TE, Vaso 3+1 25.10+19.62*2+7.91 72.25 519.01   3/13 PT Therex  Vaso 3+1  82.07 601.08   3/16 TE + Vaso 3+1  72.25 673.33   3/21 TE + Vaso 3+1  72.25 745.58   3/23 Therex  Vaso PT 3+1  82.07 827.65   3/28 PTA TE  3 25.10+19.62*2 64.34 891.99   3/30 PTA TE, Gait  3+1 25.10+19.62*3 83.96 975.95   4/4 PTA TE 4  81.15 1,057.10   4/6 PTA TE, Vaso 3+1  81.15 1,138.25   4/11 PT TE, Vaso 2+1  63.70 1,201.95   4/18 PTA TE, Gait, Vaso 1+1+1  52.63 1,254.58   4/20 TE, Gait, Vaso 1+1+1  52.63 1,307.21   4/25 PTA TE, gait 2+1  64.34    4/28 PT TE, TA, Vaso 1+1+1  70.30    5/2 PTA TE 3  64.34 1,506.19   5/4 PTA TE 3  64.34 1570.53   5/9 PTA TE+MT 2+1 25.10+19.62+18.34 63.06 1633.59   5/11 PTA TE+MT 3+1 25.10+19.62*2+18.34 82.68 1716.27   5/16 PTA TE+MT 2+1 25.10+19.62+18.34 63.06 1779.33   5/18 PTA TE+MT   63.06 1842.39   5/23 PTA TE 3 25.10+19.62*2 64.34 1906.73   5/25 PTA TE+MT 2+1 25.10+19.62+18.34 63.06 1969.79   5/30 PTA TE+MT 2+1  63.06 2032.85   START USING KX        6/2 KX 3TE   64.34 2097.19   6/6KX 3TE   73.08 2170.27

## 2023-06-06 NOTE — FLOWSHEET NOTE
[] [facility]) [facility] &  Therapy  [address]  [phone]  F: [phone] [] [address]  [address]   [address]  P: [phone]  F: [phone] [] [place] &  Therapy  [address]  P: [phone]  F: [phone] [x] [address]  P: [phone]  F: [phone] [] [address]  [facility]   [address]   [place]: [phone]  F: [phone]      Physical Therapy Daily Treatment Note    Date:  2023  Patient Name:  Mane Power    :  1940  MRN: [id number]  Physician: Dr. Mead [facility] Delia Foot[facility]  Medical Diagnosis: S/p L TKA                     Rehab Codes: M25. 562 (L knee pain) New referral for L glut tear   Onset date:  DOS: 22              Next Dr's appt.: 23  Visit# / total visits: ; Progress note for Medicare patient due at visit 30     Cancels/No Shows: 1/0    Subjective:    Pain:  [x] Yes  [x] No Location: B knee burning anteriorly. Pain Rating: (0-10 scale) 1/10  Pain altered Tx:  [x] No  [] Yes  Action:  Comments: Pt arrives w/ SPC - L knee is very stiff every morning and takes a long time before he can bend it. R knee is fine, L knee and hip feel weak. Able to sit on L gluteal for 1 hour.  Stairs are still difficult especially descending with R first, feels LLE weakness      L Hip flex: 4/5  L hip abd: 3-/5   L glut TTP along ischial tuberosity     R hip flex: 4+/5  R hip abd: 4-/5    Objective:  Modalities:   Precautions: check on pitting edema  Exercises:  Exercise  L TKA (DOS 22)    Reps/ Time Weight/ Level Comments    NuStep 10' L5  x          Mat          Hip flexor stretch 3'   -   Hip abd slide board 2x10   -   Clamshells 2x15 lime

## 2023-06-08 ENCOUNTER — HOSPITAL ENCOUNTER (OUTPATIENT)
Dept: PHYSICAL THERAPY | Facility: CLINIC | Age: 83
Setting detail: THERAPIES SERIES
Discharge: HOME | End: 2023-06-08
Payer: MEDICARE

## 2023-06-08 PROCEDURE — 97110 THERAPEUTIC EXERCISES: CPT

## 2023-06-08 PROCEDURE — 97016 VASOPNEUMATIC DEVICE THERAPY: CPT

## 2023-06-08 NOTE — FLOWSHEET NOTE
[] [address] - . [facility] &  Therapy  [address]  [phone]  F: [phone] [] [address]  [address]   [address]  P: [phone]  F: [phone] [] [place] &  Therapy  [address]  P: [phone]  F: [phone] [x] [address]  P: [phone]  F: [phone] [] [address]  [facility]   [address]   [place]: [phone]  F: [phone]      Physical Therapy Daily Treatment Note    Date:  2023  Patient Name:  Kenneth Bowser    :  1940  MRN: [id number]  Physician: Dr. Nicolas Rather: [place] Medicare (no Landon Pfeiffer)  Medical Diagnosis: S/p L TKA                     Rehab Codes: M25. 562 (L knee pain) New referral for L glut tear   Onset date:  DOS: 22              Next Dr's appt.: 23  Visit# / total visits: ; Progress note for Medicare patient due at visit 30     Cancels/No Shows: 1/0    Subjective:    Pain:  [x] Yes  [x] No Location: B knee burning anteriorly. Pain Rating: (0-10 scale) 1/10  Pain altered Tx:  [x] No  [] Yes  Action:  Comments: Pt arrives noting that hip/glut pain has improved significantly. Continues to notice stiffness in L knee however denies pain.      L Hip flex: 4/5  L hip abd: 3-/5   L glut TTP along ischial tuberosity     R hip flex: 4+/5  R hip abd: 4-/5    Objective:  Modalities:   Precautions: check on pitting edema  Exercises:  Exercise  L TKA (DOS 22)    Reps/ Time Weight/ Level Comments    NuStep 10' L5  x          Mat          Hip flexor stretch 3'   -   Hip abd slide board 2x10   -   Clamshells 2x15 lime L only, w/ slide board and tactile cues x   Hip abd X 10 AROM L only very weak 3-/5 x   Glut set 20x5\" hold   -   Bridges 2x15   x

## 2023-06-21 PROBLEM — Z12.11 SCREEN FOR COLON CANCER: Status: ACTIVE | Noted: 2023-06-21

## 2023-06-30 ENCOUNTER — OFFICE VISIT (OUTPATIENT)
Dept: ORTHOPEDIC SURGERY | Age: 83
End: 2023-06-30

## 2023-06-30 DIAGNOSIS — Z96.651 H/O TOTAL KNEE REPLACEMENT, RIGHT: Primary | ICD-10-CM

## 2023-07-11 ENCOUNTER — HOSPITAL ENCOUNTER (OUTPATIENT)
Dept: PHYSICAL THERAPY | Facility: CLINIC | Age: 83
Setting detail: THERAPIES SERIES
Discharge: HOME | End: 2023-07-11
Attending: ORTHOPAEDIC SURGERY
Payer: MEDICARE

## 2023-07-11 PROCEDURE — 97116 GAIT TRAINING THERAPY: CPT

## 2023-07-11 PROCEDURE — 97110 THERAPEUTIC EXERCISES: CPT

## 2023-07-11 NOTE — PRE-CERTIFICATION NOTE
Medicare Cap   [x] Physical Therapy  [] Speech Therapy  [] Occupational therapy  *PT and Speech caps combine      $2230 Limit for PT and Speech combined  $2230 Limit for OT individually  At the beginning of the month where you expect to go over $2150, please add the [facility] modifier      Patient Name: Jordin Dykes: 1940    Note:  This is an estimate of charges billed.      Date of [facility] Name # units/ charge $$$ charge Daily Total Charge Ongoing Total $$$   2/20 PT eval  Therex  Vaso 1+1+1 97.02+22.29+8.99 128.3 128.3   2/23 PTA TE, Vaso 3+1 25.10+19.62*2+7.91 72.25 200.55   2/27 PT Therex  Vaso 3+1 28.50+22.29+22.29+8.99 82.07 282.62   3/2 PT Therex  Vaso 3+1  82.07 364.69   3/6 PT Therex  Vaso 3+1  82.07 446.76   3/9 PTA TE, Vaso 3+1 25.10+19.62*2+7.91 72.25 519.01   3/13 PT Therex  Vaso 3+1  82.07 601.08   3/16 TE + Vaso 3+1  72.25 673.33   3/21 TE + Vaso 3+1  72.25 745.58   3/23 Therex  Vaso PT 3+1  82.07 827.65   3/28 PTA TE  3 25.10+19.62*2 64.34 891.99   3/30 PTA TE, Gait  3+1 25.10+19.62*3 83.96 975.95   4/4 PTA TE 4  81.15 1,057.10   4/6 PTA TE, Vaso 3+1  81.15 1,138.25   4/11 PT TE, Vaso 2+1  63.70 1,201.95   4/18 PTA TE, Gait, Vaso 1+1+1  52.63 1,254.58   4/20 TE, Gait, Vaso 1+1+1  52.63 1,307.21   4/25 PTA TE, gait 2+1  64.34    4/28 PT TE, TA, Vaso 1+1+1  70.30    5/2 PTA TE 3  64.34 1,506.19   5/4 PTA TE 3  64.34 1570.53   5/9 PTA TE+MT 2+1 25.10+19.62+18.34 63.06 1633.59   5/11 PTA TE+MT 3+1 25.10+19.62*2+18.34 82.68 1716.27   5/16 PTA TE+MT 2+1 25.10+19.62+18.34 63.06 1779.33   5/18 PTA TE+MT   63.06 1842.39   5/23 PTA TE 3 25.10+19.62*2 64.34 1906.73   5/25 PTA TE+MT 2+1 25.10+19.62+18.34 63.06 1969.79   5/30 PTA TE+MT 2+1  63.06 2032.85   START USING KX        6/2 KX 3TE   64.34 2097.19   6/6KX 3TE   73.08 2170.27   6/8KX 3TE   73.08    7/11 KX 2TE, Gt  28.50+22.29+22.29 73.08 2316.43

## 2023-07-11 NOTE — CONSULTS
[] [facility]) - [facility] &  Therapy  [address] [facility]  [phone]  F: [phone] [] [address] [facility]  [address]   [address]  P: [phone]  F: [phone] [x] [address] &  Therapy  [address]  P: [phone]  F: [phone] [] [facility]  P: [phone]  F: [phone] [] [address] [facility]  [address]   [address]   [place]: [phone]  F: [phone]      Physical Therapy ReEvaluation    Date: 2023      Patient: Marcus Trotter  : 1940  MRN: [id number]    Physician: Dr. Solano Bolus: [facility] Medicare (no Javonjuanita Wolf)  Medical Diagnosis: S/p L TKA                     Rehab Codes: M25. 562,  R26.89   Onset date:  DOS: 22              Next 's appt.:10/6/23  Visit# / total visits:  PN at 36 visits                               Cancels/No Shows: 1/0     Subjective:    Pain:  [x] Yes  [] No   Location: L knee Pain Rating: (0-10 scale) 2/10  Pain altered Tx:  [x] No  [] Yes  Action:  Comments: I mostly feel weakness not pain. I got to the point that I can walk with a cane but it is slow and feels weak. Pt has a lot of edema in L LE vs R. He states he tried wearing his compression stocking and taking his Lasix but didn't notice much difference in the size of his 2 legs. He also has a remote history of back surgery and still has some back pain. He states that before the back surgery he had radiating pain down the L LE. He denies any N/T in the L LE or radiating pain. Also has a history of L glute max tear. Pt denies falls since L knee has been replaced in Dec 2022.      Objective:  Midcalf girth measurement   L48.5 cm   R41.6cm  ROM  Knee flexion 0-91 deg  Ankle DF L 5  R 10  Strength  Hip flexion

## 2023-07-11 NOTE — FLOWSHEET NOTE
[] [address]  [address] [facility].  [phone]  F: [phone] [] [address] [facility]  [address]   [address]  P: [phone]  F: [phone] [x] [address]  [address] [facility]  P: [phone]  F: [phone] [] [address] [facility] [address]  P: [phone]  F: [phone]     Physical Therapy Daily Treatment Note/ Reeval    Date:  2023  Patient Name:  Marlon Ackerman    :  1940  MRN: [id number]  Physician: Dr. Cornell Nap: [facility] Medicare (no [facility])  Medical Diagnosis: S/p L TKA                     Rehab Codes: M25. 562,  R26.89   Onset date:  DOS: 22              Next 's appt.:10/6/23  Visit# / total visits: 31/46                                Cancels/No Shows: 1/0    Subjective:    Pain:  [x] Yes  [] No Location: L knee Pain Rating: (0-10 scale) 2/10  Pain altered Tx:  [x] No  [] Yes  Action:  Comments: I mostly feel weakness not pain. I got to the point that I can walk with a cane but it is slow and feels weak. Pt has a lot of edema in L LE vs R. He states he tried wearing his compression stocking and taking his Lasix but didn't notice much difference in the size of his 2 legs. He also has a remote history of back surgery and still has some back pain. He states that before the back surgery he had radiating pain down the L LE. He denies any N/T in the L LE or radiating pain. Also has a history of L glute max tear. Pt denies falls since L knee has been replaced in Dec 2022.     Objective:  Modalities: prn  Precautions: Close SBA to CGA with walking as pt becomes fatigued  Exercises:  Exercise Reps/ Time Weight/ Level Comments   Nustep 10' L3    Calf raises 2x10  Seated and standing    Tibial raises 2x10  Seated and standing    3

## 2023-07-17 ENCOUNTER — HOSPITAL ENCOUNTER (OUTPATIENT)
Dept: PHYSICAL THERAPY | Facility: CLINIC | Age: 83
Setting detail: THERAPIES SERIES
Discharge: HOME | End: 2023-07-17
Attending: ORTHOPAEDIC SURGERY
Payer: MEDICARE

## 2023-07-17 PROCEDURE — 97110 THERAPEUTIC EXERCISES: CPT

## 2023-07-17 NOTE — PRE-CERTIFICATION NOTE
Medicare Cap   [x] Physical Therapy  [] Speech Therapy  [] Occupational therapy  *PT and Speech caps combine      $2230 Limit for PT and Speech combined  $2230 Limit for OT individually  At the beginning of the month where you expect to go over $2150, please add the [facility] modifier      Patient Name: Russ [place]: 1940    Note:  This is an estimate of charges billed.      Date of [facility] Name # units/ charge $$$ charge Daily Total Charge Ongoing Total $$$   2/20 PT eval  Therex  Vaso 1+1+1 97.02+22.29+8.99 128.3 128.3   2/23 PTA TE, Vaso 3+1 25.10+19.62*2+7.91 72.25 200.55   2/27 PT Therex  Vaso 3+1 28.50+22.29+22.29+8.99 82.07 282.62   3/2 PT Therex  Vaso 3+1  82.07 364.69   3/6 PT Therex  Vaso 3+1  82.07 446.76   3/9 PTA TE, Vaso 3+1 25.10+19.62*2+7.91 72.25 519.01   3/13 PT Therex  Vaso 3+1  82.07 601.08   3/16 TE + Vaso 3+1  72.25 673.33   3/21 TE + Vaso 3+1  72.25 745.58   3/23 Therex  Vaso PT 3+1  82.07 827.65   3/28 PTA TE  3 25.10+19.62*2 64.34 891.99   3/30 PTA TE, Gait  3+1 25.10+19.62*3 83.96 975.95   4/4 PTA TE 4  81.15 1,057.10   4/6 PTA TE, Vaso 3+1  81.15 1,138.25   4/11 PT TE, Vaso 2+1  63.70 1,201.95   4/18 PTA TE, Gait, Vaso 1+1+1  52.63 1,254.58   4/20 TE, Gait, Vaso 1+1+1  52.63 1,307.21   4/25 PTA TE, gait 2+1  64.34    4/28 PT TE, TA, Vaso 1+1+1  70.30    5/2 PTA TE 3  64.34 1,506.19   5/4 PTA TE 3  64.34 1570.53   5/9 PTA TE+MT 2+1 25.10+19.62+18.34 63.06 1633.59   5/11 PTA TE+MT 3+1 25.10+19.62*2+18.34 82.68 1716.27   5/16 PTA TE+MT 2+1 25.10+19.62+18.34 63.06 1779.33   5/18 PTA TE+MT   63.06 1842.39   5/23 PTA TE 3 25.10+19.62*2 64.34 1906.73   5/25 PTA TE+MT 2+1 25.10+19.62+18.34 63.06 1969.79   5/30 PTA TE+MT 2+1  63.06 2032.85   START USING KX        6/2 KX 3TE   64.34 2097.19   6/6KX 3TE   73.08 2170.27   6/8KX 3TE   73.08    7/11 KX 2TE, Gt  28.50+22.29+22.29 73.08 2316.43   7/17 KX 3TE  24.77+19.15+19.15 63.07 2379.50

## 2023-07-17 NOTE — FLOWSHEET NOTE
sets - 10 reps  - Supine Hip Abduction  - 1 x daily - 7 x weekly - 3 sets - 10 reps  - Small Range Straight Leg Raise  - 1 x daily - 7 x weekly - 3 sets - 10 reps  - Supine Hip Abduction  - 1 x daily - 7 x weekly - 3 sets - 10 reps  - Seated Knee Flexion Slide  - 1 x daily - 7 x weekly - 10-15 reps  Use compression stocking and use RW  Comprehension of Education:  [x] Verbalizes understanding. [] Demonstrates understanding. [x] Needs review. [] Demonstrates/verbalizes HEP/Ed previously given. Plan: [x] Continue current frequency toward long and short term goals.     [x] Specific Instructions for subsequent treatments: see above      Time In: 1:00 pm         Time Out: 2:00 pm    Electronically signed by:  Segundo Bird PTA

## 2023-07-20 ENCOUNTER — HOSPITAL ENCOUNTER (OUTPATIENT)
Dept: PHYSICAL THERAPY | Facility: CLINIC | Age: 83
Setting detail: THERAPIES SERIES
Discharge: HOME | End: 2023-07-20
Attending: ORTHOPAEDIC SURGERY
Payer: MEDICARE

## 2023-07-20 PROCEDURE — 97110 THERAPEUTIC EXERCISES: CPT

## 2023-07-20 NOTE — FLOWSHEET NOTE
to hip strengthening      Treatment Charges: Mins Units   []  Modalities     [x]  Ther Exercise 48 3   []  Manual Therapy     []  Ther Activities     []  Neuro Re-ed     []  Vasocompression     [] Gait     []  Reeval     Total Treatment time 48 3   10 min warmup not billed    Assessment: [x] Progressing toward goals. Initiated session with Nustep for a warm up to promote blood flow prior to ex. Gait belt donned prior to standing ex, SBA/CGA provided for safety. No LOB with standing program however modA x2 needed for pt to get off TG. Max cues with ambulation for foot clearance, postural awareness, and to stay within walker. Rest breaks taken as needed secondary to LE fatigue. Instructed pt to cont to wear compression stocking for edema relief. Fatigued post treatment but no increase in pain. [] No change. [] Other:   [x] Patient would continue to benefit from skilled physical therapy services in order to: Increase ankle DF, Knee flexion ROM as well as L LE strength to normalize gait with cane    STG (to be met in 8 visits)  <3/10 knee pain to allow for normal ADLs  Normal ankle DF to aide in normal LE mechanics  Hip flexion and abd strength 5/5 B to allow for normal LE mechanics  Girth measure midcalf equal for improved L LE mobility with gait and transfers   Gait community distance with cane demonstrating more level hips and good foot clearance   IND in HEP with little to no cues    LTG (to be met in 16 visits)  0/10 knee pain to allow for return to all activity normally  Pt able to demonstrate good eccentric quad control on 6 inch step for normal stair negotiation  LEFI<30% limitation        Pt. Education:  [x] Yes  [] No  [] Reviewed Prior HEP/Ed  Method of Education: [x] Verbal  [x] Demo  [x] Written  Access Code: Vicki Malcolm  URL: [email or web address]  Date: 07/11/2023  Prepared by: Elizabeth Ocampo    Exercises  - Seated Heel Raise  - 1 x daily - 7 x weekly - 3 sets - 10 reps  - Seated Heel Toe

## 2023-07-21 PROBLEM — Z12.11 SCREEN FOR COLON CANCER: Status: RESOLVED | Noted: 2023-06-21 | Resolved: 2023-07-21

## 2023-07-25 ENCOUNTER — HOSPITAL ENCOUNTER (OUTPATIENT)
Dept: PHYSICAL THERAPY | Facility: CLINIC | Age: 83
Setting detail: THERAPIES SERIES
Discharge: HOME | End: 2023-07-25
Attending: ORTHOPAEDIC SURGERY
Payer: MEDICARE

## 2023-07-25 PROCEDURE — 97110 THERAPEUTIC EXERCISES: CPT

## 2023-07-25 NOTE — PRE-CERTIFICATION NOTE
Medicare Cap   [x] Physical Therapy  [] Speech Therapy  [] Occupational therapy  *PT and Speech caps combine      $2230 Limit for PT and Speech combined  $2230 Limit for OT individually  At the beginning of the month where you expect to go over $2150, please add the [facility] modifier      Patient Name: Erica Anderson: 1940    Note:  This is an estimate of charges billed.      Date of 7[facility] Name # units/ charge $$$ charge Daily Total Charge Ongoing Total $$$   2/20 PT eval  Therex  Vaso 1+1+1 97.02+22.29+8.99 128.3 128.3   2/23 PTA TE, Vaso 3+1 25.10+19.62*2+7.91 72.25 200.55   2/27 PT Therex  Vaso 3+1 28.50+22.29+22.29+8.99 82.07 282.62   3/2 PT Therex  Vaso 3+1  82.07 364.69   3/6 PT Therex  Vaso 3+1  82.07 446.76   3/9 PTA TE, Vaso 3+1 25.10+19.62*2+7.91 72.25 519.01   3/13 PT Therex  Vaso 3+1  82.07 601.08   3/16 TE + Vaso 3+1  72.25 673.33   3/21 TE + Vaso 3+1  72.25 745.58   3/23 Therex  Vaso PT 3+1  82.07 827.65   3/28 PTA TE  3 25.10+19.62*2 64.34 891.99   3/30 PTA TE, Gait  3+1 25.10+19.62*3 83.96 975.95   4/4 PTA TE 4  81.15 1,057.10   4/6 PTA TE, Vaso 3+1  81.15 1,138.25   4/11 PT TE, Vaso 2+1  63.70 1,201.95   4/18 PTA TE, Gait, Vaso 1+1+1  52.63 1,254.58   4/20 TE, Gait, Vaso 1+1+1  52.63 1,307.21   4/25 PTA TE, gait 2+1  64.34    4/28 PT TE, TA, Vaso 1+1+1  70.30    5/2 PTA TE 3  64.34 1,506.19   5/4 PTA TE 3  64.34 1570.53   5/9 PTA TE+MT 2+1 25.10+19.62+18.34 63.06 1633.59   5/11 PTA TE+MT 3+1 25.10+19.62*2+18.34 82.68 1716.27   5/16 PTA TE+MT 2+1 25.10+19.62+18.34 63.06 1779.33   5/18 PTA TE+MT   63.06 1842.39   5/23 PTA TE 3 25.10+19.62*2 64.34 1906.73   5/25 PTA TE+MT 2+1 25.10+19.62+18.34 63.06 1969.79   5/30 PTA TE+MT 2+1  63.06 2032.85   START USING KX        6/2 KX 3TE   64.34 2097.19   6/6KX 3TE   73.08 2170.27   6/8KX 3TE   73.08    7/11 KX 2TE, Gt  28.50+22.29+22.29 73.08 2316.43   7/17 KX 3TE  24.77+19.15+19.15 63.07 2379.50   7/20 KX 3 TE  24.77+19.15+19.15 63.07 2442.57

## 2023-07-25 NOTE — FLOWSHEET NOTE
[] [address]  [address] [facility].  [phone]  F: [phone] [] [address]  [address]   [address]  P: [phone]  F: [phone] [x] [address]  [address] [facility]  P: [phone]  F: [phone] [] [address] [facility]  P: [phone]  F: [phone]     Physical Therapy Daily Treatment Note    Date:  2023  Patient Name:  Russell Sanders    :  1940  MRN: [id number]  Physician: Dr. Eduardo Edwards: [facility] Medicare [facility]  Medical Diagnosis: S/p L TKA                     Rehab Codes: M25. 562,  R26.89   Onset date:  DOS: 22              Next 's appt.:10/6/23  Visit# / total visits: 34/46                                Cancels/No Shows: 1/0    Subjective:    Pain:  [x] Yes  [] No Location: L knee Pain Rating: (0-10 scale) 2/10  Pain altered Tx:  [x] No  [] Yes  Action:  Comments: Pt reporting he is feeling pretty tired today. Arrived with just a cane today, stating he left his walker in his wives car.      Objective:  Modalities: prn  Precautions: Close SBA to CGA with walking as pt becomes fatigued  Exercises:  Exercise Reps/ Time Weight/ Level Comments   Nustep 10' L4    Calf raises 2x10  Seated and standing    Tibial raises 2x10  Seated and standing    Marches X20 B     3 way hip  x15  B in //bar   Tgym squat 2x10 L15 [place] to get on, modA x 2 to get off- Held today    Step Ups 10x 4\" Fwd/lat         Seated      Calf raise 2x10     Tibial raise  2x10     LAQ 2x10     HS curl 2x10 lime          Supine       Hip abd 2x10     SLR 2x10     Heel Slides  10x5\"                             Other:     Instructions for next visit: Review previous as appropriate, big mat table to hip strengthening      Treatment Charges: Mins

## 2023-07-27 ENCOUNTER — HOSPITAL ENCOUNTER (OUTPATIENT)
Dept: PHYSICAL THERAPY | Facility: CLINIC | Age: 83
Setting detail: THERAPIES SERIES
Discharge: HOME | End: 2023-07-27
Attending: ORTHOPAEDIC SURGERY
Payer: MEDICARE

## 2023-07-27 PROCEDURE — 97110 THERAPEUTIC EXERCISES: CPT

## 2023-07-27 NOTE — PRE-CERTIFICATION NOTE
Medicare Cap   [x] Physical Therapy  [] Speech Therapy  [] Occupational therapy  *PT and Speech caps combine      $2230 Limit for PT and Speech combined  $2230 Limit for OT individually  At the beginning of the month where you expect to go over $2150, please add the [facility] modifier      Patient Name: Jatinder Trotter: 1940    Note:  This is an estimate of charges billed.      Date of 7[facility] Name # units/ charge $$$ charge Daily Total Charge Ongoing Total $$$   2/20 PT eval  Therex  Vaso 1+1+1 97.02+22.29+8.99 128.3 128.3   2/23 PTA TE, Vaso 3+1 25.10+19.62*2+7.91 72.25 200.55   2/27 PT Therex  Vaso 3+1 28.50+22.29+22.29+8.99 82.07 282.62   3/2 PT Therex  Vaso 3+1  82.07 364.69   3/6 PT Therex  Vaso 3+1  82.07 446.76   3/9 PTA TE, Vaso 3+1 25.10+19.62*2+7.91 72.25 519.01   3/13 PT Therex  Vaso 3+1  82.07 601.08   3/16 TE + Vaso 3+1  72.25 673.33   3/21 TE + Vaso 3+1  72.25 745.58   3/23 Therex  Vaso PT 3+1  82.07 827.65   3/28 PTA TE  3 25.10+19.62*2 64.34 891.99   3/30 PTA TE, Gait  3+1 25.10+19.62*3 83.96 975.95   4/4 PTA TE 4  81.15 1,057.10   4/6 PTA TE, Vaso 3+1  81.15 1,138.25   4/11 PT TE, Vaso 2+1  63.70 1,201.95   4/18 PTA TE, Gait, Vaso 1+1+1  52.63 1,254.58   4/20 TE, Gait, Vaso 1+1+1  52.63 1,307.21   4/25 PTA TE, gait 2+1  64.34    4/28 PT TE, TA, Vaso 1+1+1  70.30    5/2 PTA TE 3  64.34 1,506.19   5/4 PTA TE 3  64.34 1570.53   5/9 PTA TE+MT 2+1 25.10+19.62+18.34 63.06 1633.59   5/11 PTA TE+MT 3+1 25.10+19.62*2+18.34 82.68 1716.27   5/16 PTA TE+MT 2+1 25.10+19.62+18.34 63.06 1779.33   5/18 PTA TE+MT   63.06 1842.39   5/23 PTA TE 3 25.10+19.62*2 64.34 1906.73   5/25 PTA TE+MT 2+1 25.10+19.62+18.34 63.06 1969.79   5/30 PTA TE+MT 2+1  63.06 2032.85   START USING KX        6/2 KX 3TE   64.34 2097.19   6/6KX 3TE   73.08 2170.27   6/8KX 3TE   73.08    7/11 KX 2TE, Gt  28.50+22.29+22.29 73.08 2316.43   7/17 KX 3TE  24.77+19.15+19.15 63.07 2379.50   7/20 KX 3 TE  24.77+19.15+19.15 63.07 2442.57

## 2023-07-27 NOTE — FLOWSHEET NOTE
[] [address]  [address] [facility].  [phone]  F: [phone] [] [address]  [address]   [address]  P: [phone]  F: [phone] [x] [address]  151 [facility]  P: [phone]  F: [phone] [] [address]  P: [phone]  F: [phone]     Physical Therapy Daily Treatment Note    Date:  2023  Patient Name:  Claudette Bills    :  1940  MRN: [id number]  Physician: Dr. William Goode: [facility] Medicare (no Sandra Darrin)  Medical Diagnosis: S/p L TKA                     Rehab Codes: M25. 562,  R26.89   Onset date:  DOS: 22              Next 's appt.:10/6/23  Visit# / total visits: 35/46                                Cancels/No Shows: 1/0    Subjective:    Pain:  [x] Yes  [] No Location: L knee Pain Rating: (0-10 scale) 0/10  Pain altered Tx:  [x] No  [] Yes  Action:  Comments: Pt arrived with no pain, stating legs are stiff.      Objective:  Modalities: prn  Precautions: Close SBA to CGA with walking as pt becomes fatigued  Exercises:  Exercise Reps/ Time Weight/ Level Comments   Nustep 10' L4    Calf raises 2x10  Seated and standing    Tibial raises 2x10  Seated and standing    Marches X20 B     HS curls  x20     3 way hip  x15  B in //bar   Tgym squat 2x10 L15 Pancho to get on, modA x 2 to get off- Held today    Step Ups 15x 4\" Fwd/lat   sidestepping 2 laps   // bars   Seated      Calf raise 2x10     Tibial raise  2x10     LAQ 2x10                 Supine       Hip abd 2x10     SLR 2x10     Heel Slides  10x5\"                             Other:     Instructions for next visit: Review previous as appropriate, big mat table to hip strengthening      Treatment Charges: Mins Units   []  Modalities     [x]  Ther Exercise 48 3

## 2023-08-03 ENCOUNTER — HOSPITAL ENCOUNTER (OUTPATIENT)
Dept: PHYSICAL THERAPY | Facility: CLINIC | Age: 83
Setting detail: THERAPIES SERIES
Discharge: HOME | End: 2023-08-03
Attending: ORTHOPAEDIC SURGERY
Payer: MEDICARE

## 2023-08-03 PROBLEM — R35.1 NOCTURIA: Status: ACTIVE | Noted: 2023-08-03

## 2023-08-03 PROBLEM — M79.89 SWELLING OF LOWER LEG: Status: ACTIVE | Noted: 2023-08-03

## 2023-08-03 PROCEDURE — 97110 THERAPEUTIC EXERCISES: CPT

## 2023-08-03 NOTE — FLOWSHEET NOTE
[] [address]  4[address] [facility].  [phone]  F: [phone] [] [address]  [address]   [address]  P: [phone]  F: [phone] [x] [address]  151 [facility]  P: [phone]  F: [phone] [] [address] [facility]  P: [phone]  F: [phone]     Physical Therapy Daily Treatment Note    Date:  8/3/2023  Patient Name:  Alex Bower    :  1940  MRN: [id number]  Physician: Dr. Jordan Weeks: [facility] Medicare (no [facility])  Medical Diagnosis: S/p L TKA                     Rehab Codes: M25. 562,  R26.89   Onset date:  DOS: 22              Next 's appt.:10/6/23  Visit# / total visits: 36/46                                Cancels/No Shows: 1/0    Subjective:    Pain:  [x] Yes  [] No Location: L knee Pain Rating: (0-10 scale) 0/10  Pain altered Tx:  [x] No  [] Yes  Action:  Comments: Pt reporting no pain. States he is feeling better than previous visit.      Objective:  Modalities: prn  Precautions: Close SBA to CGA with walking as pt becomes fatigued  Exercises:  Exercise Reps/ Time Weight/ Level Comments   Nustep 10' L5    Calf raises 2x10  Seated and standing    Tibial raises 2x10  Seated and standing    Marches X20 B     HS curls  x20     3 way hip  x15 orange B in //bar   Tgym squat 2x10 L15 Pancho to get on, modA x 2 to get off- Held today    Step Ups 15x 4\" Fwd/lat   sidestepping 2 laps  orange // bars   Seated      Calf raise 2x10     Tibial raise  2x10     LAQ 2x10 3#                Supine       Hip abd 2x10     SLR 2x10     Heel Slides  10x5\"                             Other:     Instructions for next visit: Review previous as appropriate, big mat table to hip strengthening      Treatment Charges: Mins Units   []

## 2023-08-04 ENCOUNTER — HOSPITAL ENCOUNTER (OUTPATIENT)
Dept: PHYSICAL THERAPY | Facility: CLINIC | Age: 83
Setting detail: THERAPIES SERIES
Discharge: HOME | End: 2023-08-04
Attending: ORTHOPAEDIC SURGERY
Payer: MEDICARE

## 2023-08-04 PROCEDURE — 97110 THERAPEUTIC EXERCISES: CPT

## 2023-08-04 NOTE — FLOWSHEET NOTE
[] [address]  4605 [facility].  [phone]  F: [phone] [] 204 [facility]  [address]  P: [phone]  F: [phone] [x] [address]  151 [facility]  P: [phone]  F: [phone] [] [address] [facility]  P: [phone]  F: [phone]     Physical Therapy Daily Treatment Note    Date:  2023  Patient Name:  Dominic Salazar    :  1940  MRN: [id number]  Physician: Dr. Dinora Gayle: [facility] Medicare (no Penny Lamar)  Medical Diagnosis: S/p L TKA                     Rehab Codes: M25. 562,  R26.89   Onset date:  DOS: 22              Next Dr's appt.:10/6/23  Visit# / total visits: 37/46                                Cancels/No Shows: 1/0    Subjective:    Pain:  [] Yes  [x] No Location: L knee Pain Rating: (0-10 scale) 0/10  Pain altered Tx:  [x] No  [] Yes  Action:  Comments: Pt mentioned not having any pain today.       Objective:  Modalities: prn  Precautions: Close SBA to CGA with walking as pt becomes fatigued  Exercises:  Exercise Reps/ Time Weight/ Level Comments    Nustep 10' L5  x   Calf raises 2x10  Seated and standing  x   Tibial raises 2x10  Seated and standing  x   Marches X20 B   x   HS curls  x20   x   3 way hip  x15 orange B in //bar x   Tgym squat 2x10 L15 Pancho to get on, modA x 2 to get off- Held today     Step Ups 15x 4\" Fwd/lat x   sidestepping 2 laps  orange // bars    Seated       Calf raise 2x10   x   Tibial raise  2x10   x   LAQ 2x10 3#                   Supine        Hip abd 2x10   x   SLR 2x10   x   Heel Slides  10x5\"   x                               Other:     Instructions for next visit: Review previous as appropriate, big mat table to hip strengthening      Treatment Charges: Mins Units

## 2023-08-07 ENCOUNTER — TRANSCRIBE ORDERS (OUTPATIENT)
Dept: ADMINISTRATIVE | Age: 83
End: 2023-08-07

## 2023-08-07 DIAGNOSIS — R10.2 MALE PELVIC PAIN: ICD-10-CM

## 2023-08-07 DIAGNOSIS — R10.84 ABDOMINAL PAIN, GENERALIZED: Primary | ICD-10-CM

## 2023-08-08 ENCOUNTER — APPOINTMENT (OUTPATIENT)
Dept: GENERAL RADIOLOGY | Age: 83
DRG: 853 | End: 2023-08-08
Payer: MEDICARE

## 2023-08-08 ENCOUNTER — HOSPITAL ENCOUNTER (OUTPATIENT)
Dept: CT IMAGING | Age: 83
Discharge: HOME | End: 2023-08-10
Payer: MEDICARE

## 2023-08-08 ENCOUNTER — HOSPITAL ENCOUNTER (INPATIENT)
Age: 83
LOS: 14 days | Discharge: NURSING, REHAB OR LONG-TERM CARE | DRG: 853 | End: 2023-08-22
Attending: EMERGENCY MEDICINE | Admitting: SURGERY
Payer: MEDICARE

## 2023-08-08 ENCOUNTER — HOSPITAL ENCOUNTER (OUTPATIENT)
Age: 83
Discharge: HOME | End: 2023-08-08
Payer: MEDICARE

## 2023-08-08 ENCOUNTER — ANESTHESIA EVENT (OUTPATIENT)
Dept: OPERATING ROOM | Age: 83
End: 2023-08-08
Payer: MEDICARE

## 2023-08-08 ENCOUNTER — APPOINTMENT (OUTPATIENT)
Dept: PHYSICAL THERAPY | Facility: CLINIC | Age: 83
End: 2023-08-08
Attending: ORTHOPAEDIC SURGERY
Payer: MEDICARE

## 2023-08-08 ENCOUNTER — ANESTHESIA (OUTPATIENT)
Dept: OPERATING ROOM | Age: 83
End: 2023-08-08
Payer: MEDICARE

## 2023-08-08 DIAGNOSIS — R10.84 ABDOMINAL PAIN, GENERALIZED: ICD-10-CM

## 2023-08-08 DIAGNOSIS — G89.18 ACUTE POSTOPERATIVE PAIN: ICD-10-CM

## 2023-08-08 DIAGNOSIS — K35.80 ACUTE APPENDICITIS, UNSPECIFIED ACUTE APPENDICITIS TYPE: ICD-10-CM

## 2023-08-08 DIAGNOSIS — R10.2 PELVIC PAIN: ICD-10-CM

## 2023-08-08 DIAGNOSIS — K35.20 ACUTE APPENDICITIS WITH PERFORATION AND GENERALIZED PERITONITIS, WITHOUT ABSCESS OR GANGRENE: ICD-10-CM

## 2023-08-08 DIAGNOSIS — K35.32 ACUTE APPENDICITIS WITH PERFORATION AND LOCALIZED PERITONITIS, WITHOUT ABSCESS OR GANGRENE: Primary | ICD-10-CM

## 2023-08-08 DIAGNOSIS — R10.2 MALE PELVIC PAIN: Primary | ICD-10-CM

## 2023-08-08 LAB
ALBUMIN SERPL-MCNC: 3.6 G/DL (ref 3.5–5.2)
ALBUMIN/GLOB SERPL: 0.9 {RATIO} (ref 1–2.5)
ALP SERPL-CCNC: 79 U/L (ref 40–129)
ALT SERPL-CCNC: 16 U/L (ref 5–41)
ANION GAP SERPL CALCULATED.3IONS-SCNC: 14 MMOL/L (ref 9–17)
AST SERPL-CCNC: 29 U/L
BASOPHILS # BLD: 0.03 K/UL (ref 0–0.2)
BASOPHILS NFR BLD: 0 % (ref 0–2)
BILIRUB SERPL-MCNC: 0.6 MG/DL (ref 0.3–1.2)
BUN SERPL-MCNC: 22 MG/DL (ref 8–23)
CALCIUM SERPL-MCNC: 8.3 MG/DL (ref 8.6–10.4)
CHLORIDE SERPL-SCNC: 95 MMOL/L (ref 98–107)
CO2 SERPL-SCNC: 24 MMOL/L (ref 20–31)
CREAT SERPL-MCNC: 1.3 MG/DL (ref 0.7–1.2)
CREAT SERPL-MCNC: 1.3 MG/DL (ref 0.7–1.2)
EOSINOPHIL # BLD: 0.04 K/UL (ref 0–0.44)
EOSINOPHILS RELATIVE PERCENT: 0 % (ref 1–4)
ERYTHROCYTE [DISTWIDTH] IN BLOOD BY AUTOMATED COUNT: 12.2 % (ref 11.8–14.4)
FIO2: 100
GFR SERPL CREATININE-BSD FRML MDRD: 56 ML/MIN/1.73M2
GFR SERPL CREATININE-BSD FRML MDRD: 56 ML/MIN/1.73M2
GLUCOSE BLD-MCNC: 124 MG/DL (ref 74–100)
GLUCOSE SERPL-MCNC: 114 MG/DL (ref 70–99)
HCT VFR BLD AUTO: 40.9 % (ref 40.7–50.3)
HGB BLD-MCNC: 13.2 G/DL (ref 13–17)
IMM GRANULOCYTES # BLD AUTO: 0.05 K/UL (ref 0–0.3)
IMM GRANULOCYTES NFR BLD: 0 %
LACTIC ACID, WHOLE BLOOD: 2.3 MMOL/L (ref 0.7–2.1)
LIPASE SERPL-CCNC: 29 U/L (ref 13–60)
LYMPHOCYTES NFR BLD: 0.86 K/UL (ref 1.1–3.7)
LYMPHOCYTES RELATIVE PERCENT: 7 % (ref 24–43)
MCH RBC QN AUTO: 30.8 PG (ref 25.2–33.5)
MCHC RBC AUTO-ENTMCNC: 32.3 G/DL (ref 28.4–34.8)
MCV RBC AUTO: 95.3 FL (ref 82.6–102.9)
MONOCYTES NFR BLD: 0.53 K/UL (ref 0.1–1.2)
MONOCYTES NFR BLD: 4 % (ref 3–12)
NEUTROPHILS NFR BLD: 89 % (ref 36–65)
NEUTS SEG NFR BLD: 11.27 K/UL (ref 1.5–8.1)
NRBC BLD-RTO: 0 PER 100 WBC
PLATELET # BLD AUTO: 168 K/UL (ref 138–453)
PMV BLD AUTO: 9.8 FL (ref 8.1–13.5)
POC HCO3: 26 MMOL/L (ref 21–28)
POC LACTIC ACID: 0.5 MMOL/L (ref 0.56–1.39)
POC O2 SATURATION: 100 % (ref 94–98)
POC PCO2: 37.4 MM HG (ref 35–48)
POC PH: 7.45 (ref 7.35–7.45)
POC PO2: 508.2 MM HG (ref 83–108)
POSITIVE BASE EXCESS, ART: 2.1 MMOL/L (ref 0–3)
POTASSIUM SERPL-SCNC: 4.5 MMOL/L (ref 3.7–5.3)
PROT SERPL-MCNC: 7.8 G/DL (ref 6.4–8.3)
RBC # BLD AUTO: 4.29 M/UL (ref 4.21–5.77)
SAMPLE SITE: ABNORMAL
SODIUM SERPL-SCNC: 133 MMOL/L (ref 135–144)
WBC OTHER # BLD: 12.8 K/UL (ref 3.5–11.3)

## 2023-08-08 PROCEDURE — 6360000002 HC RX W HCPCS: Performed by: STUDENT IN AN ORGANIZED HEALTH CARE EDUCATION/TRAINING PROGRAM

## 2023-08-08 PROCEDURE — 82330 ASSAY OF CALCIUM: CPT

## 2023-08-08 PROCEDURE — 36620 INSERTION CATHETER ARTERY: CPT

## 2023-08-08 PROCEDURE — 99223 1ST HOSP IP/OBS HIGH 75: CPT | Performed by: SURGERY

## 2023-08-08 PROCEDURE — 80053 COMPREHEN METABOLIC PANEL: CPT

## 2023-08-08 PROCEDURE — 0DTJ0ZZ RESECTION OF APPENDIX, OPEN APPROACH: ICD-10-PCS | Performed by: SURGERY

## 2023-08-08 PROCEDURE — 3700000001 HC ADD 15 MINUTES (ANESTHESIA): Performed by: SURGERY

## 2023-08-08 PROCEDURE — 0WJG4ZZ INSPECTION OF PERITONEAL CAVITY, PERCUTANEOUS ENDOSCOPIC APPROACH: ICD-10-PCS | Performed by: SURGERY

## 2023-08-08 PROCEDURE — 88304 TISSUE EXAM BY PATHOLOGIST: CPT

## 2023-08-08 PROCEDURE — 83605 ASSAY OF LACTIC ACID: CPT

## 2023-08-08 PROCEDURE — 3600000015 HC SURGERY LEVEL 5 ADDTL 15MIN: Performed by: SURGERY

## 2023-08-08 PROCEDURE — 2580000003 HC RX 258: Performed by: NURSE ANESTHETIST, CERTIFIED REGISTERED

## 2023-08-08 PROCEDURE — 96374 THER/PROPH/DIAG INJ IV PUSH: CPT

## 2023-08-08 PROCEDURE — 74177 CT ABD & PELVIS W/CONTRAST: CPT

## 2023-08-08 PROCEDURE — 2060000000 HC ICU INTERMEDIATE R&B

## 2023-08-08 PROCEDURE — 82565 ASSAY OF CREATININE: CPT

## 2023-08-08 PROCEDURE — 6360000004 HC RX CONTRAST MEDICATION: Performed by: NURSE PRACTITIONER

## 2023-08-08 PROCEDURE — 6360000002 HC RX W HCPCS: Performed by: ANESTHESIOLOGY

## 2023-08-08 PROCEDURE — 83735 ASSAY OF MAGNESIUM: CPT

## 2023-08-08 PROCEDURE — 82803 BLOOD GASES ANY COMBINATION: CPT

## 2023-08-08 PROCEDURE — 85025 COMPLETE CBC W/AUTO DIFF WBC: CPT

## 2023-08-08 PROCEDURE — 37799 UNLISTED PX VASCULAR SURGERY: CPT

## 2023-08-08 PROCEDURE — 3700000000 HC ANESTHESIA ATTENDED CARE: Performed by: SURGERY

## 2023-08-08 PROCEDURE — 82947 ASSAY GLUCOSE BLOOD QUANT: CPT

## 2023-08-08 PROCEDURE — 93005 ELECTROCARDIOGRAM TRACING: CPT | Performed by: STUDENT IN AN ORGANIZED HEALTH CARE EDUCATION/TRAINING PROGRAM

## 2023-08-08 PROCEDURE — 3600000005 HC SURGERY LEVEL 5 BASE: Performed by: SURGERY

## 2023-08-08 PROCEDURE — 2500000003 HC RX 250 WO HCPCS: Performed by: NURSE ANESTHETIST, CERTIFIED REGISTERED

## 2023-08-08 PROCEDURE — 6370000000 HC RX 637 (ALT 250 FOR IP): Performed by: STUDENT IN AN ORGANIZED HEALTH CARE EDUCATION/TRAINING PROGRAM

## 2023-08-08 PROCEDURE — 2720000010 HC SURG SUPPLY STERILE: Performed by: SURGERY

## 2023-08-08 PROCEDURE — 36415 COLL VENOUS BLD VENIPUNCTURE: CPT

## 2023-08-08 PROCEDURE — 2709999900 HC NON-CHARGEABLE SUPPLY: Performed by: SURGERY

## 2023-08-08 PROCEDURE — 6360000002 HC RX W HCPCS: Performed by: NURSE ANESTHETIST, CERTIFIED REGISTERED

## 2023-08-08 PROCEDURE — 99285 EMERGENCY DEPT VISIT HI MDM: CPT

## 2023-08-08 PROCEDURE — 84100 ASSAY OF PHOSPHORUS: CPT

## 2023-08-08 PROCEDURE — 71045 X-RAY EXAM CHEST 1 VIEW: CPT

## 2023-08-08 PROCEDURE — 94002 VENT MGMT INPAT INIT DAY: CPT

## 2023-08-08 PROCEDURE — 2500000003 HC RX 250 WO HCPCS: Performed by: NURSE PRACTITIONER

## 2023-08-08 PROCEDURE — P9041 ALBUMIN (HUMAN),5%, 50ML: HCPCS | Performed by: ANESTHESIOLOGY

## 2023-08-08 PROCEDURE — 2580000003 HC RX 258: Performed by: STUDENT IN AN ORGANIZED HEALTH CARE EDUCATION/TRAINING PROGRAM

## 2023-08-08 PROCEDURE — 2580000003 HC RX 258: Performed by: SURGERY

## 2023-08-08 PROCEDURE — 6360000002 HC RX W HCPCS

## 2023-08-08 PROCEDURE — 83690 ASSAY OF LIPASE: CPT

## 2023-08-08 PROCEDURE — 80048 BASIC METABOLIC PNL TOTAL CA: CPT

## 2023-08-08 PROCEDURE — 94761 N-INVAS EAR/PLS OXIMETRY MLT: CPT

## 2023-08-08 PROCEDURE — 2500000003 HC RX 250 WO HCPCS: Performed by: STUDENT IN AN ORGANIZED HEALTH CARE EDUCATION/TRAINING PROGRAM

## 2023-08-08 PROCEDURE — 87086 URINE CULTURE/COLONY COUNT: CPT

## 2023-08-08 PROCEDURE — 44960 APPENDECTOMY: CPT | Performed by: SURGERY

## 2023-08-08 RX ORDER — SODIUM CHLORIDE, SODIUM LACTATE, POTASSIUM CHLORIDE, CALCIUM CHLORIDE 600; 310; 30; 20 MG/100ML; MG/100ML; MG/100ML; MG/100ML
INJECTION, SOLUTION INTRAVENOUS CONTINUOUS
Status: DISCONTINUED | OUTPATIENT
Start: 2023-08-08 | End: 2023-08-08

## 2023-08-08 RX ORDER — MAGNESIUM HYDROXIDE 1200 MG/15ML
LIQUID ORAL CONTINUOUS PRN
Status: COMPLETED | OUTPATIENT
Start: 2023-08-08 | End: 2023-08-08

## 2023-08-08 RX ORDER — ALBUMIN, HUMAN INJ 5% 5 %
SOLUTION INTRAVENOUS PRN
Status: DISCONTINUED | OUTPATIENT
Start: 2023-08-08 | End: 2023-08-08 | Stop reason: SDUPTHER

## 2023-08-08 RX ORDER — ONDANSETRON 2 MG/ML
4 INJECTION INTRAMUSCULAR; INTRAVENOUS EVERY 6 HOURS PRN
Status: DISCONTINUED | OUTPATIENT
Start: 2023-08-08 | End: 2023-08-18

## 2023-08-08 RX ORDER — MIDAZOLAM HYDROCHLORIDE 1 MG/ML
INJECTION INTRAMUSCULAR; INTRAVENOUS PRN
Status: DISCONTINUED | OUTPATIENT
Start: 2023-08-08 | End: 2023-08-08 | Stop reason: SDUPTHER

## 2023-08-08 RX ORDER — PHENYLEPHRINE HCL IN 0.9% NACL 1 MG/10 ML
SYRINGE (ML) INTRAVENOUS PRN
Status: DISCONTINUED | OUTPATIENT
Start: 2023-08-08 | End: 2023-08-08 | Stop reason: SDUPTHER

## 2023-08-08 RX ORDER — OXYCODONE HYDROCHLORIDE 5 MG/1
5 TABLET ORAL EVERY 4 HOURS PRN
Status: DISCONTINUED | OUTPATIENT
Start: 2023-08-08 | End: 2023-08-08

## 2023-08-08 RX ORDER — PROPOFOL 10 MG/ML
5-50 INJECTION, EMULSION INTRAVENOUS CONTINUOUS
Status: DISCONTINUED | OUTPATIENT
Start: 2023-08-08 | End: 2023-08-09

## 2023-08-08 RX ORDER — SODIUM CHLORIDE, SODIUM LACTATE, POTASSIUM CHLORIDE, CALCIUM CHLORIDE 600; 310; 30; 20 MG/100ML; MG/100ML; MG/100ML; MG/100ML
INJECTION, SOLUTION INTRAVENOUS CONTINUOUS PRN
Status: DISCONTINUED | OUTPATIENT
Start: 2023-08-08 | End: 2023-08-08 | Stop reason: SDUPTHER

## 2023-08-08 RX ORDER — ROCURONIUM BROMIDE 10 MG/ML
INJECTION, SOLUTION INTRAVENOUS PRN
Status: DISCONTINUED | OUTPATIENT
Start: 2023-08-08 | End: 2023-08-08 | Stop reason: SDUPTHER

## 2023-08-08 RX ORDER — SODIUM CHLORIDE 0.9 % (FLUSH) 0.9 %
5-40 SYRINGE (ML) INJECTION PRN
Status: DISCONTINUED | OUTPATIENT
Start: 2023-08-08 | End: 2023-08-22 | Stop reason: HOSPADM

## 2023-08-08 RX ORDER — METHOCARBAMOL 750 MG/1
750 TABLET, FILM COATED ORAL EVERY 6 HOURS
Status: DISCONTINUED | OUTPATIENT
Start: 2023-08-08 | End: 2023-08-08

## 2023-08-08 RX ORDER — ACETAMINOPHEN 500 MG
1000 TABLET ORAL EVERY 8 HOURS SCHEDULED
Status: DISCONTINUED | OUTPATIENT
Start: 2023-08-08 | End: 2023-08-08

## 2023-08-08 RX ORDER — SODIUM CHLORIDE 9 MG/ML
INJECTION, SOLUTION INTRAVENOUS CONTINUOUS
Status: DISCONTINUED | OUTPATIENT
Start: 2023-08-08 | End: 2023-08-08

## 2023-08-08 RX ORDER — LIDOCAINE HYDROCHLORIDE 10 MG/ML
INJECTION, SOLUTION EPIDURAL; INFILTRATION; INTRACAUDAL; PERINEURAL PRN
Status: DISCONTINUED | OUTPATIENT
Start: 2023-08-08 | End: 2023-08-08 | Stop reason: SDUPTHER

## 2023-08-08 RX ORDER — ENOXAPARIN SODIUM 100 MG/ML
30 INJECTION SUBCUTANEOUS 2 TIMES DAILY
Status: DISCONTINUED | OUTPATIENT
Start: 2023-08-09 | End: 2023-08-10

## 2023-08-08 RX ORDER — CHLORHEXIDINE GLUCONATE 0.12 MG/ML
15 RINSE ORAL 2 TIMES DAILY
Status: DISCONTINUED | OUTPATIENT
Start: 2023-08-08 | End: 2023-08-11

## 2023-08-08 RX ORDER — FENTANYL CITRATE 50 UG/ML
50 INJECTION, SOLUTION INTRAMUSCULAR; INTRAVENOUS ONCE
Status: COMPLETED | OUTPATIENT
Start: 2023-08-08 | End: 2023-08-08

## 2023-08-08 RX ORDER — SODIUM CHLORIDE 0.9 % (FLUSH) 0.9 %
5-40 SYRINGE (ML) INJECTION EVERY 12 HOURS SCHEDULED
Status: DISCONTINUED | OUTPATIENT
Start: 2023-08-08 | End: 2023-08-11

## 2023-08-08 RX ORDER — FENTANYL CITRATE 50 UG/ML
INJECTION, SOLUTION INTRAMUSCULAR; INTRAVENOUS PRN
Status: DISCONTINUED | OUTPATIENT
Start: 2023-08-08 | End: 2023-08-08 | Stop reason: SDUPTHER

## 2023-08-08 RX ORDER — SODIUM CHLORIDE 9 MG/ML
INJECTION, SOLUTION INTRAVENOUS PRN
Status: DISCONTINUED | OUTPATIENT
Start: 2023-08-08 | End: 2023-08-11

## 2023-08-08 RX ORDER — ONDANSETRON 4 MG/1
4 TABLET, ORALLY DISINTEGRATING ORAL EVERY 8 HOURS PRN
Status: DISCONTINUED | OUTPATIENT
Start: 2023-08-08 | End: 2023-08-18

## 2023-08-08 RX ORDER — PROPOFOL 10 MG/ML
INJECTION, EMULSION INTRAVENOUS PRN
Status: DISCONTINUED | OUTPATIENT
Start: 2023-08-08 | End: 2023-08-08 | Stop reason: SDUPTHER

## 2023-08-08 RX ORDER — SODIUM CHLORIDE, SODIUM LACTATE, POTASSIUM CHLORIDE, CALCIUM CHLORIDE 600; 310; 30; 20 MG/100ML; MG/100ML; MG/100ML; MG/100ML
INJECTION, SOLUTION INTRAVENOUS CONTINUOUS
Status: DISCONTINUED | OUTPATIENT
Start: 2023-08-08 | End: 2023-08-14

## 2023-08-08 RX ADMIN — ROCURONIUM BROMIDE 50 MG: 10 INJECTION, SOLUTION INTRAVENOUS at 22:35

## 2023-08-08 RX ADMIN — PIPERACILLIN AND TAZOBACTAM 4500 MG: 4; .5 INJECTION, POWDER, LYOPHILIZED, FOR SOLUTION INTRAVENOUS; PARENTERAL at 17:31

## 2023-08-08 RX ADMIN — SODIUM CHLORIDE, POTASSIUM CHLORIDE, SODIUM LACTATE AND CALCIUM CHLORIDE: 600; 310; 30; 20 INJECTION, SOLUTION INTRAVENOUS at 19:25

## 2023-08-08 RX ADMIN — SODIUM CHLORIDE: 9 INJECTION, SOLUTION INTRAVENOUS at 18:53

## 2023-08-08 RX ADMIN — FENTANYL CITRATE 50 MCG: 50 INJECTION, SOLUTION INTRAMUSCULAR; INTRAVENOUS at 20:04

## 2023-08-08 RX ADMIN — CHLORHEXIDINE GLUCONATE 15 ML: 1.2 RINSE ORAL at 23:53

## 2023-08-08 RX ADMIN — Medication 100 MCG: at 20:57

## 2023-08-08 RX ADMIN — FENTANYL CITRATE 100 MCG: 50 INJECTION, SOLUTION INTRAMUSCULAR; INTRAVENOUS at 22:35

## 2023-08-08 RX ADMIN — LIDOCAINE HYDROCHLORIDE 5 ML: 10 INJECTION, SOLUTION EPIDURAL; INFILTRATION; INTRACAUDAL; PERINEURAL at 19:30

## 2023-08-08 RX ADMIN — Medication 200 MCG: at 19:42

## 2023-08-08 RX ADMIN — BARIUM SULFATE 900 ML: 20 SUSPENSION ORAL at 10:20

## 2023-08-08 RX ADMIN — ALBUMIN (HUMAN) 25 G: 12.5 INJECTION, SOLUTION INTRAVENOUS at 21:09

## 2023-08-08 RX ADMIN — ROCURONIUM BROMIDE 50 MG: 10 INJECTION, SOLUTION INTRAVENOUS at 19:31

## 2023-08-08 RX ADMIN — Medication 50 MCG/HR: at 22:58

## 2023-08-08 RX ADMIN — SODIUM CHLORIDE, POTASSIUM CHLORIDE, SODIUM LACTATE AND CALCIUM CHLORIDE: 600; 310; 30; 20 INJECTION, SOLUTION INTRAVENOUS at 23:26

## 2023-08-08 RX ADMIN — IOPAMIDOL 75 ML: 755 INJECTION, SOLUTION INTRAVENOUS at 10:20

## 2023-08-08 RX ADMIN — SODIUM CHLORIDE, PRESERVATIVE FREE 20 MG: 5 INJECTION INTRAVENOUS at 23:48

## 2023-08-08 RX ADMIN — FENTANYL CITRATE 50 MCG: 50 INJECTION, SOLUTION INTRAMUSCULAR; INTRAVENOUS at 20:09

## 2023-08-08 RX ADMIN — PROPOFOL 200 MG: 10 INJECTION, EMULSION INTRAVENOUS at 19:30

## 2023-08-08 RX ADMIN — ROCURONIUM BROMIDE 50 MG: 10 INJECTION, SOLUTION INTRAVENOUS at 21:06

## 2023-08-08 RX ADMIN — ROCURONIUM BROMIDE 50 MG: 10 INJECTION, SOLUTION INTRAVENOUS at 19:58

## 2023-08-08 RX ADMIN — PROPOFOL 30 MCG/KG/MIN: 10 INJECTION, EMULSION INTRAVENOUS at 23:47

## 2023-08-08 RX ADMIN — MIDAZOLAM 2 MG: 1 INJECTION INTRAMUSCULAR; INTRAVENOUS at 19:56

## 2023-08-08 RX ADMIN — Medication 100 MCG: at 20:49

## 2023-08-08 RX ADMIN — FENTANYL CITRATE 50 MCG: 50 INJECTION, SOLUTION INTRAMUSCULAR; INTRAVENOUS at 16:37

## 2023-08-08 ASSESSMENT — PULMONARY FUNCTION TESTS
PIF_VALUE: 30
PIF_VALUE: 29
PIF_VALUE: 32

## 2023-08-08 ASSESSMENT — ENCOUNTER SYMPTOMS
BACK PAIN: 0
COUGH: 0
RHINORRHEA: 0
EYE PAIN: 0
SHORTNESS OF BREATH: 0
DIARRHEA: 0
NAUSEA: 0
SORE THROAT: 0
EYE REDNESS: 0
ABDOMINAL PAIN: 1
CONSTIPATION: 1
VOMITING: 0

## 2023-08-08 ASSESSMENT — PAIN DESCRIPTION - LOCATION
LOCATION: ABDOMEN
LOCATION: ABDOMEN

## 2023-08-08 ASSESSMENT — PAIN SCALES - GENERAL: PAINLEVEL_OUTOF10: 8

## 2023-08-08 NOTE — H&P
History and Physical    PATIENT NAME: Donny Adrian  AGE: 82 y.o. MEDICAL RECORD NO. [id number]  DATE: 8/8/2023  SURGEON: Dr. Crescencio Edgar: Chaya Garnett MD    Reason for evaluation: abdominal pain     Patient information was obtained from patient. History/Exam limitations: none.     IMPRESSION:     Patient Active Problem List   Diagnosis    Hypertension, essential    Renal insufficiency    Fatigue    Primary osteoarthritis of both knees    Cellulitis of left lower leg    On potassium wasting diuretic therapy    Edema    Bloating    Arthritis of right knee    JEMAL on CPAP    Obesity, Class III, BMI 40-49.9 (morbid obesity) (HCC)    Lumbar radiculopathy    Depression    Positive depression screening    Primary osteoarthritis of left knee    Prediabetes    Medicare annual wellness visit, subsequent    Acquired trigger finger    Vitamin D deficiency    Primary osteoarthritis of right hip    Slow transit constipation    Black stool    Trapezius muscle spasm    Thoracic spine pain    Spinal stenosis    DDD (degenerative disc disease), cervical    Vitamin B12 deficiency    Numbness    Localized edema    Osteoarthritis of spine with radiculopathy, lumbar region    Spondylosis of thoracic region without myelopathy or radiculopathy    Ingrown toenail    Primary osteoarthritis of left hip    Stage 3b chronic kidney disease ([address])    Other chest pain    Peripheral vascular disease, unspecified (HCC)    Cold intolerance    Subclinical hypothyroidism    Status post lumbar laminectomy    Normocytic hypochromic anemia    Primary osteoarthritis of right knee    Hypokalemia    Pelvic pain    Left hip pain    Normochromic normocytic anemia    Skin ulcer of scalp, limited to breakdown of skin (HCC)    Tear of left gluteus jez muscle    Prostate enlargement    Swelling of lower leg    Nocturia    Acute appendicitis     PLAN:   Plan for laparoscopic appendectomy, possible open  NPO, IVF  IV abx: Zosyn

## 2023-08-08 NOTE — ED NOTES
Dr. Lucila Perez at bedside     Augie Casey, [address] [place] [address]  08/08/23 1600
Dr. Pritesh Lizarraga called Triage to verify pt was in the ED     Silvio Flores, LILIN  29/92/92 6843
Patient on stretcher with no complaints. POC updated.  Will continue to monitor        Marizol Klein RN  08/08/23 3069
Pt presents to ED via walking through triage c/o lower abdominal pain. Pt reports having a CT scan today which resulted with acute appendicitis. Pt denies any nausea or vomiting. Pt rates pain 8/10. IV access established. RR even and non labored.      Magi Dominguez RN  08/08/23 7939
Surgery consent obtained by Dr. Salo Peralta. Pt understands full risks and benefits of surgery.       Stephany Walls RN  08/08/23 5557
Linda Draft on 8/8/2023 at 4:33 PM       True Polanco RN  08/08/23 5834

## 2023-08-08 NOTE — ED PROVIDER NOTES
[address] ED  Emergency Department Encounter  Emergency Medicine Resident     Pt Name:Donny Barron  MRN: [id number]  [address] [facility] 1940  Date of evaluation: 8/8/23  PCP:  Manpreet Cortes MD  Note Started: 4:07 PM EDT      CHIEF COMPLAINT       Chief Complaint   Patient presents with    Abdominal Pain       HISTORY OF PRESENT ILLNESS  (Location/Symptom, Timing/Onset, Context/Setting, Quality, Duration, Modifying Factors, Severity.)      Susy Stewart is a 82 y.o. male who presents with right lower quadrant pain since yesterday. Patient states he woke up yesterday with indigestion until he palpated his right lower quadrant at which point he had excruciating pain. He presented to an urgent care who ordered a CT scan of his abdomen which he received this morning. CT showed acute appendicitis with a suspected obstructing appendicolith and possible walled-off perforation and no drainable fluid collection/abscess. Patient received the results on his cellular phone and called his PCP who then called him back and told him to present to the ED. He rates his pain when palpating the area a 6 out of 10 and notes that it is worse with motion and in particular when hitting potholes on the way here. He has not taken anything for his pain. He last ate approximately 1030 today. He denies fever, chills, chest pain, shortness of breath, nausea, vomiting, diarrhea, blood in the stool, changes in urinary habits, hematuria. He does note some constipation with his last BM Sunday.     PAST MEDICAL / SURGICAL / SOCIAL / FAMILY HISTORY      has a past medical history of Abnormal EKG, Abnormal renal function, Allergic contact dermatitis due to other agents, Anemia, Cellulitis of buttock, Depression, Dysmetabolic syndrome, Dysuria, Edema, Elevated glucose, Erythrasma, Health care maintenance, Hypertension, Intertrigo, Leukopenia, Obesity, Osteoarthritis, Other constipation, Other specified counseling, Primary

## 2023-08-09 ENCOUNTER — APPOINTMENT (OUTPATIENT)
Dept: GENERAL RADIOLOGY | Age: 83
DRG: 853 | End: 2023-08-09
Payer: MEDICARE

## 2023-08-09 LAB
ANION GAP SERPL CALCULATED.3IONS-SCNC: 12 MMOL/L (ref 9–17)
ANION GAP SERPL CALCULATED.3IONS-SCNC: 15 MMOL/L (ref 9–17)
BASOPHILS # BLD: 0 K/UL (ref 0–0.2)
BASOPHILS # BLD: 0 K/UL (ref 0–0.2)
BASOPHILS NFR BLD: 0 % (ref 0–2)
BASOPHILS NFR BLD: 0 % (ref 0–2)
BUN SERPL-MCNC: 19 MG/DL (ref 8–23)
BUN SERPL-MCNC: 19 MG/DL (ref 8–23)
CA-I BLD-SCNC: 1.07 MMOL/L (ref 1.13–1.33)
CA-I BLD-SCNC: 1.08 MMOL/L (ref 1.13–1.33)
CALCIUM SERPL-MCNC: 8.6 MG/DL (ref 8.6–10.4)
CALCIUM SERPL-MCNC: 8.8 MG/DL (ref 8.6–10.4)
CHLORIDE SERPL-SCNC: 100 MMOL/L (ref 98–107)
CHLORIDE SERPL-SCNC: 98 MMOL/L (ref 98–107)
CO2 SERPL-SCNC: 25 MMOL/L (ref 20–31)
CO2 SERPL-SCNC: 25 MMOL/L (ref 20–31)
CREAT SERPL-MCNC: 1.1 MG/DL (ref 0.7–1.2)
CREAT SERPL-MCNC: 1.1 MG/DL (ref 0.7–1.2)
EKG ATRIAL RATE: 99 BPM
EKG P AXIS: 37 DEGREES
EKG P-R INTERVAL: 254 MS
EKG Q-T INTERVAL: 324 MS
EKG QRS DURATION: 82 MS
EKG QTC CALCULATION (BAZETT): 415 MS
EKG R AXIS: 7 DEGREES
EKG T AXIS: 32 DEGREES
EKG VENTRICULAR RATE: 99 BPM
EOSINOPHIL # BLD: 0 K/UL (ref 0–0.4)
EOSINOPHIL # BLD: 0 K/UL (ref 0–0.4)
EOSINOPHILS RELATIVE PERCENT: 0 % (ref 1–4)
EOSINOPHILS RELATIVE PERCENT: 0 % (ref 1–4)
ERYTHROCYTE [DISTWIDTH] IN BLOOD BY AUTOMATED COUNT: 12 % (ref 11.8–14.4)
ERYTHROCYTE [DISTWIDTH] IN BLOOD BY AUTOMATED COUNT: 12.1 % (ref 11.8–14.4)
FIO2: 40
GFR SERPL CREATININE-BSD FRML MDRD: >60 ML/MIN/1.73M2
GFR SERPL CREATININE-BSD FRML MDRD: >60 ML/MIN/1.73M2
GLUCOSE BLD-MCNC: 132 MG/DL (ref 74–100)
GLUCOSE SERPL-MCNC: 142 MG/DL (ref 70–99)
GLUCOSE SERPL-MCNC: 142 MG/DL (ref 70–99)
HCT VFR BLD AUTO: 32.5 % (ref 40.7–50.3)
HCT VFR BLD AUTO: 35.4 % (ref 40.7–50.3)
HGB BLD-MCNC: 11.1 G/DL (ref 13–17)
HGB BLD-MCNC: 12.1 G/DL (ref 13–17)
IMM GRANULOCYTES # BLD AUTO: 0 K/UL (ref 0–0.3)
IMM GRANULOCYTES # BLD AUTO: 0 K/UL (ref 0–0.3)
IMM GRANULOCYTES NFR BLD: 0 %
IMM GRANULOCYTES NFR BLD: 0 %
LACTIC ACID, WHOLE BLOOD: 1.5 MMOL/L (ref 0.7–2.1)
LYMPHOCYTES NFR BLD: 0.17 K/UL (ref 1–4.8)
LYMPHOCYTES NFR BLD: 0.46 K/UL (ref 1–4.8)
LYMPHOCYTES RELATIVE PERCENT: 2 % (ref 24–44)
LYMPHOCYTES RELATIVE PERCENT: 5 % (ref 24–44)
MAGNESIUM SERPL-MCNC: 1.9 MG/DL (ref 1.6–2.6)
MAGNESIUM SERPL-MCNC: 2 MG/DL (ref 1.6–2.6)
MCH RBC QN AUTO: 30.9 PG (ref 25.2–33.5)
MCH RBC QN AUTO: 31.3 PG (ref 25.2–33.5)
MCHC RBC AUTO-ENTMCNC: 34.2 G/DL (ref 28.4–34.8)
MCHC RBC AUTO-ENTMCNC: 34.2 G/DL (ref 28.4–34.8)
MCV RBC AUTO: 90.5 FL (ref 82.6–102.9)
MCV RBC AUTO: 91.5 FL (ref 82.6–102.9)
MICROORGANISM SPEC CULT: NO GROWTH
MONOCYTES NFR BLD: 0.34 K/UL (ref 0.1–0.8)
MONOCYTES NFR BLD: 0.46 K/UL (ref 0.1–0.8)
MONOCYTES NFR BLD: 4 % (ref 1–7)
MONOCYTES NFR BLD: 5 % (ref 1–7)
MORPHOLOGY: NORMAL
MORPHOLOGY: NORMAL
NEUTROPHILS NFR BLD: 90 % (ref 36–66)
NEUTROPHILS NFR BLD: 94 % (ref 36–66)
NEUTS SEG NFR BLD: 8.09 K/UL (ref 1.8–7.7)
NEUTS SEG NFR BLD: 8.28 K/UL (ref 1.8–7.7)
NRBC BLD-RTO: 0 PER 100 WBC
NRBC BLD-RTO: 0 PER 100 WBC
PHOSPHATE SERPL-MCNC: 2.7 MG/DL (ref 2.5–4.5)
PLATELET # BLD AUTO: 153 K/UL (ref 138–453)
PLATELET # BLD AUTO: 154 K/UL (ref 138–453)
PMV BLD AUTO: 10.1 FL (ref 8.1–13.5)
PMV BLD AUTO: 10.5 FL (ref 8.1–13.5)
POC HCO3: 28 MMOL/L (ref 21–28)
POC LACTIC ACID: 1.2 MMOL/L (ref 0.56–1.39)
POC O2 SATURATION: 99.4 % (ref 94–98)
POC PCO2: 42.4 MM HG (ref 35–48)
POC PH: 7.43 (ref 7.35–7.45)
POC PO2: 152.9 MM HG (ref 83–108)
POSITIVE BASE EXCESS, ART: 3.2 MMOL/L (ref 0–3)
POTASSIUM SERPL-SCNC: 3.2 MMOL/L (ref 3.7–5.3)
POTASSIUM SERPL-SCNC: 4 MMOL/L (ref 3.7–5.3)
RBC # BLD AUTO: 3.55 M/UL (ref 4.21–5.77)
RBC # BLD AUTO: 3.91 M/UL (ref 4.21–5.77)
SAMPLE SITE: ABNORMAL
SODIUM SERPL-SCNC: 137 MMOL/L (ref 135–144)
SODIUM SERPL-SCNC: 138 MMOL/L (ref 135–144)
SPECIMEN DESCRIPTION: NORMAL
WBC OTHER # BLD: 8.6 K/UL (ref 3.5–11.3)
WBC OTHER # BLD: 9.2 K/UL (ref 3.5–11.3)

## 2023-08-09 PROCEDURE — 82803 BLOOD GASES ANY COMBINATION: CPT

## 2023-08-09 PROCEDURE — 83605 ASSAY OF LACTIC ACID: CPT

## 2023-08-09 PROCEDURE — 6360000002 HC RX W HCPCS

## 2023-08-09 PROCEDURE — A4216 STERILE WATER/SALINE, 10 ML: HCPCS | Performed by: STUDENT IN AN ORGANIZED HEALTH CARE EDUCATION/TRAINING PROGRAM

## 2023-08-09 PROCEDURE — 2580000003 HC RX 258: Performed by: STUDENT IN AN ORGANIZED HEALTH CARE EDUCATION/TRAINING PROGRAM

## 2023-08-09 PROCEDURE — 99024 POSTOP FOLLOW-UP VISIT: CPT | Performed by: SURGERY

## 2023-08-09 PROCEDURE — 6360000002 HC RX W HCPCS: Performed by: STUDENT IN AN ORGANIZED HEALTH CARE EDUCATION/TRAINING PROGRAM

## 2023-08-09 PROCEDURE — 82330 ASSAY OF CALCIUM: CPT

## 2023-08-09 PROCEDURE — 2580000003 HC RX 258

## 2023-08-09 PROCEDURE — 94003 VENT MGMT INPAT SUBQ DAY: CPT

## 2023-08-09 PROCEDURE — 94761 N-INVAS EAR/PLS OXIMETRY MLT: CPT

## 2023-08-09 PROCEDURE — 2060000000 HC ICU INTERMEDIATE R&B

## 2023-08-09 PROCEDURE — 85025 COMPLETE CBC W/AUTO DIFF WBC: CPT

## 2023-08-09 PROCEDURE — 82947 ASSAY GLUCOSE BLOOD QUANT: CPT

## 2023-08-09 PROCEDURE — 6370000000 HC RX 637 (ALT 250 FOR IP)

## 2023-08-09 PROCEDURE — 37799 UNLISTED PX VASCULAR SURGERY: CPT

## 2023-08-09 PROCEDURE — 71045 X-RAY EXAM CHEST 1 VIEW: CPT

## 2023-08-09 PROCEDURE — 2500000003 HC RX 250 WO HCPCS

## 2023-08-09 PROCEDURE — 6370000000 HC RX 637 (ALT 250 FOR IP): Performed by: STUDENT IN AN ORGANIZED HEALTH CARE EDUCATION/TRAINING PROGRAM

## 2023-08-09 PROCEDURE — 93010 ELECTROCARDIOGRAM REPORT: CPT | Performed by: INTERNAL MEDICINE

## 2023-08-09 PROCEDURE — 2500000003 HC RX 250 WO HCPCS: Performed by: STUDENT IN AN ORGANIZED HEALTH CARE EDUCATION/TRAINING PROGRAM

## 2023-08-09 PROCEDURE — 80048 BASIC METABOLIC PNL TOTAL CA: CPT

## 2023-08-09 PROCEDURE — 83735 ASSAY OF MAGNESIUM: CPT

## 2023-08-09 PROCEDURE — 2700000000 HC OXYGEN THERAPY PER DAY

## 2023-08-09 RX ORDER — FENTANYL CITRATE 50 UG/ML
50 INJECTION, SOLUTION INTRAMUSCULAR; INTRAVENOUS
Status: DISCONTINUED | OUTPATIENT
Start: 2023-08-09 | End: 2023-08-10

## 2023-08-09 RX ORDER — CALCIUM GLUCONATE 20 MG/ML
1000 INJECTION, SOLUTION INTRAVENOUS ONCE
Status: COMPLETED | OUTPATIENT
Start: 2023-08-09 | End: 2023-08-09

## 2023-08-09 RX ORDER — SODIUM CHLORIDE, SODIUM LACTATE, POTASSIUM CHLORIDE, AND CALCIUM CHLORIDE .6; .31; .03; .02 G/100ML; G/100ML; G/100ML; G/100ML
500 INJECTION, SOLUTION INTRAVENOUS ONCE
Status: COMPLETED | OUTPATIENT
Start: 2023-08-09 | End: 2023-08-09

## 2023-08-09 RX ORDER — POTASSIUM CHLORIDE 7.45 MG/ML
10 INJECTION INTRAVENOUS
Status: COMPLETED | OUTPATIENT
Start: 2023-08-09 | End: 2023-08-09

## 2023-08-09 RX ORDER — MAGNESIUM SULFATE 1 G/100ML
1000 INJECTION INTRAVENOUS ONCE
Status: COMPLETED | OUTPATIENT
Start: 2023-08-09 | End: 2023-08-09

## 2023-08-09 RX ORDER — OXYCODONE HCL 5 MG/5 ML
5 SOLUTION, ORAL ORAL EVERY 6 HOURS PRN
Status: DISCONTINUED | OUTPATIENT
Start: 2023-08-09 | End: 2023-08-22 | Stop reason: HOSPADM

## 2023-08-09 RX ADMIN — SODIUM CHLORIDE, PRESERVATIVE FREE 5 ML: 5 INJECTION INTRAVENOUS at 21:29

## 2023-08-09 RX ADMIN — OXYCODONE HYDROCHLORIDE 5 MG: 5 SOLUTION ORAL at 22:57

## 2023-08-09 RX ADMIN — PIPERACILLIN AND TAZOBACTAM 3375 MG: 3; .375 INJECTION, POWDER, LYOPHILIZED, FOR SOLUTION INTRAVENOUS at 15:48

## 2023-08-09 RX ADMIN — SODIUM CHLORIDE, PRESERVATIVE FREE 20 MG: 5 INJECTION INTRAVENOUS at 21:29

## 2023-08-09 RX ADMIN — CALCIUM GLUCONATE 1000 MG: 20 INJECTION, SOLUTION INTRAVENOUS at 10:04

## 2023-08-09 RX ADMIN — ENOXAPARIN SODIUM 30 MG: 100 INJECTION SUBCUTANEOUS at 21:29

## 2023-08-09 RX ADMIN — FENTANYL CITRATE 50 MCG: 50 INJECTION, SOLUTION INTRAMUSCULAR; INTRAVENOUS at 19:45

## 2023-08-09 RX ADMIN — PIPERACILLIN AND TAZOBACTAM 3375 MG: 3; .375 INJECTION, POWDER, LYOPHILIZED, FOR SOLUTION INTRAVENOUS at 07:47

## 2023-08-09 RX ADMIN — MAGNESIUM SULFATE HEPTAHYDRATE 1000 MG: 1 INJECTION, SOLUTION INTRAVENOUS at 10:00

## 2023-08-09 RX ADMIN — FENTANYL CITRATE 50 MCG: 50 INJECTION, SOLUTION INTRAMUSCULAR; INTRAVENOUS at 13:51

## 2023-08-09 RX ADMIN — SODIUM CHLORIDE, PRESERVATIVE FREE 10 ML: 5 INJECTION INTRAVENOUS at 07:43

## 2023-08-09 RX ADMIN — OXYCODONE HYDROCHLORIDE 5 MG: 5 SOLUTION ORAL at 16:17

## 2023-08-09 RX ADMIN — POTASSIUM CHLORIDE 10 MEQ: 7.46 INJECTION, SOLUTION INTRAVENOUS at 01:08

## 2023-08-09 RX ADMIN — SODIUM CHLORIDE, POTASSIUM CHLORIDE, SODIUM LACTATE AND CALCIUM CHLORIDE: 600; 310; 30; 20 INJECTION, SOLUTION INTRAVENOUS at 15:44

## 2023-08-09 RX ADMIN — SODIUM CHLORIDE: 9 INJECTION, SOLUTION INTRAVENOUS at 00:12

## 2023-08-09 RX ADMIN — FENTANYL CITRATE 50 MCG: 50 INJECTION, SOLUTION INTRAMUSCULAR; INTRAVENOUS at 07:41

## 2023-08-09 RX ADMIN — SODIUM CHLORIDE, PRESERVATIVE FREE 10 ML: 5 INJECTION INTRAVENOUS at 00:14

## 2023-08-09 RX ADMIN — CHLORHEXIDINE GLUCONATE 15 ML: 1.2 RINSE ORAL at 07:41

## 2023-08-09 RX ADMIN — FENTANYL CITRATE 50 MCG: 50 INJECTION, SOLUTION INTRAMUSCULAR; INTRAVENOUS at 10:14

## 2023-08-09 RX ADMIN — SODIUM CHLORIDE, PRESERVATIVE FREE 20 MG: 5 INJECTION INTRAVENOUS at 07:42

## 2023-08-09 RX ADMIN — PIPERACILLIN AND TAZOBACTAM 3375 MG: 3; .375 INJECTION, POWDER, LYOPHILIZED, FOR SOLUTION INTRAVENOUS at 23:37

## 2023-08-09 RX ADMIN — SODIUM CHLORIDE, POTASSIUM CHLORIDE, SODIUM LACTATE AND CALCIUM CHLORIDE: 600; 310; 30; 20 INJECTION, SOLUTION INTRAVENOUS at 23:05

## 2023-08-09 RX ADMIN — PIPERACILLIN AND TAZOBACTAM 3375 MG: 3; .375 INJECTION, POWDER, LYOPHILIZED, FOR SOLUTION INTRAVENOUS at 00:14

## 2023-08-09 RX ADMIN — SODIUM CHLORIDE, POTASSIUM CHLORIDE, SODIUM LACTATE AND CALCIUM CHLORIDE 500 ML: 600; 310; 30; 20 INJECTION, SOLUTION INTRAVENOUS at 02:47

## 2023-08-09 RX ADMIN — ENOXAPARIN SODIUM 30 MG: 100 INJECTION SUBCUTANEOUS at 08:13

## 2023-08-09 RX ADMIN — POTASSIUM CHLORIDE 10 MEQ: 7.46 INJECTION, SOLUTION INTRAVENOUS at 02:11

## 2023-08-09 RX ADMIN — POTASSIUM CHLORIDE 10 MEQ: 7.46 INJECTION, SOLUTION INTRAVENOUS at 04:14

## 2023-08-09 RX ADMIN — OXYCODONE HYDROCHLORIDE 5 MG: 5 SOLUTION ORAL at 08:53

## 2023-08-09 RX ADMIN — SODIUM CHLORIDE: 9 INJECTION, SOLUTION INTRAVENOUS at 10:03

## 2023-08-09 RX ADMIN — PROPOFOL 15 MCG/KG/MIN: 10 INJECTION, EMULSION INTRAVENOUS at 03:08

## 2023-08-09 RX ADMIN — POTASSIUM CHLORIDE 10 MEQ: 7.46 INJECTION, SOLUTION INTRAVENOUS at 03:12

## 2023-08-09 ASSESSMENT — PAIN DESCRIPTION - FREQUENCY
FREQUENCY: INTERMITTENT

## 2023-08-09 ASSESSMENT — PAIN SCALES - GENERAL
PAINLEVEL_OUTOF10: 4
PAINLEVEL_OUTOF10: 7
PAINLEVEL_OUTOF10: 3
PAINLEVEL_OUTOF10: 7
PAINLEVEL_OUTOF10: 2
PAINLEVEL_OUTOF10: 7
PAINLEVEL_OUTOF10: 2
PAINLEVEL_OUTOF10: 7
PAINLEVEL_OUTOF10: 7

## 2023-08-09 ASSESSMENT — PAIN DESCRIPTION - ORIENTATION
ORIENTATION: MID

## 2023-08-09 ASSESSMENT — PAIN DESCRIPTION - LOCATION
LOCATION: ABDOMEN

## 2023-08-09 ASSESSMENT — PAIN DESCRIPTION - DESCRIPTORS
DESCRIPTORS: OTHER (COMMENT)
DESCRIPTORS: STABBING;DISCOMFORT
DESCRIPTORS: OTHER (COMMENT)

## 2023-08-09 ASSESSMENT — PULMONARY FUNCTION TESTS
PIF_VALUE: 14
PIF_VALUE: 31

## 2023-08-09 NOTE — BRIEF OP NOTE
Brief Postoperative Note      Patient: Susy Stewart  YOB: 1940  MRN: [id number]    Date of Procedure: 8/8/2023    Pre-Op Diagnosis Codes:     * Acute appendicitis, unspecified acute appendicitis type [K35.80]    Post-Op Diagnosis: Same       Procedure(s):  APPENDECTOMY LAPAROSCOPIC CONVERTED TO OPEN    Surgeon(s):  Tiera Arzate MD    Assistant:  Resident: Harry Luther DO    Anesthesia: General    Estimated Blood Loss (mL): 50 cc     Complications: None    Specimens:   ID Type Source Tests Collected by Time Destination   1 : urine culture for catheter placement Urine Urine, indwelling catheter CULTURE, URINE Tiera Arzate MD 8/8/2023 2004    A :  Tissue Appendix [address] MD 8/8/2023 2121        Implants:  * No implants in log *      Drains:   Closed/Suction Drain Right;Lateral Abdomen Bulb (Active)       Urinary Catheter 08/08/23 2 Way (Active)       Findings: Acute, perforated appendicitis. Unable to visualize the base of the appendix laparoscopically due to inflammation, thus procedure converted to open. JILLIAN x 1 left in the RLQ. Electronically signed by Harry Luther DO on 8/8/2023 at 9:57 PM Present throughout case.

## 2023-08-09 NOTE — CARE COORDINATION
Case Management Assessment  Initial Evaluation    Date/Time of Evaluation: 8/9/2023 4:41 PM  Assessment Completed by: Vaibhav Garcia RN    If patient is discharged prior to next notation, then this note serves as note for discharge by case management. Patient Name: Sarthak Ramirez                   YOB: 1940  Diagnosis: Acute appendicitis [K35.80]  Acute appendicitis, unspecified acute appendicitis type [K35.80]  Acute appendicitis with perforation and localized peritonitis, without abscess or gangrene [K35.32]                   Date / Time: 8/8/2023  3:49 PM    Patient Admission Status: Inpatient   Readmission Risk (Low < 19, Mod (19-27), High > 27): Readmission Risk Score: 16.9    Current PCP: Marc Reynoso MD  PCP verified by CM? (P) Yes    Chart Reviewed: Yes      History Provided by: (P) Patient  Patient Orientation: (P) Alert and Oriented    Patient Cognition: (P) Alert    Hospitalization in the last 30 days (Readmission):  No    If yes, Readmission Assessment in  Navigator will be completed.     Advance Directives:      Code Status: Full Code   Patient's Primary Decision Maker is: (P) Legal Next of Kin    Primary Decision MakeRosalia Pritchard Spouse - [phone]    Discharge Planning:    Patient lives with: (P) Spouse/Significant Other Type of Home: (P) House  Primary Care Giver: (P) Self  Patient Support Systems include: (P) Spouse/Significant Other, Children   Current Financial resources: (P) Medicare  Current community resources: (P) None  Current services prior to admission: (P) Durable Medical Equipment            Current DME: (P) [employer]ane            Type of Home Care services:       ADLS  Prior functional level: (P) Independent in ADLs/IADLs  Current functional level: (P) Assistance with the following:, Housework, Shopping, Cooking    PT AM-PAC:   /24  OT AM-PAC:   /24    Family can provide assistance at DC: (P) Yes  Would you like Case Management to discuss the discharge

## 2023-08-09 NOTE — OP NOTE
Operative Note      Patient: Marlon Ackerman  YOB: 1940  MRN: [id number]    Date of Procedure: 8/8/2023    Pre-Op Diagnosis Codes:     * Acute appendicitis, unspecified acute appendicitis type [K35.80]    Post-Op Diagnosis: Same       Procedure(s):  APPENDECTOMY LAPAROSCOPIC CONVERTED TO OPEN    Surgeon(s):  Taras Car MD    Assistant:   Resident: Patrica Lopez DO    Anesthesia: General    Estimated Blood Loss (mL): 50 cc    Complications: None    Specimens:   ID Type Source Tests Collected by Time Destination   1 : urine culture for catheter placement Urine Urine, indwelling catheter CULTURE, URINE Taras Car MD 8/8/2023 2004    A :  Tissue Appendix [address] MD 8/8/2023 2121        Implants:  * No implants in log *      Drains:   Closed/Suction Drain Right;Lateral Abdomen Bulb (Active)       Urinary Catheter 08/08/23 2 Way (Active)       Findings: Acute, perforated appendicitis. Unable to visualize the base of the appendix laparoscopically due to inflammation, thus procedure converted to open. JILLIAN x 1 left in the RLQ. Indications: Patient is a 82-year-old gentleman who presented to the emergency department with abdominal pain. History, physical exam, images and laboratory values were consistent with acute appendicitis. Decision was made to proceed to the operating room for laparoscopic appendectomy, possible open with all indicated procedures. Risks, benefits, and alternatives surgery were discussed with the patient, and all his questions were answered. Written consent was obtained and witnessed. Detailed Description of Procedure: The patient was brought back to the operating room and assisted on the operating table in a supine position. General anesthesia was induced. The patient was then prepped and draped in the usual sterile fashion.   A formal timeout identifying the correct patient, procedure, surgical personnel, allergies, and

## 2023-08-10 ENCOUNTER — APPOINTMENT (OUTPATIENT)
Dept: GENERAL RADIOLOGY | Age: 83
DRG: 853 | End: 2023-08-10
Payer: MEDICARE

## 2023-08-10 ENCOUNTER — HOSPITAL ENCOUNTER (OUTPATIENT)
Dept: PHYSICAL THERAPY | Facility: CLINIC | Age: 83
Setting detail: THERAPIES SERIES
Discharge: HOME | End: 2023-08-10
Attending: ORTHOPAEDIC SURGERY
Payer: MEDICARE

## 2023-08-10 LAB
ANION GAP SERPL CALCULATED.3IONS-SCNC: 11 MMOL/L (ref 9–17)
BASOPHILS # BLD: 0 K/UL (ref 0–0.2)
BASOPHILS NFR BLD: 0 % (ref 0–2)
BUN SERPL-MCNC: 18 MG/DL (ref 8–23)
CA-I BLD-SCNC: 1.07 MMOL/L (ref 1.13–1.33)
CALCIUM SERPL-MCNC: 8.7 MG/DL (ref 8.6–10.4)
CHLORIDE SERPL-SCNC: 99 MMOL/L (ref 98–107)
CO2 SERPL-SCNC: 28 MMOL/L (ref 20–31)
CREAT SERPL-MCNC: 1.1 MG/DL (ref 0.7–1.2)
EOSINOPHIL # BLD: 0 K/UL (ref 0–0.4)
EOSINOPHILS RELATIVE PERCENT: 0 % (ref 1–4)
ERYTHROCYTE [DISTWIDTH] IN BLOOD BY AUTOMATED COUNT: 12.1 % (ref 11.8–14.4)
GFR SERPL CREATININE-BSD FRML MDRD: >60 ML/MIN/1.73M2
GLUCOSE SERPL-MCNC: 103 MG/DL (ref 70–99)
HCT VFR BLD AUTO: 31.2 % (ref 40.7–50.3)
HGB BLD-MCNC: 9.9 G/DL (ref 13–17)
IMM GRANULOCYTES # BLD AUTO: 0 K/UL (ref 0–0.3)
IMM GRANULOCYTES NFR BLD: 0 %
LYMPHOCYTES NFR BLD: 0.79 K/UL (ref 1–4.8)
LYMPHOCYTES RELATIVE PERCENT: 10 % (ref 24–44)
MAGNESIUM SERPL-MCNC: 2.1 MG/DL (ref 1.6–2.6)
MCH RBC QN AUTO: 30.5 PG (ref 25.2–33.5)
MCHC RBC AUTO-ENTMCNC: 31.7 G/DL (ref 28.4–34.8)
MCV RBC AUTO: 96 FL (ref 82.6–102.9)
MONOCYTES NFR BLD: 0.4 K/UL (ref 0.1–0.8)
MONOCYTES NFR BLD: 5 % (ref 1–7)
MORPHOLOGY: NORMAL
NEUTROPHILS NFR BLD: 85 % (ref 36–66)
NEUTS SEG NFR BLD: 6.71 K/UL (ref 1.8–7.7)
NRBC BLD-RTO: 0 PER 100 WBC
PLATELET # BLD AUTO: 144 K/UL (ref 138–453)
PMV BLD AUTO: 9.9 FL (ref 8.1–13.5)
POTASSIUM SERPL-SCNC: 3.6 MMOL/L (ref 3.7–5.3)
RBC # BLD AUTO: 3.25 M/UL (ref 4.21–5.77)
SODIUM SERPL-SCNC: 138 MMOL/L (ref 135–144)
WBC OTHER # BLD: 7.9 K/UL (ref 3.5–11.3)

## 2023-08-10 PROCEDURE — 6360000002 HC RX W HCPCS: Performed by: STUDENT IN AN ORGANIZED HEALTH CARE EDUCATION/TRAINING PROGRAM

## 2023-08-10 PROCEDURE — 94761 N-INVAS EAR/PLS OXIMETRY MLT: CPT

## 2023-08-10 PROCEDURE — 2580000003 HC RX 258

## 2023-08-10 PROCEDURE — 2580000003 HC RX 258: Performed by: STUDENT IN AN ORGANIZED HEALTH CARE EDUCATION/TRAINING PROGRAM

## 2023-08-10 PROCEDURE — 2060000000 HC ICU INTERMEDIATE R&B

## 2023-08-10 PROCEDURE — 2500000003 HC RX 250 WO HCPCS: Performed by: STUDENT IN AN ORGANIZED HEALTH CARE EDUCATION/TRAINING PROGRAM

## 2023-08-10 PROCEDURE — 85025 COMPLETE CBC W/AUTO DIFF WBC: CPT

## 2023-08-10 PROCEDURE — 6360000002 HC RX W HCPCS

## 2023-08-10 PROCEDURE — 6370000000 HC RX 637 (ALT 250 FOR IP)

## 2023-08-10 PROCEDURE — 36415 COLL VENOUS BLD VENIPUNCTURE: CPT

## 2023-08-10 PROCEDURE — 83735 ASSAY OF MAGNESIUM: CPT

## 2023-08-10 PROCEDURE — 82330 ASSAY OF CALCIUM: CPT

## 2023-08-10 PROCEDURE — 99024 POSTOP FOLLOW-UP VISIT: CPT | Performed by: SURGERY

## 2023-08-10 PROCEDURE — 2700000000 HC OXYGEN THERAPY PER DAY

## 2023-08-10 PROCEDURE — 80048 BASIC METABOLIC PNL TOTAL CA: CPT

## 2023-08-10 RX ORDER — ACETAMINOPHEN 500 MG
1000 TABLET ORAL EVERY 8 HOURS SCHEDULED
Status: DISCONTINUED | OUTPATIENT
Start: 2023-08-10 | End: 2023-08-22 | Stop reason: HOSPADM

## 2023-08-10 RX ORDER — GABAPENTIN 300 MG/1
300 CAPSULE ORAL EVERY 8 HOURS
Status: DISCONTINUED | OUTPATIENT
Start: 2023-08-10 | End: 2023-08-22 | Stop reason: HOSPADM

## 2023-08-10 RX ORDER — HEPARIN SODIUM 5000 [USP'U]/ML
5000 INJECTION, SOLUTION INTRAVENOUS; SUBCUTANEOUS EVERY 8 HOURS SCHEDULED
Status: DISCONTINUED | OUTPATIENT
Start: 2023-08-10 | End: 2023-08-14

## 2023-08-10 RX ORDER — METHOCARBAMOL 750 MG/1
750 TABLET, FILM COATED ORAL EVERY 6 HOURS
Status: DISCONTINUED | OUTPATIENT
Start: 2023-08-10 | End: 2023-08-22 | Stop reason: HOSPADM

## 2023-08-10 RX ORDER — CALCIUM GLUCONATE 20 MG/ML
2000 INJECTION, SOLUTION INTRAVENOUS ONCE
Status: COMPLETED | OUTPATIENT
Start: 2023-08-10 | End: 2023-08-10

## 2023-08-10 RX ADMIN — PIPERACILLIN AND TAZOBACTAM 3375 MG: 3; .375 INJECTION, POWDER, LYOPHILIZED, FOR SOLUTION INTRAVENOUS at 18:29

## 2023-08-10 RX ADMIN — SODIUM CHLORIDE, PRESERVATIVE FREE 10 ML: 5 INJECTION INTRAVENOUS at 07:57

## 2023-08-10 RX ADMIN — POTASSIUM BICARBONATE 20 MEQ: 782 TABLET, EFFERVESCENT ORAL at 09:27

## 2023-08-10 RX ADMIN — SODIUM CHLORIDE, POTASSIUM CHLORIDE, SODIUM LACTATE AND CALCIUM CHLORIDE: 600; 310; 30; 20 INJECTION, SOLUTION INTRAVENOUS at 23:30

## 2023-08-10 RX ADMIN — SODIUM CHLORIDE, PRESERVATIVE FREE 20 MG: 5 INJECTION INTRAVENOUS at 21:26

## 2023-08-10 RX ADMIN — SODIUM CHLORIDE, PRESERVATIVE FREE 10 ML: 5 INJECTION INTRAVENOUS at 21:27

## 2023-08-10 RX ADMIN — ONDANSETRON 4 MG: 2 INJECTION INTRAMUSCULAR; INTRAVENOUS at 23:43

## 2023-08-10 RX ADMIN — ACETAMINOPHEN 1000 MG: 500 TABLET ORAL at 13:45

## 2023-08-10 RX ADMIN — SODIUM CHLORIDE, PRESERVATIVE FREE 20 MG: 5 INJECTION INTRAVENOUS at 07:57

## 2023-08-10 RX ADMIN — GABAPENTIN 300 MG: 300 CAPSULE ORAL at 12:20

## 2023-08-10 RX ADMIN — METHOCARBAMOL TABLETS 750 MG: 750 TABLET, COATED ORAL at 23:32

## 2023-08-10 RX ADMIN — METHOCARBAMOL TABLETS 750 MG: 750 TABLET, COATED ORAL at 12:20

## 2023-08-10 RX ADMIN — HEPARIN SODIUM 5000 UNITS: 5000 INJECTION INTRAVENOUS; SUBCUTANEOUS at 21:26

## 2023-08-10 RX ADMIN — PIPERACILLIN AND TAZOBACTAM 3375 MG: 3; .375 INJECTION, POWDER, LYOPHILIZED, FOR SOLUTION INTRAVENOUS at 23:32

## 2023-08-10 RX ADMIN — ENOXAPARIN SODIUM 30 MG: 100 INJECTION SUBCUTANEOUS at 07:56

## 2023-08-10 RX ADMIN — ACETAMINOPHEN 1000 MG: 500 TABLET ORAL at 21:26

## 2023-08-10 RX ADMIN — SODIUM CHLORIDE, POTASSIUM CHLORIDE, SODIUM LACTATE AND CALCIUM CHLORIDE: 600; 310; 30; 20 INJECTION, SOLUTION INTRAVENOUS at 16:23

## 2023-08-10 RX ADMIN — GABAPENTIN 300 MG: 300 CAPSULE ORAL at 21:26

## 2023-08-10 RX ADMIN — PIPERACILLIN AND TAZOBACTAM 3375 MG: 3; .375 INJECTION, POWDER, LYOPHILIZED, FOR SOLUTION INTRAVENOUS at 08:05

## 2023-08-10 RX ADMIN — SODIUM CHLORIDE, POTASSIUM CHLORIDE, SODIUM LACTATE AND CALCIUM CHLORIDE: 600; 310; 30; 20 INJECTION, SOLUTION INTRAVENOUS at 06:19

## 2023-08-10 RX ADMIN — CALCIUM GLUCONATE 2000 MG: 20 INJECTION, SOLUTION INTRAVENOUS at 09:31

## 2023-08-10 RX ADMIN — METHOCARBAMOL TABLETS 750 MG: 750 TABLET, COATED ORAL at 18:32

## 2023-08-10 ASSESSMENT — PAIN DESCRIPTION - DESCRIPTORS
DESCRIPTORS: OTHER (COMMENT)
DESCRIPTORS: OTHER (COMMENT)

## 2023-08-10 ASSESSMENT — PAIN SCALES - GENERAL
PAINLEVEL_OUTOF10: 2
PAINLEVEL_OUTOF10: 2
PAINLEVEL_OUTOF10: 3
PAINLEVEL_OUTOF10: 2

## 2023-08-10 ASSESSMENT — PAIN DESCRIPTION - ORIENTATION
ORIENTATION: MID
ORIENTATION: MID

## 2023-08-10 ASSESSMENT — PAIN DESCRIPTION - LOCATION
LOCATION: ABDOMEN
LOCATION: ABDOMEN

## 2023-08-10 ASSESSMENT — PAIN DESCRIPTION - FREQUENCY
FREQUENCY: INTERMITTENT
FREQUENCY: INTERMITTENT

## 2023-08-10 NOTE — FLOWSHEET NOTE
[] [facility] ([facility] - [facility] &  Therapy  [address] [facility].    [phone]  F: [phone]   [] [address]  [address]   [address]  P: [phone]  F: [phone]  [] [address]  [address]  P: [phone]  F: [phone] [] [facility]  P: [phone]  F: [phone]  [] [address] [facility] [address]  [address]   [place]: [phone]  F: [phone]   [] [address] [facility]  [address]  [facility] [place]: [phone]   F: [phone]     Physical Therapy Cancel/No Show note    Date: 8/10/2023  Patient: Angeles Garcia  : 1940  MRN: [id number]    Cancels/No Shows to date:     For today's appointment patient:    [x]  Cancelled    [] Rescheduled appointment    [] No-show     Reason given by patient:    [x]  Patient ill    []  Conflicting appointment    [] No transportation      [] Conflict with work    [] No reason given    [] Weather related    [] COVID-19    [] Other:      Comments:  Pt states he is in the hospital, had surgery for his appendix.        [] Next appointment was confirmed    Electronically signed by: Mando Childs PTA

## 2023-08-11 ENCOUNTER — HOSPITAL ENCOUNTER (OUTPATIENT)
Dept: PHYSICAL THERAPY | Facility: CLINIC | Age: 83
Setting detail: THERAPIES SERIES
End: 2023-08-11
Attending: ORTHOPAEDIC SURGERY
Payer: MEDICARE

## 2023-08-11 LAB
ANION GAP SERPL CALCULATED.3IONS-SCNC: 12 MMOL/L (ref 9–17)
BASOPHILS # BLD: 0 K/UL (ref 0–0.2)
BASOPHILS NFR BLD: 0 % (ref 0–2)
BUN SERPL-MCNC: 21 MG/DL (ref 8–23)
CA-I BLD-SCNC: 1.12 MMOL/L (ref 1.13–1.33)
CALCIUM SERPL-MCNC: 9.1 MG/DL (ref 8.6–10.4)
CHLORIDE SERPL-SCNC: 100 MMOL/L (ref 98–107)
CO2 SERPL-SCNC: 28 MMOL/L (ref 20–31)
CREAT SERPL-MCNC: 1.1 MG/DL (ref 0.7–1.2)
EOSINOPHIL # BLD: 0 K/UL (ref 0–0.44)
EOSINOPHILS RELATIVE PERCENT: 0 % (ref 1–4)
ERYTHROCYTE [DISTWIDTH] IN BLOOD BY AUTOMATED COUNT: 12 % (ref 11.8–14.4)
GFR SERPL CREATININE-BSD FRML MDRD: >60 ML/MIN/1.73M2
GLUCOSE BLD-MCNC: 120 MG/DL (ref 75–110)
GLUCOSE SERPL-MCNC: 123 MG/DL (ref 70–99)
HCT VFR BLD AUTO: 35.1 % (ref 40.7–50.3)
HGB BLD-MCNC: 11 G/DL (ref 13–17)
IMM GRANULOCYTES # BLD AUTO: 0 K/UL (ref 0–0.3)
IMM GRANULOCYTES NFR BLD: 0 %
LYMPHOCYTES NFR BLD: 0.64 K/UL (ref 1.1–3.7)
LYMPHOCYTES RELATIVE PERCENT: 6 % (ref 24–43)
MAGNESIUM SERPL-MCNC: 2.2 MG/DL (ref 1.6–2.6)
MCH RBC QN AUTO: 29.7 PG (ref 25.2–33.5)
MCHC RBC AUTO-ENTMCNC: 31.3 G/DL (ref 28.4–34.8)
MCV RBC AUTO: 94.9 FL (ref 82.6–102.9)
MONOCYTES NFR BLD: 0.42 K/UL (ref 0.1–1.2)
MONOCYTES NFR BLD: 4 % (ref 3–12)
MORPHOLOGY: NORMAL
NEUTROPHILS NFR BLD: 90 % (ref 36–65)
NEUTS SEG NFR BLD: 9.54 K/UL (ref 1.5–8.1)
NRBC BLD-RTO: 0 PER 100 WBC
PLATELET # BLD AUTO: 194 K/UL (ref 138–453)
PMV BLD AUTO: 10.2 FL (ref 8.1–13.5)
POTASSIUM SERPL-SCNC: 3.7 MMOL/L (ref 3.7–5.3)
RBC # BLD AUTO: 3.7 M/UL (ref 4.21–5.77)
SODIUM SERPL-SCNC: 140 MMOL/L (ref 135–144)
SURGICAL PATHOLOGY REPORT: NORMAL
WBC OTHER # BLD: 10.6 K/UL (ref 3.5–11.3)

## 2023-08-11 PROCEDURE — 85025 COMPLETE CBC W/AUTO DIFF WBC: CPT

## 2023-08-11 PROCEDURE — 97535 SELF CARE MNGMENT TRAINING: CPT

## 2023-08-11 PROCEDURE — 82947 ASSAY GLUCOSE BLOOD QUANT: CPT

## 2023-08-11 PROCEDURE — 6360000002 HC RX W HCPCS

## 2023-08-11 PROCEDURE — 82330 ASSAY OF CALCIUM: CPT

## 2023-08-11 PROCEDURE — 2580000003 HC RX 258: Performed by: NURSE PRACTITIONER

## 2023-08-11 PROCEDURE — 6370000000 HC RX 637 (ALT 250 FOR IP)

## 2023-08-11 PROCEDURE — 97162 PT EVAL MOD COMPLEX 30 MIN: CPT

## 2023-08-11 PROCEDURE — 36415 COLL VENOUS BLD VENIPUNCTURE: CPT

## 2023-08-11 PROCEDURE — 97530 THERAPEUTIC ACTIVITIES: CPT

## 2023-08-11 PROCEDURE — 2700000000 HC OXYGEN THERAPY PER DAY

## 2023-08-11 PROCEDURE — 2060000000 HC ICU INTERMEDIATE R&B

## 2023-08-11 PROCEDURE — 2580000003 HC RX 258

## 2023-08-11 PROCEDURE — 94761 N-INVAS EAR/PLS OXIMETRY MLT: CPT

## 2023-08-11 PROCEDURE — 83735 ASSAY OF MAGNESIUM: CPT

## 2023-08-11 PROCEDURE — 80048 BASIC METABOLIC PNL TOTAL CA: CPT

## 2023-08-11 PROCEDURE — 97166 OT EVAL MOD COMPLEX 45 MIN: CPT

## 2023-08-11 RX ORDER — SODIUM CHLORIDE, SODIUM LACTATE, POTASSIUM CHLORIDE, AND CALCIUM CHLORIDE .6; .31; .03; .02 G/100ML; G/100ML; G/100ML; G/100ML
1000 INJECTION, SOLUTION INTRAVENOUS ONCE
Status: COMPLETED | OUTPATIENT
Start: 2023-08-11 | End: 2023-08-11

## 2023-08-11 RX ADMIN — SODIUM CHLORIDE, POTASSIUM CHLORIDE, SODIUM LACTATE AND CALCIUM CHLORIDE: 600; 310; 30; 20 INJECTION, SOLUTION INTRAVENOUS at 08:31

## 2023-08-11 RX ADMIN — OXYCODONE HYDROCHLORIDE 5 MG: 5 SOLUTION ORAL at 01:54

## 2023-08-11 RX ADMIN — METHOCARBAMOL TABLETS 750 MG: 750 TABLET, COATED ORAL at 04:35

## 2023-08-11 RX ADMIN — SODIUM CHLORIDE, POTASSIUM CHLORIDE, SODIUM LACTATE AND CALCIUM CHLORIDE 1000 ML: 600; 310; 30; 20 INJECTION, SOLUTION INTRAVENOUS at 14:50

## 2023-08-11 RX ADMIN — PIPERACILLIN AND TAZOBACTAM 3375 MG: 3; .375 INJECTION, POWDER, LYOPHILIZED, FOR SOLUTION INTRAVENOUS at 08:30

## 2023-08-11 RX ADMIN — GABAPENTIN 300 MG: 300 CAPSULE ORAL at 04:34

## 2023-08-11 RX ADMIN — HEPARIN SODIUM 5000 UNITS: 5000 INJECTION INTRAVENOUS; SUBCUTANEOUS at 22:06

## 2023-08-11 RX ADMIN — METHOCARBAMOL TABLETS 750 MG: 750 TABLET, COATED ORAL at 23:34

## 2023-08-11 RX ADMIN — ACETAMINOPHEN 1000 MG: 500 TABLET ORAL at 12:37

## 2023-08-11 RX ADMIN — SODIUM CHLORIDE, POTASSIUM CHLORIDE, SODIUM LACTATE AND CALCIUM CHLORIDE: 600; 310; 30; 20 INJECTION, SOLUTION INTRAVENOUS at 18:34

## 2023-08-11 RX ADMIN — METHOCARBAMOL TABLETS 750 MG: 750 TABLET, COATED ORAL at 12:37

## 2023-08-11 RX ADMIN — GABAPENTIN 300 MG: 300 CAPSULE ORAL at 20:27

## 2023-08-11 RX ADMIN — ACETAMINOPHEN 1000 MG: 500 TABLET ORAL at 04:34

## 2023-08-11 RX ADMIN — HEPARIN SODIUM 5000 UNITS: 5000 INJECTION INTRAVENOUS; SUBCUTANEOUS at 05:56

## 2023-08-11 RX ADMIN — ACETAMINOPHEN 1000 MG: 500 TABLET ORAL at 20:27

## 2023-08-11 RX ADMIN — PIPERACILLIN AND TAZOBACTAM 3375 MG: 3; .375 INJECTION, POWDER, LYOPHILIZED, FOR SOLUTION INTRAVENOUS at 23:35

## 2023-08-11 RX ADMIN — HEPARIN SODIUM 5000 UNITS: 5000 INJECTION INTRAVENOUS; SUBCUTANEOUS at 14:50

## 2023-08-11 RX ADMIN — GABAPENTIN 300 MG: 300 CAPSULE ORAL at 12:37

## 2023-08-11 RX ADMIN — PIPERACILLIN AND TAZOBACTAM 3375 MG: 3; .375 INJECTION, POWDER, LYOPHILIZED, FOR SOLUTION INTRAVENOUS at 17:03

## 2023-08-11 ASSESSMENT — PAIN SCALES - GENERAL
PAINLEVEL_OUTOF10: 4
PAINLEVEL_OUTOF10: 2

## 2023-08-11 ASSESSMENT — PAIN DESCRIPTION - DESCRIPTORS
DESCRIPTORS: DISCOMFORT;ACHING
DESCRIPTORS: NAGGING
DESCRIPTORS: ACHING;DISCOMFORT

## 2023-08-11 ASSESSMENT — PAIN DESCRIPTION - ORIENTATION
ORIENTATION: MID
ORIENTATION: MID

## 2023-08-11 ASSESSMENT — PAIN DESCRIPTION - LOCATION
LOCATION: ABDOMEN

## 2023-08-11 NOTE — DISCHARGE INSTRUCTIONS
Discharge Instructions for General Surgery    No lifting above 10lbs for next 2 weeks. No soaking in bathtubs or submerging yourself in water for 4 weeks  Resume activity as tolerated, No operating heavy equipment while using narcotics  Wash incision gently with soap and water, pat dry. If steri-strips or surgical glue in place wash gently and leave in place until the glue or strips fall off. (Do not pull/tug)    F/u in trauma/acute care surgery clinic in 1-2 weeks  Call your doctor for the following:   Chills   Temperature greater than 101   Pain that is not tolerable despite taking pain medicine as ordered   There is increased swelling, redness or warmth at surgical site   There is increased drainage or bleeding from surgical site    Recommended diet: regular diet  Depending on your injuries, your doctor may want you to follow a specific diet. Some pain medicine can cause constipation . To avoid this problem:   Drink plenty of fluids. Eat foods high in fiber , such as:   Whole grain cereals and breads   Fruits and vegetables   Legumes (eg, beans, lentils)   If you are still having problems, talk to your doctor about using laxatives or stool softeners. General questions or concerns please call the [address] [place] [address] at [phone]. If needed, the clinic fax number is [phone].

## 2023-08-11 NOTE — DISCHARGE INSTR - COC
Continuity of Care Form    Patient Name: Marcus Trotter   :  1940  MRN:  [id number]    Admit date:  2023  Discharge date:  2023    Code Status Order: Full Code   Advance Directives:     Admitting Physician:  Nellie Dodson MD  PCP: Joe Doyle MD    Discharging Nurse: [address] [facility] Unit/Room#: 9937/9787-79  Discharging Unit Phone Number: [phone]    Emergency Contact:   Extended Emergency Contact Information  Primary Emergency Contact: Paola Urbina  Address: [address], [place] [address] [place] Phone: [phone]  Mobile Phone: [phone]  Relation: Spouse  Secondary Emergency Contact: Jessica Adrian  Address: [facility], [place] [address] [place] Phone: [phone]  Mobile Phone: [phone]  Relation: Spouse    Past Surgical History:  Past Surgical History:   Procedure Laterality Date    BACK SURGERY  2005    CATARACT EXTRACTION Left     COLONOSCOPY      EYE SURGERY      cataract    FOOT SURGERY Right     toenail removal great toe    HERNIA REPAIR      age 12- right inguinal    LAPAROSCOPIC APPENDECTOMY N/A 2023    APPENDECTOMY LAPAROSCOPIC CONVERTED TO OPEN performed by Nellie Dodson MD at [address]  2022    with fusion    NECK SURGERY  2010    not spine- hair follicle    TONSILLECTOMY      TOTAL KNEE ARTHROPLASTY Right 08/15/2022    RIGHT KNEE TOTAL ARTHROPLASTY WITH BIOMET AND GPS performed by Argelia Wynn MD at [address] [facility] Left 2022    KNEE TOTAL ARTHROPLASTY performed by Argelia Wynn MD at [address] [facility]       Immunization History:   Immunization History   Administered Date(s) Administered    COVID-19, MODERNA BLUE border, Primary or Immunocompromised, (age 12y+), IM, 100 mcg/0.5mL 2021, 2021, 2021    Influenza, FLUAD, (age 72 y+), Adjuvanted, 0.5mL 2020, 10/14/2022

## 2023-08-12 PROCEDURE — 6360000002 HC RX W HCPCS

## 2023-08-12 PROCEDURE — 2580000003 HC RX 258

## 2023-08-12 PROCEDURE — 6370000000 HC RX 637 (ALT 250 FOR IP)

## 2023-08-12 PROCEDURE — 2060000000 HC ICU INTERMEDIATE R&B

## 2023-08-12 RX ADMIN — HEPARIN SODIUM 5000 UNITS: 5000 INJECTION INTRAVENOUS; SUBCUTANEOUS at 06:00

## 2023-08-12 RX ADMIN — METHOCARBAMOL TABLETS 750 MG: 750 TABLET, COATED ORAL at 04:52

## 2023-08-12 RX ADMIN — GABAPENTIN 300 MG: 300 CAPSULE ORAL at 13:48

## 2023-08-12 RX ADMIN — GABAPENTIN 300 MG: 300 CAPSULE ORAL at 21:32

## 2023-08-12 RX ADMIN — SODIUM CHLORIDE, POTASSIUM CHLORIDE, SODIUM LACTATE AND CALCIUM CHLORIDE 125 ML/HR: 600; 310; 30; 20 INJECTION, SOLUTION INTRAVENOUS at 21:05

## 2023-08-12 RX ADMIN — ACETAMINOPHEN 1000 MG: 500 TABLET ORAL at 04:52

## 2023-08-12 RX ADMIN — HEPARIN SODIUM 5000 UNITS: 5000 INJECTION INTRAVENOUS; SUBCUTANEOUS at 13:48

## 2023-08-12 RX ADMIN — METHOCARBAMOL TABLETS 750 MG: 750 TABLET, COATED ORAL at 18:19

## 2023-08-12 RX ADMIN — ACETAMINOPHEN 1000 MG: 500 TABLET ORAL at 21:32

## 2023-08-12 RX ADMIN — ACETAMINOPHEN 1000 MG: 500 TABLET ORAL at 13:48

## 2023-08-12 RX ADMIN — HEPARIN SODIUM 5000 UNITS: 5000 INJECTION INTRAVENOUS; SUBCUTANEOUS at 21:32

## 2023-08-12 RX ADMIN — METHOCARBAMOL TABLETS 750 MG: 750 TABLET, COATED ORAL at 13:48

## 2023-08-12 RX ADMIN — GABAPENTIN 300 MG: 300 CAPSULE ORAL at 04:52

## 2023-08-12 RX ADMIN — PIPERACILLIN AND TAZOBACTAM 3375 MG: 3; .375 INJECTION, POWDER, LYOPHILIZED, FOR SOLUTION INTRAVENOUS at 18:19

## 2023-08-12 RX ADMIN — PIPERACILLIN AND TAZOBACTAM 3375 MG: 3; .375 INJECTION, POWDER, LYOPHILIZED, FOR SOLUTION INTRAVENOUS at 08:29

## 2023-08-12 ASSESSMENT — PAIN SCALES - GENERAL
PAINLEVEL_OUTOF10: 4
PAINLEVEL_OUTOF10: 3

## 2023-08-12 ASSESSMENT — PAIN DESCRIPTION - DESCRIPTORS: DESCRIPTORS: ACHING;SORE

## 2023-08-12 ASSESSMENT — PAIN DESCRIPTION - ORIENTATION: ORIENTATION: MID

## 2023-08-12 ASSESSMENT — PAIN DESCRIPTION - LOCATION: LOCATION: ABDOMEN

## 2023-08-13 PROCEDURE — 2580000003 HC RX 258

## 2023-08-13 PROCEDURE — 2700000000 HC OXYGEN THERAPY PER DAY

## 2023-08-13 PROCEDURE — 94761 N-INVAS EAR/PLS OXIMETRY MLT: CPT

## 2023-08-13 PROCEDURE — 6360000002 HC RX W HCPCS

## 2023-08-13 PROCEDURE — 6370000000 HC RX 637 (ALT 250 FOR IP)

## 2023-08-13 PROCEDURE — 2060000000 HC ICU INTERMEDIATE R&B

## 2023-08-13 RX ADMIN — ACETAMINOPHEN 1000 MG: 500 TABLET ORAL at 22:20

## 2023-08-13 RX ADMIN — ACETAMINOPHEN 1000 MG: 500 TABLET ORAL at 13:52

## 2023-08-13 RX ADMIN — HEPARIN SODIUM 5000 UNITS: 5000 INJECTION INTRAVENOUS; SUBCUTANEOUS at 22:23

## 2023-08-13 RX ADMIN — METHOCARBAMOL TABLETS 750 MG: 750 TABLET, COATED ORAL at 13:53

## 2023-08-13 RX ADMIN — METHOCARBAMOL TABLETS 750 MG: 750 TABLET, COATED ORAL at 00:56

## 2023-08-13 RX ADMIN — HEPARIN SODIUM 5000 UNITS: 5000 INJECTION INTRAVENOUS; SUBCUTANEOUS at 18:30

## 2023-08-13 RX ADMIN — METHOCARBAMOL TABLETS 750 MG: 750 TABLET, COATED ORAL at 06:07

## 2023-08-13 RX ADMIN — ACETAMINOPHEN 1000 MG: 500 TABLET ORAL at 06:08

## 2023-08-13 RX ADMIN — PIPERACILLIN AND TAZOBACTAM 3375 MG: 3; .375 INJECTION, POWDER, LYOPHILIZED, FOR SOLUTION INTRAVENOUS at 00:56

## 2023-08-13 RX ADMIN — GABAPENTIN 300 MG: 300 CAPSULE ORAL at 20:50

## 2023-08-13 RX ADMIN — HEPARIN SODIUM 5000 UNITS: 5000 INJECTION INTRAVENOUS; SUBCUTANEOUS at 08:44

## 2023-08-13 RX ADMIN — PIPERACILLIN AND TAZOBACTAM 3375 MG: 3; .375 INJECTION, POWDER, LYOPHILIZED, FOR SOLUTION INTRAVENOUS at 08:51

## 2023-08-13 RX ADMIN — SODIUM CHLORIDE, POTASSIUM CHLORIDE, SODIUM LACTATE AND CALCIUM CHLORIDE: 600; 310; 30; 20 INJECTION, SOLUTION INTRAVENOUS at 23:13

## 2023-08-13 RX ADMIN — METHOCARBAMOL TABLETS 750 MG: 750 TABLET, COATED ORAL at 22:20

## 2023-08-13 RX ADMIN — GABAPENTIN 300 MG: 300 CAPSULE ORAL at 13:53

## 2023-08-13 RX ADMIN — SODIUM CHLORIDE, POTASSIUM CHLORIDE, SODIUM LACTATE AND CALCIUM CHLORIDE 125 ML/HR: 600; 310; 30; 20 INJECTION, SOLUTION INTRAVENOUS at 05:14

## 2023-08-13 RX ADMIN — GABAPENTIN 300 MG: 300 CAPSULE ORAL at 06:08

## 2023-08-13 ASSESSMENT — PAIN DESCRIPTION - ORIENTATION: ORIENTATION: MID

## 2023-08-13 ASSESSMENT — PAIN SCALES - GENERAL
PAINLEVEL_OUTOF10: 0
PAINLEVEL_OUTOF10: 1
PAINLEVEL_OUTOF10: 6

## 2023-08-13 ASSESSMENT — PAIN DESCRIPTION - LOCATION
LOCATION: ABDOMEN
LOCATION: ABDOMEN

## 2023-08-13 ASSESSMENT — PAIN DESCRIPTION - DESCRIPTORS: DESCRIPTORS: ACHING

## 2023-08-14 ENCOUNTER — APPOINTMENT (OUTPATIENT)
Dept: GENERAL RADIOLOGY | Age: 83
DRG: 853 | End: 2023-08-14
Payer: MEDICARE

## 2023-08-14 LAB
ANION GAP SERPL CALCULATED.3IONS-SCNC: 12 MMOL/L (ref 9–17)
BASOPHILS # BLD: 0 K/UL (ref 0–0.2)
BASOPHILS NFR BLD: 0 % (ref 0–2)
BUN SERPL-MCNC: 12 MG/DL (ref 8–23)
CALCIUM SERPL-MCNC: 8.5 MG/DL (ref 8.6–10.4)
CHLORIDE SERPL-SCNC: 98 MMOL/L (ref 98–107)
CO2 SERPL-SCNC: 28 MMOL/L (ref 20–31)
CREAT SERPL-MCNC: 0.9 MG/DL (ref 0.7–1.2)
EOSINOPHIL # BLD: 0.13 K/UL (ref 0–0.4)
EOSINOPHILS RELATIVE PERCENT: 2 % (ref 1–4)
ERYTHROCYTE [DISTWIDTH] IN BLOOD BY AUTOMATED COUNT: 11.9 % (ref 11.8–14.4)
GFR SERPL CREATININE-BSD FRML MDRD: >60 ML/MIN/1.73M2
GLUCOSE BLD-MCNC: 125 MG/DL (ref 75–110)
GLUCOSE SERPL-MCNC: 93 MG/DL (ref 70–99)
HCT VFR BLD AUTO: 31.1 % (ref 40.7–50.3)
HGB BLD-MCNC: 9.8 G/DL (ref 13–17)
IMM GRANULOCYTES # BLD AUTO: 0 K/UL (ref 0–0.3)
IMM GRANULOCYTES NFR BLD: 0 %
LYMPHOCYTES NFR BLD: 0.79 K/UL (ref 1–4.8)
LYMPHOCYTES RELATIVE PERCENT: 12 % (ref 24–44)
MCH RBC QN AUTO: 30.2 PG (ref 25.2–33.5)
MCHC RBC AUTO-ENTMCNC: 31.5 G/DL (ref 28.4–34.8)
MCV RBC AUTO: 95.7 FL (ref 82.6–102.9)
MONOCYTES NFR BLD: 0.26 K/UL (ref 0.1–0.8)
MONOCYTES NFR BLD: 4 % (ref 1–7)
MORPHOLOGY: NORMAL
NEUTROPHILS NFR BLD: 82 % (ref 36–66)
NEUTS SEG NFR BLD: 5.42 K/UL (ref 1.8–7.7)
NRBC BLD-RTO: 0 PER 100 WBC
PLATELET # BLD AUTO: 249 K/UL (ref 138–453)
PMV BLD AUTO: 10.3 FL (ref 8.1–13.5)
POTASSIUM SERPL-SCNC: 3.4 MMOL/L (ref 3.7–5.3)
RBC # BLD AUTO: 3.25 M/UL (ref 4.21–5.77)
SODIUM SERPL-SCNC: 138 MMOL/L (ref 135–144)
WBC OTHER # BLD: 6.6 K/UL (ref 3.5–11.3)

## 2023-08-14 PROCEDURE — 6370000000 HC RX 637 (ALT 250 FOR IP): Performed by: NURSE PRACTITIONER

## 2023-08-14 PROCEDURE — 97530 THERAPEUTIC ACTIVITIES: CPT

## 2023-08-14 PROCEDURE — 6370000000 HC RX 637 (ALT 250 FOR IP)

## 2023-08-14 PROCEDURE — 2580000003 HC RX 258

## 2023-08-14 PROCEDURE — 6360000002 HC RX W HCPCS

## 2023-08-14 PROCEDURE — 36415 COLL VENOUS BLD VENIPUNCTURE: CPT

## 2023-08-14 PROCEDURE — 6360000002 HC RX W HCPCS: Performed by: NURSE PRACTITIONER

## 2023-08-14 PROCEDURE — 97535 SELF CARE MNGMENT TRAINING: CPT

## 2023-08-14 PROCEDURE — 02HV33Z INSERTION OF INFUSION DEVICE INTO SUPERIOR VENA CAVA, PERCUTANEOUS APPROACH: ICD-10-PCS | Performed by: STUDENT IN AN ORGANIZED HEALTH CARE EDUCATION/TRAINING PROGRAM

## 2023-08-14 PROCEDURE — 82947 ASSAY GLUCOSE BLOOD QUANT: CPT

## 2023-08-14 PROCEDURE — 2060000000 HC ICU INTERMEDIATE R&B

## 2023-08-14 PROCEDURE — 6360000002 HC RX W HCPCS: Performed by: STUDENT IN AN ORGANIZED HEALTH CARE EDUCATION/TRAINING PROGRAM

## 2023-08-14 PROCEDURE — 97116 GAIT TRAINING THERAPY: CPT

## 2023-08-14 PROCEDURE — 2500000003 HC RX 250 WO HCPCS: Performed by: STUDENT IN AN ORGANIZED HEALTH CARE EDUCATION/TRAINING PROGRAM

## 2023-08-14 PROCEDURE — 85025 COMPLETE CBC W/AUTO DIFF WBC: CPT

## 2023-08-14 PROCEDURE — 80048 BASIC METABOLIC PNL TOTAL CA: CPT

## 2023-08-14 PROCEDURE — 99222 1ST HOSP IP/OBS MODERATE 55: CPT | Performed by: STUDENT IN AN ORGANIZED HEALTH CARE EDUCATION/TRAINING PROGRAM

## 2023-08-14 PROCEDURE — 74018 RADEX ABDOMEN 1 VIEW: CPT

## 2023-08-14 RX ORDER — DULOXETIN HYDROCHLORIDE 30 MG/1
60 CAPSULE, DELAYED RELEASE ORAL DAILY
Status: DISCONTINUED | OUTPATIENT
Start: 2023-08-14 | End: 2023-08-22 | Stop reason: HOSPADM

## 2023-08-14 RX ORDER — ENOXAPARIN SODIUM 100 MG/ML
30 INJECTION SUBCUTANEOUS 2 TIMES DAILY
Status: DISCONTINUED | OUTPATIENT
Start: 2023-08-14 | End: 2023-08-22 | Stop reason: HOSPADM

## 2023-08-14 RX ORDER — FUROSEMIDE 40 MG/1
40 TABLET ORAL DAILY
Status: DISCONTINUED | OUTPATIENT
Start: 2023-08-14 | End: 2023-08-22 | Stop reason: HOSPADM

## 2023-08-14 RX ORDER — AMLODIPINE BESYLATE 5 MG/1
5 TABLET ORAL DAILY
Status: DISCONTINUED | OUTPATIENT
Start: 2023-08-14 | End: 2023-08-22 | Stop reason: HOSPADM

## 2023-08-14 RX ORDER — POTASSIUM CHLORIDE 7.45 MG/ML
10 INJECTION INTRAVENOUS
Status: COMPLETED | OUTPATIENT
Start: 2023-08-14 | End: 2023-08-14

## 2023-08-14 RX ORDER — DEXTROSE MONOHYDRATE, SODIUM CHLORIDE, AND POTASSIUM CHLORIDE 50; 1.49; 4.5 G/1000ML; G/1000ML; G/1000ML
INJECTION, SOLUTION INTRAVENOUS CONTINUOUS
Status: DISCONTINUED | OUTPATIENT
Start: 2023-08-14 | End: 2023-08-15

## 2023-08-14 RX ORDER — POTASSIUM CHLORIDE 20 MEQ/1
20 TABLET, EXTENDED RELEASE ORAL ONCE
Status: DISCONTINUED | OUTPATIENT
Start: 2023-08-14 | End: 2023-08-14

## 2023-08-14 RX ORDER — POTASSIUM CHLORIDE 20 MEQ/1
40 TABLET, EXTENDED RELEASE ORAL DAILY
Status: DISCONTINUED | OUTPATIENT
Start: 2023-08-14 | End: 2023-08-14

## 2023-08-14 RX ORDER — CHLORTHALIDONE 25 MG/1
50 TABLET ORAL DAILY
Status: DISCONTINUED | OUTPATIENT
Start: 2023-08-14 | End: 2023-08-22 | Stop reason: HOSPADM

## 2023-08-14 RX ADMIN — ACETAMINOPHEN 1000 MG: 500 TABLET ORAL at 17:43

## 2023-08-14 RX ADMIN — SODIUM CHLORIDE, POTASSIUM CHLORIDE, SODIUM LACTATE AND CALCIUM CHLORIDE: 600; 310; 30; 20 INJECTION, SOLUTION INTRAVENOUS at 05:10

## 2023-08-14 RX ADMIN — ACETAMINOPHEN 1000 MG: 500 TABLET ORAL at 05:17

## 2023-08-14 RX ADMIN — POTASSIUM CHLORIDE 10 MEQ: 10 INJECTION, SOLUTION INTRAVENOUS at 13:27

## 2023-08-14 RX ADMIN — POTASSIUM CHLORIDE, DEXTROSE MONOHYDRATE AND SODIUM CHLORIDE: 150; 5; 450 INJECTION, SOLUTION INTRAVENOUS at 20:11

## 2023-08-14 RX ADMIN — POTASSIUM CHLORIDE 10 MEQ: 10 INJECTION, SOLUTION INTRAVENOUS at 15:51

## 2023-08-14 RX ADMIN — I.V. FAT EMULSION 250 ML: 20 EMULSION INTRAVENOUS at 17:27

## 2023-08-14 RX ADMIN — METHOCARBAMOL TABLETS 750 MG: 750 TABLET, COATED ORAL at 03:41

## 2023-08-14 RX ADMIN — ASCORBIC ACID, VITAMIN A PALMITATE, CHOLECALCIFEROL, THIAMINE HYDROCHLORIDE, RIBOFLAVIN-5 PHOSPHATE SODIUM, PYRIDOXINE HYDROCHLORIDE, NIACINAMIDE, DEXPANTHENOL, ALPHA-TOCOPHEROL ACETATE, VITAMIN K1, FOLIC ACID, BIOTIN, CYANOCOBALAMIN: 200; 3300; 200; 6; 3.6; 6; 40; 15; 10; 150; 600; 60; 5 INJECTION, SOLUTION INTRAVENOUS at 17:26

## 2023-08-14 RX ADMIN — ENOXAPARIN SODIUM 30 MG: 100 INJECTION SUBCUTANEOUS at 15:51

## 2023-08-14 RX ADMIN — POTASSIUM CHLORIDE, DEXTROSE MONOHYDRATE AND SODIUM CHLORIDE: 150; 5; 450 INJECTION, SOLUTION INTRAVENOUS at 12:09

## 2023-08-14 RX ADMIN — HEPARIN SODIUM 5000 UNITS: 5000 INJECTION INTRAVENOUS; SUBCUTANEOUS at 05:17

## 2023-08-14 RX ADMIN — POTASSIUM CHLORIDE 10 MEQ: 10 INJECTION, SOLUTION INTRAVENOUS at 16:54

## 2023-08-14 RX ADMIN — GABAPENTIN 300 MG: 300 CAPSULE ORAL at 21:44

## 2023-08-14 RX ADMIN — GABAPENTIN 300 MG: 300 CAPSULE ORAL at 17:54

## 2023-08-14 RX ADMIN — POTASSIUM CHLORIDE 10 MEQ: 10 INJECTION, SOLUTION INTRAVENOUS at 12:20

## 2023-08-14 RX ADMIN — ACETAMINOPHEN 1000 MG: 500 TABLET ORAL at 21:43

## 2023-08-14 RX ADMIN — ENOXAPARIN SODIUM 30 MG: 100 INJECTION SUBCUTANEOUS at 21:47

## 2023-08-14 RX ADMIN — GABAPENTIN 300 MG: 300 CAPSULE ORAL at 03:41

## 2023-08-14 ASSESSMENT — ENCOUNTER SYMPTOMS
NAUSEA: 1
DIARRHEA: 1
ABDOMINAL PAIN: 1
VOMITING: 1
SHORTNESS OF BREATH: 0

## 2023-08-14 ASSESSMENT — PAIN DESCRIPTION - LOCATION
LOCATION: ABDOMEN

## 2023-08-14 ASSESSMENT — PAIN SCALES - GENERAL
PAINLEVEL_OUTOF10: 4
PAINLEVEL_OUTOF10: 3
PAINLEVEL_OUTOF10: 4
PAINLEVEL_OUTOF10: 2

## 2023-08-14 NOTE — CARE COORDINATION
Transitional planning-talked with patient about rehab-he is requesting [facility]. Explained to him he may not qualify-may need to go to a SNF.    3390 referral faxed to [address] and called to [facility]. 1310 message from [address] [facility]. Call back if precert should be started. 1615 message left with Tita. Explained that patient is being started on TPN. 1620 call from [facility]-states can do TPN.

## 2023-08-15 ENCOUNTER — APPOINTMENT (OUTPATIENT)
Dept: GENERAL RADIOLOGY | Age: 83
DRG: 853 | End: 2023-08-15
Payer: MEDICARE

## 2023-08-15 LAB
ALBUMIN SERPL-MCNC: 2.7 G/DL (ref 3.5–5.2)
ALBUMIN/GLOB SERPL: 0.7 {RATIO} (ref 1–2.5)
ALP SERPL-CCNC: 74 U/L (ref 40–129)
ALT SERPL-CCNC: 32 U/L (ref 5–41)
ANION GAP SERPL CALCULATED.3IONS-SCNC: 9 MMOL/L (ref 9–17)
AST SERPL-CCNC: 43 U/L
BILIRUB DIRECT SERPL-MCNC: 0.3 MG/DL
BILIRUB INDIRECT SERPL-MCNC: 0.2 MG/DL (ref 0–1)
BILIRUB SERPL-MCNC: 0.5 MG/DL (ref 0.3–1.2)
BUN SERPL-MCNC: 9 MG/DL (ref 8–23)
CALCIUM SERPL-MCNC: 8.8 MG/DL (ref 8.6–10.4)
CHLORIDE SERPL-SCNC: 102 MMOL/L (ref 98–107)
CO2 SERPL-SCNC: 30 MMOL/L (ref 20–31)
CREAT SERPL-MCNC: 0.9 MG/DL (ref 0.7–1.2)
EKG ATRIAL RATE: 87 BPM
EKG P AXIS: 38 DEGREES
EKG P-R INTERVAL: 198 MS
EKG Q-T INTERVAL: 376 MS
EKG QRS DURATION: 78 MS
EKG QTC CALCULATION (BAZETT): 452 MS
EKG R AXIS: 10 DEGREES
EKG T AXIS: 26 DEGREES
EKG VENTRICULAR RATE: 87 BPM
ERYTHROCYTE [DISTWIDTH] IN BLOOD BY AUTOMATED COUNT: 11.9 % (ref 11.8–14.4)
GFR SERPL CREATININE-BSD FRML MDRD: >60 ML/MIN/1.73M2
GLUCOSE BLD-MCNC: 111 MG/DL (ref 75–110)
GLUCOSE BLD-MCNC: 122 MG/DL (ref 75–110)
GLUCOSE BLD-MCNC: 125 MG/DL (ref 75–110)
GLUCOSE BLD-MCNC: 133 MG/DL (ref 75–110)
GLUCOSE BLD-MCNC: 138 MG/DL (ref 75–110)
GLUCOSE BLD-MCNC: 140 MG/DL (ref 75–110)
GLUCOSE SERPL-MCNC: 140 MG/DL (ref 70–99)
HCT VFR BLD AUTO: 32.4 % (ref 40.7–50.3)
HGB BLD-MCNC: 10.3 G/DL (ref 13–17)
MAGNESIUM SERPL-MCNC: 1.9 MG/DL (ref 1.6–2.6)
MCH RBC QN AUTO: 30.3 PG (ref 25.2–33.5)
MCHC RBC AUTO-ENTMCNC: 31.8 G/DL (ref 28.4–34.8)
MCV RBC AUTO: 95.3 FL (ref 82.6–102.9)
NRBC BLD-RTO: 0 PER 100 WBC
PHOSPHATE SERPL-MCNC: 2.8 MG/DL (ref 2.5–4.5)
PLATELET # BLD AUTO: 268 K/UL (ref 138–453)
PMV BLD AUTO: 10.1 FL (ref 8.1–13.5)
POTASSIUM SERPL-SCNC: 3.5 MMOL/L (ref 3.7–5.3)
PROT SERPL-MCNC: 6.4 G/DL (ref 6.4–8.3)
RBC # BLD AUTO: 3.4 M/UL (ref 4.21–5.77)
SODIUM SERPL-SCNC: 141 MMOL/L (ref 135–144)
TRIGL SERPL-MCNC: 178 MG/DL
WBC OTHER # BLD: 6.9 K/UL (ref 3.5–11.3)

## 2023-08-15 PROCEDURE — 36569 INSJ PICC 5 YR+ W/O IMAGING: CPT

## 2023-08-15 PROCEDURE — 80048 BASIC METABOLIC PNL TOTAL CA: CPT

## 2023-08-15 PROCEDURE — 80076 HEPATIC FUNCTION PANEL: CPT

## 2023-08-15 PROCEDURE — 2580000003 HC RX 258: Performed by: STUDENT IN AN ORGANIZED HEALTH CARE EDUCATION/TRAINING PROGRAM

## 2023-08-15 PROCEDURE — 85027 COMPLETE CBC AUTOMATED: CPT

## 2023-08-15 PROCEDURE — 2500000003 HC RX 250 WO HCPCS: Performed by: STUDENT IN AN ORGANIZED HEALTH CARE EDUCATION/TRAINING PROGRAM

## 2023-08-15 PROCEDURE — 6360000002 HC RX W HCPCS: Performed by: SURGERY

## 2023-08-15 PROCEDURE — C1751 CATH, INF, PER/CENT/MIDLINE: HCPCS

## 2023-08-15 PROCEDURE — 76937 US GUIDE VASCULAR ACCESS: CPT

## 2023-08-15 PROCEDURE — 84100 ASSAY OF PHOSPHORUS: CPT

## 2023-08-15 PROCEDURE — 6360000002 HC RX W HCPCS: Performed by: STUDENT IN AN ORGANIZED HEALTH CARE EDUCATION/TRAINING PROGRAM

## 2023-08-15 PROCEDURE — 2500000003 HC RX 250 WO HCPCS: Performed by: SURGERY

## 2023-08-15 PROCEDURE — 93005 ELECTROCARDIOGRAM TRACING: CPT | Performed by: STUDENT IN AN ORGANIZED HEALTH CARE EDUCATION/TRAINING PROGRAM

## 2023-08-15 PROCEDURE — 36415 COLL VENOUS BLD VENIPUNCTURE: CPT

## 2023-08-15 PROCEDURE — 84478 ASSAY OF TRIGLYCERIDES: CPT

## 2023-08-15 PROCEDURE — 74018 RADEX ABDOMEN 1 VIEW: CPT

## 2023-08-15 PROCEDURE — 83735 ASSAY OF MAGNESIUM: CPT

## 2023-08-15 PROCEDURE — 94761 N-INVAS EAR/PLS OXIMETRY MLT: CPT

## 2023-08-15 PROCEDURE — 82947 ASSAY GLUCOSE BLOOD QUANT: CPT

## 2023-08-15 PROCEDURE — 6370000000 HC RX 637 (ALT 250 FOR IP)

## 2023-08-15 PROCEDURE — 93010 ELECTROCARDIOGRAM REPORT: CPT | Performed by: INTERNAL MEDICINE

## 2023-08-15 PROCEDURE — 6360000002 HC RX W HCPCS: Performed by: NURSE PRACTITIONER

## 2023-08-15 PROCEDURE — 2580000003 HC RX 258: Performed by: SURGERY

## 2023-08-15 PROCEDURE — 51798 US URINE CAPACITY MEASURE: CPT

## 2023-08-15 PROCEDURE — 6370000000 HC RX 637 (ALT 250 FOR IP): Performed by: NURSE PRACTITIONER

## 2023-08-15 PROCEDURE — 2060000000 HC ICU INTERMEDIATE R&B

## 2023-08-15 RX ORDER — BISACODYL 10 MG
10 SUPPOSITORY, RECTAL RECTAL ONCE
Status: COMPLETED | OUTPATIENT
Start: 2023-08-15 | End: 2023-08-15

## 2023-08-15 RX ORDER — SODIUM CHLORIDE 9 MG/ML
INJECTION, SOLUTION INTRAVENOUS CONTINUOUS
Status: DISCONTINUED | OUTPATIENT
Start: 2023-08-15 | End: 2023-08-17

## 2023-08-15 RX ORDER — POTASSIUM CHLORIDE 7.45 MG/ML
10 INJECTION INTRAVENOUS
Status: COMPLETED | OUTPATIENT
Start: 2023-08-15 | End: 2023-08-15

## 2023-08-15 RX ADMIN — METHOCARBAMOL TABLETS 750 MG: 750 TABLET, COATED ORAL at 05:57

## 2023-08-15 RX ADMIN — POTASSIUM CHLORIDE 10 MEQ: 10 INJECTION, SOLUTION INTRAVENOUS at 09:32

## 2023-08-15 RX ADMIN — ACETAMINOPHEN 1000 MG: 500 TABLET ORAL at 05:39

## 2023-08-15 RX ADMIN — SODIUM CHLORIDE: 9 INJECTION, SOLUTION INTRAVENOUS at 09:30

## 2023-08-15 RX ADMIN — POTASSIUM CHLORIDE: 2 INJECTION, SOLUTION, CONCENTRATE INTRAVENOUS at 18:04

## 2023-08-15 RX ADMIN — ENOXAPARIN SODIUM 30 MG: 100 INJECTION SUBCUTANEOUS at 09:34

## 2023-08-15 RX ADMIN — ONDANSETRON 4 MG: 2 INJECTION INTRAMUSCULAR; INTRAVENOUS at 08:43

## 2023-08-15 RX ADMIN — POTASSIUM CHLORIDE 10 MEQ: 10 INJECTION, SOLUTION INTRAVENOUS at 12:07

## 2023-08-15 RX ADMIN — BISACODYL 10 MG: 10 SUPPOSITORY RECTAL at 11:22

## 2023-08-15 RX ADMIN — GABAPENTIN 300 MG: 300 CAPSULE ORAL at 11:23

## 2023-08-15 RX ADMIN — METHOCARBAMOL TABLETS 750 MG: 750 TABLET, COATED ORAL at 11:23

## 2023-08-15 RX ADMIN — DULOXETINE HYDROCHLORIDE 60 MG: 30 CAPSULE, DELAYED RELEASE ORAL at 09:34

## 2023-08-15 RX ADMIN — GABAPENTIN 300 MG: 300 CAPSULE ORAL at 20:38

## 2023-08-15 RX ADMIN — ENOXAPARIN SODIUM 30 MG: 100 INJECTION SUBCUTANEOUS at 20:38

## 2023-08-15 RX ADMIN — POTASSIUM CHLORIDE 10 MEQ: 10 INJECTION, SOLUTION INTRAVENOUS at 13:00

## 2023-08-15 RX ADMIN — POTASSIUM CHLORIDE 10 MEQ: 10 INJECTION, SOLUTION INTRAVENOUS at 11:02

## 2023-08-15 RX ADMIN — ACETAMINOPHEN 1000 MG: 500 TABLET ORAL at 14:20

## 2023-08-15 RX ADMIN — ACETAMINOPHEN 1000 MG: 500 TABLET ORAL at 20:38

## 2023-08-15 RX ADMIN — AMLODIPINE BESYLATE 5 MG: 5 TABLET ORAL at 09:35

## 2023-08-15 RX ADMIN — GABAPENTIN 300 MG: 300 CAPSULE ORAL at 05:39

## 2023-08-15 RX ADMIN — POTASSIUM CHLORIDE, DEXTROSE MONOHYDRATE AND SODIUM CHLORIDE: 150; 5; 450 INJECTION, SOLUTION INTRAVENOUS at 05:42

## 2023-08-15 RX ADMIN — METHOCARBAMOL TABLETS 750 MG: 750 TABLET, COATED ORAL at 20:38

## 2023-08-15 RX ADMIN — METHOCARBAMOL TABLETS 750 MG: 750 TABLET, COATED ORAL at 00:07

## 2023-08-15 RX ADMIN — SODIUM CHLORIDE, PRESERVATIVE FREE 10 ML: 5 INJECTION INTRAVENOUS at 20:38

## 2023-08-15 RX ADMIN — CHLORTHALIDONE 50 MG: 25 TABLET ORAL at 09:35

## 2023-08-15 RX ADMIN — FUROSEMIDE 40 MG: 40 TABLET ORAL at 09:34

## 2023-08-15 ASSESSMENT — PAIN SCALES - GENERAL
PAINLEVEL_OUTOF10: 2

## 2023-08-15 ASSESSMENT — PAIN DESCRIPTION - ORIENTATION: ORIENTATION: MID

## 2023-08-15 ASSESSMENT — PAIN DESCRIPTION - LOCATION
LOCATION: ABDOMEN

## 2023-08-15 ASSESSMENT — PAIN DESCRIPTION - DESCRIPTORS: DESCRIPTORS: ACHING

## 2023-08-15 NOTE — ANESTHESIA POSTPROCEDURE EVALUATION
Department of Anesthesiology  Postprocedure Note    Patient: Maryanne Munoz  MRN: [id number]  YOB: 1940  Date of evaluation: 8/15/2023      Procedure Summary     Date: 08/08/23 Room / Location: [address]    Anesthesia Start: 1925 Anesthesia Stop: 2246    Procedure: APPENDECTOMY LAPAROSCOPIC CONVERTED TO OPEN Diagnosis:       Acute appendicitis, unspecified acute appendicitis type      (Acute appendicitis, unspecified acute appendicitis type [K35.80])    Surgeons: Otilio Cloud MD Responsible Provider: Kojo Blue MD    Anesthesia Type: general ASA Status: 3          Anesthesia Type: No value filed.     [place] Phase I:      [place] Phase II:        Anesthesia Post Evaluation    Patient location during evaluation: ICU  Patient participation: complete - patient cannot participate  Level of consciousness: sedated and ventilated  Pain score: 0  Airway patency: patent  Nausea & Vomiting: no vomiting and no nausea  Complications: no  Cardiovascular status: hemodynamically stable  Respiratory status: acceptable, intubated and ventilator  Hydration status: stable  Pain management: adequate

## 2023-08-15 NOTE — PROCEDURES
Picc placement note:    During picc placement (Picc tip was at IJ/Bracheocephalic confluence and was not in distal SVC), patient had a vasovagal episode, becoming bradycardic, nauseous with emesis. Patient remained bradycardic (HR in 30's) for approx 1 min. Never became agitated or altered. Denied shortness of breath, chest pain, or allergic reaction symptoms during episode. Patient did complain of some abd pain after having dry heaves. Rapid response called. Patient medication with Zofran IV. Patient returned to baseline prior to team arrival for rapid. ECG done at bedside. Benefits include stable, long term intravenous access. Blood draws. Peripheral vein preservation. Safely deliver vesicant medications. Risks include failure to obtain the desired result(s) of the procedure, discomfort, injury, the need for additional procedures/therapies, permanent loss of body function, bleeding/bruising, arterial puncture, air embolism, nerve damage, hematoma, phlebitis, catheter fracture/rupture, catheter embolism, catheter occlusion, catheter migration, catheter site infection, unintentional/accidental removal of catheter, bloodstream infection, infiltration, cardiac arrhythmia, vein thrombosis, difficult catheter removal.   Alternatives discussed including centrally inserted central catheter, as well as less invasive procedures such as multiple peripheral IVs, extended dwell catheters and midline placements. Consent obtained by proceduralist, signed by Patient. Prescribed therapy: TPN  Peripheral ultrasound assessment done. Plan for right brachial vein with 3 cm subcutaneous needle tunnel to move skin entrance site away from axilla and to have appropriate CVR. Vein measurement = 0.62cm and area based CVR= 8.6%, preferable CVR based on area would be less than 20% (which correlates to less than 45% linear CVR). Product type:  Antimicrobial/Antithrombogenic Arrow non tapered power injectable

## 2023-08-16 ENCOUNTER — TELEPHONE (OUTPATIENT)
Dept: ORTHOPEDIC SURGERY | Age: 83
End: 2023-08-16

## 2023-08-16 LAB
ALBUMIN SERPL-MCNC: 2.7 G/DL (ref 3.5–5.2)
ALBUMIN/GLOB SERPL: 0.7 {RATIO} (ref 1–2.5)
ALP SERPL-CCNC: 74 U/L (ref 40–129)
ALT SERPL-CCNC: 47 U/L (ref 5–41)
ANION GAP SERPL CALCULATED.3IONS-SCNC: 9 MMOL/L (ref 9–17)
AST SERPL-CCNC: 50 U/L
BASOPHILS # BLD: <0.03 K/UL (ref 0–0.2)
BASOPHILS NFR BLD: 0 % (ref 0–2)
BILIRUB DIRECT SERPL-MCNC: 0.2 MG/DL
BILIRUB INDIRECT SERPL-MCNC: 0.2 MG/DL (ref 0–1)
BILIRUB SERPL-MCNC: 0.4 MG/DL (ref 0.3–1.2)
BUN SERPL-MCNC: 10 MG/DL (ref 8–23)
CALCIUM SERPL-MCNC: 8.6 MG/DL (ref 8.6–10.4)
CHLORIDE SERPL-SCNC: 101 MMOL/L (ref 98–107)
CO2 SERPL-SCNC: 28 MMOL/L (ref 20–31)
CREAT SERPL-MCNC: 0.8 MG/DL (ref 0.7–1.2)
EOSINOPHIL # BLD: 0.1 K/UL (ref 0–0.44)
EOSINOPHILS RELATIVE PERCENT: 2 % (ref 1–4)
ERYTHROCYTE [DISTWIDTH] IN BLOOD BY AUTOMATED COUNT: 12.3 % (ref 11.8–14.4)
GFR SERPL CREATININE-BSD FRML MDRD: >60 ML/MIN/1.73M2
GLUCOSE BLD-MCNC: 119 MG/DL (ref 75–110)
GLUCOSE BLD-MCNC: 123 MG/DL (ref 75–110)
GLUCOSE BLD-MCNC: 123 MG/DL (ref 75–110)
GLUCOSE BLD-MCNC: 133 MG/DL (ref 75–110)
GLUCOSE SERPL-MCNC: 119 MG/DL (ref 70–99)
HCT VFR BLD AUTO: 32.5 % (ref 40.7–50.3)
HGB BLD-MCNC: 9.9 G/DL (ref 13–17)
IMM GRANULOCYTES # BLD AUTO: 0.07 K/UL (ref 0–0.3)
IMM GRANULOCYTES NFR BLD: 1 %
LYMPHOCYTES NFR BLD: 1.05 K/UL (ref 1.1–3.7)
LYMPHOCYTES RELATIVE PERCENT: 17 % (ref 24–43)
MAGNESIUM SERPL-MCNC: 2.2 MG/DL (ref 1.6–2.6)
MCH RBC QN AUTO: 29.9 PG (ref 25.2–33.5)
MCHC RBC AUTO-ENTMCNC: 30.5 G/DL (ref 28.4–34.8)
MCV RBC AUTO: 98.2 FL (ref 82.6–102.9)
MONOCYTES NFR BLD: 0.26 K/UL (ref 0.1–1.2)
MONOCYTES NFR BLD: 4 % (ref 3–12)
NEUTROPHILS NFR BLD: 76 % (ref 36–65)
NEUTS SEG NFR BLD: 4.75 K/UL (ref 1.5–8.1)
NRBC BLD-RTO: 0 PER 100 WBC
PHOSPHATE SERPL-MCNC: 3.5 MG/DL (ref 2.5–4.5)
PLATELET # BLD AUTO: 288 K/UL (ref 138–453)
PMV BLD AUTO: 10 FL (ref 8.1–13.5)
POTASSIUM SERPL-SCNC: 3.7 MMOL/L (ref 3.7–5.3)
PROT SERPL-MCNC: 6.5 G/DL (ref 6.4–8.3)
RBC # BLD AUTO: 3.31 M/UL (ref 4.21–5.77)
SODIUM SERPL-SCNC: 138 MMOL/L (ref 135–144)
WBC OTHER # BLD: 6.2 K/UL (ref 3.5–11.3)

## 2023-08-16 PROCEDURE — 85025 COMPLETE CBC W/AUTO DIFF WBC: CPT

## 2023-08-16 PROCEDURE — 97530 THERAPEUTIC ACTIVITIES: CPT

## 2023-08-16 PROCEDURE — 6360000002 HC RX W HCPCS: Performed by: NURSE PRACTITIONER

## 2023-08-16 PROCEDURE — 2500000003 HC RX 250 WO HCPCS: Performed by: SURGERY

## 2023-08-16 PROCEDURE — 83735 ASSAY OF MAGNESIUM: CPT

## 2023-08-16 PROCEDURE — 80076 HEPATIC FUNCTION PANEL: CPT

## 2023-08-16 PROCEDURE — 6370000000 HC RX 637 (ALT 250 FOR IP)

## 2023-08-16 PROCEDURE — 82947 ASSAY GLUCOSE BLOOD QUANT: CPT

## 2023-08-16 PROCEDURE — 2580000003 HC RX 258: Performed by: SURGERY

## 2023-08-16 PROCEDURE — 6360000002 HC RX W HCPCS: Performed by: STUDENT IN AN ORGANIZED HEALTH CARE EDUCATION/TRAINING PROGRAM

## 2023-08-16 PROCEDURE — 84100 ASSAY OF PHOSPHORUS: CPT

## 2023-08-16 PROCEDURE — 80048 BASIC METABOLIC PNL TOTAL CA: CPT

## 2023-08-16 PROCEDURE — 6370000000 HC RX 637 (ALT 250 FOR IP): Performed by: NURSE PRACTITIONER

## 2023-08-16 PROCEDURE — 97110 THERAPEUTIC EXERCISES: CPT

## 2023-08-16 PROCEDURE — 2580000003 HC RX 258: Performed by: STUDENT IN AN ORGANIZED HEALTH CARE EDUCATION/TRAINING PROGRAM

## 2023-08-16 PROCEDURE — 99024 POSTOP FOLLOW-UP VISIT: CPT | Performed by: SURGERY

## 2023-08-16 PROCEDURE — 6360000002 HC RX W HCPCS: Performed by: SURGERY

## 2023-08-16 PROCEDURE — 36415 COLL VENOUS BLD VENIPUNCTURE: CPT

## 2023-08-16 PROCEDURE — 2060000000 HC ICU INTERMEDIATE R&B

## 2023-08-16 RX ORDER — POTASSIUM CHLORIDE 7.45 MG/ML
10 INJECTION INTRAVENOUS
Status: COMPLETED | OUTPATIENT
Start: 2023-08-16 | End: 2023-08-16

## 2023-08-16 RX ADMIN — ENOXAPARIN SODIUM 30 MG: 100 INJECTION SUBCUTANEOUS at 21:11

## 2023-08-16 RX ADMIN — GABAPENTIN 300 MG: 300 CAPSULE ORAL at 21:11

## 2023-08-16 RX ADMIN — DULOXETINE HYDROCHLORIDE 60 MG: 30 CAPSULE, DELAYED RELEASE ORAL at 08:30

## 2023-08-16 RX ADMIN — POTASSIUM CHLORIDE: 2 INJECTION, SOLUTION, CONCENTRATE INTRAVENOUS at 18:31

## 2023-08-16 RX ADMIN — METHOCARBAMOL TABLETS 750 MG: 750 TABLET, COATED ORAL at 05:53

## 2023-08-16 RX ADMIN — METHOCARBAMOL TABLETS 750 MG: 750 TABLET, COATED ORAL at 12:41

## 2023-08-16 RX ADMIN — I.V. FAT EMULSION 250 ML: 20 EMULSION INTRAVENOUS at 18:31

## 2023-08-16 RX ADMIN — ACETAMINOPHEN 1000 MG: 500 TABLET ORAL at 05:53

## 2023-08-16 RX ADMIN — CHLORTHALIDONE 50 MG: 25 TABLET ORAL at 08:30

## 2023-08-16 RX ADMIN — SODIUM CHLORIDE, PRESERVATIVE FREE 10 ML: 5 INJECTION INTRAVENOUS at 21:11

## 2023-08-16 RX ADMIN — ENOXAPARIN SODIUM 30 MG: 100 INJECTION SUBCUTANEOUS at 08:30

## 2023-08-16 RX ADMIN — AMLODIPINE BESYLATE 5 MG: 5 TABLET ORAL at 08:30

## 2023-08-16 RX ADMIN — POTASSIUM CHLORIDE 10 MEQ: 10 INJECTION, SOLUTION INTRAVENOUS at 12:40

## 2023-08-16 RX ADMIN — FUROSEMIDE 40 MG: 40 TABLET ORAL at 08:30

## 2023-08-16 RX ADMIN — POTASSIUM CHLORIDE 10 MEQ: 10 INJECTION, SOLUTION INTRAVENOUS at 11:33

## 2023-08-16 RX ADMIN — OXYCODONE HYDROCHLORIDE 5 MG: 5 SOLUTION ORAL at 03:37

## 2023-08-16 RX ADMIN — SODIUM CHLORIDE: 9 INJECTION, SOLUTION INTRAVENOUS at 05:53

## 2023-08-16 RX ADMIN — GABAPENTIN 300 MG: 300 CAPSULE ORAL at 03:38

## 2023-08-16 RX ADMIN — GABAPENTIN 300 MG: 300 CAPSULE ORAL at 12:41

## 2023-08-16 RX ADMIN — ACETAMINOPHEN 1000 MG: 500 TABLET ORAL at 21:13

## 2023-08-16 RX ADMIN — POTASSIUM CHLORIDE 10 MEQ: 10 INJECTION, SOLUTION INTRAVENOUS at 10:12

## 2023-08-16 RX ADMIN — METHOCARBAMOL TABLETS 750 MG: 750 TABLET, COATED ORAL at 21:11

## 2023-08-16 ASSESSMENT — PAIN DESCRIPTION - ORIENTATION: ORIENTATION: LOWER

## 2023-08-16 ASSESSMENT — PAIN SCALES - GENERAL
PAINLEVEL_OUTOF10: 3
PAINLEVEL_OUTOF10: 7

## 2023-08-16 ASSESSMENT — PAIN DESCRIPTION - LOCATION
LOCATION: HEAD
LOCATION: ABDOMEN

## 2023-08-16 ASSESSMENT — PAIN DESCRIPTION - DESCRIPTORS
DESCRIPTORS: ACHING
DESCRIPTORS: ACHING

## 2023-08-16 NOTE — TELEPHONE ENCOUNTER
Patients wife called to let Dr Darrell De Paz know Tianaisrrael Pallas has not been able to make it to PT  due a hospitalization. Once he gets better, he will go. Thank you.

## 2023-08-17 LAB
ANION GAP SERPL CALCULATED.3IONS-SCNC: 9 MMOL/L (ref 9–17)
BILIRUB UR QL STRIP: NEGATIVE
BUN SERPL-MCNC: 13 MG/DL (ref 8–23)
CALCIUM SERPL-MCNC: 8.7 MG/DL (ref 8.6–10.4)
CHLORIDE SERPL-SCNC: 95 MMOL/L (ref 98–107)
CLARITY UR: CLEAR
CO2 SERPL-SCNC: 29 MMOL/L (ref 20–31)
COLOR UR: YELLOW
COMMENT: NORMAL
CREAT SERPL-MCNC: 0.8 MG/DL (ref 0.7–1.2)
GFR SERPL CREATININE-BSD FRML MDRD: >60 ML/MIN/1.73M2
GLUCOSE BLD-MCNC: 106 MG/DL (ref 75–110)
GLUCOSE BLD-MCNC: 122 MG/DL (ref 75–110)
GLUCOSE BLD-MCNC: 135 MG/DL (ref 75–110)
GLUCOSE SERPL-MCNC: 125 MG/DL (ref 70–99)
GLUCOSE UR STRIP-MCNC: NEGATIVE MG/DL
HGB UR QL STRIP.AUTO: NEGATIVE
KETONES UR STRIP-MCNC: NEGATIVE MG/DL
LEUKOCYTE ESTERASE UR QL STRIP: NEGATIVE
MAGNESIUM SERPL-MCNC: 2.1 MG/DL (ref 1.6–2.6)
NITRITE UR QL STRIP: NEGATIVE
PH UR STRIP: 7 [PH] (ref 5–8)
PHOSPHATE SERPL-MCNC: 3.6 MG/DL (ref 2.5–4.5)
POTASSIUM SERPL-SCNC: 3.8 MMOL/L (ref 3.7–5.3)
PROT UR STRIP-MCNC: NEGATIVE MG/DL
SODIUM SERPL-SCNC: 133 MMOL/L (ref 135–144)
SP GR UR STRIP: 1.01 (ref 1–1.03)
UROBILINOGEN UR STRIP-ACNC: NORMAL EU/DL (ref 0–1)

## 2023-08-17 PROCEDURE — 6360000002 HC RX W HCPCS: Performed by: NURSE PRACTITIONER

## 2023-08-17 PROCEDURE — 2060000000 HC ICU INTERMEDIATE R&B

## 2023-08-17 PROCEDURE — 2500000003 HC RX 250 WO HCPCS: Performed by: SURGERY

## 2023-08-17 PROCEDURE — 82947 ASSAY GLUCOSE BLOOD QUANT: CPT

## 2023-08-17 PROCEDURE — 80048 BASIC METABOLIC PNL TOTAL CA: CPT

## 2023-08-17 PROCEDURE — 81003 URINALYSIS AUTO W/O SCOPE: CPT

## 2023-08-17 PROCEDURE — 2580000003 HC RX 258: Performed by: STUDENT IN AN ORGANIZED HEALTH CARE EDUCATION/TRAINING PROGRAM

## 2023-08-17 PROCEDURE — 36415 COLL VENOUS BLD VENIPUNCTURE: CPT

## 2023-08-17 PROCEDURE — 6370000000 HC RX 637 (ALT 250 FOR IP): Performed by: STUDENT IN AN ORGANIZED HEALTH CARE EDUCATION/TRAINING PROGRAM

## 2023-08-17 PROCEDURE — 6370000000 HC RX 637 (ALT 250 FOR IP): Performed by: NURSE PRACTITIONER

## 2023-08-17 PROCEDURE — 6370000000 HC RX 637 (ALT 250 FOR IP)

## 2023-08-17 PROCEDURE — 84100 ASSAY OF PHOSPHORUS: CPT

## 2023-08-17 PROCEDURE — 97535 SELF CARE MNGMENT TRAINING: CPT

## 2023-08-17 PROCEDURE — 6360000002 HC RX W HCPCS: Performed by: SURGERY

## 2023-08-17 PROCEDURE — 2580000003 HC RX 258: Performed by: SURGERY

## 2023-08-17 PROCEDURE — 83735 ASSAY OF MAGNESIUM: CPT

## 2023-08-17 RX ORDER — POTASSIUM CHLORIDE 20 MEQ/1
20 TABLET, EXTENDED RELEASE ORAL ONCE
Status: COMPLETED | OUTPATIENT
Start: 2023-08-17 | End: 2023-08-17

## 2023-08-17 RX ORDER — BISACODYL 10 MG
10 SUPPOSITORY, RECTAL RECTAL DAILY
Status: DISCONTINUED | OUTPATIENT
Start: 2023-08-17 | End: 2023-08-22 | Stop reason: HOSPADM

## 2023-08-17 RX ADMIN — CHLORTHALIDONE 50 MG: 25 TABLET ORAL at 08:25

## 2023-08-17 RX ADMIN — BISACODYL 10 MG: 10 SUPPOSITORY RECTAL at 08:25

## 2023-08-17 RX ADMIN — ACETAMINOPHEN 1000 MG: 500 TABLET ORAL at 21:10

## 2023-08-17 RX ADMIN — ENOXAPARIN SODIUM 30 MG: 100 INJECTION SUBCUTANEOUS at 21:11

## 2023-08-17 RX ADMIN — METHOCARBAMOL TABLETS 750 MG: 750 TABLET, COATED ORAL at 05:10

## 2023-08-17 RX ADMIN — I.V. FAT EMULSION 250 ML: 20 EMULSION INTRAVENOUS at 17:52

## 2023-08-17 RX ADMIN — FUROSEMIDE 40 MG: 40 TABLET ORAL at 08:25

## 2023-08-17 RX ADMIN — POTASSIUM CHLORIDE: 2 INJECTION, SOLUTION, CONCENTRATE INTRAVENOUS at 18:05

## 2023-08-17 RX ADMIN — SODIUM CHLORIDE, PRESERVATIVE FREE 10 ML: 5 INJECTION INTRAVENOUS at 21:11

## 2023-08-17 RX ADMIN — ENOXAPARIN SODIUM 30 MG: 100 INJECTION SUBCUTANEOUS at 08:25

## 2023-08-17 RX ADMIN — METHOCARBAMOL TABLETS 750 MG: 750 TABLET, COATED ORAL at 12:38

## 2023-08-17 RX ADMIN — AMLODIPINE BESYLATE 5 MG: 5 TABLET ORAL at 08:25

## 2023-08-17 RX ADMIN — GABAPENTIN 300 MG: 300 CAPSULE ORAL at 12:39

## 2023-08-17 RX ADMIN — DULOXETINE HYDROCHLORIDE 60 MG: 30 CAPSULE, DELAYED RELEASE ORAL at 08:25

## 2023-08-17 RX ADMIN — ACETAMINOPHEN 1000 MG: 500 TABLET ORAL at 12:39

## 2023-08-17 RX ADMIN — ACETAMINOPHEN 1000 MG: 500 TABLET ORAL at 05:18

## 2023-08-17 RX ADMIN — METHOCARBAMOL TABLETS 750 MG: 750 TABLET, COATED ORAL at 18:26

## 2023-08-17 RX ADMIN — GABAPENTIN 300 MG: 300 CAPSULE ORAL at 21:11

## 2023-08-17 RX ADMIN — POTASSIUM CHLORIDE 20 MEQ: 1500 TABLET, EXTENDED RELEASE ORAL at 08:25

## 2023-08-17 RX ADMIN — GABAPENTIN 300 MG: 300 CAPSULE ORAL at 05:10

## 2023-08-17 ASSESSMENT — PAIN DESCRIPTION - DESCRIPTORS: DESCRIPTORS: ACHING

## 2023-08-17 ASSESSMENT — PAIN SCALES - GENERAL
PAINLEVEL_OUTOF10: 0
PAINLEVEL_OUTOF10: 3

## 2023-08-17 ASSESSMENT — PAIN DESCRIPTION - LOCATION: LOCATION: ABDOMEN

## 2023-08-17 ASSESSMENT — PAIN DESCRIPTION - ORIENTATION: ORIENTATION: MID

## 2023-08-17 NOTE — CARE COORDINATION
Transitional planning-talked with patient, he is still wanting to go to [facility]. NG out. TPN still running. On clear liquids. 0945 called [facility] and talked with Tita-needs OT note to do precert. Called OT and talked with Nell Lozano.

## 2023-08-18 LAB
ALBUMIN SERPL-MCNC: 3.2 G/DL (ref 3.5–5.2)
ALBUMIN/GLOB SERPL: 0.8 {RATIO} (ref 1–2.5)
ALP SERPL-CCNC: 101 U/L (ref 40–129)
ALT SERPL-CCNC: 63 U/L (ref 5–41)
ANION GAP SERPL CALCULATED.3IONS-SCNC: 13 MMOL/L (ref 9–17)
AST SERPL-CCNC: 46 U/L
BILIRUB DIRECT SERPL-MCNC: 0.2 MG/DL
BILIRUB INDIRECT SERPL-MCNC: 0.2 MG/DL (ref 0–1)
BILIRUB SERPL-MCNC: 0.4 MG/DL (ref 0.3–1.2)
BUN SERPL-MCNC: 14 MG/DL (ref 8–23)
CALCIUM SERPL-MCNC: 9 MG/DL (ref 8.6–10.4)
CHLORIDE SERPL-SCNC: 96 MMOL/L (ref 98–107)
CO2 SERPL-SCNC: 28 MMOL/L (ref 20–31)
CREAT SERPL-MCNC: 1 MG/DL (ref 0.7–1.2)
GFR SERPL CREATININE-BSD FRML MDRD: >60 ML/MIN/1.73M2
GLUCOSE BLD-MCNC: 121 MG/DL (ref 75–110)
GLUCOSE BLD-MCNC: 125 MG/DL (ref 75–110)
GLUCOSE BLD-MCNC: 125 MG/DL (ref 75–110)
GLUCOSE SERPL-MCNC: 117 MG/DL (ref 70–99)
MAGNESIUM SERPL-MCNC: 2 MG/DL (ref 1.6–2.6)
PHOSPHATE SERPL-MCNC: 3.9 MG/DL (ref 2.5–4.5)
POTASSIUM SERPL-SCNC: 3.8 MMOL/L (ref 3.7–5.3)
PROT SERPL-MCNC: 7 G/DL (ref 6.4–8.3)
SODIUM SERPL-SCNC: 137 MMOL/L (ref 135–144)

## 2023-08-18 PROCEDURE — 97110 THERAPEUTIC EXERCISES: CPT

## 2023-08-18 PROCEDURE — 6370000000 HC RX 637 (ALT 250 FOR IP)

## 2023-08-18 PROCEDURE — 83735 ASSAY OF MAGNESIUM: CPT

## 2023-08-18 PROCEDURE — 80076 HEPATIC FUNCTION PANEL: CPT

## 2023-08-18 PROCEDURE — 82947 ASSAY GLUCOSE BLOOD QUANT: CPT

## 2023-08-18 PROCEDURE — 2580000003 HC RX 258: Performed by: STUDENT IN AN ORGANIZED HEALTH CARE EDUCATION/TRAINING PROGRAM

## 2023-08-18 PROCEDURE — 2060000000 HC ICU INTERMEDIATE R&B

## 2023-08-18 PROCEDURE — 36415 COLL VENOUS BLD VENIPUNCTURE: CPT

## 2023-08-18 PROCEDURE — 6360000002 HC RX W HCPCS: Performed by: STUDENT IN AN ORGANIZED HEALTH CARE EDUCATION/TRAINING PROGRAM

## 2023-08-18 PROCEDURE — 80048 BASIC METABOLIC PNL TOTAL CA: CPT

## 2023-08-18 PROCEDURE — 84100 ASSAY OF PHOSPHORUS: CPT

## 2023-08-18 PROCEDURE — 97116 GAIT TRAINING THERAPY: CPT

## 2023-08-18 PROCEDURE — 6360000002 HC RX W HCPCS: Performed by: NURSE PRACTITIONER

## 2023-08-18 PROCEDURE — 6370000000 HC RX 637 (ALT 250 FOR IP): Performed by: NURSE PRACTITIONER

## 2023-08-18 RX ORDER — ONDANSETRON 2 MG/ML
4 INJECTION INTRAMUSCULAR; INTRAVENOUS EVERY 6 HOURS PRN
Status: DISCONTINUED | OUTPATIENT
Start: 2023-08-18 | End: 2023-08-22 | Stop reason: HOSPADM

## 2023-08-18 RX ORDER — CALCIUM CARBONATE 500 MG/1
500 TABLET, CHEWABLE ORAL 3 TIMES DAILY PRN
Status: DISCONTINUED | OUTPATIENT
Start: 2023-08-18 | End: 2023-08-22 | Stop reason: HOSPADM

## 2023-08-18 RX ORDER — FUROSEMIDE 40 MG/1
40 TABLET ORAL DAILY
Qty: 60 TABLET | Refills: 3 | COMMUNITY
Start: 2023-08-19

## 2023-08-18 RX ORDER — ENOXAPARIN SODIUM 100 MG/ML
30 INJECTION SUBCUTANEOUS 2 TIMES DAILY
DISCHARGE
Start: 2023-08-18

## 2023-08-18 RX ORDER — OXYCODONE HCL 5 MG/5 ML
5 SOLUTION, ORAL ORAL EVERY 6 HOURS PRN
Refills: 0 | Status: SHIPPED | OUTPATIENT
Start: 2023-08-18 | End: 2023-08-21 | Stop reason: HOSPADM

## 2023-08-18 RX ADMIN — CHLORTHALIDONE 50 MG: 25 TABLET ORAL at 08:39

## 2023-08-18 RX ADMIN — ACETAMINOPHEN 500 MG: 500 TABLET ORAL at 05:13

## 2023-08-18 RX ADMIN — ONDANSETRON 4 MG: 2 INJECTION INTRAMUSCULAR; INTRAVENOUS at 20:23

## 2023-08-18 RX ADMIN — ACETAMINOPHEN 1000 MG: 500 TABLET ORAL at 12:32

## 2023-08-18 RX ADMIN — METHOCARBAMOL TABLETS 750 MG: 750 TABLET, COATED ORAL at 08:39

## 2023-08-18 RX ADMIN — DULOXETINE HYDROCHLORIDE 60 MG: 30 CAPSULE, DELAYED RELEASE ORAL at 08:39

## 2023-08-18 RX ADMIN — METHOCARBAMOL TABLETS 750 MG: 750 TABLET, COATED ORAL at 12:32

## 2023-08-18 RX ADMIN — GABAPENTIN 300 MG: 300 CAPSULE ORAL at 05:13

## 2023-08-18 RX ADMIN — SODIUM CHLORIDE, PRESERVATIVE FREE 10 ML: 5 INJECTION INTRAVENOUS at 20:25

## 2023-08-18 RX ADMIN — ANTACID TABLETS 500 MG: 500 TABLET, CHEWABLE ORAL at 22:18

## 2023-08-18 RX ADMIN — METHOCARBAMOL TABLETS 750 MG: 750 TABLET, COATED ORAL at 18:35

## 2023-08-18 RX ADMIN — METHOCARBAMOL TABLETS 750 MG: 750 TABLET, COATED ORAL at 23:17

## 2023-08-18 RX ADMIN — ENOXAPARIN SODIUM 30 MG: 100 INJECTION SUBCUTANEOUS at 08:38

## 2023-08-18 RX ADMIN — ENOXAPARIN SODIUM 30 MG: 100 INJECTION SUBCUTANEOUS at 20:25

## 2023-08-18 RX ADMIN — ACETAMINOPHEN 1000 MG: 500 TABLET ORAL at 20:25

## 2023-08-18 RX ADMIN — GABAPENTIN 300 MG: 300 CAPSULE ORAL at 20:25

## 2023-08-18 RX ADMIN — OXYCODONE HYDROCHLORIDE 5 MG: 5 SOLUTION ORAL at 21:53

## 2023-08-18 RX ADMIN — FUROSEMIDE 40 MG: 40 TABLET ORAL at 08:39

## 2023-08-18 RX ADMIN — AMLODIPINE BESYLATE 5 MG: 5 TABLET ORAL at 08:40

## 2023-08-18 RX ADMIN — GABAPENTIN 300 MG: 300 CAPSULE ORAL at 12:32

## 2023-08-18 ASSESSMENT — PAIN DESCRIPTION - ORIENTATION
ORIENTATION: MID
ORIENTATION: MID
ORIENTATION: RIGHT
ORIENTATION: RIGHT

## 2023-08-18 ASSESSMENT — PAIN SCALES - GENERAL
PAINLEVEL_OUTOF10: 1
PAINLEVEL_OUTOF10: 4
PAINLEVEL_OUTOF10: 6
PAINLEVEL_OUTOF10: 6
PAINLEVEL_OUTOF10: 4

## 2023-08-18 ASSESSMENT — PAIN - FUNCTIONAL ASSESSMENT
PAIN_FUNCTIONAL_ASSESSMENT: ACTIVITIES ARE NOT PREVENTED

## 2023-08-18 ASSESSMENT — PAIN DESCRIPTION - LOCATION
LOCATION: ABDOMEN
LOCATION: ABDOMEN
LOCATION: ABDOMEN;BACK
LOCATION: ABDOMEN

## 2023-08-18 ASSESSMENT — PAIN DESCRIPTION - DESCRIPTORS
DESCRIPTORS: ACHING
DESCRIPTORS: PRESSURE

## 2023-08-18 NOTE — CARE COORDINATION
Transitional planning-called Tita at [address] still pending.  Jovanna is ON CALL this [phone]

## 2023-08-19 LAB
ANION GAP SERPL CALCULATED.3IONS-SCNC: 10 MMOL/L (ref 9–17)
BUN SERPL-MCNC: 18 MG/DL (ref 8–23)
CALCIUM SERPL-MCNC: 8.8 MG/DL (ref 8.6–10.4)
CHLORIDE SERPL-SCNC: 93 MMOL/L (ref 98–107)
CO2 SERPL-SCNC: 29 MMOL/L (ref 20–31)
CREAT SERPL-MCNC: 1.2 MG/DL (ref 0.7–1.2)
GFR SERPL CREATININE-BSD FRML MDRD: >60 ML/MIN/1.73M2
GLUCOSE SERPL-MCNC: 125 MG/DL (ref 70–99)
MAGNESIUM SERPL-MCNC: 1.8 MG/DL (ref 1.6–2.6)
POTASSIUM SERPL-SCNC: 3.7 MMOL/L (ref 3.7–5.3)
SODIUM SERPL-SCNC: 132 MMOL/L (ref 135–144)

## 2023-08-19 PROCEDURE — 6370000000 HC RX 637 (ALT 250 FOR IP)

## 2023-08-19 PROCEDURE — 99024 POSTOP FOLLOW-UP VISIT: CPT | Performed by: SURGERY

## 2023-08-19 PROCEDURE — 97535 SELF CARE MNGMENT TRAINING: CPT

## 2023-08-19 PROCEDURE — 83735 ASSAY OF MAGNESIUM: CPT

## 2023-08-19 PROCEDURE — 2060000000 HC ICU INTERMEDIATE R&B

## 2023-08-19 PROCEDURE — 80048 BASIC METABOLIC PNL TOTAL CA: CPT

## 2023-08-19 PROCEDURE — 97530 THERAPEUTIC ACTIVITIES: CPT

## 2023-08-19 PROCEDURE — 6370000000 HC RX 637 (ALT 250 FOR IP): Performed by: NURSE PRACTITIONER

## 2023-08-19 PROCEDURE — 6360000002 HC RX W HCPCS: Performed by: NURSE PRACTITIONER

## 2023-08-19 PROCEDURE — 36415 COLL VENOUS BLD VENIPUNCTURE: CPT

## 2023-08-19 RX ADMIN — ACETAMINOPHEN 1000 MG: 500 TABLET ORAL at 06:00

## 2023-08-19 RX ADMIN — METHOCARBAMOL TABLETS 750 MG: 750 TABLET, COATED ORAL at 06:00

## 2023-08-19 RX ADMIN — GABAPENTIN 300 MG: 300 CAPSULE ORAL at 21:09

## 2023-08-19 RX ADMIN — CHLORTHALIDONE 50 MG: 25 TABLET ORAL at 08:31

## 2023-08-19 RX ADMIN — DULOXETINE HYDROCHLORIDE 60 MG: 30 CAPSULE, DELAYED RELEASE ORAL at 08:31

## 2023-08-19 RX ADMIN — FUROSEMIDE 40 MG: 40 TABLET ORAL at 08:31

## 2023-08-19 RX ADMIN — ENOXAPARIN SODIUM 30 MG: 100 INJECTION SUBCUTANEOUS at 21:09

## 2023-08-19 RX ADMIN — GABAPENTIN 300 MG: 300 CAPSULE ORAL at 11:45

## 2023-08-19 RX ADMIN — ENOXAPARIN SODIUM 30 MG: 100 INJECTION SUBCUTANEOUS at 08:32

## 2023-08-19 RX ADMIN — AMLODIPINE BESYLATE 5 MG: 5 TABLET ORAL at 08:31

## 2023-08-19 RX ADMIN — METHOCARBAMOL TABLETS 750 MG: 750 TABLET, COATED ORAL at 18:23

## 2023-08-19 RX ADMIN — ACETAMINOPHEN 1000 MG: 500 TABLET ORAL at 14:17

## 2023-08-19 RX ADMIN — ACETAMINOPHEN 1000 MG: 500 TABLET ORAL at 21:09

## 2023-08-19 RX ADMIN — METHOCARBAMOL TABLETS 750 MG: 750 TABLET, COATED ORAL at 11:45

## 2023-08-19 RX ADMIN — GABAPENTIN 300 MG: 300 CAPSULE ORAL at 06:00

## 2023-08-19 ASSESSMENT — PAIN DESCRIPTION - ORIENTATION
ORIENTATION: RIGHT;MID
ORIENTATION: MID

## 2023-08-19 ASSESSMENT — PAIN SCALES - GENERAL
PAINLEVEL_OUTOF10: 4

## 2023-08-19 ASSESSMENT — PAIN DESCRIPTION - DESCRIPTORS
DESCRIPTORS: ACHING
DESCRIPTORS: ACHING

## 2023-08-19 ASSESSMENT — PAIN DESCRIPTION - LOCATION
LOCATION: BACK
LOCATION: ABDOMEN
LOCATION: ABDOMEN;HEAD

## 2023-08-19 ASSESSMENT — PAIN - FUNCTIONAL ASSESSMENT
PAIN_FUNCTIONAL_ASSESSMENT: ACTIVITIES ARE NOT PREVENTED
PAIN_FUNCTIONAL_ASSESSMENT: ACTIVITIES ARE NOT PREVENTED

## 2023-08-19 NOTE — CARE COORDINATION
Transitional Planning  Call from Wilmer Mattson at [facility]. Offering a peer to peer. Has to be completed by Monday at 12 pm. [facility] ID [id number]. Telephone number [phone] opt. 5.  MD's are available on the weekend.   Update given to [facility]

## 2023-08-19 NOTE — CARE COORDINATION
Transitional planning: Perfect serve message sent to the general surgery attending on call that the Precert for acute rehab has been denied for this patient. They are offering a peer to peer. Has to be completed by Monday at 12 pm. [facility] ID [id number]. Telephone number [phone] opt. 5. MD's are available on the weekend. It can be designated to a resident or NP but the info has to be sent to the attending. 8112 Perfect serve response that P2P will be attempted and to prepare for SNF referral if denied. 1010 Spoke to patient at bedside to notify of and explain the above. SNF list provided as well as SNF list printed from [facility] website.

## 2023-08-20 LAB
ANION GAP SERPL CALCULATED.3IONS-SCNC: 10 MMOL/L (ref 9–17)
BASOPHILS # BLD: 0.05 K/UL (ref 0–0.2)
BASOPHILS NFR BLD: 1 % (ref 0–2)
BUN SERPL-MCNC: 19 MG/DL (ref 8–23)
CALCIUM SERPL-MCNC: 8.8 MG/DL (ref 8.6–10.4)
CHLORIDE SERPL-SCNC: 94 MMOL/L (ref 98–107)
CO2 SERPL-SCNC: 30 MMOL/L (ref 20–31)
CREAT SERPL-MCNC: 1 MG/DL (ref 0.7–1.2)
EOSINOPHIL # BLD: 0.14 K/UL (ref 0–0.44)
EOSINOPHILS RELATIVE PERCENT: 2 % (ref 1–4)
ERYTHROCYTE [DISTWIDTH] IN BLOOD BY AUTOMATED COUNT: 12.7 % (ref 11.8–14.4)
GFR SERPL CREATININE-BSD FRML MDRD: >60 ML/MIN/1.73M2
GLUCOSE BLD-MCNC: 106 MG/DL (ref 75–110)
GLUCOSE BLD-MCNC: 110 MG/DL (ref 75–110)
GLUCOSE BLD-MCNC: 129 MG/DL (ref 75–110)
GLUCOSE BLD-MCNC: 98 MG/DL (ref 75–110)
GLUCOSE SERPL-MCNC: 105 MG/DL (ref 70–99)
HCT VFR BLD AUTO: 31.9 % (ref 40.7–50.3)
HGB BLD-MCNC: 10.2 G/DL (ref 13–17)
IMM GRANULOCYTES # BLD AUTO: 0.06 K/UL (ref 0–0.3)
IMM GRANULOCYTES NFR BLD: 1 %
LYMPHOCYTES NFR BLD: 0.9 K/UL (ref 1.1–3.7)
LYMPHOCYTES RELATIVE PERCENT: 12 % (ref 24–43)
MAGNESIUM SERPL-MCNC: 2.1 MG/DL (ref 1.6–2.6)
MCH RBC QN AUTO: 30.4 PG (ref 25.2–33.5)
MCHC RBC AUTO-ENTMCNC: 32 G/DL (ref 28.4–34.8)
MCV RBC AUTO: 94.9 FL (ref 82.6–102.9)
MONOCYTES NFR BLD: 0.48 K/UL (ref 0.1–1.2)
MONOCYTES NFR BLD: 6 % (ref 3–12)
NEUTROPHILS NFR BLD: 78 % (ref 36–65)
NEUTS SEG NFR BLD: 5.93 K/UL (ref 1.5–8.1)
NRBC BLD-RTO: 0 PER 100 WBC
PLATELET # BLD AUTO: 381 K/UL (ref 138–453)
PMV BLD AUTO: 10.3 FL (ref 8.1–13.5)
POTASSIUM SERPL-SCNC: 3.7 MMOL/L (ref 3.7–5.3)
RBC # BLD AUTO: 3.36 M/UL (ref 4.21–5.77)
SODIUM SERPL-SCNC: 134 MMOL/L (ref 135–144)
WBC OTHER # BLD: 7.6 K/UL (ref 3.5–11.3)

## 2023-08-20 PROCEDURE — 2060000000 HC ICU INTERMEDIATE R&B

## 2023-08-20 PROCEDURE — 80048 BASIC METABOLIC PNL TOTAL CA: CPT

## 2023-08-20 PROCEDURE — 6360000002 HC RX W HCPCS: Performed by: NURSE PRACTITIONER

## 2023-08-20 PROCEDURE — 6370000000 HC RX 637 (ALT 250 FOR IP): Performed by: NURSE PRACTITIONER

## 2023-08-20 PROCEDURE — 83735 ASSAY OF MAGNESIUM: CPT

## 2023-08-20 PROCEDURE — 82947 ASSAY GLUCOSE BLOOD QUANT: CPT

## 2023-08-20 PROCEDURE — 6370000000 HC RX 637 (ALT 250 FOR IP)

## 2023-08-20 PROCEDURE — 36415 COLL VENOUS BLD VENIPUNCTURE: CPT

## 2023-08-20 PROCEDURE — 85025 COMPLETE CBC W/AUTO DIFF WBC: CPT

## 2023-08-20 RX ADMIN — FUROSEMIDE 40 MG: 40 TABLET ORAL at 08:58

## 2023-08-20 RX ADMIN — GABAPENTIN 300 MG: 300 CAPSULE ORAL at 05:35

## 2023-08-20 RX ADMIN — ACETAMINOPHEN 1000 MG: 500 TABLET ORAL at 20:51

## 2023-08-20 RX ADMIN — AMLODIPINE BESYLATE 5 MG: 5 TABLET ORAL at 08:58

## 2023-08-20 RX ADMIN — ENOXAPARIN SODIUM 30 MG: 100 INJECTION SUBCUTANEOUS at 20:51

## 2023-08-20 RX ADMIN — METHOCARBAMOL TABLETS 750 MG: 750 TABLET, COATED ORAL at 12:12

## 2023-08-20 RX ADMIN — GABAPENTIN 300 MG: 300 CAPSULE ORAL at 20:51

## 2023-08-20 RX ADMIN — GABAPENTIN 300 MG: 300 CAPSULE ORAL at 12:12

## 2023-08-20 RX ADMIN — ACETAMINOPHEN 1000 MG: 500 TABLET ORAL at 05:36

## 2023-08-20 RX ADMIN — DULOXETINE HYDROCHLORIDE 60 MG: 30 CAPSULE, DELAYED RELEASE ORAL at 08:58

## 2023-08-20 RX ADMIN — ENOXAPARIN SODIUM 30 MG: 100 INJECTION SUBCUTANEOUS at 08:58

## 2023-08-20 RX ADMIN — CHLORTHALIDONE 50 MG: 25 TABLET ORAL at 08:58

## 2023-08-20 RX ADMIN — METHOCARBAMOL TABLETS 750 MG: 750 TABLET, COATED ORAL at 05:35

## 2023-08-20 ASSESSMENT — PAIN DESCRIPTION - ORIENTATION
ORIENTATION: MID

## 2023-08-20 ASSESSMENT — PAIN SCALES - GENERAL
PAINLEVEL_OUTOF10: 2
PAINLEVEL_OUTOF10: 0

## 2023-08-20 ASSESSMENT — PAIN DESCRIPTION - LOCATION
LOCATION: ABDOMEN

## 2023-08-20 ASSESSMENT — PAIN DESCRIPTION - DESCRIPTORS
DESCRIPTORS: ACHING

## 2023-08-20 NOTE — CARE COORDINATION
Transitional planning: Patient would like referral to [facility] for SNF if P2P is denied. [address] with Dr. Jose Raul Barry with trauma regarding if P2P has been done. It has not. She states that they have asked for PT to work with patient as the patient has been here long enough that patient will probably not qualify for even SNF at this point in time. [phone] to patient and RN at bedside to notify of the above. Referral sent to [facility]. [phone] Phone call from [place] with [facility]. She will be looking at their bed situation but she thinks patient looks good for acceptance if they have a bed available. She will watch for PT notes and check back with CM tomorrow.

## 2023-08-21 LAB
BILIRUB UR QL STRIP: NEGATIVE
CLARITY UR: CLEAR
COLOR UR: YELLOW
COMMENT: NORMAL
GLUCOSE UR STRIP-MCNC: NEGATIVE MG/DL
HGB UR QL STRIP.AUTO: NEGATIVE
KETONES UR STRIP-MCNC: NEGATIVE MG/DL
LEUKOCYTE ESTERASE UR QL STRIP: NEGATIVE
NITRITE UR QL STRIP: NEGATIVE
PH UR STRIP: 7 [PH] (ref 5–8)
PROT UR STRIP-MCNC: NEGATIVE MG/DL
SP GR UR STRIP: 1.01 (ref 1–1.03)
UROBILINOGEN UR STRIP-ACNC: NORMAL EU/DL (ref 0–1)

## 2023-08-21 PROCEDURE — 2580000003 HC RX 258: Performed by: STUDENT IN AN ORGANIZED HEALTH CARE EDUCATION/TRAINING PROGRAM

## 2023-08-21 PROCEDURE — 6370000000 HC RX 637 (ALT 250 FOR IP): Performed by: STUDENT IN AN ORGANIZED HEALTH CARE EDUCATION/TRAINING PROGRAM

## 2023-08-21 PROCEDURE — 99024 POSTOP FOLLOW-UP VISIT: CPT | Performed by: SURGERY

## 2023-08-21 PROCEDURE — 81003 URINALYSIS AUTO W/O SCOPE: CPT

## 2023-08-21 PROCEDURE — 6360000002 HC RX W HCPCS: Performed by: NURSE PRACTITIONER

## 2023-08-21 PROCEDURE — 6370000000 HC RX 637 (ALT 250 FOR IP)

## 2023-08-21 PROCEDURE — 2060000000 HC ICU INTERMEDIATE R&B

## 2023-08-21 PROCEDURE — 97110 THERAPEUTIC EXERCISES: CPT

## 2023-08-21 PROCEDURE — 97530 THERAPEUTIC ACTIVITIES: CPT

## 2023-08-21 PROCEDURE — 97535 SELF CARE MNGMENT TRAINING: CPT

## 2023-08-21 PROCEDURE — 6370000000 HC RX 637 (ALT 250 FOR IP): Performed by: NURSE PRACTITIONER

## 2023-08-21 RX ORDER — SENNA AND DOCUSATE SODIUM 50; 8.6 MG/1; MG/1
2 TABLET, FILM COATED ORAL DAILY
Status: DISCONTINUED | OUTPATIENT
Start: 2023-08-21 | End: 2023-08-22 | Stop reason: HOSPADM

## 2023-08-21 RX ORDER — OXYCODONE HYDROCHLORIDE 5 MG/1
5 TABLET ORAL EVERY 6 HOURS PRN
Qty: 12 TABLET | Refills: 0 | Status: SHIPPED | OUTPATIENT
Start: 2023-08-21 | End: 2023-08-24

## 2023-08-21 RX ORDER — POLYETHYLENE GLYCOL 3350 17 G/17G
17 POWDER, FOR SOLUTION ORAL DAILY
Status: DISCONTINUED | OUTPATIENT
Start: 2023-08-21 | End: 2023-08-22 | Stop reason: HOSPADM

## 2023-08-21 RX ADMIN — DULOXETINE HYDROCHLORIDE 60 MG: 30 CAPSULE, DELAYED RELEASE ORAL at 08:14

## 2023-08-21 RX ADMIN — ACETAMINOPHEN 1000 MG: 500 TABLET ORAL at 05:27

## 2023-08-21 RX ADMIN — METHOCARBAMOL TABLETS 750 MG: 750 TABLET, COATED ORAL at 12:02

## 2023-08-21 RX ADMIN — ENOXAPARIN SODIUM 30 MG: 100 INJECTION SUBCUTANEOUS at 08:14

## 2023-08-21 RX ADMIN — METHOCARBAMOL TABLETS 750 MG: 750 TABLET, COATED ORAL at 05:27

## 2023-08-21 RX ADMIN — SODIUM CHLORIDE, PRESERVATIVE FREE 10 ML: 5 INJECTION INTRAVENOUS at 21:35

## 2023-08-21 RX ADMIN — DOCUSATE SODIUM 50 MG AND SENNOSIDES 8.6 MG 2 TABLET: 8.6; 5 TABLET, FILM COATED ORAL at 08:14

## 2023-08-21 RX ADMIN — ACETAMINOPHEN 1000 MG: 500 TABLET ORAL at 21:44

## 2023-08-21 RX ADMIN — FUROSEMIDE 40 MG: 40 TABLET ORAL at 09:25

## 2023-08-21 RX ADMIN — ANTACID TABLETS 500 MG: 500 TABLET, CHEWABLE ORAL at 21:35

## 2023-08-21 RX ADMIN — GABAPENTIN 300 MG: 300 CAPSULE ORAL at 12:02

## 2023-08-21 RX ADMIN — AMLODIPINE BESYLATE 5 MG: 5 TABLET ORAL at 08:14

## 2023-08-21 RX ADMIN — METHOCARBAMOL TABLETS 750 MG: 750 TABLET, COATED ORAL at 00:49

## 2023-08-21 RX ADMIN — ACETAMINOPHEN 1000 MG: 500 TABLET ORAL at 13:50

## 2023-08-21 RX ADMIN — GABAPENTIN 300 MG: 300 CAPSULE ORAL at 21:34

## 2023-08-21 RX ADMIN — ENOXAPARIN SODIUM 30 MG: 100 INJECTION SUBCUTANEOUS at 21:34

## 2023-08-21 RX ADMIN — CHLORTHALIDONE 50 MG: 25 TABLET ORAL at 08:14

## 2023-08-21 RX ADMIN — METHOCARBAMOL TABLETS 750 MG: 750 TABLET, COATED ORAL at 18:22

## 2023-08-21 RX ADMIN — GABAPENTIN 300 MG: 300 CAPSULE ORAL at 05:27

## 2023-08-21 ASSESSMENT — PAIN DESCRIPTION - ORIENTATION
ORIENTATION: MID

## 2023-08-21 ASSESSMENT — PAIN SCALES - GENERAL
PAINLEVEL_OUTOF10: 3
PAINLEVEL_OUTOF10: 2
PAINLEVEL_OUTOF10: 2
PAINLEVEL_OUTOF10: 1
PAINLEVEL_OUTOF10: 4
PAINLEVEL_OUTOF10: 4
PAINLEVEL_OUTOF10: 2

## 2023-08-21 ASSESSMENT — PAIN - FUNCTIONAL ASSESSMENT: PAIN_FUNCTIONAL_ASSESSMENT: ACTIVITIES ARE NOT PREVENTED

## 2023-08-21 ASSESSMENT — PAIN DESCRIPTION - LOCATION
LOCATION: ABDOMEN
LOCATION: ABDOMEN
LOCATION: INCISION
LOCATION: INCISION
LOCATION: ABDOMEN
LOCATION: ABDOMEN

## 2023-08-21 ASSESSMENT — PAIN DESCRIPTION - DESCRIPTORS
DESCRIPTORS: ACHING
DESCRIPTORS: ACHING

## 2023-08-21 NOTE — CARE COORDINATION
Transitional planning-[facility] still denied after Peer to Peer. Informed patient of the denial. He is still interested in [address]. 1430 Approved thru [facility] [place] for placement at [address]. 1440 notified [place] at [address]. The approval starts 8--8-.    1515 HENS Complete, [facility] at 12PM. Need MICHAEL completed. [facility] called [place] with Transport time of 1200PM on 8-.    1640 PS  for script for oxycodone and for attending to resign MICHAEL.

## 2023-08-21 NOTE — CARE COORDINATION
Transitional planning-called Kay at [facility] to her we were waiting on Peer to Peer for [address] by Gen Surg. Explained to her we do precert if he is going to a SNF.    Colon Devoid PS Dr. Sukhdeep Donaldson regarding the peer to peer for [address]. Called PT/OT to see patient ASAP.    1665 Dr. Bayron Matson team will do it as soon as able today when not doing patient care.

## 2023-08-22 VITALS
HEIGHT: 67 IN | WEIGHT: 264.33 LBS | HEART RATE: 100 BPM | SYSTOLIC BLOOD PRESSURE: 107 MMHG | TEMPERATURE: 98.6 F | DIASTOLIC BLOOD PRESSURE: 53 MMHG | OXYGEN SATURATION: 98 % | BODY MASS INDEX: 41.49 KG/M2 | RESPIRATION RATE: 18 BRPM

## 2023-08-22 PROCEDURE — 6370000000 HC RX 637 (ALT 250 FOR IP): Performed by: PHYSICIAN ASSISTANT

## 2023-08-22 PROCEDURE — 6370000000 HC RX 637 (ALT 250 FOR IP)

## 2023-08-22 PROCEDURE — 6370000000 HC RX 637 (ALT 250 FOR IP): Performed by: NURSE PRACTITIONER

## 2023-08-22 PROCEDURE — 99024 POSTOP FOLLOW-UP VISIT: CPT | Performed by: SURGERY

## 2023-08-22 PROCEDURE — 6370000000 HC RX 637 (ALT 250 FOR IP): Performed by: STUDENT IN AN ORGANIZED HEALTH CARE EDUCATION/TRAINING PROGRAM

## 2023-08-22 PROCEDURE — 6360000002 HC RX W HCPCS: Performed by: NURSE PRACTITIONER

## 2023-08-22 RX ORDER — PHENAZOPYRIDINE HYDROCHLORIDE 100 MG/1
100 TABLET, FILM COATED ORAL 3 TIMES DAILY PRN
Qty: 9 TABLET | Refills: 0 | DISCHARGE
Start: 2023-08-22 | End: 2023-08-25

## 2023-08-22 RX ORDER — POLYETHYLENE GLYCOL 3350 17 G/17G
17 POWDER, FOR SOLUTION ORAL DAILY
Qty: 510 G | Refills: 0 | DISCHARGE
Start: 2023-08-22 | End: 2023-09-21

## 2023-08-22 RX ORDER — LIDOCAINE HYDROCHLORIDE 20 MG/ML
JELLY TOPICAL PRN
Status: DISCONTINUED | OUTPATIENT
Start: 2023-08-22 | End: 2023-08-22 | Stop reason: HOSPADM

## 2023-08-22 RX ORDER — PHENAZOPYRIDINE HYDROCHLORIDE 100 MG/1
200 TABLET, FILM COATED ORAL ONCE
Status: COMPLETED | OUTPATIENT
Start: 2023-08-22 | End: 2023-08-22

## 2023-08-22 RX ADMIN — METHOCARBAMOL TABLETS 750 MG: 750 TABLET, COATED ORAL at 03:07

## 2023-08-22 RX ADMIN — ENOXAPARIN SODIUM 30 MG: 100 INJECTION SUBCUTANEOUS at 08:38

## 2023-08-22 RX ADMIN — PHENAZOPYRIDINE HYDROCHLORIDE 200 MG: 100 TABLET ORAL at 11:52

## 2023-08-22 RX ADMIN — GABAPENTIN 300 MG: 300 CAPSULE ORAL at 11:53

## 2023-08-22 RX ADMIN — METHOCARBAMOL TABLETS 750 MG: 750 TABLET, COATED ORAL at 11:54

## 2023-08-22 RX ADMIN — FUROSEMIDE 40 MG: 40 TABLET ORAL at 08:38

## 2023-08-22 RX ADMIN — ANTACID TABLETS 500 MG: 500 TABLET, CHEWABLE ORAL at 05:41

## 2023-08-22 RX ADMIN — AMLODIPINE BESYLATE 5 MG: 5 TABLET ORAL at 08:38

## 2023-08-22 RX ADMIN — DULOXETINE HYDROCHLORIDE 60 MG: 30 CAPSULE, DELAYED RELEASE ORAL at 08:38

## 2023-08-22 RX ADMIN — CHLORTHALIDONE 50 MG: 25 TABLET ORAL at 08:38

## 2023-08-22 RX ADMIN — GABAPENTIN 300 MG: 300 CAPSULE ORAL at 05:38

## 2023-08-22 RX ADMIN — METHOCARBAMOL TABLETS 750 MG: 750 TABLET, COATED ORAL at 08:38

## 2023-08-22 RX ADMIN — ACETAMINOPHEN 1000 MG: 500 TABLET ORAL at 05:38

## 2023-08-22 RX ADMIN — DOCUSATE SODIUM 50 MG AND SENNOSIDES 8.6 MG 2 TABLET: 8.6; 5 TABLET, FILM COATED ORAL at 08:38

## 2023-08-22 ASSESSMENT — PAIN DESCRIPTION - LOCATION
LOCATION: ABDOMEN
LOCATION: PENIS
LOCATION: ABDOMEN

## 2023-08-22 ASSESSMENT — PAIN - FUNCTIONAL ASSESSMENT
PAIN_FUNCTIONAL_ASSESSMENT: ACTIVITIES ARE NOT PREVENTED
PAIN_FUNCTIONAL_ASSESSMENT: PREVENTS OR INTERFERES SOME ACTIVE ACTIVITIES AND ADLS

## 2023-08-22 ASSESSMENT — PAIN SCALES - GENERAL
PAINLEVEL_OUTOF10: 1
PAINLEVEL_OUTOF10: 4
PAINLEVEL_OUTOF10: 4
PAINLEVEL_OUTOF10: 1

## 2023-08-22 ASSESSMENT — PAIN DESCRIPTION - DESCRIPTORS
DESCRIPTORS: PRESSURE
DESCRIPTORS: PRESSURE

## 2023-08-22 ASSESSMENT — PAIN DESCRIPTION - ORIENTATION
ORIENTATION: MID
ORIENTATION: MID

## 2023-08-22 NOTE — PLAN OF CARE
Problem: Discharge Planning  Goal: Discharge to home or other facility with appropriate resources  8/11/2023 2218 by Artur Allen RN  Outcome: Progressing  8/11/2023 1316 by Anita Colón RN  Outcome: Progressing     Problem: Pain  Goal: Verbalizes/displays adequate comfort level or baseline comfort level  8/11/2023 2218 by Artur Allen RN  Outcome: Progressing  8/11/2023 1316 by Anita Colón RN  Outcome: Progressing     Problem: Safety - Adult  Goal: Free from fall injury  8/11/2023 2218 by Artur Allen RN  Outcome: Progressing  8/11/2023 1316 by Anita Colón RN  Outcome: Progressing     Problem: Safety - Medical Restraint  Goal: Remains free of injury from restraints (Restraint for Interference with Medical Device)  Description: INTERVENTIONS:  1. Determine that other, less restrictive measures have been tried or would not be effective before applying the restraint  2. Evaluate the patient's condition at the time of restraint application  3. Inform patient/family regarding the reason for restraint  4. Q2H: Monitor safety, psychosocial status, comfort, nutrition and hydration  Outcome: Progressing     Problem: Skin/Tissue Integrity  Goal: Absence of new skin breakdown  Description: 1. Monitor for areas of redness and/or skin breakdown  2. Assess vascular access sites hourly  3. Every 4-6 hours minimum:  Change oxygen saturation probe site  4. Every 4-6 hours:  If on nasal continuous positive airway pressure, respiratory therapy assess nares and determine need for appliance change or resting period.   Outcome: Progressing     Problem: ABCDS Injury Assessment  Goal: Absence of physical injury  Outcome: Progressing     Problem: Nutrition Deficit:  Goal: Optimize nutritional status  8/11/2023 2218 by Artur lAlen RN  Outcome: Progressing  8/11/2023 1216 by López Rhoades MS, RD, LD  Outcome: Not Progressing  Flowsheets (Taken 8/11/2023 1213)  Nutrient intake appropriate
Problem: Discharge Planning  Goal: Discharge to home or other facility with appropriate resources  8/15/2023 0900 by Clarence Alcala RN  Outcome: Progressing  8/14/2023 2013 by Terrance Pascual RN  Outcome: Progressing     Problem: Pain  Goal: Verbalizes/displays adequate comfort level or baseline comfort level  8/15/2023 0900 by Clarence Alcala RN  Outcome: Progressing  8/14/2023 2013 by Terrance Pascual RN  Outcome: Progressing     Problem: Safety - Adult  Goal: Free from fall injury  8/15/2023 0900 by Clarence Alcala RN  Outcome: Progressing  8/14/2023 2013 by Terrance Pascual RN  Outcome: Progressing     Problem: Safety - Medical Restraint  Goal: Remains free of injury from restraints (Restraint for Interference with Medical Device)  Description: INTERVENTIONS:  1. Determine that other, less restrictive measures have been tried or would not be effective before applying the restraint  2. Evaluate the patient's condition at the time of restraint application  3. Inform patient/family regarding the reason for restraint  4. Q2H: Monitor safety, psychosocial status, comfort, nutrition and hydration  8/15/2023 0900 by Clarence Alcala RN  Outcome: Progressing  8/14/2023 2013 by Terrance Pascual RN  Outcome: Progressing     Problem: Skin/Tissue Integrity  Goal: Absence of new skin breakdown  Description: 1. Monitor for areas of redness and/or skin breakdown  2. Assess vascular access sites hourly  3. Every 4-6 hours minimum:  Change oxygen saturation probe site  4. Every 4-6 hours:  If on nasal continuous positive airway pressure, respiratory therapy assess nares and determine need for appliance change or resting period.   8/15/2023 0900 by Clarence Alcala RN  Outcome: Progressing  8/14/2023 2013 by Terrance Pascual RN  Outcome: Progressing     Problem: ABCDS Injury Assessment  Goal: Absence of physical injury  8/15/2023 0900 by Clarence Alcala RN  Outcome: Progressing  8/14/2023 2013 by Terrance Pascual
Problem: Discharge Planning  Goal: Discharge to home or other facility with appropriate resources  8/18/2023 2239 by Joslyn Rosen RN  Outcome: Progressing  8/18/2023 1810 by Kasie Foley RN  Outcome: Progressing     Problem: Pain  Goal: Verbalizes/displays adequate comfort level or baseline comfort level  8/18/2023 2239 by Joslyn Rosen RN  Outcome: Progressing  8/18/2023 1810 by Kasie Foley RN  Outcome: Progressing     Problem: Safety - Adult  Goal: Free from fall injury  8/18/2023 2239 by Joslyn Rosen RN  Outcome: Progressing  8/18/2023 1810 by Kasie Foley RN  Outcome: Progressing     Problem: Safety - Medical Restraint  Goal: Remains free of injury from restraints (Restraint for Interference with Medical Device)  Description: INTERVENTIONS:  1. Determine that other, less restrictive measures have been tried or would not be effective before applying the restraint  2. Evaluate the patient's condition at the time of restraint application  3. Inform patient/family regarding the reason for restraint  4. Q2H: Monitor safety, psychosocial status, comfort, nutrition and hydration  8/18/2023 2239 by Joslyn Rosen RN  Outcome: Progressing  8/18/2023 1810 by Kasie Foley RN  Outcome: Progressing     Problem: Skin/Tissue Integrity  Goal: Absence of new skin breakdown  Description: 1. Monitor for areas of redness and/or skin breakdown  2. Assess vascular access sites hourly  3. Every 4-6 hours minimum:  Change oxygen saturation probe site  4. Every 4-6 hours:  If on nasal continuous positive airway pressure, respiratory therapy assess nares and determine need for appliance change or resting period.   8/18/2023 2239 by Joslyn Rosen RN  Outcome: Progressing  8/18/2023 1810 by Kasie Foley RN  Outcome: Progressing     Problem: ABCDS Injury Assessment  Goal: Absence of physical injury  8/18/2023 2239 by Joslyn Rosen RN  Outcome: Progressing  8/18/2023 1810 by Kasie Foley
Problem: Discharge Planning  Goal: Discharge to home or other facility with appropriate resources  8/19/2023 2211 by Maria Teresa Willis RN  Outcome: Progressing  8/19/2023 0850 by Jasmin Lopez RN  Outcome: Progressing     Problem: Pain  Goal: Verbalizes/displays adequate comfort level or baseline comfort level  8/19/2023 2211 by Maria Teresa Willis RN  Outcome: Progressing  8/19/2023 0850 by Jasmin Lopez RN  Outcome: Progressing     Problem: Safety - Adult  Goal: Free from fall injury  8/19/2023 2211 by Maria Teresa Willis RN  Outcome: Progressing  8/19/2023 0850 by Jasmin Lopez RN  Outcome: Progressing     Problem: Safety - Medical Restraint  Goal: Remains free of injury from restraints (Restraint for Interference with Medical Device)  Description: INTERVENTIONS:  1. Determine that other, less restrictive measures have been tried or would not be effective before applying the restraint  2. Evaluate the patient's condition at the time of restraint application  3. Inform patient/family regarding the reason for restraint  4. Q2H: Monitor safety, psychosocial status, comfort, nutrition and hydration  8/19/2023 2211 by Maria Teresa Willis RN  Outcome: Progressing  8/19/2023 0850 by Jasmin Lopez RN  Outcome: Progressing     Problem: Skin/Tissue Integrity  Goal: Absence of new skin breakdown  Description: 1. Monitor for areas of redness and/or skin breakdown  2. Assess vascular access sites hourly  3. Every 4-6 hours minimum:  Change oxygen saturation probe site  4. Every 4-6 hours:  If on nasal continuous positive airway pressure, respiratory therapy assess nares and determine need for appliance change or resting period.   8/19/2023 2211 by Maria Teresa Willis RN  Outcome: Progressing  8/19/2023 0850 by Jasmin Lopez RN  Outcome: Progressing     Problem: ABCDS Injury Assessment  Goal: Absence of physical injury  8/19/2023 2211 by Maria Teresa Willis RN  Outcome: Progressing  8/19/2023 0850 by Jasmin Lopez RN  Outcome:
Problem: Discharge Planning  Goal: Discharge to home or other facility with appropriate resources  8/21/2023 0856 by Filemon Spangler RN  Outcome: Progressing  8/20/2023 2116 by Shazia Dominguez RN  Outcome: Progressing     Problem: Pain  Goal: Verbalizes/displays adequate comfort level or baseline comfort level  8/21/2023 0856 by Filemon Spangler RN  Outcome: Progressing  8/20/2023 2116 by Shazia Dominguez RN  Outcome: Progressing     Problem: Safety - Adult  Goal: Free from fall injury  8/21/2023 0856 by Filemon Spangler RN  Outcome: Progressing  8/20/2023 2116 by Shazia Dominguez RN  Outcome: Progressing     Problem: Skin/Tissue Integrity  Goal: Absence of new skin breakdown  Description: 1. Monitor for areas of redness and/or skin breakdown  2. Assess vascular access sites hourly  3. Every 4-6 hours minimum:  Change oxygen saturation probe site  4. Every 4-6 hours:  If on nasal continuous positive airway pressure, respiratory therapy assess nares and determine need for appliance change or resting period.   8/21/2023 0856 by Filemon Spangler RN  Outcome: Progressing  8/20/2023 2116 by Shazia Dominguez RN  Outcome: Progressing     Problem: ABCDS Injury Assessment  Goal: Absence of physical injury  8/21/2023 0856 by Filemon Spangler RN  Outcome: Progressing  8/20/2023 2116 by Shazia Dominguez RN  Outcome: Progressing     Problem: Nutrition Deficit:  Goal: Optimize nutritional status  8/21/2023 0856 by Filemon Spangler RN  Outcome: Progressing  8/20/2023 2116 by Shazia Dominguez RN  Outcome: Progressing     Problem: Respiratory - Adult  Goal: Achieves optimal ventilation and oxygenation  8/21/2023 0856 by Filemon Spangler RN  Outcome: Progressing  8/20/2023 2116 by Shazia Dominguez RN  Outcome: Progressing     Problem: Musculoskeletal - Adult  Goal: Return mobility to safest level of function  8/21/2023 0856 by Filemon Spangler RN  Outcome: Progressing  8/20/2023 2116 by Shazia Dominguez RN  Outcome: Progressing     Problem:
Problem: Discharge Planning  Goal: Discharge to home or other facility with appropriate resources  8/9/2023 0814 by Carlos Morrison RN  Outcome: Progressing  8/9/2023 0159 by Ezequiel Thorpe RN  Outcome: Progressing  Flowsheets (Taken 8/8/2023 2300)  Discharge to home or other facility with appropriate resources: Identify barriers to discharge with patient and caregiver     Problem: Safety - Adult  Goal: Free from fall injury  8/9/2023 0814 by Carlos Morrison RN  Outcome: Progressing  Flowsheets (Taken 8/9/2023 0203 by Ezequiel Thorpe RN)  Free From Fall Injury: Instruct family/caregiver on patient safety  8/9/2023 0159 by Ezequiel Thorpe RN  Outcome: Progressing     Problem: Safety - Medical Restraint  Goal: Remains free of injury from restraints (Restraint for Interference with Medical Device)  Description: INTERVENTIONS:  1. Determine that other, less restrictive measures have been tried or would not be effective before applying the restraint  2. Evaluate the patient's condition at the time of restraint application  3. Inform patient/family regarding the reason for restraint  4.  Q2H: Monitor safety, psychosocial status, comfort, nutrition and hydration  8/9/2023 0814 by Carlos Morrison RN  Outcome: Progressing  Flowsheets  Taken 8/9/2023 0600 by Ezequiel Thorpe RN  Remains free of injury from restraints (restraint for interference with medical device): Every 2 hours: Monitor safety, psychosocial status, comfort, nutrition and hydration  Taken 8/9/2023 0400 by Ezequiel Thorpe RN  Remains free of injury from restraints (restraint for interference with medical device):   Determine that other, less restrictive measures have been tried or would not be effective before applying the restraint   Evaluate the patient's condition at the time of restraint application   Inform patient/family regarding the reason for restraint   Every 2 hours: Monitor safety, psychosocial status, comfort, nutrition and hydration  Taken
Problem: Discharge Planning  Goal: Discharge to home or other facility with appropriate resources  Outcome: Progressing     Problem: Pain  Goal: Verbalizes/displays adequate comfort level or baseline comfort level  8/22/2023 1027 by Fifi Reich RN  Outcome: Progressing  8/22/2023 0530 by Bernardo Hart RN  Outcome: Progressing     Problem: Safety - Adult  Goal: Free from fall injury  Outcome: Progressing     Problem: Skin/Tissue Integrity  Goal: Absence of new skin breakdown  Description: 1. Monitor for areas of redness and/or skin breakdown  2. Assess vascular access sites hourly  3. Every 4-6 hours minimum:  Change oxygen saturation probe site  4. Every 4-6 hours:  If on nasal continuous positive airway pressure, respiratory therapy assess nares and determine need for appliance change or resting period.   Outcome: Progressing     Problem: ABCDS Injury Assessment  Goal: Absence of physical injury  Outcome: Progressing     Problem: Nutrition Deficit:  Goal: Optimize nutritional status  Outcome: Progressing     Problem: Respiratory - Adult  Goal: Achieves optimal ventilation and oxygenation  Outcome: Progressing     Problem: Musculoskeletal - Adult  Goal: Return mobility to safest level of function  Outcome: Progressing     Problem: Gastrointestinal - Adult  Goal: Minimal or absence of nausea and vomiting  Outcome: Progressing  Goal: Maintains or returns to baseline bowel function  Outcome: Progressing  Goal: Maintains adequate nutritional intake  Outcome: Progressing     Problem: Genitourinary - Adult  Goal: Absence of urinary retention  Outcome: Progressing     Problem: Skin/Tissue Integrity - Adult  Goal: Skin integrity remains intact  Outcome: Progressing  Goal: Incisions, wounds, or drain sites healing without S/S of infection  Outcome: Progressing
Problem: Discharge Planning  Goal: Discharge to home or other facility with appropriate resources  Outcome: Progressing     Problem: Pain  Goal: Verbalizes/displays adequate comfort level or baseline comfort level  Outcome: Progressing     Problem: Safety - Adult  Goal: Free from fall injury  Outcome: Progressing     Problem: Nutrition Deficit:  Goal: Optimize nutritional status  8/11/2023 1216 by Adama Zarater, MS, RD, LD  Outcome: Not Progressing  Flowsheets (Taken 8/11/2023 1213)  Nutrient intake appropriate for improving, restoring, or maintaining nutritional needs:   Assess nutritional status and recommend course of action   Monitor oral intake, labs, and treatment plans   Recommend appropriate diets, oral nutritional supplements, and vitamin/mineral supplements
Problem: Discharge Planning  Goal: Discharge to home or other facility with appropriate resources  Outcome: Progressing     Problem: Pain  Goal: Verbalizes/displays adequate comfort level or baseline comfort level  Outcome: Progressing     Problem: Safety - Adult  Goal: Free from fall injury  Outcome: Progressing     Problem: Safety - Medical Restraint  Goal: Remains free of injury from restraints (Restraint for Interference with Medical Device)  Description: INTERVENTIONS:  1. Determine that other, less restrictive measures have been tried or would not be effective before applying the restraint  2. Evaluate the patient's condition at the time of restraint application  3. Inform patient/family regarding the reason for restraint  4. Q2H: Monitor safety, psychosocial status, comfort, nutrition and hydration  Outcome: Completed     Problem: Skin/Tissue Integrity  Goal: Absence of new skin breakdown  Description: 1. Monitor for areas of redness and/or skin breakdown  2. Assess vascular access sites hourly  3. Every 4-6 hours minimum:  Change oxygen saturation probe site  4. Every 4-6 hours:  If on nasal continuous positive airway pressure, respiratory therapy assess nares and determine need for appliance change or resting period.   Outcome: Progressing     Problem: ABCDS Injury Assessment  Goal: Absence of physical injury  Outcome: Progressing     Problem: Nutrition Deficit:  Goal: Optimize nutritional status  Outcome: Progressing     Problem: Respiratory - Adult  Goal: Achieves optimal ventilation and oxygenation  Outcome: Progressing     Problem: Musculoskeletal - Adult  Goal: Return mobility to safest level of function  Outcome: Progressing     Problem: Gastrointestinal - Adult  Goal: Minimal or absence of nausea and vomiting  Outcome: Progressing  Goal: Maintains or returns to baseline bowel function  Outcome: Progressing  Goal: Maintains adequate nutritional intake  Outcome: Progressing     Problem:
Problem: Discharge Planning  Goal: Discharge to home or other facility with appropriate resources  Outcome: Progressing     Problem: Pain  Goal: Verbalizes/displays adequate comfort level or baseline comfort level  Outcome: Progressing     Problem: Safety - Adult  Goal: Free from fall injury  Outcome: Progressing     Problem: Safety - Medical Restraint  Goal: Remains free of injury from restraints (Restraint for Interference with Medical Device)  Description: INTERVENTIONS:  1. Determine that other, less restrictive measures have been tried or would not be effective before applying the restraint  2. Evaluate the patient's condition at the time of restraint application  3. Inform patient/family regarding the reason for restraint  4. Q2H: Monitor safety, psychosocial status, comfort, nutrition and hydration  Outcome: Progressing     Problem: Skin/Tissue Integrity  Goal: Absence of new skin breakdown  Description: 1. Monitor for areas of redness and/or skin breakdown  2. Assess vascular access sites hourly  3. Every 4-6 hours minimum:  Change oxygen saturation probe site  4. Every 4-6 hours:  If on nasal continuous positive airway pressure, respiratory therapy assess nares and determine need for appliance change or resting period.   Outcome: Progressing     Problem: ABCDS Injury Assessment  Goal: Absence of physical injury  Outcome: Progressing
Problem: Discharge Planning  Goal: Discharge to home or other facility with appropriate resources  Outcome: Progressing     Problem: Pain  Goal: Verbalizes/displays adequate comfort level or baseline comfort level  Outcome: Progressing     Problem: Safety - Adult  Goal: Free from fall injury  Outcome: Progressing     Problem: Safety - Medical Restraint  Goal: Remains free of injury from restraints (Restraint for Interference with Medical Device)  Description: INTERVENTIONS:  1. Determine that other, less restrictive measures have been tried or would not be effective before applying the restraint  2. Evaluate the patient's condition at the time of restraint application  3. Inform patient/family regarding the reason for restraint  4. Q2H: Monitor safety, psychosocial status, comfort, nutrition and hydration  Outcome: Progressing     Problem: Skin/Tissue Integrity  Goal: Absence of new skin breakdown  Description: 1. Monitor for areas of redness and/or skin breakdown  2. Assess vascular access sites hourly  3. Every 4-6 hours minimum:  Change oxygen saturation probe site  4. Every 4-6 hours:  If on nasal continuous positive airway pressure, respiratory therapy assess nares and determine need for appliance change or resting period.   Outcome: Progressing     Problem: ABCDS Injury Assessment  Goal: Absence of physical injury  Outcome: Progressing     Problem: Nutrition Deficit:  Goal: Optimize nutritional status  Outcome: Progressing     Problem: Respiratory - Adult  Goal: Achieves optimal ventilation and oxygenation  Outcome: Progressing     Problem: Musculoskeletal - Adult  Goal: Return mobility to safest level of function  Outcome: Progressing     Problem: Gastrointestinal - Adult  Goal: Minimal or absence of nausea and vomiting  Outcome: Progressing  Goal: Maintains or returns to baseline bowel function  Outcome: Progressing  Goal: Maintains adequate nutritional intake  Outcome: Progressing     Problem:
Problem: Discharge Planning  Goal: Discharge to home or other facility with appropriate resources  Outcome: Progressing     Problem: Pain  Goal: Verbalizes/displays adequate comfort level or baseline comfort level  Outcome: Progressing     Problem: Safety - Adult  Goal: Free from fall injury  Outcome: Progressing     Problem: Safety - Medical Restraint  Goal: Remains free of injury from restraints (Restraint for Interference with Medical Device)  Description: INTERVENTIONS:  1. Determine that other, less restrictive measures have been tried or would not be effective before applying the restraint  2. Evaluate the patient's condition at the time of restraint application  3. Inform patient/family regarding the reason for restraint  4. Q2H: Monitor safety, psychosocial status, comfort, nutrition and hydration  Outcome: Progressing     Problem: Skin/Tissue Integrity  Goal: Absence of new skin breakdown  Description: 1. Monitor for areas of redness and/or skin breakdown  2. Assess vascular access sites hourly  3. Every 4-6 hours minimum:  Change oxygen saturation probe site  4. Every 4-6 hours:  If on nasal continuous positive airway pressure, respiratory therapy assess nares and determine need for appliance change or resting period.   Outcome: Progressing     Problem: ABCDS Injury Assessment  Goal: Absence of physical injury  Outcome: Progressing     Problem: Nutrition Deficit:  Goal: Optimize nutritional status  Outcome: Progressing     Problem: Respiratory - Adult  Goal: Achieves optimal ventilation and oxygenation  Outcome: Progressing     Problem: Musculoskeletal - Adult  Goal: Return mobility to safest level of function  Outcome: Progressing     Problem: Gastrointestinal - Adult  Goal: Minimal or absence of nausea and vomiting  Outcome: Progressing  Goal: Maintains or returns to baseline bowel function  Outcome: Progressing  Goal: Maintains adequate nutritional intake  Outcome: Progressing     Problem:
Problem: Discharge Planning  Goal: Discharge to home or other facility with appropriate resources  Outcome: Progressing     Problem: Pain  Goal: Verbalizes/displays adequate comfort level or baseline comfort level  Outcome: Progressing     Problem: Safety - Adult  Goal: Free from fall injury  Outcome: Progressing     Problem: Safety - Medical Restraint  Goal: Remains free of injury from restraints (Restraint for Interference with Medical Device)  Description: INTERVENTIONS:  1. Determine that other, less restrictive measures have been tried or would not be effective before applying the restraint  2. Evaluate the patient's condition at the time of restraint application  3. Inform patient/family regarding the reason for restraint  4. Q2H: Monitor safety, psychosocial status, comfort, nutrition and hydration  Outcome: Progressing     Problem: Skin/Tissue Integrity  Goal: Absence of new skin breakdown  Description: 1. Monitor for areas of redness and/or skin breakdown  2. Assess vascular access sites hourly  3. Every 4-6 hours minimum:  Change oxygen saturation probe site  4. Every 4-6 hours:  If on nasal continuous positive airway pressure, respiratory therapy assess nares and determine need for appliance change or resting period.   Outcome: Progressing     Problem: ABCDS Injury Assessment  Goal: Absence of physical injury  Outcome: Progressing     Problem: Nutrition Deficit:  Goal: Optimize nutritional status  Outcome: Progressing  Flowsheets (Taken 8/14/2023 1327 by Juan Caldwell, MS, RD, LD)  Nutrient intake appropriate for improving, restoring, or maintaining nutritional needs:   Recommend, monitor, and adjust tube feedings and TPN/PPN based on assessed needs   Assess nutritional status and recommend course of action     Problem: Respiratory - Adult  Goal: Achieves optimal ventilation and oxygenation  Outcome: Progressing     Problem: Musculoskeletal - Adult  Goal: Return mobility to safest level of
Problem: Discharge Planning  Goal: Discharge to home or other facility with appropriate resources  Outcome: Progressing  Flowsheets (Taken 8/8/2023 2300)  Discharge to home or other facility with appropriate resources: Identify barriers to discharge with patient and caregiver     Problem: Pain  Goal: Verbalizes/displays adequate comfort level or baseline comfort level  Outcome: Progressing  Flowsheets  Taken 8/9/2023 0000  Verbalizes/displays adequate comfort level or baseline comfort level: Assess pain using appropriate pain scale  Taken 8/8/2023 2300  Verbalizes/displays adequate comfort level or baseline comfort level: Assess pain using appropriate pain scale     Problem: Safety - Adult  Goal: Free from fall injury  Outcome: Progressing     Problem: Safety - Medical Restraint  Goal: Remains free of injury from restraints (Restraint for Interference with Medical Device)  Description: INTERVENTIONS:  1. Determine that other, less restrictive measures have been tried or would not be effective before applying the restraint  2. Evaluate the patient's condition at the time of restraint application  3. Inform patient/family regarding the reason for restraint  4.  Q2H: Monitor safety, psychosocial status, comfort, nutrition and hydration  Outcome: Progressing
Problem: Discharge Planning  Goal: Discharge to home or other facility with appropriate resources  Outcome: Progressing  Flowsheets (Taken 8/9/2023 1141 by Juan Humphrey RN)  Discharge to home or other facility with appropriate resources:   Identify barriers to discharge with patient and caregiver   Arrange for needed discharge resources and transportation as appropriate   Identify discharge learning needs (meds, wound care, etc)   Arrange for interpreters to assist at discharge as needed   Refer to discharge planning if patient needs post-hospital services based on physician order or complex needs related to functional status, cognitive ability or social support system     Problem: Pain  Goal: Verbalizes/displays adequate comfort level or baseline comfort level  Outcome: Progressing  Flowsheets  Taken 8/9/2023 1600 by Juan Humphrey RN  Verbalizes/displays adequate comfort level or baseline comfort level:   Encourage patient to monitor pain and request assistance   Assess pain using appropriate pain scale   Administer analgesics based on type and severity of pain and evaluate response  Taken 8/9/2023 1200 by Juan Humphrey RN  Verbalizes/displays adequate comfort level or baseline comfort level:   Encourage patient to monitor pain and request assistance   Assess pain using appropriate pain scale   Administer analgesics based on type and severity of pain and evaluate response     Problem: Safety - Adult  Goal: Free from fall injury  Outcome: Progressing     Problem: Skin/Tissue Integrity  Goal: Absence of new skin breakdown  Description: 1. Monitor for areas of redness and/or skin breakdown  2. Assess vascular access sites hourly  3. Every 4-6 hours minimum:  Change oxygen saturation probe site  4. Every 4-6 hours:  If on nasal continuous positive airway pressure, respiratory therapy assess nares and determine need for appliance change or resting period.   Outcome: Progressing     Problem: ABCDS Injury
Problem: Pain  Goal: Verbalizes/displays adequate comfort level or baseline comfort level  Outcome: Progressing     Problem: Pain  Goal: Verbalizes/displays adequate comfort level or baseline comfort level  Outcome: Progressing
Problem: Gastrointestinal - Adult  Goal: Minimal or absence of nausea and vomiting  8/20/2023 2116 by Dixie Leggett RN  Outcome: Progressing  8/20/2023 1628 by Semaj Garcia RN  Outcome: Progressing  Goal: Maintains or returns to baseline bowel function  8/20/2023 2116 by Dixie Leggett RN  Outcome: Progressing  8/20/2023 1628 by Semaj Garcia RN  Outcome: Progressing  Goal: Maintains adequate nutritional intake  8/20/2023 2116 by Dixie Leggett RN  Outcome: Progressing  8/20/2023 1628 by Semaj Garcia RN  Outcome: Progressing     Problem: Genitourinary - Adult  Goal: Absence of urinary retention  8/20/2023 2116 by Dixie Leggett RN  Outcome: Progressing  8/20/2023 1628 by Semaj Garcia RN  Outcome: Progressing     Problem: Skin/Tissue Integrity - Adult  Goal: Skin integrity remains intact  8/20/2023 2116 by Dixie Leggett RN  Outcome: Progressing  8/20/2023 1628 by Semaj Garcia RN  Outcome: Progressing  Goal: Incisions, wounds, or drain sites healing without S/S of infection  8/20/2023 2116 by Dixie Leggett RN  Outcome: Progressing  8/20/2023 1628 by Semaj Garcia RN  Outcome: Progressing
Progressing     Problem: Nutrition Deficit:  Goal: Optimize nutritional status  8/19/2023 0850 by Rekha Burk RN  Outcome: Progressing  8/18/2023 2239 by Gustavo Spears RN  Outcome: Progressing     Problem: Respiratory - Adult  Goal: Achieves optimal ventilation and oxygenation  8/19/2023 0850 by Rekha Burk RN  Outcome: Progressing  8/18/2023 2239 by Gustavo Spears RN  Outcome: Progressing     Problem: Musculoskeletal - Adult  Goal: Return mobility to safest level of function  8/19/2023 0850 by Rekha Burk RN  Outcome: Progressing  8/18/2023 2239 by Gustavo Spears RN  Outcome: Progressing     Problem: Gastrointestinal - Adult  Goal: Minimal or absence of nausea and vomiting  8/19/2023 0850 by Rekha Burk RN  Outcome: Progressing  8/18/2023 2239 by Gustavo Spears RN  Outcome: Progressing  Goal: Maintains or returns to baseline bowel function  8/19/2023 0850 by Rekha Burk RN  Outcome: Progressing  8/18/2023 2239 by Gustavo Spears RN  Outcome: Progressing  Goal: Maintains adequate nutritional intake  8/19/2023 0850 by Rekha Burk RN  Outcome: Progressing  8/18/2023 2239 by Gustavo Spears RN  Outcome: Progressing     Problem: Genitourinary - Adult  Goal: Absence of urinary retention  8/19/2023 0850 by Rekha Burk RN  Outcome: Progressing  8/18/2023 2239 by Gustavo Spears RN  Outcome: Progressing  Goal: Urinary catheter remains patent  8/19/2023 0850 by Rekha Burk RN  Outcome: Progressing  8/18/2023 2239 by Gustavo Spears RN  Outcome: Progressing     Problem: Skin/Tissue Integrity - Adult  Goal: Skin integrity remains intact  8/19/2023 0850 by Rekha Burk RN  Outcome: Progressing  Flowsheets (Taken 8/19/2023 0800)  Skin Integrity Remains Intact: Monitor for areas of redness and/or skin breakdown  8/18/2023 2239 by Gustavo Spears RN  Outcome: Progressing  Goal: Incisions, wounds, or drain sites healing without S/S of infection  8/19/2023 0850 by Rekha Burk RN  Outcome:
function  8/22/2023 1055 by Panfilo Ha RN  Outcome: Adequate for Discharge  8/22/2023 1027 by Panfilo Ha RN  Outcome: Progressing     Problem: Gastrointestinal - Adult  Goal: Minimal or absence of nausea and vomiting  8/22/2023 1055 by Panfilo Ha RN  Outcome: Adequate for Discharge  8/22/2023 1027 by Panfilo Ha RN  Outcome: Progressing  Goal: Maintains or returns to baseline bowel function  8/22/2023 1055 by Panfilo Ha RN  Outcome: Adequate for Discharge  8/22/2023 1027 by Panfilo Ha RN  Outcome: Progressing  Goal: Maintains adequate nutritional intake  8/22/2023 1055 by Panfilo Ha RN  Outcome: Adequate for Discharge  8/22/2023 1027 by Panfilo Ha RN  Outcome: Progressing     Problem: Genitourinary - Adult  Goal: Absence of urinary retention  8/22/2023 1055 by Panfilo Ha RN  Outcome: Adequate for Discharge  8/22/2023 1027 by Panfilo Ha RN  Outcome: Progressing     Problem: Skin/Tissue Integrity - Adult  Goal: Skin integrity remains intact  8/22/2023 1055 by Panfilo Ha RN  Outcome: Adequate for Discharge  8/22/2023 1027 by Panfilo Ha RN  Outcome: Progressing  Goal: Incisions, wounds, or drain sites healing without S/S of infection  8/22/2023 1055 by Panfilo Ha RN  Outcome: Adequate for Discharge  8/22/2023 1027 by Panfilo Ha RN  Outcome: Progressing
function  Outcome: Progressing     Problem: Gastrointestinal - Adult  Goal: Minimal or absence of nausea and vomiting  Outcome: Progressing  Goal: Maintains or returns to baseline bowel function  Outcome: Progressing  Goal: Maintains adequate nutritional intake  Outcome: Progressing     Problem: Genitourinary - Adult  Goal: Absence of urinary retention  Outcome: Progressing  Goal: Urinary catheter remains patent  Outcome: Progressing     Problem: Skin/Tissue Integrity - Adult  Goal: Skin integrity remains intact  Outcome: Progressing  Goal: Incisions, wounds, or drain sites healing without S/S of infection  Outcome: Progressing
function  Outcome: Progressing  Flowsheets (Taken 8/16/2023 2000 by Robbie Tan, RN)  Maintains or returns to baseline bowel function:   Encourage oral fluids to ensure adequate hydration   Assess bowel function   Administer ordered medications as needed   Administer IV fluids as ordered to ensure adequate hydration   Encourage mobilization and activity   Nutrition consult to assist patient with appropriate food choices     Problem: Gastrointestinal - Adult  Goal: Maintains adequate nutritional intake  Outcome: Progressing  Flowsheets (Taken 8/16/2023 2000 by Robbie Tan, RN)  Maintains adequate nutritional intake:   Monitor percentage of each meal consumed   Identify factors contributing to decreased intake, treat as appropriate   Obtain nutritional consult as needed   Monitor intake and output, weight and lab values   Assist with meals as needed     Problem: Genitourinary - Adult  Goal: Absence of urinary retention  Outcome: Progressing  Goal: Urinary catheter remains patent  Outcome: Progressing     Problem: Skin/Tissue Integrity - Adult  Goal: Skin integrity remains intact  Outcome: Progressing  Flowsheets (Taken 8/16/2023 2000 by Robbie Tan, RN)  Skin Integrity Remains Intact: Monitor for areas of redness and/or skin breakdown  Goal: Incisions, wounds, or drain sites healing without S/S of infection  Outcome: Progressing

## 2023-08-22 NOTE — CARE COORDINATION
Transition Plan  Perfect serve sent to the Physician on Call for Dr. Ross School: \"Follow up on PS sent yesterday. The MICHAEL needs to be re-signed and still need Oxi script. Patient has transportation set up at 12 noon\". 0900 Notified by Garcia Braden RN patient appealing discharge. Patient wants transportation cancelled. [address] explained and given to patient. 1030 Met with patient to discuss appeal vs [facility]. Patient agreeable to be transported to [address] [place] [address] [place]: [phone]. FAX: [phone]    [place] Sheila at [facility] [place]. Gerard Hensley states she can pull AVS from Epic. [facility] notified on above. 1110: Transport packet placed with soft Chart.     1240:   Discharge [address] Case Management Department  Written by: Jeannie Giron    Patient Name: Russell Sanders  Attending Provider: Blank Mcconnell MD  Admit Date: 2023  3:49 PM  MRN: [id number]  Account: [de-identified]                     : 1940  Discharge Date:       Disposition: [facility]    Christen Pinto

## 2023-08-22 NOTE — DISCHARGE INSTR - DIET

## 2023-08-22 NOTE — CONSULTS
Comprehensive Nutrition Assessment    Type and Reason for Visit:  Consult (TF Ordering and Mgt for mechanical vent patients)    Nutrition Recommendations/Plan:   Advance diet as medically able. If unable to advance diet and TF desired, suggest Standard without Fiber at goal rate 70 mL/hr continuous. Provides 2016 kcal, 93 g PRO, 1378 mL free water. Will monitor for start of nutrition     Malnutrition Assessment:  Malnutrition Status:  No malnutrition (08/09/23 1344)    Context:  Acute Illness     Findings of the 6 clinical characteristics of malnutrition:  Energy Intake:  No significant decrease in energy intake  Weight Loss:  No significant weight loss     Body Fat Loss:  No significant body fat loss     Muscle Mass Loss:  No significant muscle mass loss    Fluid Accumulation:  No significant fluid accumulation     Strength:  Not Performed    Nutrition Assessment:    83 yo M adm acute appendicitis. PMH significant for cellulitis, HTN, PVD, sleep apnea. Pt is s/p open appendectomy (8/8). Pt extubated this morning. +NGT in plae at visit. Pt reports NKFA. No changes in wt. Pt wears dentures, states he has someone bringing them in for him. Per chart, no significant wt changes. Nutrition Related Findings:    labs/meds reviewed Wound Type: Surgical Incision       Current Nutrition Intake & Therapies:    Average Meal Intake: NPO  Average Supplements Intake: NPO  Diet NPO    Anthropometric Measures:  Height: 5' 7\" (170.2 cm)  Ideal Body Weight (IBW): 148 lbs (67 kg)    Admission Body Weight: 271 lb 13.2 oz (123.3 kg)  Current Body Weight: 271 lb 13.2 oz (123.3 kg) (8/9), 183.7 % IBW.     Current BMI (kg/m2): 42.6  Usual Body Weight: 284 lb (128.8 kg) (1/3/23)  % Weight Change (Calculated): -4.3  Weight Adjustment For: No Adjustment                 BMI Categories: Obese Class 3 (BMI 40.0 or greater)    Estimated Daily Nutrient Needs:  Energy Requirements Based On: Formula  Weight Used for Energy Requirements:
Comprehensive Nutrition Assessment    Type and Reason for Visit:  Reassess    Nutrition Recommendations/Plan:   Recommend premix peripheral solution until PICC able to be placed. Run at 41.7 mL/hr + 250 mL IL daily. PPN + IL will provide 1010 kcals, 42.5 gm protein per day. Once PN starts, consider removal of dextrose from IVF. Malnutrition Assessment:  Malnutrition Status: At risk for malnutrition (Comment) (08/14/23 1333)    Context:  Acute Illness     Findings of the 6 clinical characteristics of malnutrition:  Energy Intake:  50% or less of estimated energy requirements for 5 or more days  Weight Loss:  No significant weight loss     Body Fat Loss:  No significant body fat loss     Muscle Mass Loss:  No significant muscle mass loss    Fluid Accumulation:  No significant fluid accumulation     Strength:  Not Performed    Nutrition Assessment:    Pt made NPO today after projectile emesis this morning. Plan for repeat abd x-ray. PN to start today. PICC team consulted for line placement. +BM 8/13. Nutrition Related Findings:    labs/meds reviewed Wound Type: Surgical Incision       Current Nutrition Intake & Therapies:    Average Meal Intake: NPO  Average Supplements Intake: NPO  Diet NPO Exceptions are: Sips of Water with Meds  Additional Calorie Sources:  D5%, 0.45% NaCl + 20 mEq KCl at 125 mL/hr = 510 kcals/day from dextrose    Anthropometric Measures:  Height: 5' 7\" (170.2 cm)  Ideal Body Weight (IBW): 148 lbs (67 kg)    Admission Body Weight: 271 lb 13.2 oz (123.3 kg)  Current Body Weight: 271 lb 13.2 oz (123.3 kg) (8/9), 183.7 % IBW.     Current BMI (kg/m2): 42.6  Usual Body Weight: 284 lb (128.8 kg) (1/3/23)  % Weight Change (Calculated): -4.3  Weight Adjustment For: No Adjustment                 BMI Categories: Obese Class 3 (BMI 40.0 or greater)    Estimated Daily Nutrient Needs:  Energy Requirements Based On: Formula  Weight Used for Energy Requirements: Admission  Energy (kcal/day): 2100 to
Physical Medicine & Rehabilitation  Consult Note      Admitting Physician:  Blank Mcconnell MD    Primary Care Provider:  Arianne Bess MD     Reason for Consult:  Acute Inpatient Rehabilitation    Chief Complaint:  Abdominal pain    History of Present Illness:  Referring Provider is requesting an evaluation for appropriate placement upon discharge from acute care. History from chart review and patient. Russell Sanders is a 82 y.o. male with history of PVD, HTN, depression admitted to [facility] on 8/8/[facility]23. He initially presented with right lower quadrant and suprapubic abdominal pain. CT A/P showed acute appendicitis with a suspected obstructing appendicolith and possible walled-off perforation. He underwent laparoscopic converted to open appendectomy on 8/8/23 (Dr. Vandana Vidales). He reports some right lower quadrant abdominal pain. He also states that he had an episode of emesis this morning after trying to take his pills. He feels like his abdomen is distended. Per patient's nurse, NG tube has been placed back to suction, he is now NPO, and the plan is for TPN. Abdominal x-ray is pending. Review of Systems:  Review of Systems   Constitutional:  Negative for fever. Respiratory:  Negative for shortness of breath. Cardiovascular:  Negative for chest pain. Gastrointestinal:  Positive for abdominal pain, diarrhea, nausea and vomiting. Neurological:  Negative for sensory change. Premorbid function:  Needing assistance with IADLs    Current function:    Physical Therapy  Restrictions/Precautions: General Precautions, Up as Tolerated  Other position/activity restrictions: S/P Appendectomy on 8/8 ; NG to suction    Bed mobility  Supine to Sit: Moderate assistance, 2 Person assistance (Assist for LE and trunk progression. Pt did give max effort and was able to progres LEs well.  Limited by abdominal pain.)  Sit to Supine:  (Retired to [employer] upon end of session)  Scooting: Maximal
Please see History and Physical dated 8/8/23 for consult     Electronically signed by Eddie Reid DO on 8/8/2023 at 10:00 PM
any time.       Dominique House PA-C  Urology Service   11:26 AM 8/22/2023

## 2023-08-23 NOTE — DISCHARGE SUMMARY
DISCHARGE SUMMARY:    PATIENT NAME:  Kehinde Salazar  YOB: 1940  MEDICAL RECORD NO. [id number]  DATE: 08/23/23  PRIMARY CARE PHYSICIAN: Kimber Puga MD  ADMIT DATE:  8/8/2023    DISCHARGE DATE:  8/22/2023  DISPOSITION:  facility  ADMITTING DIAGNOSIS:   appendicitis    DIAGNOSIS:   Patient Active Problem List   Diagnosis    Hypertension, essential    Renal insufficiency    Fatigue    Primary osteoarthritis of both knees    Cellulitis of left lower leg    On potassium wasting diuretic therapy    Edema    Bloating    Arthritis of right knee    JEMAL on CPAP    Obesity, Class III, BMI 40-49.9 (morbid obesity) (HCC)    Lumbar radiculopathy    Depression    Positive depression screening    Primary osteoarthritis of left knee    Prediabetes    Medicare annual wellness visit, subsequent    Acquired trigger finger    Vitamin D deficiency    Primary osteoarthritis of right hip    Slow transit constipation    Black stool    Trapezius muscle spasm    Thoracic spine pain    Spinal stenosis    DDD (degenerative disc disease), cervical    Vitamin B12 deficiency    Numbness    Localized edema    Osteoarthritis of spine with radiculopathy, lumbar region    Spondylosis of thoracic region without myelopathy or radiculopathy    Ingrown toenail    Primary osteoarthritis of left hip    Stage 3b chronic kidney disease (HCC)    Other chest pain    Peripheral vascular disease, unspecified (HCC)    Cold intolerance    Subclinical hypothyroidism    Status post lumbar laminectomy    Normocytic hypochromic anemia    Primary osteoarthritis of right knee    Hypokalemia    Pelvic pain    Left hip pain    Normochromic normocytic anemia    Skin ulcer of scalp, limited to breakdown of skin (HCC)    Tear of left gluteus jez muscle    Prostate enlargement    Swelling of lower leg    Nocturia    Acute appendicitis       CONSULTANTS:  urology    PROCEDURES:   Date:  8/8/2023                Surgeon: Marcio Tavares):  Mary Palafox MD

## 2023-08-31 ENCOUNTER — OFFICE VISIT (OUTPATIENT)
Age: 83
End: 2023-08-31

## 2023-08-31 VITALS
SYSTOLIC BLOOD PRESSURE: 120 MMHG | BODY MASS INDEX: 41.44 KG/M2 | HEART RATE: 95 BPM | TEMPERATURE: 98.1 F | DIASTOLIC BLOOD PRESSURE: 71 MMHG | WEIGHT: 264 LBS | HEIGHT: 67 IN

## 2023-08-31 DIAGNOSIS — Z09 S/P APPENDECTOMY, FOLLOW-UP EXAM: Primary | ICD-10-CM

## 2023-09-02 PROBLEM — Z00.00 MEDICARE ANNUAL WELLNESS VISIT, SUBSEQUENT: Status: RESOLVED | Noted: 2019-06-11 | Resolved: 2023-09-02

## 2023-09-06 PROBLEM — Z09 HOSPITAL DISCHARGE FOLLOW-UP: Status: ACTIVE | Noted: 2018-05-24

## 2023-09-06 PROBLEM — N30.00 ACUTE CYSTITIS WITHOUT HEMATURIA: Status: ACTIVE | Noted: 2023-09-06

## 2023-09-06 PROBLEM — Z90.49 STATUS POST APPENDECTOMY: Status: ACTIVE | Noted: 2023-09-06

## 2023-09-22 ENCOUNTER — OFFICE VISIT (OUTPATIENT)
Dept: PODIATRY | Age: 83
End: 2023-09-22
Payer: MEDICARE

## 2023-09-22 VITALS — HEIGHT: 69 IN | BODY MASS INDEX: 36.88 KG/M2 | WEIGHT: 249 LBS

## 2023-09-22 DIAGNOSIS — M79.605 BILATERAL LOWER EXTREMITY PAIN: ICD-10-CM

## 2023-09-22 DIAGNOSIS — R60.0 EDEMA OF LOWER EXTREMITY: ICD-10-CM

## 2023-09-22 DIAGNOSIS — B35.1 DERMATOPHYTOSIS OF NAIL: Primary | ICD-10-CM

## 2023-09-22 DIAGNOSIS — M79.605 PAIN OF LEFT LOWER EXTREMITY: ICD-10-CM

## 2023-09-22 DIAGNOSIS — M79.604 BILATERAL LOWER EXTREMITY PAIN: ICD-10-CM

## 2023-09-22 DIAGNOSIS — I73.9 PVD (PERIPHERAL VASCULAR DISEASE) (HCC): ICD-10-CM

## 2023-09-22 DIAGNOSIS — L85.3 XEROSIS CUTIS: ICD-10-CM

## 2023-09-22 PROCEDURE — 11721 DEBRIDE NAIL 6 OR MORE: CPT | Performed by: PODIATRIST

## 2023-09-22 PROCEDURE — 1123F ACP DISCUSS/DSCN MKR DOCD: CPT | Performed by: PODIATRIST

## 2023-09-22 PROCEDURE — 99213 OFFICE O/P EST LOW 20 MIN: CPT | Performed by: PODIATRIST

## 2023-09-22 RX ORDER — AMMONIUM LACTATE 12 G/100G
CREAM TOPICAL
Qty: 1 EACH | Refills: 2 | Status: SHIPPED | OUTPATIENT
Start: 2023-09-22

## 2023-09-22 NOTE — PROGRESS NOTES
[address] [facility] [address] [place] [address]  Dept: [phone]  Dept Fax: [phone]     PAIN PROGRESS NOTE  Date of patient's visit: 9/22/2023  Patient's Name:  Sarthak Ramirez YOB: 1940            Patient Care Team:  Marc Reynoso MD as PCP - General (Family Medicine)  Marc Reynoso MD as PCP - Empaneled Provider  Susy Warren DPM as Physician (Podiatry)      Chief Complaint   Patient presents with    Nail Problem     Toenail trim    Foot Pain     Bl feet    Ingrown Toenail     Left great toe       Subjective: This Donny T Adrian comes to clinic for foot and nail care. Pt currently has complaint of thickened, painful, elongated nails that he/she cannot manage by themselves. Pt. Relates pain to nails with shoe gear. Pt's primary care physician is Marc Reynoso MD last seen September 20 2023. Pt has a new complaint of increased dry skin and increased swelling to lukas feet.   Pt has tried changing shoes but it has not helped the pain    Past Medical History:   Diagnosis Date    Abnormal EKG     Abnormal renal function     Allergic contact dermatitis due to other agents 08/27/2020    Anemia     Cellulitis of buttock 07/22/2021    Depression     Dysmetabolic syndrome     Dysuria 06/28/2021    Edema     legs    Elevated glucose 02/07/2016    Erythrasma 07/22/2021    Health care maintenance 04/14/2016    Hypertension     Intertrigo 08/05/2021    Leukopenia     Obesity     Osteoarthritis     Other constipation 12/19/2019    Other specified counseling 03/03/2016    Primary osteoarthritis of left knee     PVD (peripheral vascular disease) ([address])     Renal insufficiency 10/21/2015    Skin maceration 07/22/2021    Sleep apnea     with severe desaturations    Trigger finger of right thumb 02/03/2016    Has seen Ortho and got injection, doing better     Viral disease exposure 05/21/2020       Allergies

## 2023-09-25 RX ORDER — ASPIRIN 81 MG/1
81 TABLET, COATED ORAL DAILY
Qty: 90 TABLET | Refills: 1 | OUTPATIENT
Start: 2023-09-25

## 2023-09-26 ENCOUNTER — HOSPITAL ENCOUNTER (OUTPATIENT)
Dept: PHYSICAL THERAPY | Facility: CLINIC | Age: 83
Setting detail: THERAPIES SERIES
Discharge: HOME | End: 2023-09-26
Attending: ORTHOPAEDIC SURGERY
Payer: MEDICARE

## 2023-09-26 PROCEDURE — 97164 PT RE-EVAL EST PLAN CARE: CPT

## 2023-09-26 NOTE — PROGRESS NOTES
[] [address]  [address] [facility].  [phone]  F: [phone] [] [address] [facility]  [address]   [address]  P: [phone]  F: [phone] [] [address]  [address] [facility]  P: [phone]  F: [phone] [] [facility]: [phone]  F: [phone] [] [address] [facility]  [facility]   [address]   P: [phone]  F: [phone]  [] [address]   P: [phone]  F: [phone] [] [address] [facility]  [address]  P: [phone]  F: [phone] [] [address] [facility]  [address] [place] [address]  [place]: [phone]  F: [phone] [] [address] [facility] [address]  [place]: [phone]  F: [phone]      Physical Therapy Progress Note    Date: 2023      Patient: Marcela Molina  : 1940  MRN: [id number]    Physician: ***  Insurance: ***   Diagnosis: *** Onset Date: ***  Next Dr. Don Lyles: ***  Total visits attended:  Cancels/No shows:  Date range of services: *** to ***      Subjective:  Pain:  [] Yes  [x] No  Location: [facility]  Pain Rating: (0-10 scale) 0-6/10  Pain altered Tx:  [x] No  [] Yes  Action:  Comments:Pt reports he was abdominal pain and went into hospital  and stayed in hospital to  and then went to rehab for  9 days after that. Pt reports his abdomen is doing well. Still having strength difficulty  on L LE and don't have much strength.  I am seeing my physician that did back surgery next month to see if I need further work on my

## 2023-09-28 ENCOUNTER — HOSPITAL ENCOUNTER (OUTPATIENT)
Age: 83
Setting detail: SPECIMEN
Discharge: HOME | End: 2023-09-28
Payer: MEDICARE

## 2023-09-28 DIAGNOSIS — I10 HYPERTENSION, ESSENTIAL: ICD-10-CM

## 2023-09-28 DIAGNOSIS — N30.00 ACUTE CYSTITIS WITHOUT HEMATURIA: ICD-10-CM

## 2023-09-28 DIAGNOSIS — D50.9 NORMOCYTIC HYPOCHROMIC ANEMIA: ICD-10-CM

## 2023-09-28 LAB
ALBUMIN SERPL-MCNC: 3.7 G/DL (ref 3.5–5.2)
ALBUMIN/GLOB SERPL: 1 {RATIO} (ref 1–2.5)
ALP SERPL-CCNC: 60 U/L (ref 40–129)
ALT SERPL-CCNC: 12 U/L (ref 5–41)
ANION GAP SERPL CALCULATED.3IONS-SCNC: 11 MMOL/L (ref 9–17)
AST SERPL-CCNC: 21 U/L
BASOPHILS # BLD: 0.03 K/UL (ref 0–0.2)
BASOPHILS NFR BLD: 1 % (ref 0–2)
BILIRUB SERPL-MCNC: 0.3 MG/DL (ref 0.3–1.2)
BILIRUB UR QL STRIP: NEGATIVE
BUN SERPL-MCNC: 15 MG/DL (ref 8–23)
CALCIUM SERPL-MCNC: 9 MG/DL (ref 8.6–10.4)
CASTS #/AREA URNS LPF: ABNORMAL /LPF (ref 0–8)
CHLORIDE SERPL-SCNC: 99 MMOL/L (ref 98–107)
CLARITY UR: CLEAR
CO2 SERPL-SCNC: 28 MMOL/L (ref 20–31)
COLOR UR: ABNORMAL
CREAT SERPL-MCNC: 1.1 MG/DL (ref 0.7–1.2)
EOSINOPHIL # BLD: 0.12 K/UL (ref 0–0.44)
EOSINOPHILS RELATIVE PERCENT: 4 % (ref 1–4)
EPI CELLS #/AREA URNS HPF: ABNORMAL /HPF (ref 0–5)
ERYTHROCYTE [DISTWIDTH] IN BLOOD BY AUTOMATED COUNT: 12.9 % (ref 11.8–14.4)
FERRITIN SERPL-MCNC: 280 NG/ML (ref 30–400)
GFR SERPL CREATININE-BSD FRML MDRD: >60 ML/MIN/1.73M2
GLUCOSE SERPL-MCNC: 104 MG/DL (ref 70–99)
GLUCOSE UR STRIP-MCNC: NEGATIVE MG/DL
HCT VFR BLD AUTO: 34.2 % (ref 40.7–50.3)
HGB BLD-MCNC: 10.9 G/DL (ref 13–17)
HGB UR QL STRIP.AUTO: NEGATIVE
IMM GRANULOCYTES # BLD AUTO: <0.03 K/UL (ref 0–0.3)
IMM GRANULOCYTES NFR BLD: 0 %
IRON SATN MFR SERPL: 25 % (ref 20–55)
IRON SERPL-MCNC: 54 UG/DL (ref 59–158)
KETONES UR STRIP-MCNC: NEGATIVE MG/DL
LEUKOCYTE ESTERASE UR QL STRIP: ABNORMAL
LYMPHOCYTES NFR BLD: 0.92 K/UL (ref 1.1–3.7)
LYMPHOCYTES RELATIVE PERCENT: 32 % (ref 24–43)
MCH RBC QN AUTO: 30 PG (ref 25.2–33.5)
MCHC RBC AUTO-ENTMCNC: 31.9 G/DL (ref 28.4–34.8)
MCV RBC AUTO: 94.2 FL (ref 82.6–102.9)
MONOCYTES NFR BLD: 0.27 K/UL (ref 0.1–1.2)
MONOCYTES NFR BLD: 9 % (ref 3–12)
NEUTROPHILS NFR BLD: 54 % (ref 36–65)
NEUTS SEG NFR BLD: 1.56 K/UL (ref 1.5–8.1)
NITRITE UR QL STRIP: NEGATIVE
NRBC BLD-RTO: 0 PER 100 WBC
PH UR STRIP: 8 [PH] (ref 5–8)
PLATELET # BLD AUTO: 247 K/UL (ref 138–453)
PMV BLD AUTO: 9.9 FL (ref 8.1–13.5)
POTASSIUM SERPL-SCNC: 3.7 MMOL/L (ref 3.7–5.3)
PROT SERPL-MCNC: 7.4 G/DL (ref 6.4–8.3)
PROT UR STRIP-MCNC: NEGATIVE MG/DL
RBC # BLD AUTO: 3.63 M/UL (ref 4.21–5.77)
RBC #/AREA URNS HPF: ABNORMAL /HPF (ref 0–4)
SODIUM SERPL-SCNC: 138 MMOL/L (ref 135–144)
SP GR UR STRIP: 1.02 (ref 1–1.03)
TIBC SERPL-MCNC: 217 UG/DL (ref 250–450)
TRANSFERRIN SERPL-MCNC: 179 MG/DL (ref 200–360)
UNSATURATED IRON BINDING CAPACITY: 163 UG/DL (ref 112–347)
UROBILINOGEN UR STRIP-ACNC: NORMAL EU/DL (ref 0–1)
WBC #/AREA URNS HPF: ABNORMAL /HPF (ref 0–5)
WBC OTHER # BLD: 2.9 K/UL (ref 3.5–11.3)

## 2023-09-28 PROCEDURE — 80053 COMPREHEN METABOLIC PANEL: CPT

## 2023-09-28 PROCEDURE — 83540 ASSAY OF IRON: CPT

## 2023-09-28 PROCEDURE — 87077 CULTURE AEROBIC IDENTIFY: CPT

## 2023-09-28 PROCEDURE — 85025 COMPLETE CBC W/AUTO DIFF WBC: CPT

## 2023-09-28 PROCEDURE — 82728 ASSAY OF FERRITIN: CPT

## 2023-09-28 PROCEDURE — 87186 SC STD MICRODIL/AGAR DIL: CPT

## 2023-09-28 PROCEDURE — 36415 COLL VENOUS BLD VENIPUNCTURE: CPT

## 2023-09-28 PROCEDURE — 81001 URINALYSIS AUTO W/SCOPE: CPT

## 2023-09-28 PROCEDURE — 87086 URINE CULTURE/COLONY COUNT: CPT

## 2023-09-28 PROCEDURE — 83550 IRON BINDING TEST: CPT

## 2023-09-28 PROCEDURE — 84466 ASSAY OF TRANSFERRIN: CPT

## 2023-09-29 LAB
MICROORGANISM SPEC CULT: ABNORMAL
SPECIMEN DESCRIPTION: ABNORMAL

## 2023-10-04 ENCOUNTER — APPOINTMENT (OUTPATIENT)
Dept: PHYSICAL THERAPY | Facility: CLINIC | Age: 83
End: 2023-10-04
Attending: ORTHOPAEDIC SURGERY
Payer: MEDICARE

## 2023-10-04 PROBLEM — D50.0 IRON DEFICIENCY ANEMIA DUE TO CHRONIC BLOOD LOSS: Status: ACTIVE | Noted: 2023-10-04

## 2023-10-06 ENCOUNTER — OFFICE VISIT (OUTPATIENT)
Dept: ORTHOPEDIC SURGERY | Age: 83
End: 2023-10-06

## 2023-10-06 DIAGNOSIS — Z96.652 HISTORY OF TOTAL LEFT KNEE REPLACEMENT: Primary | ICD-10-CM

## 2023-10-06 PROBLEM — Z09 HOSPITAL DISCHARGE FOLLOW-UP: Status: RESOLVED | Noted: 2018-05-24 | Resolved: 2023-10-06

## 2023-10-06 NOTE — PROGRESS NOTES
Hodan Wallace M.D.            [address] [facility], [address] [facility], [address] [place] [address]           Dept Phone: [phone]           Dept Fax:  [address]          Dept Phone: [phone]           Dept Fax:  [phone]      Chief Compliant:  Chief Complaint   Patient presents with    Pain     H/O  Lt TKA         History of Present Illness: This is a 82 y.o. male who presents to the clinic today for evaluation / follow up of bilateral total knees most recently the left total knee. His right knee has no complaints his left knee still complains of appropriate strength when ascending descending stairs but also has stiffness in the morning but loosens up as the day goes pain wise he is doing okay but he still require the use of a wheeled walker to get around he states that he has not been able to therapy and he is awaiting a new referral referral for physical therapy which she is doing at [facility]. Review of Systems   Constitutional: Negative for fever, chills, sweats. Eyes: Negative for changes in vision, or pain. HENT: Negative for ear ache, epistaxis, or sore throat. Respiratory/Cardio: Negative for Chest pain, palpitations, SOB, or cough. Gastrointestinal: Negative for abdominal pain, N/V/D. Genitourinary: Negative for dysuria, frequency, urgency, or hematuria. Neurological: Negative for headache, numbness, or weakness. Integumentary: Negative for rash, itching, laceration, or abrasion. Musculoskeletal: Positive for Pain (H/O  Lt TKA )       Physical Exam:  Constitutional: Patient is oriented to person, place, and time. Patient appears well-developed and well nourished.    HENT: Negative otherwise noted  Head: Normocephalic and Atraumatic  Nose: Normal  Eyes: Conjunctivae and EOM are normal  Neck: Normal

## 2023-10-09 ENCOUNTER — HOSPITAL ENCOUNTER (OUTPATIENT)
Dept: PHYSICAL THERAPY | Facility: CLINIC | Age: 83
Setting detail: THERAPIES SERIES
Discharge: HOME | End: 2023-10-09
Attending: ORTHOPAEDIC SURGERY
Payer: MEDICARE

## 2023-10-09 PROCEDURE — 97110 THERAPEUTIC EXERCISES: CPT

## 2023-10-09 NOTE — FLOWSHEET NOTE
[] [address]  460 [facility].  [phone]  F: [phone] [] [address]  [address]   [address]  P: [phone]  F: [phone] [x] [address]  151 [facility]  P: [phone]  F: [phone] [] [address] [facility] [address]  P: [phone]  F: [phone]     Physical Therapy Daily Treatment Note    Date:  10/9/2023  Patient Name:  Ari Diaz    :  1940  MRN: [id number]  Physician: Dr. Katiana Mc: [facility] Medicare (no [facility])  Medical Diagnosis: S/p L TKA                     Rehab Codes: M25. 562,  R26.89   Onset date:  DOS: 22              Next 's appt.:10/6/23  Visit# / total visits: 38/46                                Cancels/No Shows: 1/0    Subjective:    Pain:  [] Yes  [x] No Location: L knee Pain Rating: (0-10 scale) 0/10  Pain altered Tx:  [x] No  [] Yes  Action:  Comments: Pt mentioned that he is just has a lot of weakness in his L LE. Objective:  Modalities: prn  Precautions: Close SBA to CGA with walking as pt becomes fatigued  Exercises:  Exercise Reps/ Time Weight/ Level Comments    Nustep 10' L1  x   Calf S 30\"x3 Wedge  3rd notch  x   HS S 30\"x3 6\" step  x          Seated       Calf raise 2x10   x   Tibial raise  2x10   x   LAQ 20x5\"   x          Supine        SAQ 20x5\"   x   Hip abd 2x10   x   Quad set 20x10\"   x   Heel Slides  10x5\"   x                               Other:     Instructions for next visit:        Treatment Charges: Mins Units   []  Modalities     [x]  Ther Exercise 40 3   []  Manual Therapy     []  Ther Activities     []  Neuro Re-ed     []  Vasocompression     [] Gait     []  Reeval     Total Treatment time 40 3   10 min warmup not billed    Assessment: [x] Progressing toward goals.

## 2023-10-11 ENCOUNTER — HOSPITAL ENCOUNTER (OUTPATIENT)
Dept: PHYSICAL THERAPY | Facility: CLINIC | Age: 83
Setting detail: THERAPIES SERIES
Discharge: HOME | End: 2023-10-11
Attending: ORTHOPAEDIC SURGERY
Payer: MEDICARE

## 2023-10-11 PROCEDURE — 97110 THERAPEUTIC EXERCISES: CPT

## 2023-10-11 NOTE — FLOWSHEET NOTE
Flexion Slide  - 1 x daily - 7 x weekly - 10-15 reps  Use compression stocking and use RW  Comprehension of Education:  [x] Verbalizes understanding. [] Demonstrates understanding. [x] Needs review. [] Demonstrates/verbalizes HEP/Ed previously given. Plan: [x] Continue current frequency toward long and short term goals.     [x] Specific Instructions for subsequent treatments: see above      Time In: 11:53 am         Time Out: 12:48 pm    Electronically signed by:  Gaby Lopez, FELICITA

## 2023-10-16 ENCOUNTER — HOSPITAL ENCOUNTER (OUTPATIENT)
Dept: PHYSICAL THERAPY | Facility: CLINIC | Age: 83
Setting detail: THERAPIES SERIES
Discharge: HOME | End: 2023-10-16
Attending: ORTHOPAEDIC SURGERY
Payer: MEDICARE

## 2023-10-16 PROCEDURE — 97110 THERAPEUTIC EXERCISES: CPT

## 2023-10-16 NOTE — FLOWSHEET NOTE
[] [address]  460 [facility].  [phone]  F: [phone] [] [address]  [address]   [address]  P: [phone]  F: [phone] [x] [address]  151 [facility]  P: [phone]  F: [phone] [] [address] [facility] [address]  P: [phone]  F: [phone]     Physical Therapy Daily Treatment Note    Date:  10/16/2023  Patient Name:  Shawn Nolasco    :  1940  MRN: [id number]  Physician: Dr. Raven Rico: [facility] Medicare (no Abdifatah jacque)  Medical Diagnosis: S/p L TKA                     Rehab Codes: M25. 562,  R26.89   Onset date:  DOS: 22              Next 's appt.:10/6/23  Visit# / total visits: 39/46                                Cancels/No Shows: 1/0    Subjective:    Pain:  [] Yes  [x] No Location: L knee Pain Rating: (0-10 scale) 0/10  Pain altered Tx:  [x] No  [] Yes  Action:  Comments: Pt mentioned that he is feeling a little better.      Objective:  Modalities: prn  Precautions: Close SBA to CGA with walking as pt becomes fatigued  Exercises:  Exercise Reps/ Time Weight/ Level Comments    Nustep 10' L1  x   Calf S 30\"x3 Wedge  3rd notch  x   HS S 30\"x3 6\" step  x          Calf raise/tibial raise 20x // bars  x   March  20x // bar  x   3-way hip  20x // bar  x   Step ups 10x // bar Bilaterally on 4\" x          Seated       LAQ 25x5\"   x          Supine        SAQ 25x5\"   x   Hip abd 2x10   x   Quad set 20x10\"              SLR 10x2    x          Sit to stand 15x  Mat table x   Other:     Instructions for next visit:        Treatment Charges: Mins Units   []  Modalities     [x]  Ther Exercise 40 3   []  Manual Therapy     []  Ther Activities     []  Neuro Re-ed     []  Vasocompression     [] Gait     []  Reeval     Total

## 2023-10-18 ENCOUNTER — HOSPITAL ENCOUNTER (OUTPATIENT)
Dept: PHYSICAL THERAPY | Facility: CLINIC | Age: 83
Setting detail: THERAPIES SERIES
Discharge: HOME | End: 2023-10-18
Attending: ORTHOPAEDIC SURGERY
Payer: MEDICARE

## 2023-10-18 PROCEDURE — 97110 THERAPEUTIC EXERCISES: CPT

## 2023-10-18 NOTE — FLOWSHEET NOTE
[] [address]  4601 [facility].  [phone]  F: [phone] [] [address]  [address]  P: [phone]  F: [phone] [x] [address]  151 [facility]  P: [phone]  F: [phone] [] [address] [facility] [address]  P: [phone]  F: [phone]     Physical Therapy Daily Treatment Note    Date:  10/18/2023  Patient Name:  Raj Fowler    :  1940  MRN: [id number]  Physician: Dr. Bianka Fitzpatrick: [facility] Medicare (no [facility])  Medical Diagnosis: S/p L TKA                     Rehab Codes: M25. 562,  R26.89   Onset date:  DOS: 22              Next Dr's appt.:10/6/23  Visit# / total visits: 40/46                                Cancels/No Shows: 1/0    Subjective:    Pain:  [] Yes  [x] No Location: L knee Pain Rating: (0-10 scale) 0/10  Pain altered Tx:  [x] No  [] Yes  Action:  Comments: Pt with no c/o pain today.      Objective:  Modalities: prn  Precautions: Close SBA to CGA with walking as pt becomes fatigued  Exercises:  Exercise Reps/ Time Weight/ Level Comments    Nustep 10' L1  x   Calf S 30\"x3 Wedge  3rd notch  x   HS S 30\"x3 6\" step  x          Calf raise/tibial raise 25x // bars  x   March  25x // bar  x   3-way hip  25x // bar  x   Step ups 25x // bar Bilaterally on 4\" x          Seated       LAQ 25x5\"   x          Supine        SAQ 25x5\"   x   Hip abd 2x10   x   Quad set 20x10\"   x           SLR 10x2    x          Sit to stand 20x  Mat table x   Other:     Instructions for next visit:        Treatment Charges: Mins Units   []  Modalities     [x]  Ther Exercise 40 3   []  Manual Therapy     []  Ther Activities     []  Neuro Re-ed     []  Vasocompression     [] Gait     []  Reeval     Total Treatment time 40 3   10 min

## 2023-10-23 ENCOUNTER — HOSPITAL ENCOUNTER (OUTPATIENT)
Dept: PHYSICAL THERAPY | Facility: CLINIC | Age: 83
Setting detail: THERAPIES SERIES
Discharge: HOME | End: 2023-10-23
Attending: ORTHOPAEDIC SURGERY
Payer: MEDICARE

## 2023-10-23 PROCEDURE — 97110 THERAPEUTIC EXERCISES: CPT

## 2023-10-23 NOTE — FLOWSHEET NOTE
40 3   10 min warmup not billed    Assessment: [x] Progressing toward goals. Focused on all standing program today to work on improving mechanics and promote further strengthening. Pt instructed to utilize // handles as needed for balance and less for UE strength support. Added in mini lunges, HS curls, and progressed table squats to mat taps. Pt was fatigued at the end of session. [] No change. [] Other:   [x] Patient would continue to benefit from skilled physical therapy services in order to: Increase ankle DF, Knee flexion ROM as well as L LE strength to normalize gait with cane    STG (to be met in 8 visits)  <3/10 knee pain to allow for normal ADLs  Normal ankle DF to aide in normal LE mechanics  Hip flexion and abd strength 5/5 B to allow for normal LE mechanics  Girth measure midcalf equal for improved L LE mobility with gait and transfers   Gait community distance with cane demonstrating more level hips and good foot clearance   IND in HEP with little to no cues    LTG (to be met in 16 visits)  0/10 knee pain to allow for return to all activity normally  Pt able to demonstrate good eccentric quad control on 6 inch step for normal stair negotiation  LEFI<30% limitation        Pt. Education:  [x] Yes  [] No  [] Reviewed Prior HEP/Ed  Method of Education: [x] Verbal  [] Demo  [] Written  Access Code: 6 [facility]  Date: 10/16/2023  Exercises  - Seated Long Arc Quad  - 1 x daily - 3 x weekly - 2 sets - 10 reps - 5 hold  - Clamshell  - 1 x daily - 3 x weekly - 2 sets - 10 reps  - Sidelying Hip Abduction  - 1 x daily - 3 x weekly - 2 sets - 10 reps  - Supine Knee Extension Strengthening  - 1 x daily - 3 x weekly - 2 sets - 10 reps - 5 hold  - Sit to Stand  - 1 x daily - 3 x weekly - 2 sets - 10 reps  - Small Range Straight Leg Raise  - 1 x daily - 3 x weekly - 2 sets - 10 reps  Comprehension of Education:  [x] Verbalizes understanding. [] Demonstrates understanding. [x] Needs review.   []

## 2023-10-25 ENCOUNTER — HOSPITAL ENCOUNTER (OUTPATIENT)
Dept: PHYSICAL THERAPY | Facility: CLINIC | Age: 83
Setting detail: THERAPIES SERIES
Discharge: HOME | End: 2023-10-25
Attending: ORTHOPAEDIC SURGERY
Payer: MEDICARE

## 2023-10-25 PROCEDURE — 97110 THERAPEUTIC EXERCISES: CPT

## 2023-10-25 NOTE — FLOWSHEET NOTE
[] [address]  4603 [facility].  [phone]  F: [phone] [] [address]  [address]  P: [phone]  F: [phone] [x] [address]  151 [facility]  P: [phone]  F: [phone] [] [address] [facility] [address]  P: [phone]  F: [phone]     Physical Therapy Daily Treatment Note    Date:  10/25/2023  Patient Name:  Alex Bower    :  1940  MRN: [id number]  Physician: Dr. Jordan Weeks: [facility] Medicare (no Tim Garcia)  Medical Diagnosis: S/p L TKA                     Rehab Codes: M25. 562,  R26.89   Onset date:  DOS: 22              Next 's appt.:PRN  Visit# / total visits: 42/46                                Cancels/No Shows: 1/0    Subjective:    Pain:  [] Yes  [x] No Location: L knee Pain Rating: (0-10 scale) 0/10  Pain altered Tx:  [x] No  [] Yes  Action:  Comments: Pt stated that he is feeling  Objective:  Modalities: prn  Precautions: Close SBA to CGA with walking as pt becomes fatigued  Exercises:  Exercise Reps/ Time Weight/ Level Comments    Nustep 10' L1  x   Calf S 30\"x3 Wedge  3rd notch  x   HS S 30\"x3 6\" step  x   Standing hip flex S 30\"x3 6\" step  x   Supine hip flex S 30\"x3 Off edge  x          Mini lunge 15x   x   HS curl 25x   x   3-way hip  25x // bar  x   Step ups 25x // bar Bilaterally on 6\" x          Seated       LAQ 25x5\"             Supine        SAQ 25x5\"      Hip abd 2x10   x   Quad set 20x10\"      SLR 10x2              Mat taps 25x  16 before rest required x   Other:     Instructions for next visit:        Treatment Charges: Mins Units   []  Modalities     [x]  Ther Exercise 40 3   []  Manual Therapy     []  Ther Activities     []  Neuro Re-ed     []  Vasocompression     [] Gait     []

## 2023-10-30 ENCOUNTER — APPOINTMENT (OUTPATIENT)
Dept: PHYSICAL THERAPY | Facility: CLINIC | Age: 83
End: 2023-10-30
Attending: ORTHOPAEDIC SURGERY
Payer: MEDICARE

## 2023-10-31 ENCOUNTER — HOSPITAL ENCOUNTER (OUTPATIENT)
Dept: PHYSICAL THERAPY | Facility: CLINIC | Age: 83
Setting detail: THERAPIES SERIES
Discharge: HOME | End: 2023-10-31
Attending: ORTHOPAEDIC SURGERY
Payer: MEDICARE

## 2023-10-31 PROCEDURE — 97110 THERAPEUTIC EXERCISES: CPT

## 2023-10-31 NOTE — FLOWSHEET NOTE
[] [address]  4601 [facility].  [phone]  F: [phone] [] 204 [facility]  [address]   [address]  P: [phone]  F: [phone] [x] [address]  151 [facility]  P: [phone]  F: [phone] [] [address] [facility] [address]  P: [phone]  F: [phone]     Physical Therapy Daily Treatment Note    Date:  10/31/2023  Patient Name:  Evelyn Reese    :  1940  MRN: [id number]  Physician: Dr. León Manual: [facility] Medicare (no Tessy Fletcher)  Medical Diagnosis: S/p L TKA                     Rehab Codes: M25. 562,  R26.89   Onset date:  DOS: 22              Next 's appt.:23 back doctor  Visit# / total visits: 43/46                              Cancels/No Shows: 1/0    Subjective:    Pain:  [] Yes  [x] No Location: L knee Pain Rating: (0-10 scale) 0/10  Pain altered Tx:  [x] No  [] Yes  Action:  Comments: Pt stated that he is feeling like the leg is coming along. Denies pain. States he has not seen his doctor for his back yet.  That is coming up next week  Objective:  Modalities: prn  Precautions: Close SBA to CGA with walking as pt becomes fatigued  Exercises:  Exercise Reps/ Time Weight/ Level Comments    Nustep 10' L1  x   Calf S 30\"x3 Wedge  3rd notch  x   HS S 30\"x3 6\" step     Standing hip flex S 30\"x3 6\" step  x   Supine hip flex S 30\"x3 Off edge            Mini lunge 15x   x   HS curl 25x   x   3-way hip  2x10 Orange // bar  x   Step ups 25x // bar Bilaterally on 6\" x          Seated       LAQ x20 9#  x   Hip flexion 2x10 9#  x   Supine        SAQ 25x5\"      Hip abd 2x10   x   Quad set 20x10\"      SLR 10x2              Mat taps 25x  16 before rest required x   Other:     Instructions for next visit:    ROM  Knee flexion 0-95

## 2023-11-01 ENCOUNTER — HOSPITAL ENCOUNTER (OUTPATIENT)
Dept: PHYSICAL THERAPY | Facility: CLINIC | Age: 83
Setting detail: THERAPIES SERIES
Discharge: HOME | End: 2023-11-01
Attending: ORTHOPAEDIC SURGERY

## 2023-11-01 NOTE — FLOWSHEET NOTE
[] [address]  4600 [facility].  [phone]  F: [phone] [] 204 [facility]  [address]   [address]  P: [phone]  F: [phone] [] [address]  151 [facility]  P: [phone]  F: [phone] [] [facility] [place]: [phone]  F: [phone] [] 224 [facility]  [facility]   [address]   P: [phone]  F: [phone]  [] 9[address] [facility].   P: [phone]  F: [phone] [] 205 [facility]  2000 [facility]   [address]  P: [phone]  F: [phone] [] 224 [facility]  7[address] [facility]  [place]: [phone]  F: [phone] [] [address]  [place]: [phone]  F: [phone]      Therapy Cancel/No Show note    Date: 2023  Patient: Brian Traore  : 1940  MRN: [id number]    Cancels/No Shows to date:     For today's appointment patient:    [x]  Cancelled    [] Rescheduled appointment    [] No-show     Reason given by patient:    []  Patient ill    []  Conflicting appointment    [] No transportation      [] Conflict with work    [] No reason given    [] Weather related    [] [id number]    [] Other:      Comments: Patient called to  due to falling at home      [] Next appointment was confirmed    Electronically signed by: Melissa Campbell

## 2023-11-03 RX ORDER — ASPIRIN 81 MG/1
81 TABLET, COATED ORAL DAILY
Qty: 90 TABLET | Refills: 1 | Status: SHIPPED | OUTPATIENT
Start: 2023-11-03

## 2023-11-06 ENCOUNTER — HOSPITAL ENCOUNTER (OUTPATIENT)
Dept: PHYSICAL THERAPY | Facility: CLINIC | Age: 83
Setting detail: THERAPIES SERIES
Discharge: HOME | End: 2023-11-06
Attending: ORTHOPAEDIC SURGERY
Payer: MEDICARE

## 2023-11-06 PROCEDURE — 97110 THERAPEUTIC EXERCISES: CPT

## 2023-11-06 NOTE — FLOWSHEET NOTE
[x]  Ther Exercise 40 3   []  Manual Therapy     []  Ther Activities     []  Neuro Re-ed     []  Vasocompression     [] Gait     []  Reeval     Total Treatment time 40 3   10 min warmup not billed    Assessment: [x] Progressing toward goals. Focused on standing program today to work on improving mechanics along with resting mechanics d/t pt tending to get into flexed hip position when resting. Pt did require mod amount of rest breaks d/t fatigue. Pt able to complete 25 reps with no breaks. [] No change. [] Other:   [x] Patient would continue to benefit from skilled physical therapy services in order to: Increase ankle DF, Knee flexion ROM as well as L LE strength to normalize gait with cane    STG (to be met in 8 visits)  <3/10 knee pain to allow for normal ADLs  Normal ankle DF to aide in normal LE mechanics  Hip flexion and abd strength 5/5 B to allow for normal LE mechanics  Girth measure midcalf equal for improved L LE mobility with gait and transfers   Gait community distance with cane demonstrating more level hips and good foot clearance   IND in HEP with little to no cues    LTG (to be met in 16 visits)  0/10 knee pain to allow for return to all activity normally  Pt able to demonstrate good eccentric quad control on 6 inch step for normal stair negotiation  LEFI<30% limitation        Pt.  Education:  [x] Yes  [] No  [] Reviewed Prior HEP/Ed  Method of Education: [x] Verbal  [] Demo  [] Written  Access Code: 6 [facility]  Date: 10/16/2023  Exercises  - Seated Long Arc Quad  - 1 x daily - 3 x weekly - 2 sets - 10 reps - 5 hold  - Clamshell  - 1 x daily - 3 x weekly - 2 sets - 10 reps  - Sidelying Hip Abduction  - 1 x daily - 3 x weekly - 2 sets - 10 reps  - Supine Knee Extension Strengthening  - 1 x daily - 3 x weekly - 2 sets - 10 reps - 5 hold  - Sit to Stand  - 1 x daily - 3 x weekly - 2 sets - 10 reps  - Small Range Straight Leg Raise  - 1 x daily - 3 x weekly - 2 sets - 10 reps  Comprehension of

## 2023-11-08 ENCOUNTER — HOSPITAL ENCOUNTER (OUTPATIENT)
Dept: PHYSICAL THERAPY | Facility: CLINIC | Age: 83
Setting detail: THERAPIES SERIES
Discharge: HOME | End: 2023-11-08
Attending: ORTHOPAEDIC SURGERY
Payer: MEDICARE

## 2023-11-08 PROCEDURE — 97110 THERAPEUTIC EXERCISES: CPT

## 2023-11-08 NOTE — FLOWSHEET NOTE
[] 8[address] [facility]  4600 [facility].  [phone]  F: [phone] [] [address]  [address]  P: [phone]  F: [phone] [x] [address]  151 [facility]  P: [phone]  F: [phone] [] [address] [facility] [address]  P: [phone]  F: [phone]     Physical Therapy Daily Treatment Note    Date:  2023  Patient Name:  Marlon Ackerman    :  1940  MRN: [id number]  Physician: Dr. Cornell Nap: [address] Medicare (no [facility])  Medical Diagnosis: S/p L TKA                     Rehab Codes: M25. 562,  R26.89   Onset date:  DOS: 22              Next 's appt.:11/10/23  Visit# / total visits: 44/46                                Cancels/No Shows: 2/0    Subjective:    Pain:  [] Yes  [x] No Location: L knee Pain Rating: (0-10 scale) 0/10  Pain altered Tx:  [x] No  [] Yes  Action:  Comments: Pt arrived mentioned limited time today d/t another appointment.    Objective:  Modalities: prn  Precautions: Close SBA to CGA with walking as pt becomes fatigued  Exercises:  Exercise Reps/ Time Weight/ Level Comments    Nustep 5' L5     Calf S 30\"x3 Wedge  3rd notch     HS S 30\"x3 6\" step     Standing hip flex S 30\"x3 6\" step     Supine hip flex S 30\"x3 Off edge            Mini lunge 20x      HS curl 25x       3-way hip  25x // bar     Step ups 25x // bar Bilaterally on 6\"           Seated       LAQ 25x5\"             Supine        SAQ 25x5\"      Hip abd 2x10      Quad set 20x10\"      SLR 10x2              Mat taps 25x       Other:     Instructions for next visit:        Treatment Charges: Mins Units   []  Modalities     [x]  Ther Exercise 30 2   []  Manual Therapy     []  Ther Activities     []  Neuro Re-ed     []  Vasocompression     [] Gait

## 2023-11-10 PROBLEM — D50.8 IRON DEFICIENCY ANEMIA SECONDARY TO INADEQUATE DIETARY IRON INTAKE: Status: ACTIVE | Noted: 2023-11-10

## 2023-11-13 ENCOUNTER — HOSPITAL ENCOUNTER (OUTPATIENT)
Dept: PHYSICAL THERAPY | Facility: CLINIC | Age: 83
Setting detail: THERAPIES SERIES
Discharge: HOME | End: 2023-11-13
Attending: ORTHOPAEDIC SURGERY
Payer: MEDICARE

## 2023-11-13 PROCEDURE — 97110 THERAPEUTIC EXERCISES: CPT

## 2023-11-13 NOTE — FLOWSHEET NOTE
[] [address]  4606 [facility].  [phone]  F: [phone] [] 204 [facility]  [address]   [address]  P: [phone]  F: [phone] [x] [address]  151 [facility]  P: [phone]  F: [phone] [] [address] [facility]  P: [phone]  F: [phone]     Physical Therapy Daily Treatment Note    Date:  2023  Patient Name:  Danny Lopez    :  1940  MRN: [id number]  Physician: Dr. Joslyn Yoder: [facility] Medicare (no [facility])  Medical Diagnosis: S/p L TKA                     Rehab Codes: M25. 562,  R26.89   Onset date:  DOS: 22              Next 's appt.:11/10/23  Visit# / total visits: 45/46                                Cancels/No Shows: 2/0    Subjective:    Pain:  [] Yes  [x] No Location: L knee Pain Rating: (0-10 scale) 0/10  Pain altered Tx:  [x] No  [] Yes  Action:  Comments: Pt with minimal c/o today. Pt mentioned that his back doctor is sending him to [place] for injections to see if it helps.    Objective:  Modalities: prn  Precautions: Close SBA to CGA with walking as pt becomes fatigued  Exercises:  Exercise Reps/ Time Weight/ Level Comments    Nustep 10' L5  x   Calf S 30\"x3 Wedge  3rd notch  x   HS S 30\"x3 6\" step  x   Standing hip flex S 30\"x3 6\" step     Supine hip flex S 30\"x3 Off edge            Mini lunge 20x 2#  x   HS curl 25x 2#  x    3-way hip  25x 2#  x   Step ups 25x // bar Bilaterally on 6\" x          Seated       LAQ 25x5\" 2#  x          Supine        SAQ 25x5\" 2#  x   Hip abd 2x10 2#  x   Quad set 20x10\"      SLR 10x2              Mat taps 25x    x   Other:     Instructions for next visit:        Treatment Charges: Mins Units   []  Modalities     [x]  Ther Exercise 48 3   []  Manual Therapy     []

## 2023-11-15 ENCOUNTER — HOSPITAL ENCOUNTER (OUTPATIENT)
Dept: PHYSICAL THERAPY | Facility: CLINIC | Age: 83
Setting detail: THERAPIES SERIES
Discharge: HOME | End: 2023-11-15
Attending: ORTHOPAEDIC SURGERY
Payer: MEDICARE

## 2023-11-15 PROCEDURE — 97110 THERAPEUTIC EXERCISES: CPT

## 2023-11-15 NOTE — FLOWSHEET NOTE
not billed    Assessment: [x] Progressing toward goals. Pt does demo increased difficulty with L LE lead step up compared to R, but is able to complete with the aide of 1 UE support. Continued with 2# ankle weight for standing and table program. Pt did fatigue during last portion of side lying abd and required seated rest break before finishing last exercises of sit to stands. [] No change. [] Other:   [x] Patient would continue to benefit from skilled physical therapy services in order to: Increase ankle DF, Knee flexion ROM as well as L LE strength to normalize gait with cane    STG (to be met in 8 visits)  <3/10 knee pain to allow for normal ADLs  Normal ankle DF to aide in normal LE mechanics  Hip flexion and abd strength 5/5 B to allow for normal LE mechanics  Girth measure midcalf equal for improved L LE mobility with gait and transfers   Gait community distance with cane demonstrating more level hips and good foot clearance   IND in HEP with little to no cues    LTG (to be met in 16 visits)  0/10 knee pain to allow for return to all activity normally  Pt able to demonstrate good eccentric quad control on 6 inch step for normal stair negotiation  LEFI<30% limitation        Pt. Education:  [x] Yes  [] No  [] Reviewed Prior HEP/Ed  Method of Education: [x] Verbal  [] Demo  [] Written  Access Code: 6 [facility]  Date: 10/16/2023  Exercises  - Seated Long Arc Quad  - 1 x daily - 3 x weekly - 2 sets - 10 reps - 5 hold  - Clamshell  - 1 x daily - 3 x weekly - 2 sets - 10 reps  - Sidelying Hip Abduction  - 1 x daily - 3 x weekly - 2 sets - 10 reps  - Supine Knee Extension Strengthening  - 1 x daily - 3 x weekly - 2 sets - 10 reps - 5 hold  - Sit to Stand  - 1 x daily - 3 x weekly - 2 sets - 10 reps  - Small Range Straight Leg Raise  - 1 x daily - 3 x weekly - 2 sets - 10 reps  Comprehension of Education:  [x] Verbalizes understanding. [] Demonstrates understanding. [x] Needs review.   [] Demonstrates/verbalizes

## 2023-11-20 ENCOUNTER — HOSPITAL ENCOUNTER (OUTPATIENT)
Dept: PHYSICAL THERAPY | Facility: CLINIC | Age: 83
Setting detail: THERAPIES SERIES
Discharge: HOME | End: 2023-11-20
Attending: ORTHOPAEDIC SURGERY
Payer: MEDICARE

## 2023-11-20 PROCEDURE — 97110 THERAPEUTIC EXERCISES: CPT

## 2023-11-20 NOTE — FLOWSHEET NOTE
Needs review. [] Demonstrates/verbalizes HEP/Ed previously given. Plan: [x] Continue current frequency toward long and short term goals.     [x] Specific Instructions for subsequent treatments: see above      Time In: 1:00 pm         Time Out: 1:55 pm    Electronically signed by:  Mark Olivo PTA

## 2023-11-22 ENCOUNTER — HOSPITAL ENCOUNTER (OUTPATIENT)
Dept: PHYSICAL THERAPY | Facility: CLINIC | Age: 83
Setting detail: THERAPIES SERIES
Discharge: HOME | End: 2023-11-22
Attending: ORTHOPAEDIC SURGERY
Payer: MEDICARE

## 2023-11-22 NOTE — FLOWSHEET NOTE
[] [address]  4600 [facility].  [phone]  F: [phone] [] 204 [facility]  [address]   [address]  P: [phone]  F: [phone] [] [address]  151 [facility]  P: [phone]  F: [phone] [] [place]: [phone]  F: [phone] [] 224 [facility]  One [facility]   [address]   P: [phone]  F: [phone]  [] 5[address] [facility].   P: [phone]  F: [phone] [] 205 [facility]  2000 [facility].   [address]  P: [phone]  F: [phone] [] 224 [facility]  7[address] [facility]  [place]: [phone]  F: [phone] [] [address] [facility] [address]  [place]: [phone]  F: [phone]      Therapy Cancel/No Show note    Date: 2023  Patient: Evelyn Reese  : 1940  MRN: [id number]    Cancels/No Shows to date: 0    For today's appointment patient:    [x]  Cancelled    [] Rescheduled appointment    [] No-show     Reason given by patient:    []  Patient ill    []  Conflicting appointment    [] No transportation      [] Conflict with work    [] No reason given    [] Weather related    [] COVID-19    [x] Other:      Comments: Patient's PT cancelled at therapists request. Can return after injections and when he has a new referral.    [] Next appointment was confirmed    Electronically signed by: Priti King

## 2023-11-27 ENCOUNTER — APPOINTMENT (OUTPATIENT)
Dept: PHYSICAL THERAPY | Facility: CLINIC | Age: 83
End: 2023-11-27
Attending: ORTHOPAEDIC SURGERY
Payer: MEDICARE

## 2023-11-29 ENCOUNTER — APPOINTMENT (OUTPATIENT)
Dept: PHYSICAL THERAPY | Facility: CLINIC | Age: 83
End: 2023-11-29
Attending: ORTHOPAEDIC SURGERY
Payer: MEDICARE

## 2024-01-24 ENCOUNTER — OFFICE VISIT (OUTPATIENT)
Dept: GASTROENTEROLOGY | Age: 84
End: 2024-01-24
Payer: MEDICARE

## 2024-01-24 VITALS
HEIGHT: 69 IN | SYSTOLIC BLOOD PRESSURE: 128 MMHG | TEMPERATURE: 97.9 F | BODY MASS INDEX: 38.86 KG/M2 | OXYGEN SATURATION: 98 % | DIASTOLIC BLOOD PRESSURE: 68 MMHG | WEIGHT: 262.4 LBS | HEART RATE: 87 BPM

## 2024-01-24 DIAGNOSIS — D64.9 CHRONIC ANEMIA: ICD-10-CM

## 2024-01-24 DIAGNOSIS — K59.00 CONSTIPATION, UNSPECIFIED CONSTIPATION TYPE: Primary | ICD-10-CM

## 2024-01-24 PROCEDURE — 99204 OFFICE O/P NEW MOD 45 MIN: CPT | Performed by: INTERNAL MEDICINE

## 2024-01-24 PROCEDURE — 1123F ACP DISCUSS/DSCN MKR DOCD: CPT | Performed by: INTERNAL MEDICINE

## 2024-01-24 PROCEDURE — 3078F DIAST BP <80 MM HG: CPT | Performed by: INTERNAL MEDICINE

## 2024-01-24 PROCEDURE — 3074F SYST BP LT 130 MM HG: CPT | Performed by: INTERNAL MEDICINE

## 2024-01-24 RX ORDER — POLYETHYLENE GLYCOL 3350 17 G/17G
34 POWDER, FOR SOLUTION ORAL DAILY PRN
Qty: 510 G | Refills: 1 | Status: SHIPPED | OUTPATIENT
Start: 2024-01-24 | End: 2024-02-23

## 2024-01-24 ASSESSMENT — ENCOUNTER SYMPTOMS
RECTAL PAIN: 0
ABDOMINAL DISTENTION: 1
DIARRHEA: 0
ANAL BLEEDING: 0
CONSTIPATION: 1
NAUSEA: 0
BLOOD IN STOOL: 0
TROUBLE SWALLOWING: 0
ABDOMINAL PAIN: 1
VOMITING: 0

## 2024-01-24 NOTE — PROGRESS NOTES
education and counseling.     Thank you for allowing me to participate in the care of Mr. Adrian. For any further questions please do not hesitate to contact me.    Note is dictated utilizing voice recognition software. Unfortunately this leads to occasional typographical errors. Please contact our office if you have any questions.    I have reviewed and agree with the MA/LUCAS ROS.     Jonah Beasley MD, FACP, FACG  Board Certified in Gastroenterology and Internal Medicine  [facility]  Office #: [phone]

## 2024-01-26 DIAGNOSIS — K59.00 CONSTIPATION, UNSPECIFIED CONSTIPATION TYPE: Primary | ICD-10-CM

## 2024-01-26 NOTE — TELEPHONE ENCOUNTER
Writer PHAN asking for patient to contact our office regarding his bowel prep medication-pt has been prescribed sutab due to allergies to miralax and golytely. Sutab instructions mailed out to pt's home address.

## 2024-01-26 NOTE — TELEPHONE ENCOUNTER
Sutab sent to pharmacy. Writer received prior authorization request. PA started via [facility].

## 2024-01-26 NOTE — TELEPHONE ENCOUNTER
[employer] approved via CMM. Writer contacted [facility] and notified them of the approval. Please contact patient and discuss new prep and provide him with new prep instructions. Thank you.

## 2024-01-31 NOTE — PRE-PROCEDURE INSTRUCTIONS
Have you received your Prep? Any questions with prep instructions?   Only Clear Liquid Diet day before.  Nothing to eat after midnight day before procedure.  Are you taking any blood thinners? If so, you need to Stop.  Remove any jewelry and body piercings.  Do you wear glasses? If so, please bring a case to store them in.  Are you having any Covid symptoms?  Do you have any new rashes, infections, etc. that we should be aware of?  Do you have a ride home the day of surgery? It cannot be a cab or medical transportation.  Verify surgery time/date and what time to arrive at hospital.  NO ANSWER, DWIGHT LEFT

## 2024-01-31 NOTE — PRE-PROCEDURE INSTRUCTIONS
No answer, left message ?                             Unable to leave message ?    When were you told to arrive at hospital ? 6251     Do you have a  ?yes    Are you on any blood thinners ? no                    If yes when did you stop taking ?    Do you have your prep Rx filled and instruction ? no     Nothing to eat the day before , only clear liquids.yes    Are you experiencing any covid symptoms ? no    Do you have any infections or rash we should be aware of ?no      Do you have the Hibiclens soap to use the night before and the morning of surgery ?n/A    Nothing to eat or drink after midnight, only a sip of water to take any medication instructed to take the night before.yes  Wear comfortable clothing, leave any valuables at home, remove any jewelry and body piercing . yes

## 2024-02-01 ENCOUNTER — ANESTHESIA EVENT (OUTPATIENT)
Dept: ENDOSCOPY | Age: 84
End: 2024-02-01
Payer: MEDICARE

## 2024-02-02 ENCOUNTER — ANESTHESIA (OUTPATIENT)
Dept: ENDOSCOPY | Age: 84
End: 2024-02-02
Payer: MEDICARE

## 2024-02-02 ENCOUNTER — HOSPITAL ENCOUNTER (OUTPATIENT)
Age: 84
Setting detail: OUTPATIENT SURGERY
Discharge: HOME | End: 2024-02-02
Attending: INTERNAL MEDICINE | Admitting: INTERNAL MEDICINE
Payer: MEDICARE

## 2024-02-02 VITALS
TEMPERATURE: 97.4 F | WEIGHT: 249 LBS | HEIGHT: 69 IN | BODY MASS INDEX: 36.88 KG/M2 | HEART RATE: 84 BPM | DIASTOLIC BLOOD PRESSURE: 77 MMHG | RESPIRATION RATE: 19 BRPM | SYSTOLIC BLOOD PRESSURE: 134 MMHG | OXYGEN SATURATION: 99 %

## 2024-02-02 PROCEDURE — 2500000003 HC RX 250 WO HCPCS: Performed by: ANESTHESIOLOGY

## 2024-02-02 PROCEDURE — 2709999900 HC NON-CHARGEABLE SUPPLY: Performed by: INTERNAL MEDICINE

## 2024-02-02 PROCEDURE — 7100000010 HC PHASE II RECOVERY - FIRST 15 MIN: Performed by: INTERNAL MEDICINE

## 2024-02-02 PROCEDURE — 7100000011 HC PHASE II RECOVERY - ADDTL 15 MIN: Performed by: INTERNAL MEDICINE

## 2024-02-02 PROCEDURE — 2500000003 HC RX 250 WO HCPCS: Performed by: NURSE ANESTHETIST, CERTIFIED REGISTERED

## 2024-02-02 PROCEDURE — 3609027000 HC COLONOSCOPY: Performed by: INTERNAL MEDICINE

## 2024-02-02 PROCEDURE — 3700000001 HC ADD 15 MINUTES (ANESTHESIA): Performed by: INTERNAL MEDICINE

## 2024-02-02 PROCEDURE — 6360000002 HC RX W HCPCS: Performed by: NURSE ANESTHETIST, CERTIFIED REGISTERED

## 2024-02-02 PROCEDURE — 2580000003 HC RX 258: Performed by: ANESTHESIOLOGY

## 2024-02-02 PROCEDURE — 3700000000 HC ANESTHESIA ATTENDED CARE: Performed by: INTERNAL MEDICINE

## 2024-02-02 RX ORDER — LIDOCAINE HYDROCHLORIDE 10 MG/ML
1 INJECTION, SOLUTION EPIDURAL; INFILTRATION; INTRACAUDAL; PERINEURAL
Status: COMPLETED | OUTPATIENT
Start: 2024-02-02 | End: 2024-02-02

## 2024-02-02 RX ORDER — SODIUM CHLORIDE 0.9 % (FLUSH) 0.9 %
5-40 SYRINGE (ML) INJECTION PRN
Status: DISCONTINUED | OUTPATIENT
Start: 2024-02-02 | End: 2024-02-02 | Stop reason: HOSPADM

## 2024-02-02 RX ORDER — PROPOFOL 10 MG/ML
INJECTION, EMULSION INTRAVENOUS PRN
Status: DISCONTINUED | OUTPATIENT
Start: 2024-02-02 | End: 2024-02-02 | Stop reason: SDUPTHER

## 2024-02-02 RX ORDER — SODIUM CHLORIDE 9 MG/ML
INJECTION, SOLUTION INTRAVENOUS CONTINUOUS
Status: DISCONTINUED | OUTPATIENT
Start: 2024-02-02 | End: 2024-02-02 | Stop reason: HOSPADM

## 2024-02-02 RX ORDER — SODIUM CHLORIDE 9 MG/ML
INJECTION, SOLUTION INTRAVENOUS PRN
Status: DISCONTINUED | OUTPATIENT
Start: 2024-02-02 | End: 2024-02-02 | Stop reason: HOSPADM

## 2024-02-02 RX ORDER — SODIUM CHLORIDE 0.9 % (FLUSH) 0.9 %
5-40 SYRINGE (ML) INJECTION EVERY 12 HOURS SCHEDULED
Status: DISCONTINUED | OUTPATIENT
Start: 2024-02-02 | End: 2024-02-02 | Stop reason: HOSPADM

## 2024-02-02 RX ORDER — PROPOFOL 10 MG/ML
INJECTION, EMULSION INTRAVENOUS CONTINUOUS PRN
Status: DISCONTINUED | OUTPATIENT
Start: 2024-02-02 | End: 2024-02-02 | Stop reason: SDUPTHER

## 2024-02-02 RX ORDER — LIDOCAINE HYDROCHLORIDE 20 MG/ML
INJECTION, SOLUTION EPIDURAL; INFILTRATION; INTRACAUDAL; PERINEURAL PRN
Status: DISCONTINUED | OUTPATIENT
Start: 2024-02-02 | End: 2024-02-02 | Stop reason: SDUPTHER

## 2024-02-02 RX ADMIN — SODIUM CHLORIDE: 9 INJECTION, SOLUTION INTRAVENOUS at 06:50

## 2024-02-02 RX ADMIN — LIDOCAINE HYDROCHLORIDE 40 MG: 20 INJECTION, SOLUTION EPIDURAL; INFILTRATION; INTRACAUDAL; PERINEURAL at 08:00

## 2024-02-02 RX ADMIN — PROPOFOL 50 MG: 10 INJECTION, EMULSION INTRAVENOUS at 08:00

## 2024-02-02 RX ADMIN — PROPOFOL 100 MCG/KG/MIN: 10 INJECTION, EMULSION INTRAVENOUS at 08:00

## 2024-02-02 RX ADMIN — LIDOCAINE HYDROCHLORIDE 1 ML: 10 INJECTION, SOLUTION EPIDURAL; INFILTRATION; INTRACAUDAL; PERINEURAL at 06:50

## 2024-02-02 ASSESSMENT — PAIN - FUNCTIONAL ASSESSMENT: PAIN_FUNCTIONAL_ASSESSMENT: 0-10

## 2024-02-02 NOTE — H&P
10/04/2023    Acute cystitis without hematuria 09/06/2023    Status post appendectomy 09/06/2023    Acute appendicitis 08/08/2023    Swelling of lower leg 08/03/2023    Nocturia 08/03/2023    Prostate enlargement 05/03/2023    Tear of left gluteus jez muscle 04/27/2023    Cold intolerance 04/12/2022    Peripheral vascular disease, unspecified ([facility]) 03/22/2022    Other chest pain 02/22/2022    Stage 3b chronic kidney disease ([facility]) 12/13/2021    Primary osteoarthritis of left hip 12/06/2021    Ingrown toenail 11/02/2021    Spondylosis of thoracic region without myelopathy or radiculopathy 10/29/2021    Localized edema 09/20/2021    Osteoarthritis of spine with radiculopathy, lumbar region 09/20/2021    Numbness 09/13/2021    Vitamin B12 deficiency 05/25/2021    Spinal stenosis 03/17/2021    DDD (degenerative disc disease), cervical 03/17/2021    Trapezius muscle spasm 02/03/2021    Thoracic spine pain 02/03/2021    Slow transit constipation 12/19/2019    Black stool 12/19/2019    Primary osteoarthritis of right hip 09/06/2019    Acquired trigger finger 08/07/2019    Vitamin D deficiency 08/07/2019    Prediabetes 06/10/2019    Primary osteoarthritis of left knee 05/01/2019    Depression 12/27/2018    Positive depression screening 12/27/2018    Obesity, Class III, BMI 40-49.9 (morbid obesity) ([facility]) 10/15/2018    Lumbar radiculopathy 10/15/2018    Arthritis of right knee 09/17/2018    JEMAL on CPAP 09/17/2018    Bloating 06/21/2018    On potassium wasting diuretic therapy 04/24/2018    Edema 04/24/2018    Primary osteoarthritis of both knees 03/03/2016    Cellulitis of left lower leg 03/03/2016    Fatigue 02/07/2016    Hypertension, essential 10/21/2015    Renal insufficiency 10/21/2015           Note marked for cosign by provider      BRISEIDA WONG, APRN - CNP on 2/2/2024 at 5:54 AM

## 2024-02-02 NOTE — OP NOTE
COLONOSCOPY    DATE OF PROCEDURE: 2/2/2024    SURGEON: Jonah Beasley MD    ASSISTANT: None    PREOPERATIVE DIAGNOSIS: Screening exam.  History of severe constipation.    POSTOPERATIVE DIAGNOSIS: Inadequate preparation.  No obvious lesions seen up to the right colon.  However significant lesions can be missed in the right colon and also parts of the remaining colon.    OPERATION: Total colonoscopy     ANESTHESIA: MAC    ESTIMATED BLOOD LOSS: None    COMPLICATIONS: None     SPECIMENS:  Was Not Obtained    HISTORY: The patient is a 83 y.o. year old male with history of above preop diagnosis.  I recommended colonoscopy with possible biopsy or polypectomy and I explained the risk, benefits, expected outcome, and alternatives to the procedure.  Risks included but are not limited to bleeding, infection, respiratory distress, hypotension, and perforation of the colon and possibility of missing a lesion.  The patient understands and is in agreement.      PROCEDURE:  The patient's SPO2 remained above 90% throughout the procedure. Digital rectal exam was normal.  The colonoscope was inserted through the anus into the rectum and advanced under direct vision to the cecum with difficulty.  Terminal ileum was examined for approximately 2 inches.  The prep was inadequate.      Findings:  Terminal ileum: not examined    Cecum/Ascending colon: not examined completely.  Right colon and cecum filled with stool could not be aspirated as the scope was plugging up.    Transverse colon: No apple core lesion seen with the limitations because inadequate preparation    Descending/Sigmoid colon: No large polyps or apple core lesion seen.    Rectum/Anus: examined in normal and retroflexed positions and was inadequate preparation.    Withdrawal Time was (minutes): 10          The colon was decompressed and the scope was removed.  The patient tolerated the procedure without unusual events.     During the procedure, the patient's blood

## 2024-02-02 NOTE — ANESTHESIA PRE PROCEDURE
Department of Anesthesiology  Preprocedure Note       Name:  Donny Adrian   Age:  83 y.o.  :  1940                                          MRN:  [id number]         Date:  2024      Surgeon: Surgeon(s):  Jonah Beasley MD    Procedure: Procedure(s):  COLONOSCOPY DIAGNOSTIC    Medications prior to admission:   Prior to Admission medications    Medication Sig Start Date End Date Taking? Authorizing Provider   Sodium Sulfate-Mag Sulfate-KCl 0594-788-206 MG TABS Please follow instructions as given to you by your provider 24   Amaury Hinds APRN - NP   Sodium Sulfate-Mag Sulfate-KCl 9608-484-858 MG TABS Take as directed by physician office for bowel prep. 24   Amaury Hinds APRN - NP   polyethylene glycol (GLYCOLAX) 17 GM/SCOOP powder Take 34 g by mouth daily as needed (constipation) 24  Jonah Beasley MD   B Complex Vitamins (VITAMIN B COMPLEX) TABS Take 1 tablet by mouth every morning (before breakfast)  Patient not taking: Reported on 2024 12/8/23 3/7/24  Darion Tucker MD   amLODIPine (NORVASC) 5 MG tablet Take 1 tablet by mouth daily 12/8/23 3/7/24  Darion Tucker MD   chlorthalidone (HYGROTEN) 50 MG tablet Take 1 tablet by mouth daily 12/8/23 3/7/24  Darion Tucker MD   Cholecalciferol (VITAMIN D3) 125 MCG (5000 UT) CAPS Take 1 capsule by mouth Daily 12/8/23 3/7/24  Darion Tucker MD   DULoxetine (CYMBALTA) 60 MG extended release capsule Take 1 capsule by mouth daily 12/8/23 3/7/24  Darion Tucker MD   tadalafil (CIALIS) 5 MG tablet Take 1 tablet by mouth Twice a Week 12/11/23 1/10/24  Darion Tucker MD   ferrous sulfate (IRON 325) 325 (65 Fe) MG tablet Take 2 tablets by mouth every morning (before breakfast) 23  Darion Tucker MD   aspirin (ASPIRIN LOW DOSE) 81 MG EC tablet Take 1 tablet by mouth daily 23  Darion Tucker MD   ammonium lactate (LAC-HYDRIN) 12 % cream Apply topically once

## 2024-02-02 NOTE — ANESTHESIA POSTPROCEDURE EVALUATION
Department of Anesthesiology  Postprocedure Note    Patient: Donny Adrian  MRN: [id number]  YOB: 1940  Date of evaluation: 2/2/2024    Procedure Summary       Date: 02/02/24 Room / Location: [place] [address] / [facility]    Anesthesia Start: 0756 Anesthesia Stop: 0821    Procedure: COLONOSCOPY DIAGNOSTIC Diagnosis:       Constipation, unspecified constipation type      (Constipation, unspecified constipation type [K59.00])    Surgeons: Jonah Beasley MD Responsible Provider: April Lara MD    Anesthesia Type: general ASA Status: 3            Anesthesia Type: No value filed.    Shawnee Phase I:      Shawnee Phase II: Shawnee Score: 10    Anesthesia Post Evaluation    Comments: POST- ANESTHESIA EVALUATION       Pt Name: Donny Adrian  MRN: [id number]  YOB: 1940  Date of evaluation: 2/2/2024  Time:  9:02 AM      /77   Pulse 84   Temp 97.4 °F (36.3 °C)   Resp 19   Ht 1.753 m (5' 9\")   Wt 112.9 kg (249 lb)   SpO2 99%   BMI 36.77 kg/m²      Consciousness Level  Awake  Cardiopulmonary Status  Stable  Pain Adequately Treated YES  Nausea / Vomiting  NO  Adequate Hydration  YES  Anesthesia Related Complications NONE      Electronically signed by April Lara MD on 2/2/2024 at 9:02 AM      No notable events documented.

## 2024-02-02 NOTE — DISCHARGE INSTRUCTIONS
little fiber.   Add more fiber by topping your yogurt or cottage cheese with fresh fruit, whole grain or bran cereals, nuts, or seeds.   Meats and Beans   All beans and peas, especially Garbanzo beans, kidney beans, lentils, lima beans, split peas, and small beans All nuts and seeds, especially almonds, peanuts, Brazil nuts, cashews, peanut butter, walnuts, sesame and sunflower seeds All meat, poultry, fish, and eggs   Increase fiber in meat dishes by adding small beans, kidney beans, black-eyed peas, bran, or oatmeal. If you are following a low-fat diet, use nuts and seeds only in moderation.   Fats and Oils   All in moderation   Fats and oils do not provide fiber   Snacks, Sweets, and Condiments   Fruit Nuts Popcorn, whole-wheat pretzels, or trail mix made with dried fruits, nuts, and seeds Cakes, breads, and cookies made with oatmeal or whole-wheat flour   Most snack foods do not provide much fiber. Choose snacks with at least 2 grams of fiber per serving.     Last Reviewed: March 2011 Laura Enriquez MS, MPH, RD   Updated: 3/29/2011     Not cleaned out at all

## 2024-03-13 PROBLEM — K59.04 CHRONIC IDIOPATHIC CONSTIPATION: Status: ACTIVE | Noted: 2024-03-13

## 2024-03-13 PROBLEM — M25.512 ACUTE PAIN OF LEFT SHOULDER: Status: ACTIVE | Noted: 2024-03-13

## 2024-03-15 ENCOUNTER — HOSPITAL ENCOUNTER (OUTPATIENT)
Age: 84
Discharge: HOME | End: 2024-03-15
Payer: MEDICARE

## 2024-03-15 ENCOUNTER — HOSPITAL ENCOUNTER (OUTPATIENT)
Age: 84
End: 2024-03-15
Payer: MEDICARE

## 2024-03-15 ENCOUNTER — HOSPITAL ENCOUNTER (OUTPATIENT)
Dept: GENERAL RADIOLOGY | Age: 84
End: 2024-03-15
Payer: MEDICARE

## 2024-03-15 DIAGNOSIS — M25.512 ACUTE PAIN OF LEFT SHOULDER: ICD-10-CM

## 2024-03-15 DIAGNOSIS — D50.0 IRON DEFICIENCY ANEMIA DUE TO CHRONIC BLOOD LOSS: ICD-10-CM

## 2024-03-15 DIAGNOSIS — I10 HYPERTENSION, ESSENTIAL: ICD-10-CM

## 2024-03-15 DIAGNOSIS — E55.9 VITAMIN D DEFICIENCY: ICD-10-CM

## 2024-03-15 DIAGNOSIS — R53.83 OTHER FATIGUE: ICD-10-CM

## 2024-03-15 LAB
25(OH)D3 SERPL-MCNC: 93.8 NG/ML (ref 30–100)
ALBUMIN SERPL-MCNC: 4.2 G/DL (ref 3.5–5.2)
ALBUMIN/GLOB SERPL: 1 {RATIO} (ref 1–2.5)
ALP SERPL-CCNC: 63 U/L (ref 40–129)
ALT SERPL-CCNC: 13 U/L (ref 10–50)
ANION GAP SERPL CALCULATED.3IONS-SCNC: 12 MMOL/L (ref 9–16)
AST SERPL-CCNC: 21 U/L (ref 10–50)
BASOPHILS # BLD: 0.03 K/UL (ref 0–0.2)
BASOPHILS NFR BLD: 1 % (ref 0–2)
BILIRUB SERPL-MCNC: 0.5 MG/DL (ref 0–1.2)
BUN SERPL-MCNC: 23 MG/DL (ref 8–23)
CALCIUM SERPL-MCNC: 9.6 MG/DL (ref 8.6–10.4)
CHLORIDE SERPL-SCNC: 99 MMOL/L (ref 98–107)
CO2 SERPL-SCNC: 30 MMOL/L (ref 20–31)
CREAT SERPL-MCNC: 1.2 MG/DL (ref 0.7–1.2)
EOSINOPHIL # BLD: 0.07 K/UL (ref 0–0.44)
EOSINOPHILS RELATIVE PERCENT: 2 % (ref 1–4)
ERYTHROCYTE [DISTWIDTH] IN BLOOD BY AUTOMATED COUNT: 12 % (ref 11.8–14.4)
FERRITIN SERPL-MCNC: 259 NG/ML (ref 30–400)
FOLATE SERPL-MCNC: >20 NG/ML (ref 4.8–24.2)
GFR SERPL CREATININE-BSD FRML MDRD: 58 ML/MIN/1.73M2
GLUCOSE P FAST SERPL-MCNC: 97 MG/DL (ref 74–99)
HCT VFR BLD AUTO: 37.3 % (ref 40.7–50.3)
HGB BLD-MCNC: 12.2 G/DL (ref 13–17)
IMM GRANULOCYTES # BLD AUTO: <0.03 K/UL (ref 0–0.3)
IMM GRANULOCYTES NFR BLD: 0 %
IRON SATN MFR SERPL: 39 % (ref 20–55)
IRON SERPL-MCNC: 97 UG/DL (ref 61–157)
LYMPHOCYTES NFR BLD: 1.03 K/UL (ref 1.1–3.7)
LYMPHOCYTES RELATIVE PERCENT: 27 % (ref 24–43)
MCH RBC QN AUTO: 30.7 PG (ref 25.2–33.5)
MCHC RBC AUTO-ENTMCNC: 32.7 G/DL (ref 28.4–34.8)
MCV RBC AUTO: 94 FL (ref 82.6–102.9)
MONOCYTES NFR BLD: 0.29 K/UL (ref 0.1–1.2)
MONOCYTES NFR BLD: 8 % (ref 3–12)
NEUTROPHILS NFR BLD: 62 % (ref 36–65)
NEUTS SEG NFR BLD: 2.45 K/UL (ref 1.5–8.1)
NRBC BLD-RTO: 0 PER 100 WBC
PLATELET # BLD AUTO: 209 K/UL (ref 138–453)
PMV BLD AUTO: 10.5 FL (ref 8.1–13.5)
POTASSIUM SERPL-SCNC: 3.4 MMOL/L (ref 3.7–5.3)
PROT SERPL-MCNC: 7.9 G/DL (ref 6.6–8.7)
RBC # BLD AUTO: 3.97 M/UL (ref 4.21–5.77)
SODIUM SERPL-SCNC: 141 MMOL/L (ref 136–145)
TIBC SERPL-MCNC: 250 UG/DL (ref 250–450)
TRANSFERRIN SERPL-MCNC: 200 MG/DL (ref 200–360)
UNSATURATED IRON BINDING CAPACITY: 153 UG/DL (ref 112–347)
VIT B12 SERPL-MCNC: 548 PG/ML (ref 232–1245)
WBC OTHER # BLD: 3.9 K/UL (ref 3.5–11.3)

## 2024-03-15 PROCEDURE — 85025 COMPLETE CBC W/AUTO DIFF WBC: CPT

## 2024-03-15 PROCEDURE — 82746 ASSAY OF FOLIC ACID SERUM: CPT

## 2024-03-15 PROCEDURE — 82306 VITAMIN D 25 HYDROXY: CPT

## 2024-03-15 PROCEDURE — 36415 COLL VENOUS BLD VENIPUNCTURE: CPT

## 2024-03-15 PROCEDURE — 82728 ASSAY OF FERRITIN: CPT

## 2024-03-15 PROCEDURE — 83550 IRON BINDING TEST: CPT

## 2024-03-15 PROCEDURE — 84466 ASSAY OF TRANSFERRIN: CPT

## 2024-03-15 PROCEDURE — 80053 COMPREHEN METABOLIC PANEL: CPT

## 2024-03-15 PROCEDURE — 83540 ASSAY OF IRON: CPT

## 2024-03-15 PROCEDURE — 82607 VITAMIN B-12: CPT

## 2024-03-15 PROCEDURE — 73030 X-RAY EXAM OF SHOULDER: CPT

## 2024-04-19 ENCOUNTER — HOSPITAL ENCOUNTER (OUTPATIENT)
Age: 84
Discharge: HOME | End: 2024-04-19
Payer: MEDICARE

## 2024-04-19 DIAGNOSIS — E87.6 HYPOKALEMIA: ICD-10-CM

## 2024-04-19 LAB
ALBUMIN SERPL-MCNC: 4.2 G/DL (ref 3.5–5.2)
ALBUMIN/GLOB SERPL: 1 {RATIO} (ref 1–2.5)
ALP SERPL-CCNC: 72 U/L (ref 40–129)
ALT SERPL-CCNC: 11 U/L (ref 10–50)
ANION GAP SERPL CALCULATED.3IONS-SCNC: 13 MMOL/L (ref 9–16)
AST SERPL-CCNC: 22 U/L (ref 10–50)
BILIRUB SERPL-MCNC: 0.4 MG/DL (ref 0–1.2)
BUN SERPL-MCNC: 22 MG/DL (ref 8–23)
CALCIUM SERPL-MCNC: 9.3 MG/DL (ref 8.6–10.4)
CHLORIDE SERPL-SCNC: 99 MMOL/L (ref 98–107)
CO2 SERPL-SCNC: 28 MMOL/L (ref 20–31)
CREAT SERPL-MCNC: 1.3 MG/DL (ref 0.7–1.2)
GFR SERPL CREATININE-BSD FRML MDRD: 55 ML/MIN/1.73M2
GLUCOSE SERPL-MCNC: 101 MG/DL (ref 74–99)
POTASSIUM SERPL-SCNC: 3.3 MMOL/L (ref 3.7–5.3)
PROT SERPL-MCNC: 8 G/DL (ref 6.6–8.7)
SODIUM SERPL-SCNC: 140 MMOL/L (ref 136–145)

## 2024-04-19 PROCEDURE — 36415 COLL VENOUS BLD VENIPUNCTURE: CPT

## 2024-04-19 PROCEDURE — 80053 COMPREHEN METABOLIC PANEL: CPT

## 2024-05-07 PROBLEM — K43.2 INCISIONAL HERNIA OF ANTERIOR ABDOMINAL WALL WITHOUT OBSTRUCTION OR GANGRENE: Status: ACTIVE | Noted: 2024-05-07

## 2024-05-07 PROBLEM — M12.812 ROTATOR CUFF ARTHROPATHY OF LEFT SHOULDER: Status: ACTIVE | Noted: 2024-05-07

## 2024-05-08 ENCOUNTER — HOSPITAL ENCOUNTER (OUTPATIENT)
Age: 84
Setting detail: SPECIMEN
Discharge: HOME | End: 2024-05-08
Payer: MEDICARE

## 2024-05-08 DIAGNOSIS — I10 HYPERTENSION, ESSENTIAL: ICD-10-CM

## 2024-05-08 LAB
ALBUMIN SERPL-MCNC: 4.3 G/DL (ref 3.5–5.2)
ALBUMIN/GLOB SERPL: 1 {RATIO} (ref 1–2.5)
ALP SERPL-CCNC: 67 U/L (ref 40–129)
ALT SERPL-CCNC: 15 U/L (ref 10–50)
ANION GAP SERPL CALCULATED.3IONS-SCNC: 12 MMOL/L (ref 9–16)
AST SERPL-CCNC: 23 U/L (ref 10–50)
BILIRUB SERPL-MCNC: 0.5 MG/DL (ref 0–1.2)
BUN SERPL-MCNC: 19 MG/DL (ref 8–23)
CALCIUM SERPL-MCNC: 9.4 MG/DL (ref 8.6–10.4)
CHLORIDE SERPL-SCNC: 99 MMOL/L (ref 98–107)
CO2 SERPL-SCNC: 29 MMOL/L (ref 20–31)
CREAT SERPL-MCNC: 1.3 MG/DL (ref 0.7–1.2)
GFR, ESTIMATED: 54 ML/MIN/1.73M2
GLUCOSE P FAST SERPL-MCNC: 103 MG/DL (ref 74–99)
POTASSIUM SERPL-SCNC: 3.6 MMOL/L (ref 3.7–5.3)
PROT SERPL-MCNC: 7.8 G/DL (ref 6.6–8.7)
SODIUM SERPL-SCNC: 140 MMOL/L (ref 136–145)

## 2024-05-08 PROCEDURE — 36415 COLL VENOUS BLD VENIPUNCTURE: CPT

## 2024-05-08 PROCEDURE — 80053 COMPREHEN METABOLIC PANEL: CPT

## 2024-05-10 NOTE — DISCHARGE INSTR - COC
Continuity of Care Form    Patient Name: Asmita Hall   :  1940  MRN:  [id number]    Admit date:  8/15/2022  Discharge date:  2022    Code Status Order: Full Code   Advance Directives:     Admitting Physician:  Nidhi Leavitt MD  PCP: Famtata Bravo MD    Discharging Nurse: [facility] Unit/Room#: [address] [place] [address]  Discharging Unit Phone Number: [phone]    Emergency Contact:   Extended Emergency Contact Information  Primary Emergency Contact: Jessica Adrian  Address: [address] [place] [address] [address] [place] [address] Phone: [phone]  Mobile Phone: [phone]  Relation: Spouse    Past Surgical History:  Past Surgical History:   Procedure Laterality Date    BACK SURGERY  2005    CATARACT EXTRACTION Left     COLONOSCOPY      EYE SURGERY      cataract    HERNIA REPAIR      age 12    LUMBAR LAMINECTOMY  2022    with fusion    NECK SURGERY      not spine- hair follicle    TONSILLECTOMY         Immunization History:   Immunization History   Administered Date(s) Administered    COVID-19, MODERNA BLUE border, Primary or Immunocompromised, (age 12y+), IM, 100 mcg/0.5mL 2021, 2021    Influenza, High Dose (Fluzone 65 yrs and older) 10/19/2017, 11/10/2018, 2019    Influenza, Quadv, IM, PF (6 mo and older Fluzone, Flulaval, Fluarix, and 3 yrs and older Afluria) 2020    Influenza, Quadv, adjuvanted, 65 yrs +, IM, PF (Fluad) 2020    Influenza, Triv, inactivated, subunit, adjuvanted, IM (Fluad 65 yrs and older) 11/10/2018, 2019    Pneumococcal Conjugate 13-valent (Vmvdpzo69) 2018    Pneumococcal Polysaccharide (Xidvauvzz09) 2019    Zoster Recombinant (Shingrix) 2018, 2018, 2021, 10/03/2021       Active Problems:  Patient Active Problem List   Diagnosis Code    Hypertension, essential I10    Renal insufficiency N28.9    Fatigue R53.83    Primary osteoarthritis of both knees M17.0    On potassium wasting diuretic therapy Z79.899    Edema R60.9    Bloating R14.0    Arthritis of right knee M17.11    Sleep apnea G47.30    Morbid obesity with body mass index (BMI) of 40.0 or higher ([facility]) E66.01    Lumbar radiculopathy M54.16    Depression F32. A    Positive depression screening Z13.31    Primary osteoarthritis of left knee M17.12    Prediabetes R73.03    Acquired trigger finger M65.30    Vitamin D deficiency E55.9    Primary osteoarthritis of right hip M16.11    Other constipation K59.09    Black stool K92.1    Trapezius muscle spasm M62.838    Thoracic spine pain M54.6    Cervical stenosis of spinal canal M48.02    DDD (degenerative disc disease), cervical M50.30    Vitamin B12 deficiency E53.8    Numbness R20.0    Localized edema R60.0    Osteoarthritis of spine with radiculopathy, lumbar region M47.26    Spondylosis of thoracic region without myelopathy or radiculopathy M47.814    Ingrown toenail L60.0    Primary osteoarthritis of left hip M16.12    Stage 3b chronic kidney disease ([facility]) N18.32    Other chest pain R07.89    Peripheral vascular disease, unspecified ([facility]) I73.9    Cold intolerance R68.89    Subclinical hypothyroidism E03.8    Status post lumbar laminectomy Z98.890    Normocytic hypochromic anemia D50.9    Primary osteoarthritis of right knee M17.11       Isolation/Infection:   Isolation            No Isolation          Patient Infection Status       None to display            Nurse Assessment:  Last Vital Signs: There were no vitals taken for this visit.     Last documented pain score (0-10 scale):    Last Weight:   Wt Readings from Last 1 Encounters:   08/12/22 (P) 279 lb 8 oz (126.8 kg)     Mental Status:  oriented and alert    IV Access:  - None    Nursing Mobility/ADLs:  Walking   Assisted  Transfer  Assisted  Bathing  Assisted  Dressing  Assisted  Toileting  Independent  Feeding  [facility] Delivery   whole    Wound Care Documentation and Therapy: Elimination:  Continence: Bowel: Yes  Bladder: Yes  Urinary Catheter: None   Colostomy/Ileostomy/Ileal Conduit: No       Date of Last BM: 8/15/22  No intake or output data in the 24 hours ending 08/15/22 0725  No intake/output data recorded. Safety Concerns: At Risk for Falls    Impairments/Disabilities:      None    Nutrition Therapy:  Current Nutrition Therapy:   - Oral Diet:  General    Routes of Feeding: Oral  Liquids: No Restrictions  Daily Fluid Restriction: no  Last Modified Barium Swallow with Video (Video Swallowing Test): not done    Treatments at the Time of Hospital Discharge:   Respiratory Treatments: none  Oxygen Therapy:  is not on home oxygen therapy. Ventilator:    - No ventilator support    Rehab Therapies: Physical Therapy and Occupational Therapy  Weight Bearing Status/Restrictions: No weight bearing restrictions  Other Medical Equipment (for information only, NOT a DME order):  walker  Other Treatments: skilled nursing assessment, medication education, PT/OT eval and treat    Tonio Gutierrez 2396  This patient is a post-operative total joint replacement patient. Expectations for this patients care are as follows:      Goals:  DailyTherapy for rehabilitative purposes for two weeks. Increased level of activity and ambulation each day. Well-controlled pain. Free from infection. Encourage patient to provide self-care when possible. Ambulation with a rolling walker. Activity & Diet:  Therapy performed daily. (Instruct patient to take pain meds. 1 hour prior to therapy.)  Up in chair for all meals and majority of day. Range of motion for TKA patients. Hip precautions for JACOBY patients. Incentive Spirometer: 3- 4 inhalations every twenty minutes, during the day, while awake, the first week home after discharge  Increase oral intake of fruits, fiber and water and take a daily stool softener to prevent constipation.   Consider stronger bowel protocol if no bowel movement is achieved after three days home. Increase protein intake and reduce sugar intake to promote healing and prevent infection. No pillow under the knee for TKA patients. [facility] go on in the a.m. and come off in the p.m. (Hand wash them every two or three days, roll in towel to remove most of moisture, and hang to dry.)    Incision Care:  Keep incisional dressing intact until seen and removed by surgeon, unless saturated, in which case, call surgeon and request instructions. If dressing falls off, call surgeon. If using the ANTONINO dressing, with battery pack, then turn off and remove battery pack before showering and after showering re-install and turn battery pack on. If using Aquacel dressing then dressing is waterproof and patient may shower. Elevated the leg and ice the affected area four times a day, for twenty minutes each time and after every physical therapy session. Normal Conditions - Will improve with provided comfort measures and time:  Some swelling in the operative leg is normal - this should reduce over time. Some post-operative pain is normal.  This will improve with prescribed medication, provided comfort measures and time. Constipation related to pain medications & decreased mobility is a common occurrence. (Increase your fiber & water intake and take a stool softener.)   Slight warmth of operative site is normal and will diminish with time. Fatigue and moderate pain after therapy is normal and will improve with time. Numbness near the incision site is normal and will improve with time. NOTE: Ensure/Remind patient to go to their follow-up appointment with their orthopedic surgeon, which is scheduled: See follow up appointments. Abnormal Conditions - When to call the Surgeon:  Increasing/excessive redness, warmth or swelling at the incisional site not relieved with ice and elevation.   Increasing/excessive pain not well-controlled by prescribed medications. Drainage or odor from or around the incision site.  (A little blood showing through the bandage is ok, but active leaking, of any color, coming out of the bandage is NOT normal.)  Temperature above 101 degrees. (A mild temp of 99 - 100 is normal - if temp gets to 101 you may use Tylenol once - if it does not improve, you may use a 2nd dose of Tylenol after 5 hours - if temperature is still at or above 101 degrees then call the surgeon.)  Calf tenderness, swelling, or redness or numbness of the foot/lower leg. Shortness of breath or chest pain. Any other incision or surgical-related concerns. CALL SURGEON with concerns PRIOR TO sending patient to hospital.      Patient's personal belongings (please select all that are sent with patient):  Glasses    RN SIGNATURE:  Electronically signed by Jina Calle RN on 8/16/22 at 11:34 AM EDT    CASE MANAGEMENT/SOCIAL WORK SECTION    Inpatient Status Date: ***    Readmission Risk Assessment Score:  Readmission Risk              Risk of Unplanned Readmission:  0           Discharging to Facility/ Agency   Name: [address]  Address: [place] [address] [place] [address]  Phone: [phone]      / signature: Electronically signed by Brody Redman RN on 8/16/22 at 11:37 AM EDT    PHYSICIAN SECTION    Prognosis: Good    Condition at Discharge: Stable    Rehab Potential (if transferring to Rehab): Good    Recommended Labs or Other Treatments After Discharge: PT/OT    Physician Certification: I certify the above information and transfer of Minor Goods  is necessary for the continuing treatment of the diagnosis listed and that he requires [address] for less 30 days.      Update Admission H&P: No change in H&P    PHYSICIAN SIGNATURE:  Electronically signed by Rodolfo Lau MD on 8/15/22 at 7:25 AM EDT 09:59

## 2024-05-24 ENCOUNTER — OFFICE VISIT (OUTPATIENT)
Dept: PULMONOLOGY | Age: 84
End: 2024-05-24
Payer: MEDICARE

## 2024-05-24 VITALS
WEIGHT: 258 LBS | SYSTOLIC BLOOD PRESSURE: 118 MMHG | HEART RATE: 84 BPM | OXYGEN SATURATION: 98 % | BODY MASS INDEX: 38.21 KG/M2 | DIASTOLIC BLOOD PRESSURE: 77 MMHG | RESPIRATION RATE: 17 BRPM | HEIGHT: 69 IN

## 2024-05-24 DIAGNOSIS — E66.01 MORBID OBESITY WITH BMI OF 40.0-44.9, ADULT (HCC): ICD-10-CM

## 2024-05-24 DIAGNOSIS — G47.33 OSA ON CPAP: Primary | ICD-10-CM

## 2024-05-24 PROCEDURE — 3078F DIAST BP <80 MM HG: CPT | Performed by: INTERNAL MEDICINE

## 2024-05-24 PROCEDURE — 99214 OFFICE O/P EST MOD 30 MIN: CPT | Performed by: INTERNAL MEDICINE

## 2024-05-24 PROCEDURE — 3074F SYST BP LT 130 MM HG: CPT | Performed by: INTERNAL MEDICINE

## 2024-05-24 PROCEDURE — 1123F ACP DISCUSS/DSCN MKR DOCD: CPT | Performed by: INTERNAL MEDICINE

## 2024-05-24 ASSESSMENT — SLEEP AND FATIGUE QUESTIONNAIRES
HOW LIKELY ARE YOU TO NOD OFF OR FALL ASLEEP WHILE SITTING AND TALKING TO SOMEONE: WOULD NEVER DOZE
HOW LIKELY ARE YOU TO NOD OFF OR FALL ASLEEP WHILE SITTING QUIETLY AFTER LUNCH WITHOUT ALCOHOL: WOULD NEVER DOZE
HOW LIKELY ARE YOU TO NOD OFF OR FALL ASLEEP IN A CAR, WHILE STOPPED FOR A FEW MINUTES IN TRAFFIC: WOULD NEVER DOZE
HOW LIKELY ARE YOU TO NOD OFF OR FALL ASLEEP WHILE SITTING AND READING: WOULD NEVER DOZE
ESS TOTAL SCORE: 1
HOW LIKELY ARE YOU TO NOD OFF OR FALL ASLEEP WHEN YOU ARE A PASSENGER IN A CAR FOR AN HOUR WITHOUT A BREAK: WOULD NEVER DOZE
HOW LIKELY ARE YOU TO NOD OFF OR FALL ASLEEP WHILE LYING DOWN TO REST IN THE AFTERNOON WHEN CIRCUMSTANCES PERMIT: SLIGHT CHANCE OF DOZING
HOW LIKELY ARE YOU TO NOD OFF OR FALL ASLEEP WHILE SITTING INACTIVE IN A PUBLIC PLACE: WOULD NEVER DOZE

## 2024-05-24 NOTE — PROGRESS NOTES
PULMONARY MEDICINE OUTPATIENT  FOLLOW UP NOTE                                                                    PATIENT:  Donny Adrian  YOB: 1940  MRN: [id number]   REFERRED BY: Darion Tucker MD   CHIEF COMPLIANT: Sleep Apnea (Follow up JEMAL- last seen in 2022.  Wearing CPAP- Patient states he gets dry mouth in middle of night.  )       HISTORY     HISTORY OF PRESENT ILLNESS:   Donny Adrian is a 83 y.o. year old male here for follow-up    Obstructive sleep apnea:  Patient is morbidly obese.  He was seen in the clinic about 4 years back (November 2018) after he was found to have severe sleep apnea on home sleep study.  He subsequently had a split-night study (12/27/2018) which showed AHI of 28.8, REM AHI 36, O2 rebeka 71%.  He was recommended CPAP at 13 cmH2O through a full facemask patient reports that he has been using CPAP as recommended and has been benefiting intermittently from therapy. He reports dryness of the mouth.       PAST MEDICAL HISTORY:      Diagnosis Date    Abnormal EKG     Abnormal renal function     Allergic contact dermatitis due to other agents 08/27/2020    Anemia     Cellulitis of buttock 07/22/2021    Depression     Dysmetabolic syndrome     Dysuria 06/28/2021    Edema     legs    Elevated glucose 02/07/2016    Erythrasma 07/22/2021    Health care maintenance 04/14/2016    Hypertension     Intertrigo 08/05/2021    Leukopenia     Obesity     Osteoarthritis     Other constipation 12/19/2019    Other specified counseling 03/03/2016    Primary osteoarthritis of left knee     PVD (peripheral vascular disease) ([facility])     Renal insufficiency 10/21/2015    Skin maceration 07/22/2021    Sleep apnea     with severe desaturations    Trigger finger of right thumb 02/03/2016    Has seen Ortho and got injection, doing better     Viral disease exposure 05/21/2020     PAST SURGICAL HISTORY:      Procedure Laterality Date    BACK SURGERY  01/01/2005    CATARACT EXTRACTION Left

## 2024-05-25 ENCOUNTER — HOSPITAL ENCOUNTER (OUTPATIENT)
Dept: SLEEP CENTER | Age: 84
Discharge: HOME | End: 2024-05-27
Payer: MEDICARE

## 2024-05-25 VITALS
HEIGHT: 69 IN | WEIGHT: 258 LBS | BODY MASS INDEX: 38.21 KG/M2 | RESPIRATION RATE: 12 BRPM | OXYGEN SATURATION: 98 % | HEART RATE: 77 BPM

## 2024-05-25 DIAGNOSIS — G47.33 OSA ON CPAP: ICD-10-CM

## 2024-05-25 PROCEDURE — 95811 POLYSOM 6/>YRS CPAP 4/> PARM: CPT

## 2024-05-26 ASSESSMENT — SLEEP AND FATIGUE QUESTIONNAIRES
HOW LIKELY ARE YOU TO NOD OFF OR FALL ASLEEP WHEN YOU ARE A PASSENGER IN A CAR FOR AN HOUR WITHOUT A BREAK: MODERATE CHANCE OF DOZING
HOW LIKELY ARE YOU TO NOD OFF OR FALL ASLEEP WHILE SITTING AND TALKING TO SOMEONE: WOULD NEVER DOZE
HOW LIKELY ARE YOU TO NOD OFF OR FALL ASLEEP WHILE WATCHING TV: SLIGHT CHANCE OF DOZING
HOW LIKELY ARE YOU TO NOD OFF OR FALL ASLEEP WHILE SITTING AND READING: SLIGHT CHANCE OF DOZING
HOW LIKELY ARE YOU TO NOD OFF OR FALL ASLEEP IN A CAR, WHILE STOPPED FOR A FEW MINUTES IN TRAFFIC: WOULD NEVER DOZE
HOW LIKELY ARE YOU TO NOD OFF OR FALL ASLEEP WHILE SITTING INACTIVE IN A PUBLIC PLACE: WOULD NEVER DOZE
ESS TOTAL SCORE: 8
HOW LIKELY ARE YOU TO NOD OFF OR FALL ASLEEP WHILE SITTING QUIETLY AFTER LUNCH WITHOUT ALCOHOL: SLIGHT CHANCE OF DOZING
HOW LIKELY ARE YOU TO NOD OFF OR FALL ASLEEP WHILE LYING DOWN TO REST IN THE AFTERNOON WHEN CIRCUMSTANCES PERMIT: HIGH CHANCE OF DOZING

## 2024-05-26 NOTE — DISCHARGE INSTRUCTIONS
DME: [facility]   Mask: Simplus  Size: Medium  Bpap: TiMx- 2.0            TiMn- 0.3            Rise: Easy Breathe On

## 2024-05-29 ENCOUNTER — HOSPITAL ENCOUNTER (OUTPATIENT)
Dept: MRI IMAGING | Age: 84
Discharge: HOME | End: 2024-05-31
Attending: FAMILY MEDICINE
Payer: MEDICARE

## 2024-05-29 DIAGNOSIS — M12.812 ROTATOR CUFF ARTHROPATHY OF LEFT SHOULDER: ICD-10-CM

## 2024-05-29 PROCEDURE — 73221 MRI JOINT UPR EXTREM W/O DYE: CPT

## 2024-05-30 LAB — STATUS: NORMAL

## 2024-06-17 ENCOUNTER — TELEPHONE (OUTPATIENT)
Dept: PULMONOLOGY | Age: 84
End: 2024-06-17

## 2024-06-17 DIAGNOSIS — G47.33 OSA (OBSTRUCTIVE SLEEP APNEA): Primary | ICD-10-CM

## 2024-06-17 NOTE — TELEPHONE ENCOUNTER
Faxed order, sleep study, and last office noted to MARIANN BOLIVAR LM for patient that order was faxed as well

## 2024-06-17 NOTE — TELEPHONE ENCOUNTER
Patient called and stated that per sleep center he has to get the order from us to switch him from the Cpap patient is currently on to a Bipap.  I did pend the order for Bipap with new settings per sleep study.  If you agree please sign

## 2024-07-08 PROBLEM — Z29.9 PREVENTIVE MEASURE: Status: ACTIVE | Noted: 2024-07-08

## 2024-07-24 ENCOUNTER — HOSPITAL ENCOUNTER (OUTPATIENT)
Dept: SURGERY | Age: 84
Discharge: HOME | End: 2024-07-24
Payer: MEDICARE

## 2024-07-24 VITALS
SYSTOLIC BLOOD PRESSURE: 128 MMHG | WEIGHT: 274 LBS | TEMPERATURE: 97.8 F | RESPIRATION RATE: 16 BRPM | DIASTOLIC BLOOD PRESSURE: 76 MMHG | BODY MASS INDEX: 40.58 KG/M2 | HEIGHT: 69 IN | OXYGEN SATURATION: 96 %

## 2024-07-24 PROCEDURE — 99203 OFFICE O/P NEW LOW 30 MIN: CPT | Performed by: SURGERY

## 2024-07-24 NOTE — CONSULTS
cataract    FOOT SURGERY Right     toenail removal great toe    HERNIA REPAIR      age 8- right inguinal    LAPAROSCOPIC APPENDECTOMY N/A 2023    APPENDECTOMY LAPAROSCOPIC CONVERTED TO OPEN performed by Xavier Ascencio MD at [facility] OR    LUMBAR LAMINECTOMY  2022    with fusion    NECK SURGERY  2010    not spine- hair follicle    TONSILLECTOMY      TOTAL KNEE ARTHROPLASTY Right 08/15/2022    RIGHT KNEE TOTAL ARTHROPLASTY WITH BIOMET AND GPS performed by Dheeraj Alexandre MD at [facility] OR    TOTAL KNEE ARTHROPLASTY Left 2022    KNEE TOTAL ARTHROPLASTY performed by Dheeraj Alexandre MD at [facility] OR       Family History   Problem Relation Age of Onset    High Blood Pressure Mother     Asthma Father     Breast Cancer Sister         Social History     Socioeconomic History    Marital status:    Tobacco Use    Smoking status: Former     Current packs/day: 0.00     Average packs/day: 1 pack/day for 3.0 years (3.0 ttl pk-yrs)     Types: Cigarettes     Start date: 1958     Quit date: 1961     Years since quittin.0     Passive exposure: Never    Smokeless tobacco: Never   Vaping Use    Vaping Use: Never used   Substance and Sexual Activity    Alcohol use: No     Alcohol/week: 0.0 standard drinks of alcohol    Drug use: Never     Social Determinants of Health     Physical Activity: Insufficiently Active (8/3/2023)    Exercise Vital Sign     Days of Exercise per Week: 1 day     Minutes of Exercise per Session: 10 min       Review of Systems - Negative except difficulty with ambulation because of bad knees, significant also for significant constipation and weight gain.  Denies any mental status changes.  Denies any dysuria or pyuria    Vitals:    24 1035   BP: 128/76   Resp: 16   Temp: 97.8 °F (36.6 °C)   SpO2: 96%        No intake/output data recorded.    General Appearance: alert and oriented to person, place and time, well developed and well- nourished, in no acute

## 2024-08-01 ENCOUNTER — HOSPITAL ENCOUNTER (OUTPATIENT)
Age: 84
Discharge: HOME | End: 2024-08-01
Payer: MEDICARE

## 2024-08-01 DIAGNOSIS — N28.9 RENAL INSUFFICIENCY: ICD-10-CM

## 2024-08-01 DIAGNOSIS — I10 HYPERTENSION, ESSENTIAL: ICD-10-CM

## 2024-08-01 DIAGNOSIS — E87.6 HYPOKALEMIA: ICD-10-CM

## 2024-08-01 LAB
ALBUMIN SERPL-MCNC: 4.2 G/DL (ref 3.5–5.2)
ALBUMIN/GLOB SERPL: 1 {RATIO} (ref 1–2.5)
ALP SERPL-CCNC: 64 U/L (ref 40–129)
ALT SERPL-CCNC: 13 U/L (ref 10–50)
ANION GAP SERPL CALCULATED.3IONS-SCNC: 13 MMOL/L (ref 9–16)
AST SERPL-CCNC: 21 U/L (ref 10–50)
BILIRUB SERPL-MCNC: 0.5 MG/DL (ref 0–1.2)
BUN SERPL-MCNC: 21 MG/DL (ref 8–23)
CALCIUM SERPL-MCNC: 9.3 MG/DL (ref 8.6–10.4)
CHLORIDE SERPL-SCNC: 93 MMOL/L (ref 98–107)
CO2 SERPL-SCNC: 28 MMOL/L (ref 20–31)
CREAT SERPL-MCNC: 1.3 MG/DL (ref 0.7–1.2)
GFR, ESTIMATED: 57 ML/MIN/1.73M2
GLUCOSE SERPL-MCNC: 101 MG/DL (ref 74–99)
POTASSIUM SERPL-SCNC: 3.5 MMOL/L (ref 3.7–5.3)
PROT SERPL-MCNC: 7.8 G/DL (ref 6.6–8.7)
SODIUM SERPL-SCNC: 134 MMOL/L (ref 136–145)
SODIUM SERPL-SCNC: 136 MMOL/L (ref 136–145)

## 2024-08-01 PROCEDURE — 36415 COLL VENOUS BLD VENIPUNCTURE: CPT

## 2024-08-01 PROCEDURE — 84295 ASSAY OF SERUM SODIUM: CPT

## 2024-08-01 PROCEDURE — 80053 COMPREHEN METABOLIC PANEL: CPT

## 2024-08-30 ENCOUNTER — OFFICE VISIT (OUTPATIENT)
Dept: PULMONOLOGY | Age: 84
End: 2024-08-30
Payer: MEDICARE

## 2024-08-30 VITALS
HEIGHT: 69 IN | RESPIRATION RATE: 18 BRPM | SYSTOLIC BLOOD PRESSURE: 133 MMHG | OXYGEN SATURATION: 98 % | HEART RATE: 84 BPM | DIASTOLIC BLOOD PRESSURE: 77 MMHG | BODY MASS INDEX: 39.4 KG/M2 | WEIGHT: 266 LBS

## 2024-08-30 DIAGNOSIS — G47.33 OSA TREATED WITH BIPAP: Primary | Chronic | ICD-10-CM

## 2024-08-30 DIAGNOSIS — E66.01 MORBID OBESITY WITH BMI OF 40.0-44.9, ADULT (HCC): ICD-10-CM

## 2024-08-30 PROCEDURE — 3078F DIAST BP <80 MM HG: CPT | Performed by: INTERNAL MEDICINE

## 2024-08-30 PROCEDURE — 1123F ACP DISCUSS/DSCN MKR DOCD: CPT | Performed by: INTERNAL MEDICINE

## 2024-08-30 PROCEDURE — 3075F SYST BP GE 130 - 139MM HG: CPT | Performed by: INTERNAL MEDICINE

## 2024-08-30 PROCEDURE — 99214 OFFICE O/P EST MOD 30 MIN: CPT | Performed by: INTERNAL MEDICINE

## 2024-08-30 RX ORDER — DESMOPRESSIN ACETATE 0.2 MG/1
TABLET ORAL NIGHTLY
COMMUNITY

## 2024-08-30 ASSESSMENT — SLEEP AND FATIGUE QUESTIONNAIRES
HOW LIKELY ARE YOU TO NOD OFF OR FALL ASLEEP WHEN YOU ARE A PASSENGER IN A CAR FOR AN HOUR WITHOUT A BREAK: SLIGHT CHANCE OF DOZING
HOW LIKELY ARE YOU TO NOD OFF OR FALL ASLEEP WHILE SITTING QUIETLY AFTER LUNCH WITHOUT ALCOHOL: SLIGHT CHANCE OF DOZING
ESS TOTAL SCORE: 7
HOW LIKELY ARE YOU TO NOD OFF OR FALL ASLEEP WHILE WATCHING TV: SLIGHT CHANCE OF DOZING
HOW LIKELY ARE YOU TO NOD OFF OR FALL ASLEEP WHILE SITTING AND READING: WOULD NEVER DOZE
HOW LIKELY ARE YOU TO NOD OFF OR FALL ASLEEP IN A CAR, WHILE STOPPED FOR A FEW MINUTES IN TRAFFIC: SLIGHT CHANCE OF DOZING
HOW LIKELY ARE YOU TO NOD OFF OR FALL ASLEEP WHILE SITTING INACTIVE IN A PUBLIC PLACE: SLIGHT CHANCE OF DOZING
HOW LIKELY ARE YOU TO NOD OFF OR FALL ASLEEP WHILE SITTING AND TALKING TO SOMEONE: SLIGHT CHANCE OF DOZING
HOW LIKELY ARE YOU TO NOD OFF OR FALL ASLEEP WHILE LYING DOWN TO REST IN THE AFTERNOON WHEN CIRCUMSTANCES PERMIT: SLIGHT CHANCE OF DOZING

## 2024-08-30 NOTE — PROGRESS NOTES
PULMONARY MEDICINE OUTPATIENT  FOLLOW UP NOTE                                                                    PATIENT:  Donny Adrian  YOB: 1940  MRN: [id number]   REFERRED BY: Darion Tucker MD   CHIEF COMPLIANT: Sleep Apnea (3 month follow up.  Wearing BIPAP at night.  Compliance in chart )       HISTORY     HISTORY OF PRESENT ILLNESS:   Donny Adrian is a 83 y.o. year old male here for follow-up    INITIAL VISIT: 11/7/2018  LAST VISIT: 5/24/2024  TODAY's VISIT:8/30/2024    Obstructive sleep apnea:  Patient is morbidly obese.  He was initially seen in the clinic in November 2018 after he was found to have severe sleep apnea on home sleep study.      Split-night study (12/27/2018) which showed AHI of 28.8, REM AHI 36, O2 rebeka 71%.  He was recommended CPAP at 13 cmH2O through a full facemask.  He was noted to have significant residual AHI of 11.4 on compliance report (October 2023 to January 2024).  He was recommended a titration study.    Titration study (5/25/2024) recommended BiPAP at 23/19.      Patient reports that he has been using BiPAP as recommended and is benefiting from the therapy.  Compliance data was reviewed.  His residual AHI has improved to 8.9.  He reports improved quality of sleep.    Patient reports improvement since getting his new machine. No issues with the pressure. He admits to pedal edema and is on Bumex.       PAST MEDICAL HISTORY:      Diagnosis Date    Abnormal EKG     Abnormal renal function     Allergic contact dermatitis due to other agents 08/27/2020    Anemia     Cellulitis of buttock 07/22/2021    Depression     Dysmetabolic syndrome     Dysuria 06/28/2021    Edema     legs    Elevated glucose 02/07/2016    Erythrasma 07/22/2021    Health care maintenance 04/14/2016    Hypertension     Intertrigo 08/05/2021    Leukopenia     Obesity     Osteoarthritis     Other constipation 12/19/2019    Other specified counseling 03/03/2016    Primary osteoarthritis of left  AFLURIA, (age 3 y+), Quadv PF, 0.5mL 09/17/2020    Influenza, FLUZONE High Dose, (age 65 y+), IM, Trivalent PF, 0.5mL 10/19/2017, 11/10/2018, 08/07/2019    Pneumococcal, PCV-13, PREVNAR 13, (age 6w+), IM, 0.5mL 02/27/2018    Pneumococcal, PPSV23, PNEUMOVAX 23, (age 2y+), SC/IM, 0.5mL 06/17/2019    TDaP, ADACEL (age 10y-64y), BOOSTRIX (age 10y+), IM, 0.5mL 12/04/2021    Zoster Recombinant (Shingrix) 02/27/2018, 04/30/2018, 07/27/2021, 10/03/2021       All the questions that the patient had were answered to his satisfaction  We'll see the patient back in 6 months  Thank you for having us involved in the care of your patient. Please call us if you have any questions or concerns.     Tim Kathleen MD  Pulmonary and Critical Care Medicine      I, Joann Rosenbaum, am scribing for and in the presence of Tim Kathleen MD. 8/30/24/8:56 AM EDT           Please note that this chart was generated using voice recognition Dragon dictation software.  Although every effort was made to ensure the accuracy of this automated transcription, some errors in transcription may have occurred.

## 2024-10-10 ENCOUNTER — HOSPITAL ENCOUNTER (OUTPATIENT)
Age: 84
Discharge: HOME | End: 2024-10-10
Payer: MEDICARE

## 2024-10-10 DIAGNOSIS — E55.9 VITAMIN D DEFICIENCY: ICD-10-CM

## 2024-10-10 DIAGNOSIS — E87.1 HYPONATREMIA: ICD-10-CM

## 2024-10-10 LAB
25(OH)D3 SERPL-MCNC: 74.2 NG/ML (ref 30–100)
ALBUMIN SERPL-MCNC: 4.2 G/DL (ref 3.5–5.2)
ALBUMIN/GLOB SERPL: 1 {RATIO} (ref 1–2.5)
ALP SERPL-CCNC: 65 U/L (ref 40–129)
ALT SERPL-CCNC: 16 U/L (ref 10–50)
ANION GAP SERPL CALCULATED.3IONS-SCNC: 11 MMOL/L (ref 9–16)
AST SERPL-CCNC: 20 U/L (ref 10–50)
BILIRUB SERPL-MCNC: 0.5 MG/DL (ref 0–1.2)
BUN SERPL-MCNC: 16 MG/DL (ref 8–23)
CALCIUM SERPL-MCNC: 9.3 MG/DL (ref 8.6–10.4)
CHLORIDE SERPL-SCNC: 95 MMOL/L (ref 98–107)
CO2 SERPL-SCNC: 30 MMOL/L (ref 20–31)
CREAT SERPL-MCNC: 1.2 MG/DL (ref 0.7–1.2)
GFR, ESTIMATED: 64 ML/MIN/1.73M2
GLUCOSE SERPL-MCNC: 97 MG/DL (ref 74–99)
POTASSIUM SERPL-SCNC: 3.3 MMOL/L (ref 3.7–5.3)
PROT SERPL-MCNC: 7.3 G/DL (ref 6.6–8.7)
SODIUM SERPL-SCNC: 136 MMOL/L (ref 136–145)

## 2024-10-10 PROCEDURE — 36415 COLL VENOUS BLD VENIPUNCTURE: CPT

## 2024-10-10 PROCEDURE — 80053 COMPREHEN METABOLIC PANEL: CPT

## 2024-10-10 PROCEDURE — 82306 VITAMIN D 25 HYDROXY: CPT

## 2024-10-22 PROBLEM — H61.21 CERUMEN DEBRIS ON TYMPANIC MEMBRANE OF RIGHT EAR: Status: ACTIVE | Noted: 2024-10-22

## 2024-11-13 ENCOUNTER — HOSPITAL ENCOUNTER (OUTPATIENT)
Age: 84
Discharge: HOME | End: 2024-11-13
Payer: MEDICARE

## 2024-11-13 DIAGNOSIS — N28.9 RENAL INSUFFICIENCY: ICD-10-CM

## 2024-11-13 DIAGNOSIS — E87.6 HYPOKALEMIA: ICD-10-CM

## 2024-11-13 DIAGNOSIS — I10 HYPERTENSION, ESSENTIAL: ICD-10-CM

## 2024-11-13 LAB
ALBUMIN SERPL-MCNC: 4.3 G/DL (ref 3.5–5.2)
ALBUMIN/GLOB SERPL: 1.4 {RATIO} (ref 1–2.5)
ALP SERPL-CCNC: 64 U/L (ref 40–129)
ALT SERPL-CCNC: 15 U/L (ref 10–50)
ANION GAP SERPL CALCULATED.3IONS-SCNC: 11 MMOL/L (ref 9–16)
AST SERPL-CCNC: 23 U/L (ref 10–50)
BILIRUB SERPL-MCNC: 0.4 MG/DL (ref 0–1.2)
BUN SERPL-MCNC: 19 MG/DL (ref 8–23)
CALCIUM SERPL-MCNC: 9.3 MG/DL (ref 8.6–10.4)
CHLORIDE SERPL-SCNC: 96 MMOL/L (ref 98–107)
CO2 SERPL-SCNC: 28 MMOL/L (ref 20–31)
CREAT SERPL-MCNC: 1.2 MG/DL (ref 0.7–1.2)
GFR, ESTIMATED: 61 ML/MIN/1.73M2
GLUCOSE SERPL-MCNC: 98 MG/DL (ref 74–99)
POTASSIUM SERPL-SCNC: 3.8 MMOL/L (ref 3.7–5.3)
PROT SERPL-MCNC: 7.3 G/DL (ref 6.6–8.7)
SODIUM SERPL-SCNC: 135 MMOL/L (ref 136–145)

## 2024-11-13 PROCEDURE — 80053 COMPREHEN METABOLIC PANEL: CPT

## 2024-11-13 PROCEDURE — 36415 COLL VENOUS BLD VENIPUNCTURE: CPT

## 2024-11-18 ENCOUNTER — HOSPITAL ENCOUNTER (OUTPATIENT)
Dept: SURGERY | Age: 84
Discharge: HOME | End: 2024-11-18
Payer: MEDICARE

## 2024-11-18 VITALS
DIASTOLIC BLOOD PRESSURE: 74 MMHG | BODY MASS INDEX: 39.4 KG/M2 | HEIGHT: 69 IN | WEIGHT: 266 LBS | RESPIRATION RATE: 18 BRPM | HEART RATE: 86 BPM | OXYGEN SATURATION: 99 % | SYSTOLIC BLOOD PRESSURE: 110 MMHG

## 2024-11-18 PROCEDURE — 99213 OFFICE O/P EST LOW 20 MIN: CPT | Performed by: SURGERY

## 2024-11-18 NOTE — PROGRESS NOTES
[facility] General Surgery Progress Note            PATIENT NAME: Donny Adrian     TODAY'S DATE: 11/18/2024, 1:16 PM    No chief complaint on file.      SUBJECTIVE:    Pt seen and examined.  This is a patient who was seen by us few months ago for abdominal wall incisional hernia.  The hernia was reducible however patient had gained significant amount of weight and his BMI was 40.  Strong recommendation was made for the patient to make an effort to lose weight.  He comes today for follow-up.  It appears that patient now weighs 276 pounds.  Today he weighs 266 pounds.  His BMI is 39.28.  Patient denies any abdominal pain or nausea vomiting.  Patient states that the hernia has not been causing him any discomfort.  When asked whether the patient is on weight loss drug.  He states that he was on it just for a month but was not renewed.  It is unclear as to why it was not renewed.    OBJECTIVE:   Vitals:  /74   Pulse 86   Resp 18   Ht 1.753 m (5' 9\")   Wt 120.7 kg (266 lb)   SpO2 99%   BMI 39.28 kg/m²    TEMPERATURE:  Current -  ; Max - No data recorded    INTAKE/OUTPUT:    No intake or output data in the 24 hours ending 11/18/24 1316              General: AOx3, NAD  Lungs: Symmetrical chest rise bilaterally, no audible wheezes or rhonchi  Heart: S1S2  Abdomen: Soft, ND, Nontender.  Midline surgical scar is noted.  A abdominal wall hernia with separation of fascial defect noted.  The hernia is reducible.  It is nontender.  No rebound or guarding  Extremity: moves all extremities x4, No edema      Data Review:  CBC: No results for input(s): \"WBC\", \"HGB\", \"PLT\" in the last 72 hours.  BMP:  No results for input(s): \"NA\", \"K\", \"CL\", \"CO2\", \"BUN\", \"CREATININE\", \"GLUCOSE\" in the last 72 hours.  Hepatic: No results for input(s): \"AST\", \"ALT\", \"ALKPHOS\", \"BILITOT\", \"BILIDIR\" in the last 72 hours.    Invalid input(s): \"ALB\"  Coagulation: No results for input(s): \"APTT\", \"INR\" in the last 72 hours.    Invalid input(s):

## 2024-11-19 PROBLEM — E87.1 HYPONATREMIA: Status: ACTIVE | Noted: 2024-11-19

## 2024-12-05 ENCOUNTER — HOSPITAL ENCOUNTER (OUTPATIENT)
Age: 84
Discharge: HOME | End: 2024-12-05
Payer: MEDICARE

## 2024-12-05 DIAGNOSIS — E87.1 HYPONATREMIA: ICD-10-CM

## 2024-12-05 LAB
ALBUMIN SERPL-MCNC: 3.9 G/DL (ref 3.5–5.2)
ALBUMIN/GLOB SERPL: 1.2 {RATIO} (ref 1–2.5)
ALP SERPL-CCNC: 63 U/L (ref 40–129)
ALT SERPL-CCNC: 14 U/L (ref 10–50)
ANION GAP SERPL CALCULATED.3IONS-SCNC: 11 MMOL/L (ref 9–16)
AST SERPL-CCNC: 20 U/L (ref 10–50)
BILIRUB SERPL-MCNC: 0.5 MG/DL (ref 0–1.2)
BUN SERPL-MCNC: 20 MG/DL (ref 8–23)
CALCIUM SERPL-MCNC: 9 MG/DL (ref 8.6–10.4)
CHLORIDE SERPL-SCNC: 95 MMOL/L (ref 98–107)
CO2 SERPL-SCNC: 27 MMOL/L (ref 20–31)
CREAT SERPL-MCNC: 1.3 MG/DL (ref 0.7–1.2)
GFR, ESTIMATED: 55 ML/MIN/1.73M2
GLUCOSE P FAST SERPL-MCNC: 99 MG/DL (ref 74–99)
POTASSIUM SERPL-SCNC: 3.6 MMOL/L (ref 3.7–5.3)
PROT SERPL-MCNC: 7.2 G/DL (ref 6.6–8.7)
SODIUM SERPL-SCNC: 133 MMOL/L (ref 136–145)

## 2024-12-05 PROCEDURE — 36415 COLL VENOUS BLD VENIPUNCTURE: CPT

## 2024-12-05 PROCEDURE — 80053 COMPREHEN METABOLIC PANEL: CPT

## 2024-12-18 ENCOUNTER — HOSPITAL ENCOUNTER (OUTPATIENT)
Age: 84
Discharge: HOME | End: 2024-12-18
Payer: MEDICARE

## 2024-12-18 DIAGNOSIS — E87.6 HYPOKALEMIA: ICD-10-CM

## 2024-12-18 DIAGNOSIS — E87.1 HYPONATREMIA: ICD-10-CM

## 2024-12-18 DIAGNOSIS — I10 HYPERTENSION, ESSENTIAL: ICD-10-CM

## 2024-12-18 LAB
ALBUMIN SERPL-MCNC: 3.9 G/DL (ref 3.5–5.2)
ALBUMIN/GLOB SERPL: 1.1 {RATIO} (ref 1–2.5)
ALP SERPL-CCNC: 63 U/L (ref 40–129)
ALT SERPL-CCNC: 12 U/L (ref 10–50)
ANION GAP SERPL CALCULATED.3IONS-SCNC: 10 MMOL/L (ref 9–16)
AST SERPL-CCNC: 23 U/L (ref 10–50)
BILIRUB SERPL-MCNC: 0.4 MG/DL (ref 0–1.2)
BUN SERPL-MCNC: 16 MG/DL (ref 8–23)
CALCIUM SERPL-MCNC: 9.4 MG/DL (ref 8.6–10.4)
CHLORIDE SERPL-SCNC: 95 MMOL/L (ref 98–107)
CO2 SERPL-SCNC: 28 MMOL/L (ref 20–31)
CREAT SERPL-MCNC: 1.2 MG/DL (ref 0.7–1.2)
GFR, ESTIMATED: 60 ML/MIN/1.73M2
GLUCOSE SERPL-MCNC: 93 MG/DL (ref 74–99)
POTASSIUM SERPL-SCNC: 4 MMOL/L (ref 3.7–5.3)
PROT SERPL-MCNC: 7.3 G/DL (ref 6.6–8.7)
SODIUM SERPL-SCNC: 133 MMOL/L (ref 136–145)

## 2024-12-18 PROCEDURE — 36415 COLL VENOUS BLD VENIPUNCTURE: CPT

## 2024-12-18 PROCEDURE — 80053 COMPREHEN METABOLIC PANEL: CPT

## 2025-01-13 ENCOUNTER — HOSPITAL ENCOUNTER (OUTPATIENT)
Age: 85
Discharge: HOME | End: 2025-01-13
Payer: MEDICARE

## 2025-01-13 DIAGNOSIS — E87.1 HYPONATREMIA: ICD-10-CM

## 2025-01-13 DIAGNOSIS — I10 HYPERTENSION, ESSENTIAL: ICD-10-CM

## 2025-01-13 LAB
ALBUMIN SERPL-MCNC: 3.8 G/DL (ref 3.5–5.2)
ALBUMIN/GLOB SERPL: 1.2 {RATIO} (ref 1–2.5)
ALP SERPL-CCNC: 63 U/L (ref 40–129)
ALT SERPL-CCNC: 10 U/L (ref 10–50)
ANION GAP SERPL CALCULATED.3IONS-SCNC: 10 MMOL/L (ref 9–16)
AST SERPL-CCNC: 17 U/L (ref 10–50)
BILIRUB SERPL-MCNC: 0.4 MG/DL (ref 0–1.2)
BUN SERPL-MCNC: 14 MG/DL (ref 8–23)
CALCIUM SERPL-MCNC: 9.1 MG/DL (ref 8.6–10.4)
CHLORIDE SERPL-SCNC: 102 MMOL/L (ref 98–107)
CO2 SERPL-SCNC: 26 MMOL/L (ref 20–31)
CREAT SERPL-MCNC: 1.2 MG/DL (ref 0.7–1.2)
GFR, ESTIMATED: 60 ML/MIN/1.73M2
GLUCOSE P FAST SERPL-MCNC: 92 MG/DL (ref 74–99)
POTASSIUM SERPL-SCNC: 4.4 MMOL/L (ref 3.7–5.3)
PROT SERPL-MCNC: 6.9 G/DL (ref 6.6–8.7)
SODIUM SERPL-SCNC: 138 MMOL/L (ref 136–145)

## 2025-01-13 PROCEDURE — 36415 COLL VENOUS BLD VENIPUNCTURE: CPT

## 2025-01-13 PROCEDURE — 80053 COMPREHEN METABOLIC PANEL: CPT

## 2025-01-24 ENCOUNTER — HOSPITAL ENCOUNTER (OUTPATIENT)
Dept: PHYSICAL THERAPY | Facility: CLINIC | Age: 85
Setting detail: THERAPIES SERIES
Discharge: HOME | End: 2025-01-24
Payer: MEDICARE

## 2025-01-24 PROCEDURE — 97110 THERAPEUTIC EXERCISES: CPT

## 2025-01-24 PROCEDURE — 97162 PT EVAL MOD COMPLEX 30 MIN: CPT

## 2025-01-24 NOTE — CONSULTS
[] [facility]  Outpatient Rehabilitation &  Therapy  [address]    [phone]  F: [phone] [x] [facility]  Outpatient Rehabilitation &  Therapy  [address]   [address]  P: [phone]  F: [phone] [] [facility]  Outpatient Rehabilitation &  Therapy  [address]  P: [phone]  F: [phone] [] [facility]  Outpatient Rehabilitation &  Therapy  [address]   P: [phone]  F: [phone] [] [facility]   Outpatient Rehabilitation & Therapy  [address]  P: [phone]   F: [phone]        Physical Therapy Neurological Evaluation    Date:  2025  Patient: Donny Adrian  : 1940  MRN: [id number]  Physician: Darion Tucker MD   Insurance: UNITED [facility] MEDICARE ADVANTAGE (Auth after Eval)  Medical Diagnosis: R29.6 (ICD-10-CM) - Frequent falls    Rehab Codes: M54.59, R26.89, M62.81  Next MD appt.: TBD  Date of symptom onset: 25 (script)    Subjective:   Chief Complaint: LBP and balance deficits   Patient Goals: Decrease pain and reduce need for AD  Pertinent medical history:  Previous therapy?   [x]No       [x]Yes   []In hospital:                      []In rehab:                    []At home:   Pt is a 84 year old male presenting to physical therapy for frequent falls. Pt reports onset began in  when he had back surgery and bilateral total knee replacements. Pt reports his right knee replacement went really well, however, is still having difficulty with his left knee. Pt states he has had 4 falls within the last year. Most of which he attributes to his left leg giving out on him. Pt has been using a hurrycane for the past year and a half. At this time pt can walk about 40-50 yards before he has to stop and take a break.      Current Durable Medical Equipment: Lauraator, RW, luis    Precautions: Fall risk     Past Medical History:

## 2025-02-05 ENCOUNTER — HOSPITAL ENCOUNTER (OUTPATIENT)
Dept: PHYSICAL THERAPY | Facility: CLINIC | Age: 85
Setting detail: THERAPIES SERIES
Discharge: HOME | End: 2025-02-05
Payer: MEDICARE

## 2025-02-05 PROCEDURE — 97112 NEUROMUSCULAR REEDUCATION: CPT

## 2025-02-05 PROCEDURE — 97110 THERAPEUTIC EXERCISES: CPT

## 2025-02-05 NOTE — FLOWSHEET NOTE
[] [facility]  Outpatient Rehabilitation &  Therapy  [address]  [phone]  [phone] [x] [facility]  Outpatient Rehabilitation &  Therapy  [address]  P: [phone]  F: [phone] [] [facility]  Outpatient Rehabilitation &  Therapy  [address]  P: [phone]  F: [phone] [] [facility]  Outpatient Rehabilitation &  Therapy  [address]  [phone]  [phone] [] [facility]  Outpatient Rehabilitation &  Therapy  [address]   P: [phone]  F: [phone]  [] [facility]  Outpatient Rehabilitation &  Therapy  [address]  P: [phone]  F: [phone] [] [facility]  Outpatient Rehabilitation &  Therapy  [address]  P: [phone]  F: [phone] [] [facility]  Outpatient Rehabilitation &  Therapy  [address]  P: [phone]  F: [phone] [] [facility]  Outpatient Rehabilitation &  Therapy  [address]  P: [phone]  F: [phone]  [] [facility] Outpatient Rehabilitation &  Therapy  [address]  P: [phone]  F: [phone]     Physical Therapy Daily Treatment Note    Date:  2025  Patient Name:  Donny Adrian    :  1940  MRN: [id number]  Physician: Darion Tucker MD                          Insurance: UNITED [facility] MEDICARE ADVANTAGE (Auth still pending, okay to treat 25)  Medical Diagnosis: R29.6 (ICD-10-CM) - Frequent falls       Rehab Codes: M54.59, R26.89, M62.81  Next MD appt.: TBD  Date of symptom onset: 25 (script)  Visit# / total visits: ; Progress note for Medicare patient due at visit 10     Cancels/No Shows: 0/0    Subjective:    Pain:  [] Yes  [x] No Location:  N/A Pain Rating: (0-10 scale) 0/10  Pain altered Tx:  [] No

## 2025-02-07 ENCOUNTER — HOSPITAL ENCOUNTER (OUTPATIENT)
Dept: PHYSICAL THERAPY | Facility: CLINIC | Age: 85
Setting detail: THERAPIES SERIES
Discharge: HOME | End: 2025-02-07
Payer: MEDICARE

## 2025-02-07 PROCEDURE — 97112 NEUROMUSCULAR REEDUCATION: CPT

## 2025-02-07 PROCEDURE — 97110 THERAPEUTIC EXERCISES: CPT

## 2025-02-07 NOTE — FLOWSHEET NOTE
[] [facility]  Outpatient Rehabilitation &  Therapy  [address]  [phone]  [phone] [x] [facility]  Outpatient Rehabilitation &  Therapy  [address]  P: [phone]  F: [phone] [] [facility]  Outpatient Rehabilitation &  Therapy  [address]  P: [phone]  F: [phone] [] [facility]  Outpatient Rehabilitation &  Therapy  [address]  [phone]  [phone] [] [facility]  Outpatient Rehabilitation &  Therapy  [address]   P: [phone]  F: [phone]  [] [facility]  Outpatient Rehabilitation &  Therapy  [address]  P: [phone]  F: [phone] [] [facility]  Outpatient Rehabilitation &  Therapy  [address]  P: [phone]  F: [phone] [] [facility]  Outpatient Rehabilitation &  Therapy  [address]  P: [phone]  F: [phone] [] [facility]  Outpatient Rehabilitation &  Therapy  [address]  P: [phone]  F: [phone]  [] [facility] Outpatient Rehabilitation &  Therapy  [address]  P: [phone]  F: [phone]     Physical Therapy Daily Treatment Note    Date:  2025  Patient Name:  Donny Adrian    :  1940  MRN: [id number]  Physician: Darion Tucker MD                          Insurance: UNITED [facility] MEDICARE ADVANTAGE (Auth still pending, okay to treat 25)  Medical Diagnosis: R29.6 (ICD-10-CM) - Frequent falls       Rehab Codes: M54.59, R26.89, M62.81  Next MD appt.: TBD  Date of symptom onset: 25 (script)  Visit# / total visits: 3/12; Progress note for Medicare patient due at visit 10     Cancels/No Shows: 0/0    Subjective:  Pt stated that he had a fall yesterday helping his wife and reports no injuries.    Pain:  [x]

## 2025-02-12 ENCOUNTER — HOSPITAL ENCOUNTER (OUTPATIENT)
Dept: PHYSICAL THERAPY | Facility: CLINIC | Age: 85
Setting detail: THERAPIES SERIES
Discharge: HOME | End: 2025-02-12
Payer: MEDICARE

## 2025-02-12 PROCEDURE — 97110 THERAPEUTIC EXERCISES: CPT

## 2025-02-12 PROCEDURE — 97112 NEUROMUSCULAR REEDUCATION: CPT

## 2025-02-12 NOTE — FLOWSHEET NOTE
upcoming sessions. Encouraged to practice proper gait mechanics at home. Instructed to use walker as needed for increased safety especially in snow and icy weather conditions.     [] No change.     [] Other:  [x] Patient would continue to benefit from skilled physical therapy services in order to: address gait/balance impairment with attempt to address asymmetries, increase gait speed/efficiency, improve postural stability and dynamic standing balance, increase gastroc and hip flexor muscle length to allow improved gait mechanics, and improve overall functional mobility, progress independence and safety, and maximize level of function as close to PLOF as possible.     Problems:    [x] ? Pain 4/10 LBP  [x] ? ROM: Decreased lumbar ROM/ left knee extension  [x] ? Strength: Global deficits   [x] ? Function: 69% impaired   [x] ? Gait performance  [x] ? Balance performance                     Assessed:   Short Term Goals: Meet in 8 treatments Met Progressing No change Regressing   Pain: Pt must demo 2/10 LBP with prolonged ambulation to increase overall function.  []  []  []  []    ROM: Pt must demo pain free lumbar ROM to improve ability to perform household tasks. []  []  []  []    Functional Outcome Measure:  []  []  []  []    Functional Outcome Measure: Patient will improve gait speed by .1 m/s to demonstrate a decrease in risk of falls. []  []  []  []    Strength: Patient will improve 5x STS by 2.2s to demonstrate improved functional quad strength and a decrease in risk of falls. []  []  []  []    Home Exercise Program: Patient to be independent with home exercise program as demonstrated by performance with correct form without cues. []  []  []  []    Demonstrates knowledge of fall prevention []                 Assessed:    Long Term Goals: Meet in 12 treatments Met Progressing No change Regressing   Functional Outcome Measure: Patient will improve FGA by 5 points to demonstrate a decrease in risk of falls.  []  []

## 2025-02-14 ENCOUNTER — HOSPITAL ENCOUNTER (OUTPATIENT)
Dept: PHYSICAL THERAPY | Facility: CLINIC | Age: 85
Setting detail: THERAPIES SERIES
Discharge: HOME | End: 2025-02-14
Payer: MEDICARE

## 2025-02-14 PROCEDURE — 97110 THERAPEUTIC EXERCISES: CPT

## 2025-02-14 PROCEDURE — 97112 NEUROMUSCULAR REEDUCATION: CPT

## 2025-02-14 NOTE — FLOWSHEET NOTE
daily - 7 x weekly - 1 sets - 10 reps  - Supine Posterior Pelvic Tilt  - 1 x daily - 7 x weekly - 1 sets - 10 reps - 3 seconds hold    Comprehension of Education:  [x] Verbalizes understanding.  [x] Demonstrates understanding.  [x] Needs review.  [] Demonstrates/verbalizes HEP/Ed previously given.     Plan: [x] Continue current frequency toward long and short term goals.    [x] Specific Instructions for subsequent treatments: See above       Time In:135 pm            Time Out: 230 pm    Electronically signed by:  Bethanie Carr PTA

## 2025-02-19 ENCOUNTER — HOSPITAL ENCOUNTER (OUTPATIENT)
Dept: PHYSICAL THERAPY | Facility: CLINIC | Age: 85
Setting detail: THERAPIES SERIES
Discharge: HOME | End: 2025-02-19
Payer: MEDICARE

## 2025-02-19 PROCEDURE — 97110 THERAPEUTIC EXERCISES: CPT

## 2025-02-19 PROCEDURE — 97112 NEUROMUSCULAR REEDUCATION: CPT

## 2025-02-19 NOTE — FLOWSHEET NOTE
[] [facility]  Outpatient Rehabilitation &  Therapy  [address]  [phone]  [phone] [x] [facility]  Outpatient Rehabilitation &  Therapy  [address]  P: [phone]  F: [phone] [] [facility]  Outpatient Rehabilitation &  Therapy  [address]  P: [phone]  F: [phone] [] [facility]  Outpatient Rehabilitation &  Therapy  [address]  [phone]  [phone] [] [facility]  Outpatient Rehabilitation &  Therapy  [address]   P: [phone]  F: [phone]  [] [facility]  Outpatient Rehabilitation &  Therapy  [address]  P: [phone]  F: [phone] [] [facility]  Outpatient Rehabilitation &  Therapy  [address]  P: [phone]  F: [phone] [] [facility]  Outpatient Rehabilitation &  Therapy  [address]  P: [phone]  F: [phone] [] [facility]  Outpatient Rehabilitation &  Therapy  [address]  P: [phone]  F: [phone]  [] [facility] Outpatient Rehabilitation &  Therapy  [address]  P: [phone]  F: [phone]     Physical Therapy Daily Treatment Note    Date:  2025  Patient Name:  Donny Adrian    :  1940  MRN: [id number]  Physician: Darion Tucker MD                          Insurance: UNITED [facility] MEDICARE ADVANTAGE (Auth still pending, okay to treat 25)  Medical Diagnosis: R29.6 (ICD-10-CM) - Frequent falls       Rehab Codes: M54.59, R26.89, M62.81  Next MD appt.: TBD  Date of symptom onset: 25 (script)  Visit# / total visits: ; Progress note for Medicare patient due at visit 10     Cancels/No Shows: 0/0    Subjective:    Pain:  [x] Yes  [] No Location: low back Pain Rating: (0-10 scale) 2/10  Pain altered Tx:

## 2025-02-21 ENCOUNTER — HOSPITAL ENCOUNTER (OUTPATIENT)
Dept: PHYSICAL THERAPY | Facility: CLINIC | Age: 85
Setting detail: THERAPIES SERIES
Discharge: HOME | End: 2025-02-21
Payer: MEDICARE

## 2025-02-21 PROCEDURE — 97112 NEUROMUSCULAR REEDUCATION: CPT

## 2025-02-21 PROCEDURE — 97110 THERAPEUTIC EXERCISES: CPT

## 2025-02-21 NOTE — FLOWSHEET NOTE
[] [facility]  Outpatient Rehabilitation &  Therapy  [address]  [phone]  [phone] [x] [facility]  Outpatient Rehabilitation &  Therapy  [address]  P: [phone]  F: [phone] [] [facility]  Outpatient Rehabilitation &  Therapy  [address]  P: [phone]  F: [phone] [] [facility]  Outpatient Rehabilitation &  Therapy  [address]  [phone]  [phone] [] [facility]  Outpatient Rehabilitation &  Therapy  [address]   P: [phone]  F: [phone]  [] [facility]  Outpatient Rehabilitation &  Therapy  [address]  P: [phone]  F: [phone] [] [facility]  Outpatient Rehabilitation &  Therapy  [address]  P: [phone]  F: [phone] [] [facility]  Outpatient Rehabilitation &  Therapy  [address]  P: [phone]  F: [phone] [] [facility]  Outpatient Rehabilitation &  Therapy  [address]  P: [phone]  F: [phone]  [] [facility] Outpatient Rehabilitation &  Therapy  [address]  P: [phone]  F: [phone]     Physical Therapy Daily Treatment Note    Date:  2025  Patient Name:  Donny Adrian    :  1940  MRN: [id number]  Physician: Darion Tucker MD                          Insurance: UNITED [facility] MEDICARE ADVANTAGE (Auth still pending, okay to treat 25)  Medical Diagnosis: R29.6 (ICD-10-CM) - Frequent falls       Rehab Codes: M54.59, R26.89, M62.81  Next MD appt.: TBD  Date of symptom onset: 25 (script)  Visit# / total visits: ; Progress note for Medicare patient due at visit 10     Cancels/No Shows: 0/0    Subjective:    Pain:  [x] Yes  [] No Location: low back Pain Rating: (0-10 scale) 2/10  Pain altered Tx:

## 2025-02-26 ENCOUNTER — HOSPITAL ENCOUNTER (OUTPATIENT)
Dept: PHYSICAL THERAPY | Facility: CLINIC | Age: 85
Setting detail: THERAPIES SERIES
Discharge: HOME | End: 2025-02-26
Payer: MEDICARE

## 2025-02-26 PROCEDURE — 97112 NEUROMUSCULAR REEDUCATION: CPT

## 2025-02-26 PROCEDURE — 97116 GAIT TRAINING THERAPY: CPT

## 2025-02-26 PROCEDURE — 97110 THERAPEUTIC EXERCISES: CPT

## 2025-02-26 NOTE — FLOWSHEET NOTE
[] [facility]  Outpatient Rehabilitation &  Therapy  [address]  [phone]  [phone] [x] [facility]  Outpatient Rehabilitation &  Therapy  [address]  P: [phone]  F: [phone] [] [facility]  Outpatient Rehabilitation &  Therapy  [address]  P: [phone]  F: [phone] [] [facility]  Outpatient Rehabilitation &  Therapy  [address]  [phone]  [phone] [] [facility]  Outpatient Rehabilitation &  Therapy  [address]   P: [phone]  F: [phone]  [] [facility]  Outpatient Rehabilitation &  Therapy  [address]  P: [phone]  F: [phone] [] [facility]  Outpatient Rehabilitation &  Therapy  [address]  P: [phone]  F: [phone] [] [facility]  Outpatient Rehabilitation &  Therapy  [address]  P: [phone]  F: [phone] [] [facility]  Outpatient Rehabilitation &  Therapy  [address]  P: [phone]  F: [phone]  [] [facility] Outpatient Rehabilitation &  Therapy  [address]  P: [phone]  F: [phone]     Physical Therapy Daily Treatment Note    Date:  2025  Patient Name:  Donny Adrian    :  1940  MRN: [id number]  Physician: Darion Tucker MD                          Insurance: [employer] MEDICARE ADVANTAGE - [facility] medicare complete 100% coverage   Medical Diagnosis: R29.6 (ICD-10-CM) - Frequent falls       Rehab Codes: M54.59, R26.89, M62.81  Next MD appt.: TBD  Date of symptom onset: 25 (script)  Visit# / total visits: ; Progress note for Medicare patient due at visit 10     Cancels/No Shows: 0/0    Subjective:    Pain:  [x] Yes  [] No Location: low back Pain Rating: (0-10 scale) 2/10  Pain altered Tx:  [x] No

## 2025-02-28 ENCOUNTER — HOSPITAL ENCOUNTER (OUTPATIENT)
Dept: PHYSICAL THERAPY | Facility: CLINIC | Age: 85
Setting detail: THERAPIES SERIES
Discharge: HOME | End: 2025-02-28
Payer: MEDICARE

## 2025-02-28 ENCOUNTER — OFFICE VISIT (OUTPATIENT)
Dept: PULMONOLOGY | Age: 85
End: 2025-02-28

## 2025-02-28 VITALS
HEIGHT: 69 IN | RESPIRATION RATE: 12 BRPM | OXYGEN SATURATION: 100 % | SYSTOLIC BLOOD PRESSURE: 139 MMHG | HEART RATE: 94 BPM | BODY MASS INDEX: 40.14 KG/M2 | DIASTOLIC BLOOD PRESSURE: 78 MMHG | WEIGHT: 271 LBS

## 2025-02-28 DIAGNOSIS — G47.33 OSA TREATED WITH BIPAP: ICD-10-CM

## 2025-02-28 DIAGNOSIS — R06.02 SHORTNESS OF BREATH: Primary | ICD-10-CM

## 2025-02-28 DIAGNOSIS — E66.01 MORBID OBESITY WITH BMI OF 40.0-44.9, ADULT: ICD-10-CM

## 2025-02-28 PROCEDURE — 97112 NEUROMUSCULAR REEDUCATION: CPT

## 2025-02-28 PROCEDURE — 97110 THERAPEUTIC EXERCISES: CPT

## 2025-02-28 ASSESSMENT — SLEEP AND FATIGUE QUESTIONNAIRES
HOW LIKELY ARE YOU TO NOD OFF OR FALL ASLEEP WHEN YOU ARE A PASSENGER IN A CAR FOR AN HOUR WITHOUT A BREAK: WOULD NEVER DOZE
ESS TOTAL SCORE: 1
HOW LIKELY ARE YOU TO NOD OFF OR FALL ASLEEP WHILE SITTING AND READING: SLIGHT CHANCE OF DOZING
HOW LIKELY ARE YOU TO NOD OFF OR FALL ASLEEP WHILE SITTING QUIETLY AFTER LUNCH WITHOUT ALCOHOL: WOULD NEVER DOZE
HOW LIKELY ARE YOU TO NOD OFF OR FALL ASLEEP WHILE SITTING AND TALKING TO SOMEONE: WOULD NEVER DOZE
HOW LIKELY ARE YOU TO NOD OFF OR FALL ASLEEP WHILE SITTING INACTIVE IN A PUBLIC PLACE: WOULD NEVER DOZE
HOW LIKELY ARE YOU TO NOD OFF OR FALL ASLEEP WHILE WATCHING TV: WOULD NEVER DOZE
HOW LIKELY ARE YOU TO NOD OFF OR FALL ASLEEP WHILE LYING DOWN TO REST IN THE AFTERNOON WHEN CIRCUMSTANCES PERMIT: WOULD NEVER DOZE
HOW LIKELY ARE YOU TO NOD OFF OR FALL ASLEEP IN A CAR, WHILE STOPPED FOR A FEW MINUTES IN TRAFFIC: WOULD NEVER DOZE

## 2025-02-28 NOTE — FLOWSHEET NOTE
Hurdles  8x Fwd  2x ea Lat  // bars  Orange hurdles upside down works best New 2/21- L more limited in ROM 2 feet in each space B UE A; for fwd leads with L down and back then leads with R- flipped orange hurdles upside down and able to complete more smoothly with 1 foot in each space   2/28/25 difficulty on L due to decreased ROM was able to alternate with huddles up today. Hit hurdles 2 x                      Other:      Specific Instructions for next treatment:  Increase endurance  Gait training - hurdles   Global BLE strengthening   Balance     Treatment Charges: Mins Units   []  Modalities       [x]  Ther Exercise 15 1   [x]  Neuromuscular Re-ed 32 2   []  Gait Training     []  Manual Therapy     []  Ther Activities     []  Aquatics     []  Vasocompression     []  Cervical Traction     []  Other     Total Billable time 47 min 3    non billable warmup [id number] pm    Assessment: [x] Progressing toward goals. Began with nu step but decreased time due to being late. Pt report no falls and that he has been working on his bilateral knee ROM at home. Pt was educated on various ways to increase his knee flexion with lunges on step and seated on his Rollator. Pt tolerated exercises well and was able to increase to 3 # for all of his exercises without increased pain. Pt ha less touches to the bars with static balance activities . Will progress towards goals as tolerated.        [] No change.     [] Other:  [x] Patient would continue to benefit from skilled physical therapy services in order to: address gait/balance impairment with attempt to address asymmetries, increase gait speed/efficiency, improve postural stability and dynamic standing balance, increase gastroc and hip flexor muscle length to allow improved gait mechanics, and improve overall functional mobility, progress independence and safety, and maximize level of function as close to PLOF as possible.     Problems:    [x] ? Pain 4/10 LBP  [x] ? ROM: Decreased

## 2025-02-28 NOTE — PROGRESS NOTES
Elevated Creatinine         History of Present Illness  The patient presents for evaluation of sleep apnea, lower extremity edema, and health maintenance.    He has been utilizing a BiPAP machine, which has significantly improved his sleep quality and energy levels upon awakening. He reports no issues with the machine itself but notes that prolonged use of the mask results in greasiness, causing it to slide. To mitigate this, he regularly replaces the mask.    He also reports edema in his left leg. He is currently on Bumex for this condition and has an upcoming appointment with his primary care physician in 03/2025. He has no history of cardiac issues, open heart surgery, or stent placement.    Supplemental Information  His surgical history includes an appendectomy.    MEDICATIONS  Bumex    IMMUNIZATIONS  He is up to date with his pneumonia, influenza, and COVID-19 vaccines.    PHYSICAL EXAMINATION     Physical Exam      VITAL SIGNS:   /78 (Site: Left Upper Arm)   Pulse 94   Resp 12   Ht 1.753 m (5' 9\")   Wt 122.9 kg (271 lb)   SpO2 100% Comment: room air at rest  BMI 40.02 kg/m²   Wt Readings from Last 3 Encounters:   02/28/25 122.9 kg (271 lb)   01/16/25 123.3 kg (271 lb 12.8 oz)   12/19/24 120.8 kg (266 lb 6.4 oz)       SYSTEMIC EXAMINATION:  General appearance -  [x] well appearing  [] overweight  [] obese   [x] morbidly obese [] cachectic [x] comfortable  [x] in no acute distress  [] chronically ill-appearing  [] in mild to moderate respiratory distress  Mental status -  [x] alert  [] oriented to person, place, and time  [] anxious  [] depressed mood   Head-  [x] atraumatic  [x] normocephalic  Eyes -  [] pupils equal and reactive    [] extraocular eye movements intact  [x] sclera anicteric  Ears -  [x] hearing grossly normal bilaterally [] bilateral TM's and external ear canals normal  Nose -  [x] normal and patent [] no erythema  [] discharge  Mouth -  [x] mucous membranes moist  [] pharynx normal

## 2025-03-05 ENCOUNTER — HOSPITAL ENCOUNTER (OUTPATIENT)
Dept: PHYSICAL THERAPY | Facility: CLINIC | Age: 85
Setting detail: THERAPIES SERIES
Discharge: HOME | End: 2025-03-05
Payer: MEDICARE

## 2025-03-05 PROCEDURE — 97112 NEUROMUSCULAR REEDUCATION: CPT

## 2025-03-05 PROCEDURE — 97110 THERAPEUTIC EXERCISES: CPT

## 2025-03-05 NOTE — FLOWSHEET NOTE
[] Yes  Action:  Comments: Has difficulty getting in and out of his truck. Presents with slight increase in back pain today.                   Objective:  Modalities:   Precautions [] No  [x] Yes: Fall Risk   Exercises: bolded completed 03/05/25    Exercise gait belt  Reps/ Time Weight/ Level Comments   NuStep  6 min L3          Supine      LTR 15 x  Inc to 15 x 2/14/25   PPT 10x3\"     Quad sets 10 ea  New 2/21   SLR 5x2  New 2/21         Seated       HS Stretch 2x30s  passive     Seated march 10x2  3# New 2 # 2/7/25  Inc 2/21   LAQ 10x2x3\"  3# New 2 # 2/7/25  Inc 2/21- 2# and hold time on 2/26  Added 3 # 2/28/25   Hip add 20x3\" pb     Hip abd 10x2 [place]      Toe Raises  20x     Hamstring curls 10 x 2  Blue New 2/28/25   STS from chair 10x           Standing       Calf stretch  2x30\"     Hs stretch  3x30\"ea     Hs curls 10 x 2 3 # Added 3 # 2/28/25   Heel raises  10 x 2 3 # Added 3 # 2/28/25   Marches  10 x 2 3 # Added 3 # 2/28/25   3 way hip B 10 x 2 3 # Added 3 # 2/28/25   Side step 3L 3# // bars New 2/26 2/28/25 one had on bar   NBOS EO foam x30s      NBOS EC floor then foam  x30ea     NBOS head movements + foam 30\"Ea  Nods, rot- Added foam 3/5   Modified Tandem stance EC 2x30s   New 2/7/25 eyes closed    Tandem on floor then on foam 2x30\"ea  2nd set on foam 2/26   Step ups fwd/lat 8xea 6\" New 3/5   Stair taps  10x ea      Lunges on top step 2x30\" ea  New 2/28/25         Ambulation   1 lap  3#  Verbal cues to increase step height to increase floor clearance  Verbal cues to focus on a wider base of support, R foot  tends to hit L especially when fatigued. 2/14/25   Walking march  30' cane New 3/5   Retro amb 30' cane New 3/5   Side step 30'ea cane New 3/5   Gait over Hurdles  8x Fwd  2x ea Lat  // bars  Orange hurdles upside down works best New 2/21- L more limited in ROM                      Other:      Specific Instructions for next treatment:  Increase endurance  Gait training - hurdles   Global BLE

## 2025-03-07 ENCOUNTER — HOSPITAL ENCOUNTER (OUTPATIENT)
Dept: PHYSICAL THERAPY | Facility: CLINIC | Age: 85
Setting detail: THERAPIES SERIES
Discharge: HOME | End: 2025-03-07
Payer: MEDICARE

## 2025-03-07 PROCEDURE — 97110 THERAPEUTIC EXERCISES: CPT

## 2025-03-07 PROCEDURE — 97112 NEUROMUSCULAR REEDUCATION: CPT

## 2025-03-07 NOTE — FLOWSHEET NOTE
[] [facility]  Outpatient Rehabilitation &  Therapy  [address]  [phone]  [phone] [x] [facility]  Outpatient Rehabilitation &  Therapy  [address]  P: [phone]  F: [phone] [] [facility]  Outpatient Rehabilitation &  Therapy  [address]  P: [phone]  F: [phone] [] [facility]  Outpatient Rehabilitation &  Therapy  [address]  [phone]  [phone] [] [facility]  Outpatient Rehabilitation &  Therapy  [address]   P: [phone]  F: [phone]  [] [facility]  Outpatient Rehabilitation &  Therapy  [address]  P: [phone]  F: [phone] [] [facility]  Outpatient Rehabilitation &  Therapy  [address]  P: [phone]  F: [phone] [] [facility]  Outpatient Rehabilitation &  Therapy  [address]  P: [phone]  F: [phone] [] [facility]  Outpatient Rehabilitation &  Therapy  [address]  P: [phone]  F: [phone]  [] [facility] Outpatient Rehabilitation &  Therapy  [address]  P: [phone]  F: [phone]     Physical Therapy Daily Treatment Note    Date:  3/7/2025  Patient Name:  Donny Adrian    :  1940  MRN: [id number]  Physician: Darion Tucker MD                          Insurance: [employer] MEDICARE ADVANTAGE - [facility] medicare complete 100% coverage   Medical Diagnosis: R29.6 (ICD-10-CM) - Frequent falls       Rehab Codes: M54.59, R26.89, M62.81  Next MD appt.: TBD  Date of symptom onset: 25 (script)  Visit# / total visits: ; Progress note for Medicare patient due at visit 10     Cancels/No Shows: 0/0    Subjective:    Pain:  [x] Yes  [] No Location: low back Pain Rating: (0-10 scale) /10  Pain altered Tx:  [x] No

## 2025-03-12 ENCOUNTER — HOSPITAL ENCOUNTER (OUTPATIENT)
Dept: PHYSICAL THERAPY | Facility: CLINIC | Age: 85
Setting detail: THERAPIES SERIES
Discharge: HOME | End: 2025-03-12
Payer: MEDICARE

## 2025-03-12 PROCEDURE — 97112 NEUROMUSCULAR REEDUCATION: CPT

## 2025-03-12 PROCEDURE — 97110 THERAPEUTIC EXERCISES: CPT

## 2025-03-12 NOTE — FLOWSHEET NOTE
increase floor clearance  Verbal cues to focus on a wider base of support, R foot  tends to hit L especially when fatigued. 2/14/25   Walking march 30' cane New 3/5   Retro amb 30' cane New 3/5   Side step 30'ea cane New 3/5   Gait over Hurdles  8x Fwd  2x ea Lat  // bars  Orange hurdles upside down works best New 2/21- L more limited in ROM                      Other:      Specific Instructions for next treatment:  Increase endurance  Gait training - hurdles   Global BLE strengthening   Balance  Functional Gait Assessment (FGA)    0   (Severe Impairment/Unable) 1   (Moderate Impairment) 2   (Mild Impairment) 3  (Normal) Comments   Level Surface Gait []  [x]  []  []     Change in Gait Speed [x]  []  []  []     Gait with Horizontal Head Turns []  [x]  []  []     Gait with Vertical Head Turns []  [x]  []  []     Gait with Pivot Turn [x]  []  []  []     Step Over Obstacle [x]  []  []  []     Gait with Narrow ASHLEY [x]  []  []  []     Gait with Eyes Closed [x]  []  []  []     Backwards Walking [x]  []  []  []     Stairs [x]  []  []  []         Total score: 3/30  Fall risk? (Cutoff score </= 22):       [] Yes           [] No  MCID: 5 points    SALAZAR BALANCE SCALE 4 3 2 1 0   Sitting to standing []  [x]  []  []  []    Standing unsupported []  [x]  []  []  []    Sitting with back unsupported but feet supported on floor or on a stool  [x]  []  []  []  []    Standing to sitting []  [x]  []  []  []    Transfers []  [x]  []  []  []    Standing unsupported with eyes closed []  []  [x]  []  []    Standing unsupported with feet together []  []  [x]  []  []    Reaching forward with outstretched arm while standing  []  [x]  []  []  []     object from the floor from a standing position []  []  [x]  []  []    Turning to look behind over left and right shoulders while standing []  []  [x]  []  []    Turn 360 degrees []  []  [x]  []  []    Place alternate foot on step or stool while standing unsupported  []  []  [x]  []  []

## 2025-03-28 ENCOUNTER — HOSPITAL ENCOUNTER (OUTPATIENT)
Age: 85
Discharge: HOME | End: 2025-03-30
Attending: INTERNAL MEDICINE
Payer: MEDICARE

## 2025-03-28 VITALS — WEIGHT: 271 LBS | HEIGHT: 69 IN | BODY MASS INDEX: 40.14 KG/M2

## 2025-03-28 DIAGNOSIS — R06.02 SHORTNESS OF BREATH: ICD-10-CM

## 2025-03-28 LAB
ECHO AO ROOT DIAM: 2.3 CM
ECHO AO ROOT INDEX: 0.98 CM/M2
ECHO AV MEAN GRADIENT: 4 MMHG
ECHO AV MEAN VELOCITY: 0.9 M/S
ECHO AV PEAK GRADIENT: 8 MMHG
ECHO AV PEAK VELOCITY: 1.4 M/S
ECHO AV VELOCITY RATIO: 0.64
ECHO AV VTI: 32.3 CM
ECHO BSA: 2.45 M2
ECHO LA DIAMETER INDEX: 1.32 CM/M2
ECHO LA DIAMETER: 3.1 CM
ECHO LA TO AORTIC ROOT RATIO: 1.35
ECHO LV E' LATERAL VELOCITY: 9.25 CM/S
ECHO LV E' SEPTAL VELOCITY: 6.96 CM/S
ECHO LV EF PHYSICIAN: 55 %
ECHO LV FRACTIONAL SHORTENING: 27 % (ref 28–44)
ECHO LV INTERNAL DIMENSION DIASTOLE INDEX: 1.57 CM/M2
ECHO LV INTERNAL DIMENSION DIASTOLIC: 3.7 CM (ref 4.2–5.9)
ECHO LV INTERNAL DIMENSION SYSTOLIC INDEX: 1.15 CM/M2
ECHO LV INTERNAL DIMENSION SYSTOLIC: 2.7 CM
ECHO LV IVSD: 1 CM (ref 0.6–1)
ECHO LV MASS 2D: 112.5 G (ref 88–224)
ECHO LV MASS INDEX 2D: 47.9 G/M2 (ref 49–115)
ECHO LV POSTERIOR WALL DIASTOLIC: 1 CM (ref 0.6–1)
ECHO LV RELATIVE WALL THICKNESS RATIO: 0.54
ECHO LVOT PEAK GRADIENT: 3 MMHG
ECHO LVOT PEAK VELOCITY: 0.9 M/S
ECHO MV A VELOCITY: 0.89 M/S
ECHO MV E DECELERATION TIME (DT): 222 MS
ECHO MV E VELOCITY: 0.84 M/S
ECHO MV E/A RATIO: 0.94
ECHO MV E/E' LATERAL: 9.08
ECHO MV E/E' RATIO (AVERAGED): 10.58
ECHO MV E/E' SEPTAL: 12.07

## 2025-03-28 PROCEDURE — 93306 TTE W/DOPPLER COMPLETE: CPT | Performed by: INTERNAL MEDICINE

## 2025-03-28 PROCEDURE — 93306 TTE W/DOPPLER COMPLETE: CPT

## 2025-04-01 ENCOUNTER — HOSPITAL ENCOUNTER (OUTPATIENT)
Dept: PHYSICAL THERAPY | Facility: CLINIC | Age: 85
Setting detail: THERAPIES SERIES
Discharge: HOME | End: 2025-04-01
Payer: MEDICARE

## 2025-04-01 ENCOUNTER — APPOINTMENT (OUTPATIENT)
Dept: PHYSICAL THERAPY | Facility: CLINIC | Age: 85
End: 2025-04-01
Payer: MEDICARE

## 2025-04-01 PROCEDURE — 97110 THERAPEUTIC EXERCISES: CPT

## 2025-04-01 PROCEDURE — 97112 NEUROMUSCULAR REEDUCATION: CPT

## 2025-04-01 NOTE — FLOWSHEET NOTE
[] [facility]  Outpatient Rehabilitation &  Therapy  [address]  [phone]  [phone] [x] [facility]  Outpatient Rehabilitation &  Therapy  [address]  P: [phone]  F: [phone] [] [facility]  Outpatient Rehabilitation &  Therapy  [address]  P: [phone]  F: [phone] [] [facility]  Outpatient Rehabilitation &  Therapy  [address]  [phone]  [phone] [] [facility]  Outpatient Rehabilitation &  Therapy  [address]   P: [phone]  F: [phone]  [] [facility]  Outpatient Rehabilitation &  Therapy  [address]  P: [phone]  F: [phone] [] [facility]  Outpatient Rehabilitation &  Therapy  [address]  P: [phone]  F: [phone] [] [place]  Outpatient Rehabilitation &  Therapy  [address]  P: [phone]  F: [phone] [] [facility]  Outpatient Rehabilitation &  Therapy  [address]  P: [phone]  F: [phone]  [] [facility] Outpatient Rehabilitation &  Therapy  [address]  P: [phone]  F: [phone]     Physical Therapy Daily Treatment Note/Progress Note     Date:  2025  Patient Name:  Donny Adrian    :  1940  MRN: [id number]  Physician: Darion Tucker MD                          Insurance: [employer] MEDICARE ADVANTAGE - [facility] medicare complete 100% coverage   Medical Diagnosis: R29.6 (ICD-10-CM) - Frequent falls       Rehab Codes: M54.59, R26.89, M62.81  Next MD appt.: TBD  Date of symptom onset: 25 (script)  Visit# / total visits: ; Progress note for Medicare patient due at visit 10     Cancels/No Shows: 0/0  Visit Dates: 25 - 3/12/25    Subjective:    Pain:  [x] Yes  [] No Location: low back Pain

## 2025-04-07 ENCOUNTER — HOSPITAL ENCOUNTER (OUTPATIENT)
Dept: PHYSICAL THERAPY | Facility: CLINIC | Age: 85
Setting detail: THERAPIES SERIES
Discharge: HOME | End: 2025-04-07
Payer: MEDICARE

## 2025-04-07 PROCEDURE — 97112 NEUROMUSCULAR REEDUCATION: CPT

## 2025-04-07 PROCEDURE — 97110 THERAPEUTIC EXERCISES: CPT

## 2025-04-07 NOTE — FLOWSHEET NOTE
improved functional quad strength and a decrease in risk of falls. []  []  []  []         Pt. Education:  [x] Yes  [] No  [] Reviewed Prior HEP/Ed  Method of Education: [x] Verbal posture, core, form, gait mechanics [] Demo  [] Written  Access Code: [email or web address]  URL: [email or web address]  Date: 01/24/2025  Prepared by: Teresa Coffey     Exercises  - Seated Hamstring Stretch  - 1 x daily - 7 x weekly - 3 sets - 10 reps  - Seated March  - 1 x daily - 7 x weekly - 3 sets - 10 reps  - Seated Long Arc Quad  - 1 x daily - 7 x weekly - 3 sets - 10 reps  - Seated Hip Abduction with Resistance  - 1 x daily - 7 x weekly - 3 sets - 10 reps  - Seated Hip Adduction Isometrics with Ball  - 1 x daily - 7 x weekly - 3 sets - 10 reps  2/12/25  - Seated Heel Toe Raises  - 1 x daily - 7 x weekly - 2 sets - 10 reps  - Standing 3-way Hip with Walker  - 1 x daily - 7 x weekly - 1 sets - 10 reps  - Heel Raises with Counter Support  - 1 x daily - 7 x weekly - 2 sets - 10 reps  - Standing Knee Flexion  - 1 x daily - 7 x weekly - 1 sets - 10 reps  - Sit to Stand with Counter Support  - 1 x daily - 7 x weekly - 1 sets - 10 reps  - Supine Lower Trunk Rotation  - 1 x daily - 7 x weekly - 1 sets - 10 reps  - Supine Posterior Pelvic Tilt  - 1 x daily - 7 x weekly - 1 sets - 10 reps - 3 seconds hold    Comprehension of Education:  [x] Verbalizes understanding.  [x] Demonstrates understanding.  [x] Needs review.  [] Demonstrates/verbalizes HEP/Ed previously given.           Time In: 200 pm            Time Out: 2:56 pm    Electronically signed by:  Russell Tucker PTA

## 2025-04-09 ENCOUNTER — HOSPITAL ENCOUNTER (OUTPATIENT)
Dept: PHYSICAL THERAPY | Facility: CLINIC | Age: 85
Setting detail: THERAPIES SERIES
Discharge: HOME | End: 2025-04-09
Payer: MEDICARE

## 2025-04-09 NOTE — FLOWSHEET NOTE
[] [facility]  Outpatient Rehabilitation &  Therapy  [address]  [phone]  [phone] [x] [facility]  Outpatient Rehabilitation &  Therapy  [address]  P: [phone]  F: [phone] [] [facility]  Outpatient Rehabilitation &  Therapy  [address]  P: [phone]  F: [phone] [] [facility]  Outpatient Rehabilitation &  Therapy  [address]  [phone]  [phone] [] [facility]  Outpatient Rehabilitation &  Therapy  [address]   P: [phone]  F: [phone]  [] [facility]  Outpatient Rehabilitation &  Therapy  [address]  P: [phone]  F: [phone] [] [facility]  Outpatient Rehabilitation &  Therapy  [address]  P: [phone]  F: [phone] [] [facility]  Outpatient Rehabilitation &  Therapy  [address]  P: [phone]  F: [phone] [] [facility]  Outpatient Rehabilitation &  Therapy  [address]  P: [phone]  F: [phone]  [] [facility] Outpatient Rehabilitation &  Therapy  [address]  P: [phone]  F: [phone]     Therapy Cancel/No Show note    Date: 2025  Patient: Donny Adrian  : 1940  MRN: [id number]    Cancels/No Shows to date:     For today's appointment patient:    [x]  Cancelled    [] Rescheduled appointment    [] No-show     Reason given by patient:    []  Patient ill    [x]  Conflicting appointment    [] No transportation      [] Conflict with work    [] No reason given    [] Weather related    [] COVID-19    [] Other:      Comments:        [x] Next appointment was confirmed    Electronically signed by: Teresa Coffey PT

## 2025-04-11 ENCOUNTER — HOSPITAL ENCOUNTER (OUTPATIENT)
Dept: GENERAL RADIOLOGY | Age: 85
End: 2025-04-11
Payer: MEDICARE

## 2025-04-11 ENCOUNTER — HOSPITAL ENCOUNTER (OUTPATIENT)
Dept: GENERAL RADIOLOGY | Age: 85
Discharge: HOME | End: 2025-04-13
Payer: MEDICARE

## 2025-04-11 ENCOUNTER — HOSPITAL ENCOUNTER (OUTPATIENT)
Age: 85
Discharge: HOME | End: 2025-04-11
Payer: MEDICARE

## 2025-04-11 DIAGNOSIS — M65.312 TRIGGER FINGER OF LEFT THUMB: ICD-10-CM

## 2025-04-11 DIAGNOSIS — I10 HYPERTENSION, ESSENTIAL: ICD-10-CM

## 2025-04-11 LAB
ALBUMIN SERPL-MCNC: 3.9 G/DL (ref 3.5–5.2)
ALBUMIN/GLOB SERPL: 1.1 {RATIO} (ref 1–2.5)
ALP SERPL-CCNC: 70 U/L (ref 40–129)
ALT SERPL-CCNC: 14 U/L (ref 10–50)
ANION GAP SERPL CALCULATED.3IONS-SCNC: 11 MMOL/L (ref 9–16)
AST SERPL-CCNC: 18 U/L (ref 10–50)
BILIRUB SERPL-MCNC: 0.5 MG/DL (ref 0–1.2)
BUN SERPL-MCNC: 16 MG/DL (ref 8–23)
CALCIUM SERPL-MCNC: 9.4 MG/DL (ref 8.6–10.4)
CHLORIDE SERPL-SCNC: 102 MMOL/L (ref 98–107)
CO2 SERPL-SCNC: 26 MMOL/L (ref 20–31)
CREAT SERPL-MCNC: 1.1 MG/DL (ref 0.7–1.2)
GFR, ESTIMATED: 67 ML/MIN/1.73M2
GLUCOSE SERPL-MCNC: 92 MG/DL (ref 74–99)
POTASSIUM SERPL-SCNC: 4.2 MMOL/L (ref 3.7–5.3)
PROT SERPL-MCNC: 7.3 G/DL (ref 6.6–8.7)
SODIUM SERPL-SCNC: 139 MMOL/L (ref 136–145)

## 2025-04-11 PROCEDURE — 73130 X-RAY EXAM OF HAND: CPT

## 2025-04-11 PROCEDURE — 80053 COMPREHEN METABOLIC PANEL: CPT

## 2025-04-11 PROCEDURE — 36415 COLL VENOUS BLD VENIPUNCTURE: CPT

## 2025-04-13 PROBLEM — M65.312 TRIGGER FINGER OF LEFT THUMB: Status: ACTIVE | Noted: 2025-04-13

## 2025-04-16 ENCOUNTER — HOSPITAL ENCOUNTER (OUTPATIENT)
Dept: PHYSICAL THERAPY | Facility: CLINIC | Age: 85
Setting detail: THERAPIES SERIES
Discharge: HOME | End: 2025-04-16
Payer: MEDICARE

## 2025-04-16 DIAGNOSIS — M65.312 TRIGGER FINGER OF LEFT THUMB: ICD-10-CM

## 2025-04-16 PROCEDURE — 97112 NEUROMUSCULAR REEDUCATION: CPT

## 2025-04-16 PROCEDURE — 97110 THERAPEUTIC EXERCISES: CPT

## 2025-04-16 NOTE — FLOWSHEET NOTE
[] [facility]  Outpatient Rehabilitation &  Therapy  [address]  [phone]  [phone] [x] [facility]  Outpatient Rehabilitation &  Therapy  [address]  P: [phone]  F: [phone] [] [facility]  Outpatient Rehabilitation &  Therapy  [address]  P: [phone]  F: [phone] [] [facility]  Outpatient Rehabilitation &  Therapy  [address]  [phone]  [phone] [] [facility]  Outpatient Rehabilitation &  Therapy  [address]   P: [phone]  F: [phone]  [] [facility]  Outpatient Rehabilitation &  Therapy  [address]  P: [phone]  F: [phone] [] [facility]  Outpatient Rehabilitation &  Therapy  [address]  P: [phone]  F: [phone] [] [facility]  Outpatient Rehabilitation &  Therapy  [address]  P: [phone]  F: [phone] [] [facility]  Outpatient Rehabilitation &  Therapy  [address]  P: [phone]  F: [phone]  [] [facility] Outpatient Rehabilitation &  Therapy  [address]  P: [phone]  F: [phone]     Physical Therapy Daily Treatment Note     Date:  2025  Patient Name:  Donny Adrian    :  1940  MRN: [id number]  Physician: Darion Tucker MD                          Insurance: UNITED HE[employer] MEDICARE ADVANTAGE - [facility] medicare complete 100% coverage   Medical Diagnosis: R29.6 (ICD-10-CM) - Frequent falls       Rehab Codes: M54.59, R26.89, M62.81  Radha HOOPER appt.: May 8 2025 back dr  Date of symptom onset: 25 (script)  Visit# / total visits: 15/20; Progress note for Medicare patient due at visit 22     Cancels/No Shows:     Subjective:    Pain:  [] Yes  [x] No Location: low back Pain Rating: (0-10 scale) sore/10  Pain

## 2025-04-18 ENCOUNTER — HOSPITAL ENCOUNTER (OUTPATIENT)
Dept: PHYSICAL THERAPY | Facility: CLINIC | Age: 85
Setting detail: THERAPIES SERIES
Discharge: HOME | End: 2025-04-18
Payer: MEDICARE

## 2025-04-18 PROCEDURE — 97112 NEUROMUSCULAR REEDUCATION: CPT

## 2025-04-18 PROCEDURE — 97110 THERAPEUTIC EXERCISES: CPT

## 2025-04-18 NOTE — FLOWSHEET NOTE
1 x daily - 7 x weekly - 2 sets - 30 seconds  hold  - Supine Bridge  - 1 x daily - 7 x weekly - 1 sets - 10 reps  Comprehension of Education:  [x] Verbalizes understanding.  [x] Demonstrates understanding.  [x] Needs review.  [] Demonstrates/verbalizes HEP/Ed previously given.           Time In: 12:30 pm            Time Out:  1:29 pm    Electronically signed by:  Bethanie Carr PTA

## 2025-04-21 ENCOUNTER — HOSPITAL ENCOUNTER (OUTPATIENT)
Dept: SURGERY | Age: 85
Discharge: HOME | End: 2025-04-21
Payer: MEDICARE

## 2025-04-21 VITALS
HEART RATE: 91 BPM | BODY MASS INDEX: 40.14 KG/M2 | DIASTOLIC BLOOD PRESSURE: 94 MMHG | SYSTOLIC BLOOD PRESSURE: 188 MMHG | WEIGHT: 271 LBS | RESPIRATION RATE: 18 BRPM | HEIGHT: 69 IN | OXYGEN SATURATION: 94 %

## 2025-04-21 PROCEDURE — 99202 OFFICE O/P NEW SF 15 MIN: CPT | Performed by: SURGERY

## 2025-04-21 PROCEDURE — 99214 OFFICE O/P EST MOD 30 MIN: CPT | Performed by: SURGERY

## 2025-04-21 NOTE — PROGRESS NOTES
[facility]  Progress Note        NAME:  Donny Adrian  MRN: [id number]   YOB: 1940   Date: 4/21/2025   Age: 84 y.o.  Gender: male     Body mass index is 40.02 kg/m².     Chief Complaint: Patient comes in for follow-up visit for his incisional hernia    History of Present Illness: Patient with known incisional hernia for which he comes in for follow-up visit.  Patient states that he has made effort to lose weight however his weight has been staying stable.  The hernia has not been causing him any severe abdominal pain though it is uncomfortable and he uses support.  He comes to us and would like to proceed with the hernia repair    Current Outpatient Medications on File Prior to Encounter   Medication Sig Dispense Refill    phentermine (ADIPEX-P) 37.5 MG tablet Take 1 tablet by mouth every morning (before breakfast) for 30 days. Max Daily Amount: 37.5 mg 30 tablet 0    amLODIPine (NORVASC) 10 MG tablet Take 1 tablet by mouth daily 90 tablet 0    vitamin D (VITAMIN D3) 125 MCG (5000 UT) CAPS capsule Take 1 capsule by mouth every other day 45 capsule 0    aspirin (ASPIRIN LOW DOSE) 81 MG EC tablet Take 1 tablet by mouth daily 90 tablet 0    B Complex Vitamins (VITAMIN B COMPLEX) TABS Take 1 tablet by mouth every morning (before breakfast) 90 tablet 0    DULoxetine (CYMBALTA) 60 MG extended release capsule Take 1 capsule by mouth daily 90 capsule 0    losartan (COZAAR) 50 MG tablet Take 1 tablet by mouth daily 90 tablet 0     Current Facility-Administered Medications on File Prior to Encounter   Medication Dose Route Frequency Provider Last Rate Last Admin    triamcinolone acetonide (KENALOG-40) injection 40 mg  40 mg IntraMUSCular Once Darion Tucker MD           Allergies   Allergen Reactions    Diclofenac Sodium      Other reaction(s): Anaphylaxis, Other (See Comments)   Elevated Creatinine    Ace Inhibitors Other (See Comments)     cough    Voltaren [Diclofenac Sodium]

## 2025-04-23 ENCOUNTER — HOSPITAL ENCOUNTER (OUTPATIENT)
Dept: PHYSICAL THERAPY | Facility: CLINIC | Age: 85
Setting detail: THERAPIES SERIES
Discharge: HOME | End: 2025-04-23
Payer: MEDICARE

## 2025-04-23 PROCEDURE — 97110 THERAPEUTIC EXERCISES: CPT

## 2025-04-23 PROCEDURE — 97112 NEUROMUSCULAR REEDUCATION: CPT

## 2025-04-23 NOTE — FLOWSHEET NOTE
[] [facility]  Outpatient Rehabilitation &  Therapy  [address]  [phone]  [phone] [x] [facility]  Outpatient Rehabilitation &  Therapy  [address]  P: [phone]  F: [phone] [] [facility]  Outpatient Rehabilitation &  Therapy  [address]  P: [phone]  F: [phone] [] [facility]  Outpatient Rehabilitation &  Therapy  [address]  [phone]  [phone] [] [facility]  Outpatient Rehabilitation &  Therapy  [address]   P: [phone]  F: [phone]  [] [facility]  Outpatient Rehabilitation &  Therapy  [address]  P: [phone]  F: [phone] [] [facility]  Outpatient Rehabilitation &  Therapy  [address]  P: [phone]  F: [phone] [] [facility]  Outpatient Rehabilitation &  Therapy  [address]  P: [phone]  F: [phone] [] [facility]  Outpatient Rehabilitation &  Therapy  [address]  P: [phone]  F: [phone]  [] [facility] Outpatient Rehabilitation &  Therapy  [address]  P: [phone]  F: [phone]     Physical Therapy Daily Treatment Note     Date:  2025  Patient Name:  Donny Adrian    :  1940  MRN: [id number]  Physician: Darion Tucker MD                          Insurance: [employer] MEDICARE ADVANTAGE - [facility] medicare complete 100% coverage   Medical Diagnosis: R29.6 (ICD-10-CM) - Frequent falls       Rehab Codes: M54.59, R26.89, M62.81  Radha HOOPER appt.: May 8 2025 back dr  Date of symptom onset: 25 (script)  Visit# / total visits: ; Progress note for Medicare patient due at visit 22     Cancels/No Shows:     Subjective:    Pain:  [x] Yes  [] No Location: low back Pain Rating: (0-10 scale) 210  Pain

## 2025-04-25 ENCOUNTER — HOSPITAL ENCOUNTER (OUTPATIENT)
Dept: PHYSICAL THERAPY | Facility: CLINIC | Age: 85
Setting detail: THERAPIES SERIES
Discharge: HOME | End: 2025-04-25
Payer: MEDICARE

## 2025-04-25 NOTE — FLOWSHEET NOTE
[] [facility]  Outpatient Rehabilitation &  Therapy  [address]  [phone]  [phone] [x] [facility]  Outpatient Rehabilitation &  Therapy  [address]  P: [phone]  F: [phone] [] [facility]  Outpatient Rehabilitation &  Therapy  [address]  P: [phone]  F: [phone] [] [facility]  Outpatient Rehabilitation &  Therapy  [address]  [phone]  [phone] [] [facility]  Outpatient Rehabilitation &  Therapy  [address]   P: [phone]  F: [phone]  [] [facility]  Outpatient Rehabilitation &  Therapy  [address]  P: [phone]  F: [phone] [] [facility]  Outpatient Rehabilitation &  Therapy  [address]  P: [phone]  F: [phone] [] [facility]  Outpatient Rehabilitation &  Therapy  [address]  P: [phone]  F: [phone] [] [facility]  Outpatient Rehabilitation &  Therapy  [address]  P: [phone]  F: [phone]  [] [facility] Outpatient Rehabilitation &  Therapy  [address]  P: [phone]  F: [phone]     Therapy Cancel/No Show note    Date: 2025  Patient: Donny Adrian  : 1940  MRN: [id number]    Cancels/No Shows to date:     For today's appointment patient:    []  Cancelled    [] Rescheduled appointment    [x] No-show     Reason given by patient:    []  Patient ill    []  Conflicting appointment    [] No transportation      [] Conflict with work    [] No reason given    [] Weather related    [] COVID-19    [] Other:      Comments:  Pt was called and voicemail was left with his next appointment date and time.      [x] Next appointment was confirmed    Electronically signed by: Aydin Story PT

## 2025-04-30 ENCOUNTER — HOSPITAL ENCOUNTER (OUTPATIENT)
Dept: PHYSICAL THERAPY | Facility: CLINIC | Age: 85
Setting detail: THERAPIES SERIES
Discharge: HOME | End: 2025-04-30
Payer: MEDICARE

## 2025-04-30 PROCEDURE — 97110 THERAPEUTIC EXERCISES: CPT

## 2025-04-30 PROCEDURE — 97112 NEUROMUSCULAR REEDUCATION: CPT

## 2025-04-30 NOTE — FLOWSHEET NOTE
[] [facility]  Outpatient Rehabilitation &  Therapy  [address]  [phone]  [phone] [x] [facility]  Outpatient Rehabilitation &  Therapy  [address]  P: [phone]  F: [phone] [] [facility]  Outpatient Rehabilitation &  Therapy  [address]  P: [phone]  F: [phone] [] [facility]  Outpatient Rehabilitation &  Therapy  [address]  [phone]  [phone] [] [facility]  Outpatient Rehabilitation &  Therapy  [address]   P: [phone]  F: [phone]  [] [facility]  Outpatient Rehabilitation &  Therapy  [address]  P: [phone]  F: [phone] [] [facility]  Outpatient Rehabilitation &  Therapy  [address]  P: [phone]  F: [phone] [] [facility]  Outpatient Rehabilitation &  Therapy  [address]  P: [phone]  F: [phone] [] [facility]  Outpatient Rehabilitation &  Therapy  [address]  P: [phone]  F: [phone]  [] [facility] Outpatient Rehabilitation &  Therapy  [address]  P: [phone]  F: [phone]     Physical Therapy Daily Treatment Note     Date:  2025  Patient Name:  Donny Adrian    :  1940  MRN: [id number]  Physician: Darion Tucker MD                          Insurance: [employer] MEDICARE ADVANTAGE - [facility] medicare complete 100% coverage   Medical Diagnosis: R29.6 (ICD-10-CM) - Frequent falls       Rehab Codes: M54.59, R26.89, M62.81  Radha HOOPER appt.: May 8 2025 back dr  Date of symptom onset: 25 (script)  Visit# / total visits: ; Progress note for Medicare patient due at visit 22     Cancels/No Shows:     Subjective:    Pain:  [x] Yes  [] No Location: low back Pain Rating: (0-10 scale) 210  Pain

## 2025-05-21 ENCOUNTER — HOSPITAL ENCOUNTER (OUTPATIENT)
Dept: PHYSICAL THERAPY | Facility: CLINIC | Age: 85
Setting detail: THERAPIES SERIES
Discharge: HOME | End: 2025-05-21
Payer: MEDICARE

## 2025-05-21 PROCEDURE — 97110 THERAPEUTIC EXERCISES: CPT

## 2025-05-21 PROCEDURE — 97112 NEUROMUSCULAR REEDUCATION: CPT

## 2025-05-21 NOTE — FLOWSHEET NOTE
increase gastroc and hip flexor muscle length to allow improved gait mechanics, and improve overall functional mobility, progress independence and safety, and maximize level of function as close to PLOF as possible.     Problems:    [x] ? Pain 4/10 LBP  [x] ? ROM: Decreased lumbar ROM/ left knee extension  [x] ? Strength: Global deficits   [x] ? Function: 69% impaired   [x] ? Gait performance  [x] ? Balance performance           Re-assessed by evaluating therapist on 3/5         Assessed:   Short Term Goals: Meet in 8 treatments Met Progressing No change Regressing   Pain: Pt must demo 2/10 LBP with prolonged ambulation to increase overall function.  [x]  []  []  []    ROM: Pt must demo pain free lumbar ROM to improve ability to perform household tasks. []  [x]  []  []    Functional Outcome Measure:  []  []  []  []    Functional Outcome Measure: Patient will improve gait speed by .1 m/s to demonstrate a decrease in risk of falls. [x]  []  []  []    Strength: Patient will improve 5x STS by 2.2s to demonstrate improved functional quad strength and a decrease in risk of falls. [x] 20 seconds on 3/5 []  []  []    Home Exercise Program: Patient to be independent with home exercise program as demonstrated by performance with correct form without cues. [x]  []  []  []    Demonstrates knowledge of fall prevention [x] Handout given on eval                Assessed:    Long Term Goals: Meet in 12 treatments Met Progressing No change Regressing   Functional Outcome Measure: Patient will improve FGA by 5 points to demonstrate a decrease in risk of falls.  [x]  []  []  []    Functional Outcome Measure: Patient will improve BBS by 6 points to demonstrate a decrease in risk of falls.  [x]  []  []  []    Functional Outcome Measure: Patient will improve gait speed by .1 m/s (from STG assessment) to demonstrate a decrease in risk of falls. [x]  []  []  []    Functional Outcome Measure: Patient will improve ABC by 11% to demonstrate a

## 2025-05-29 ENCOUNTER — HOSPITAL ENCOUNTER (OUTPATIENT)
Dept: PREADMISSION TESTING | Age: 85
Discharge: HOME | End: 2025-05-29
Payer: MEDICARE

## 2025-05-29 VITALS
BODY MASS INDEX: 40.82 KG/M2 | HEART RATE: 70 BPM | HEIGHT: 69 IN | WEIGHT: 275.57 LBS | TEMPERATURE: 97.8 F | DIASTOLIC BLOOD PRESSURE: 70 MMHG | RESPIRATION RATE: 14 BRPM | SYSTOLIC BLOOD PRESSURE: 129 MMHG | OXYGEN SATURATION: 98 %

## 2025-05-29 DIAGNOSIS — Z01.818 PREOP TESTING: Primary | ICD-10-CM

## 2025-05-29 LAB
ANION GAP SERPL CALCULATED.3IONS-SCNC: 11 MMOL/L (ref 9–16)
BUN SERPL-MCNC: 22 MG/DL (ref 8–23)
CALCIUM SERPL-MCNC: 9.4 MG/DL (ref 8.6–10.4)
CHLORIDE SERPL-SCNC: 102 MMOL/L (ref 98–107)
CO2 SERPL-SCNC: 27 MMOL/L (ref 20–31)
CREAT SERPL-MCNC: 1.2 MG/DL (ref 0.7–1.2)
ERYTHROCYTE [DISTWIDTH] IN BLOOD BY AUTOMATED COUNT: 11.9 % (ref 11.8–14.4)
GFR, ESTIMATED: 60 ML/MIN/1.73M2
GLUCOSE SERPL-MCNC: 80 MG/DL (ref 74–99)
HCT VFR BLD AUTO: 35.5 % (ref 40.7–50.3)
HGB BLD-MCNC: 11.3 G/DL (ref 13–17)
MCH RBC QN AUTO: 30.5 PG (ref 25.2–33.5)
MCHC RBC AUTO-ENTMCNC: 31.8 G/DL (ref 28.4–34.8)
MCV RBC AUTO: 95.9 FL (ref 82.6–102.9)
NRBC BLD-RTO: 0 PER 100 WBC
PLATELET # BLD AUTO: 209 K/UL (ref 138–453)
PMV BLD AUTO: 9.8 FL (ref 8.1–13.5)
POTASSIUM SERPL-SCNC: 4.1 MMOL/L (ref 3.7–5.3)
RBC # BLD AUTO: 3.7 M/UL (ref 4.21–5.77)
SODIUM SERPL-SCNC: 140 MMOL/L (ref 136–145)
WBC OTHER # BLD: 3.9 K/UL (ref 3.5–11.3)

## 2025-05-29 PROCEDURE — 80048 BASIC METABOLIC PNL TOTAL CA: CPT

## 2025-05-29 PROCEDURE — 85027 COMPLETE CBC AUTOMATED: CPT

## 2025-05-29 PROCEDURE — 36415 COLL VENOUS BLD VENIPUNCTURE: CPT

## 2025-05-29 NOTE — PRE-PROCEDURE INSTRUCTIONS
ARRIVE AT THE HOSPITAL ON Thursday, June 12,2025 at 07:00 AM    Once you enter the hospital lobby, take the elevators to the second floor.  Check-In is at the surgery registration desk.      Continue to take your home medications as you normally do up to and including the night before surgery with the exception of any blood thinning medications.    Please stop any blood thinning medications as directed by your surgeon or prescribing physician. Failure to stop certain medications may interfere with your scheduled surgery.    These may include:  Aspirin, Warfarin (Coumadin), Clopidogrel (Plavix), Ibuprofen (Motrin, Advil), Naproxen (Aleve), Meloxicam (Mobic), Celecoxib (Celebrex), Eliquis, Pradaxa, Xarelto, Effient, Fish Oil, Herbal supplements.     Stop your aspirin & adipex 7 days before surgery      Please take the following medication(s) the day of surgery with a small sip of water:  Cymbalta,amlodipine          PREPARING FOR YOUR SURGERY:     Before surgery, you can play an important role in your own health. Because skin is not sterile, we need to be sure that your skin is as free of germs as possible before surgery by carefully washing before surgery.  Preparing or “prepping” skin before surgery can reduce the risk of a “surgical site infection.”  Do not shave the area of your body where your surgery will be performed unless you received specific permission from your physician.    You will need to shower at home the night before surgery and the morning of surgery with a special soap called chlorhexidine gluconate (CHG*).     *Not to be used by people allergic to Chlorhexidine Gluconate (CHG).    Following these instructions will help you be sure that your skin is clean before surgery.    Instructions on cleaning your skin before surgery:     The night before your surgery:     You will need to shower with warm water (not hot) and the CHG soap.      Use a clean wash cloth and a clean towel.  Have clean clothes  available to put on after the shower.      First wash your hair with regular shampoo.  Rinse your hair and body thoroughly to remove the shampoo.     Wash your face and genital area (private parts) with your regular soap or water only. Thoroughly rinse your body with warm water from the neck down.    Turn water off to prevent rinsing the soap off too soon.    With a clean wet washcloth and half of the CHG soap in the bottle, lather your entire body from the neck down. Do not use CHG soap near your eyes or ears to avoid injury to those areas.     Wash thoroughly, paying special attention to the area where your surgery will be performed.    Wash your body gently for five (5) minutes. Avoid scrubbing your skin too hard.  Turn the water back on and rinse your body thoroughly.    Pat yourself dry with a clean, soft towel. Do not apply lotion, cream or powder.    Dress with clean freshly washed clothes.    The morning of surgery:    Repeat shower following steps above - using remaining half of CHG soap in bottle.        Patient Instructions:    If you are having any type of anesthesia you are to have nothing to eat or drink after midnight the night before your surgery.  This includes gum, hard candy, mints, water or smoking or chewing tobacco.  The only exception to this is a small sip of water to take with any morning dose of heart, blood pressure, or seizure medications.  No alcoholic beverages for 24 hours prior to surgery.    Brush your teeth but do not swallow water.    Bring your eyeglasses and case with you.  No contacts are to be worn the day of surgery.  You also may bring your hearing aids.  Most surgical procedures involving anesthesia will require that you remove your dentures prior to surgery.    If you are on C-PAP or Bi-PAP at home and plan on staying in the hospital overnight for your surgery please bring the machine with you.    Do not wear any jewelry or body piercings day of surgery.  Also, NO lotion,

## 2025-06-11 ENCOUNTER — ANESTHESIA EVENT (OUTPATIENT)
Dept: OPERATING ROOM | Age: 85
End: 2025-06-11
Payer: MEDICARE

## 2025-06-12 ENCOUNTER — ANESTHESIA (OUTPATIENT)
Dept: OPERATING ROOM | Age: 85
End: 2025-06-12
Payer: MEDICARE

## 2025-06-12 ENCOUNTER — HOSPITAL ENCOUNTER (OUTPATIENT)
Age: 85
Setting detail: OUTPATIENT SURGERY
Discharge: HOME | End: 2025-06-12
Attending: SURGERY | Admitting: SURGERY
Payer: MEDICARE

## 2025-06-12 VITALS
WEIGHT: 275 LBS | SYSTOLIC BLOOD PRESSURE: 125 MMHG | RESPIRATION RATE: 18 BRPM | HEART RATE: 103 BPM | OXYGEN SATURATION: 100 % | TEMPERATURE: 98.1 F | BODY MASS INDEX: 40.73 KG/M2 | DIASTOLIC BLOOD PRESSURE: 73 MMHG | HEIGHT: 69 IN

## 2025-06-12 DIAGNOSIS — Z98.890 S/P REPAIR OF VENTRAL HERNIA: Primary | ICD-10-CM

## 2025-06-12 DIAGNOSIS — Z87.19 S/P REPAIR OF VENTRAL HERNIA: Primary | ICD-10-CM

## 2025-06-12 PROCEDURE — 2500000003 HC RX 250 WO HCPCS: Performed by: SURGERY

## 2025-06-12 PROCEDURE — 2580000003 HC RX 258: Performed by: ANESTHESIOLOGY

## 2025-06-12 PROCEDURE — 7100000010 HC PHASE II RECOVERY - FIRST 15 MIN: Performed by: SURGERY

## 2025-06-12 PROCEDURE — 3700000000 HC ANESTHESIA ATTENDED CARE: Performed by: SURGERY

## 2025-06-12 PROCEDURE — 6360000002 HC RX W HCPCS: Performed by: ANESTHESIOLOGY

## 2025-06-12 PROCEDURE — 7100000000 HC PACU RECOVERY - FIRST 15 MIN: Performed by: SURGERY

## 2025-06-12 PROCEDURE — 6370000000 HC RX 637 (ALT 250 FOR IP): Performed by: ANESTHESIOLOGY

## 2025-06-12 PROCEDURE — 3600000009 HC SURGERY ROBOT BASE: Performed by: SURGERY

## 2025-06-12 PROCEDURE — 3600000019 HC SURGERY ROBOT ADDTL 15MIN: Performed by: SURGERY

## 2025-06-12 PROCEDURE — 6360000002 HC RX W HCPCS: Performed by: SURGERY

## 2025-06-12 PROCEDURE — 2500000003 HC RX 250 WO HCPCS: Performed by: ANESTHESIOLOGY

## 2025-06-12 PROCEDURE — 7100000001 HC PACU RECOVERY - ADDTL 15 MIN: Performed by: SURGERY

## 2025-06-12 PROCEDURE — S2900 ROBOTIC SURGICAL SYSTEM: HCPCS | Performed by: SURGERY

## 2025-06-12 PROCEDURE — 3700000001 HC ADD 15 MINUTES (ANESTHESIA): Performed by: SURGERY

## 2025-06-12 PROCEDURE — 2709999900 HC NON-CHARGEABLE SUPPLY: Performed by: SURGERY

## 2025-06-12 PROCEDURE — C1781 MESH (IMPLANTABLE): HCPCS | Performed by: SURGERY

## 2025-06-12 PROCEDURE — 49595 RPR AA HRN 1ST > 10 RDC: CPT | Performed by: SURGERY

## 2025-06-12 PROCEDURE — 2500000003 HC RX 250 WO HCPCS: Performed by: NURSE ANESTHETIST, CERTIFIED REGISTERED

## 2025-06-12 PROCEDURE — 7100000011 HC PHASE II RECOVERY - ADDTL 15 MIN: Performed by: SURGERY

## 2025-06-12 PROCEDURE — 6360000002 HC RX W HCPCS: Performed by: NURSE ANESTHETIST, CERTIFIED REGISTERED

## 2025-06-12 DEVICE — VENTRALIGHT ST MESH, 6" (15.2 CM), CIRCLE
Type: IMPLANTABLE DEVICE | Site: ABDOMEN | Status: FUNCTIONAL
Brand: VENTRALIGHT

## 2025-06-12 RX ORDER — SODIUM CHLORIDE, SODIUM LACTATE, POTASSIUM CHLORIDE, CALCIUM CHLORIDE 600; 310; 30; 20 MG/100ML; MG/100ML; MG/100ML; MG/100ML
INJECTION, SOLUTION INTRAVENOUS CONTINUOUS
Status: DISCONTINUED | OUTPATIENT
Start: 2025-06-12 | End: 2025-06-12 | Stop reason: HOSPADM

## 2025-06-12 RX ORDER — ONDANSETRON 2 MG/ML
INJECTION INTRAMUSCULAR; INTRAVENOUS
Status: DISCONTINUED | OUTPATIENT
Start: 2025-06-12 | End: 2025-06-12 | Stop reason: SDUPTHER

## 2025-06-12 RX ORDER — METOCLOPRAMIDE HYDROCHLORIDE 5 MG/ML
10 INJECTION INTRAMUSCULAR; INTRAVENOUS
Status: DISCONTINUED | OUTPATIENT
Start: 2025-06-12 | End: 2025-06-12 | Stop reason: HOSPADM

## 2025-06-12 RX ORDER — ROCURONIUM BROMIDE 10 MG/ML
INJECTION, SOLUTION INTRAVENOUS
Status: DISCONTINUED | OUTPATIENT
Start: 2025-06-12 | End: 2025-06-12 | Stop reason: SDUPTHER

## 2025-06-12 RX ORDER — MEPERIDINE HYDROCHLORIDE 50 MG/ML
12.5 INJECTION INTRAMUSCULAR; INTRAVENOUS; SUBCUTANEOUS EVERY 5 MIN PRN
Status: DISCONTINUED | OUTPATIENT
Start: 2025-06-12 | End: 2025-06-12 | Stop reason: HOSPADM

## 2025-06-12 RX ORDER — FENTANYL CITRATE 50 UG/ML
INJECTION, SOLUTION INTRAMUSCULAR; INTRAVENOUS
Status: DISCONTINUED | OUTPATIENT
Start: 2025-06-12 | End: 2025-06-12 | Stop reason: SDUPTHER

## 2025-06-12 RX ORDER — DEXAMETHASONE SODIUM PHOSPHATE 10 MG/ML
INJECTION, SOLUTION INTRAMUSCULAR; INTRAVENOUS
Status: DISCONTINUED | OUTPATIENT
Start: 2025-06-12 | End: 2025-06-12 | Stop reason: SDUPTHER

## 2025-06-12 RX ORDER — ONDANSETRON 4 MG/1
4 TABLET, FILM COATED ORAL 3 TIMES DAILY PRN
Qty: 15 TABLET | Refills: 0 | Status: SHIPPED | OUTPATIENT
Start: 2025-06-12

## 2025-06-12 RX ORDER — HYDROMORPHONE HYDROCHLORIDE 1 MG/ML
0.5 INJECTION, SOLUTION INTRAMUSCULAR; INTRAVENOUS; SUBCUTANEOUS EVERY 5 MIN PRN
Status: DISCONTINUED | OUTPATIENT
Start: 2025-06-12 | End: 2025-06-12 | Stop reason: HOSPADM

## 2025-06-12 RX ORDER — DIPHENHYDRAMINE HYDROCHLORIDE 50 MG/ML
12.5 INJECTION, SOLUTION INTRAMUSCULAR; INTRAVENOUS
Status: DISCONTINUED | OUTPATIENT
Start: 2025-06-12 | End: 2025-06-12 | Stop reason: HOSPADM

## 2025-06-12 RX ORDER — PHENYLEPHRINE HCL IN 0.9% NACL 1 MG/10 ML
SYRINGE (ML) INTRAVENOUS
Status: DISCONTINUED | OUTPATIENT
Start: 2025-06-12 | End: 2025-06-12 | Stop reason: SDUPTHER

## 2025-06-12 RX ORDER — OXYCODONE HYDROCHLORIDE 5 MG/1
5 TABLET ORAL
Status: COMPLETED | OUTPATIENT
Start: 2025-06-12 | End: 2025-06-12

## 2025-06-12 RX ORDER — SODIUM CHLORIDE 9 MG/ML
INJECTION, SOLUTION INTRAVENOUS PRN
Status: DISCONTINUED | OUTPATIENT
Start: 2025-06-12 | End: 2025-06-12 | Stop reason: HOSPADM

## 2025-06-12 RX ORDER — SODIUM CHLORIDE 0.9 % (FLUSH) 0.9 %
5-40 SYRINGE (ML) INJECTION PRN
Status: DISCONTINUED | OUTPATIENT
Start: 2025-06-12 | End: 2025-06-12 | Stop reason: HOSPADM

## 2025-06-12 RX ORDER — PROPOFOL 10 MG/ML
INJECTION, EMULSION INTRAVENOUS
Status: DISCONTINUED | OUTPATIENT
Start: 2025-06-12 | End: 2025-06-12 | Stop reason: SDUPTHER

## 2025-06-12 RX ORDER — PROCHLORPERAZINE EDISYLATE 5 MG/ML
10 INJECTION INTRAMUSCULAR; INTRAVENOUS
Status: DISCONTINUED | OUTPATIENT
Start: 2025-06-12 | End: 2025-06-12 | Stop reason: HOSPADM

## 2025-06-12 RX ORDER — SODIUM CHLORIDE 0.9 % (FLUSH) 0.9 %
5-40 SYRINGE (ML) INJECTION EVERY 12 HOURS SCHEDULED
Status: DISCONTINUED | OUTPATIENT
Start: 2025-06-12 | End: 2025-06-12 | Stop reason: HOSPADM

## 2025-06-12 RX ORDER — LIDOCAINE HYDROCHLORIDE 20 MG/ML
INJECTION, SOLUTION EPIDURAL; INFILTRATION; INTRACAUDAL; PERINEURAL
Status: DISCONTINUED | OUTPATIENT
Start: 2025-06-12 | End: 2025-06-12 | Stop reason: SDUPTHER

## 2025-06-12 RX ORDER — MELATONIN 10 MG
3 CAPSULE ORAL DAILY
COMMUNITY

## 2025-06-12 RX ORDER — NALOXONE HYDROCHLORIDE 0.4 MG/ML
INJECTION, SOLUTION INTRAMUSCULAR; INTRAVENOUS; SUBCUTANEOUS PRN
Status: DISCONTINUED | OUTPATIENT
Start: 2025-06-12 | End: 2025-06-12 | Stop reason: HOSPADM

## 2025-06-12 RX ORDER — FENTANYL CITRATE 50 UG/ML
25 INJECTION, SOLUTION INTRAMUSCULAR; INTRAVENOUS EVERY 5 MIN PRN
Status: COMPLETED | OUTPATIENT
Start: 2025-06-12 | End: 2025-06-12

## 2025-06-12 RX ORDER — SODIUM CHLORIDE 9 MG/ML
INJECTION, SOLUTION INTRAVENOUS CONTINUOUS
Status: DISCONTINUED | OUTPATIENT
Start: 2025-06-12 | End: 2025-06-12 | Stop reason: HOSPADM

## 2025-06-12 RX ORDER — LIDOCAINE HYDROCHLORIDE 10 MG/ML
1 INJECTION, SOLUTION EPIDURAL; INFILTRATION; INTRACAUDAL; PERINEURAL
Status: DISCONTINUED | OUTPATIENT
Start: 2025-06-13 | End: 2025-06-12 | Stop reason: HOSPADM

## 2025-06-12 RX ORDER — SENNA AND DOCUSATE SODIUM 50; 8.6 MG/1; MG/1
1 TABLET, FILM COATED ORAL DAILY
Qty: 30 TABLET | Refills: 0 | Status: SHIPPED | OUTPATIENT
Start: 2025-06-12

## 2025-06-12 RX ORDER — BUPIVACAINE HYDROCHLORIDE AND EPINEPHRINE 5; 5 MG/ML; UG/ML
INJECTION, SOLUTION EPIDURAL; INTRACAUDAL; PERINEURAL PRN
Status: DISCONTINUED | OUTPATIENT
Start: 2025-06-12 | End: 2025-06-12 | Stop reason: ALTCHOICE

## 2025-06-12 RX ORDER — HYDROCODONE BITARTRATE AND ACETAMINOPHEN 5; 325 MG/1; MG/1
1 TABLET ORAL EVERY 6 HOURS PRN
Qty: 20 TABLET | Refills: 0 | Status: SHIPPED | OUTPATIENT
Start: 2025-06-12 | End: 2025-06-17

## 2025-06-12 RX ADMIN — ROCURONIUM BROMIDE 10 MG: 10 INJECTION, SOLUTION INTRAVENOUS at 10:00

## 2025-06-12 RX ADMIN — ONDANSETRON 4 MG: 2 INJECTION INTRAMUSCULAR; INTRAVENOUS at 11:27

## 2025-06-12 RX ADMIN — Medication 100 MCG: at 09:36

## 2025-06-12 RX ADMIN — SODIUM CHLORIDE, POTASSIUM CHLORIDE, SODIUM LACTATE AND CALCIUM CHLORIDE: 600; 310; 30; 20 INJECTION, SOLUTION INTRAVENOUS at 09:04

## 2025-06-12 RX ADMIN — ROCURONIUM BROMIDE 10 MG: 10 INJECTION, SOLUTION INTRAVENOUS at 11:13

## 2025-06-12 RX ADMIN — OXYCODONE HYDROCHLORIDE 5 MG: 5 TABLET ORAL at 14:50

## 2025-06-12 RX ADMIN — ROCURONIUM BROMIDE 50 MG: 10 INJECTION, SOLUTION INTRAVENOUS at 09:08

## 2025-06-12 RX ADMIN — ROCURONIUM BROMIDE 10 MG: 10 INJECTION, SOLUTION INTRAVENOUS at 10:40

## 2025-06-12 RX ADMIN — FENTANYL CITRATE 25 MCG: 50 INJECTION INTRAMUSCULAR; INTRAVENOUS at 13:18

## 2025-06-12 RX ADMIN — FENTANYL CITRATE 25 MCG: 50 INJECTION INTRAMUSCULAR; INTRAVENOUS at 13:08

## 2025-06-12 RX ADMIN — FENTANYL CITRATE 25 MCG: 50 INJECTION INTRAMUSCULAR; INTRAVENOUS at 13:03

## 2025-06-12 RX ADMIN — SODIUM CHLORIDE, POTASSIUM CHLORIDE, SODIUM LACTATE AND CALCIUM CHLORIDE: 600; 310; 30; 20 INJECTION, SOLUTION INTRAVENOUS at 11:56

## 2025-06-12 RX ADMIN — ROCURONIUM BROMIDE 10 MG: 10 INJECTION, SOLUTION INTRAVENOUS at 10:18

## 2025-06-12 RX ADMIN — WATER 3000 MG: 1 INJECTION INTRAMUSCULAR; INTRAVENOUS; SUBCUTANEOUS at 09:22

## 2025-06-12 RX ADMIN — ROCURONIUM BROMIDE 10 MG: 10 INJECTION, SOLUTION INTRAVENOUS at 11:04

## 2025-06-12 RX ADMIN — HYDROMORPHONE HYDROCHLORIDE 0.5 MG: 1 INJECTION, SOLUTION INTRAMUSCULAR; INTRAVENOUS; SUBCUTANEOUS at 13:36

## 2025-06-12 RX ADMIN — FENTANYL CITRATE 25 MCG: 50 INJECTION INTRAMUSCULAR; INTRAVENOUS at 12:36

## 2025-06-12 RX ADMIN — FENTANYL CITRATE 50 MCG: 50 INJECTION INTRAMUSCULAR; INTRAVENOUS at 09:17

## 2025-06-12 RX ADMIN — LIDOCAINE HYDROCHLORIDE 100 MG: 20 INJECTION, SOLUTION EPIDURAL; INFILTRATION; INTRACAUDAL; PERINEURAL at 09:08

## 2025-06-12 RX ADMIN — FENTANYL CITRATE 50 MCG: 50 INJECTION INTRAMUSCULAR; INTRAVENOUS at 11:12

## 2025-06-12 RX ADMIN — DEXAMETHASONE SODIUM PHOSPHATE 10 MG: 10 INJECTION, SOLUTION INTRAMUSCULAR; INTRAVENOUS at 09:38

## 2025-06-12 RX ADMIN — SUGAMMADEX 400 MG: 100 INJECTION, SOLUTION INTRAVENOUS at 11:44

## 2025-06-12 RX ADMIN — FENTANYL CITRATE 50 MCG: 50 INJECTION INTRAMUSCULAR; INTRAVENOUS at 09:08

## 2025-06-12 RX ADMIN — PROPOFOL 200 MG: 10 INJECTION, EMULSION INTRAVENOUS at 09:08

## 2025-06-12 ASSESSMENT — PAIN SCALES - GENERAL
PAINLEVEL_OUTOF10: 5
PAINLEVEL_OUTOF10: 6
PAINLEVEL_OUTOF10: 5
PAINLEVEL_OUTOF10: 4
PAINLEVEL_OUTOF10: 10
PAINLEVEL_OUTOF10: 5

## 2025-06-12 ASSESSMENT — PAIN DESCRIPTION - LOCATION
LOCATION: ABDOMEN

## 2025-06-12 ASSESSMENT — PAIN DESCRIPTION - DESCRIPTORS
DESCRIPTORS: ACHING
DESCRIPTORS: ACHING
DESCRIPTORS: SHARP

## 2025-06-12 ASSESSMENT — PAIN DESCRIPTION - ORIENTATION: ORIENTATION: UPPER

## 2025-06-12 ASSESSMENT — PAIN - FUNCTIONAL ASSESSMENT
PAIN_FUNCTIONAL_ASSESSMENT: FACE, LEGS, ACTIVITY, CRY, AND CONSOLABILITY (FLACC)
PAIN_FUNCTIONAL_ASSESSMENT: 0-10
PAIN_FUNCTIONAL_ASSESSMENT: PREVENTS OR INTERFERES SOME ACTIVE ACTIVITIES AND ADLS

## 2025-06-12 ASSESSMENT — LIFESTYLE VARIABLES: SMOKING_STATUS: 0

## 2025-06-12 NOTE — H&P
Interval H&P Note    Pt Name: Donny Adrian  MRN: [id number]  YOB: 1940  Date of evaluation: 6/12/2025      [x] I have reviewed in [facility] the family medicine progress note by Dr. Tucker dated 6/7/2025 for an Interval History and Physical note (attached below).      [x] I have examined  Donny Adrian, a 84 y.o. male.There are no changes to the patient who is scheduled for ROBOTIC, POSSIBLE OPEN INCISIONAL HERNIA  REPAIR WITH MESH by Acosta Baird MD for Incisional hernia, without obstruction or gangrene. Patient developed incisional hernia after open appendectomy that has not improved with weight loss. The hernia does cause him some discomfort. The patient denies new health changes, fever, chills, wheezing, cough, increased SOB, chest pain, nausea, vomiting, open sores or wounds. Denies hx of diabetes. Last aspirin and Adipex 6/4/2025.     Vital signs: /73   Pulse 85   Temp 97.7 °F (36.5 °C) (Temporal)   Resp 18   Ht 1.753 m (5' 9\")   Wt 124.7 kg (275 lb)   SpO2 98%   BMI 40.61 kg/m²     Allergies:  Diclofenac sodium, Voltaren [diclofenac sodium], and Ace inhibitors    Medications:    Prior to Admission medications    Medication Sig Start Date End Date Taking? Authorizing Provider   melatonin 10 MG CAPS capsule Take 3 mg by mouth daily   Yes Provider, MD Genaro   HYDROcodone-acetaminophen (NORCO) 5-325 MG per tablet Take 1 tablet by mouth every 6 hours as needed for Pain for up to 5 days. Intended supply: 5 days. Take lowest dose possible to manage pain Max Daily Amount: 4 tablets 6/12/25 6/17/25 Yes Mague Stanton, DO   sennosides-docusate sodium (SENOKOT-S) 8.6-50 MG tablet Take 1 tablet by mouth daily 6/12/25  Yes Mague Stanton, DO   ondansetron (ZOFRAN) 4 MG tablet Take 1 tablet by mouth 3 times daily as needed for Nausea or Vomiting 6/12/25  Yes Mague Stanton, DO   amLODIPine (NORVASC) 10 MG tablet Take 1 tablet by mouth daily 4/9/25 7/8/25 Yes Darion Tucker MD   losartan  signed by Darion Tucker MD on 6/7/2025 at 1:04 PM  Attending Physician Statement  I have discussed the case, including pertinent history and exam findings with the resident. I agree with the assessment, plan and orders as documented by the resident.

## 2025-06-12 NOTE — ANESTHESIA POSTPROCEDURE EVALUATION
Department of Anesthesiology  Postprocedure Note    Patient: Donny Adrian  MRN: [id number]  YOB: 1940  Date of evaluation: 6/12/2025    Procedure Summary       Date: 06/12/25 Room / Location: [address]    Anesthesia Start: 0904 Anesthesia Stop: 1203    Procedure: ROBOTIC INCISIONAL HERNIA  REPAIR WITH MESH (Abdomen) Diagnosis:       Incisional hernia, without obstruction or gangrene      (Incisional hernia, without obstruction or gangrene [K43.2])    Surgeons: Acosta Baird MD Responsible Provider: Mandeep Bernal DO    Anesthesia Type: general ASA Status: 3            Anesthesia Type: No value filed.    Shawnee Phase I: Shawnee Score: 8    Shawnee Phase II:      Anesthesia Post Evaluation    Patient location during evaluation: PACU  Patient participation: complete - patient participated  Level of consciousness: awake and alert  Airway patency: patent  Nausea & Vomiting: no nausea and no vomiting  Cardiovascular status: hemodynamically stable  Respiratory status: acceptable  Hydration status: stable  Pain management: adequate    No notable events documented.

## 2025-06-12 NOTE — DISCHARGE INSTRUCTIONS
Patient Discharge Instructions  Discharge Date:  6/12/2025    Discharged To:  Home    Home with Home Health Care: No    RESUME ACTIVITY:      BATHING: Okay to shower, do not submerge in water/bath/pools/hot tubs until cleared by your surgical team.    DRIVING: No driving on narcotics    RETURN TO WORK: Do not return to work until cleared by your surgical team    WALKING: Yes    STAIRS: Yes    LIFTING: Less than 10 pounds for 6weeks     DIET: common adult    SPECIAL INSTRUCTIONS:     May use ice pack for pain/swelling    May use Motrin or Aleve for breakthrough pain    May take Milk of Magnesia as needed for constipation      Call [phone] for follow up appointment with Dr. Baird in: 10-14 days

## 2025-06-12 NOTE — OP NOTE
Operative Note      Patient: Donny Adrian  YOB: 1940  MRN: [id number]    Date of Procedure: 6/12/2025    Pre-Op Diagnosis Codes:      * Incisional hernia, without obstruction or gangrene [K43.2]    Post-Op Diagnosis:  Incisional hernia, extensive intra-abdominal adhesions       Procedure(s):  ROBOTIC INCISIONAL HERNIA  REPAIR WITH MESH, lysis of extensive intra-abdominal adhesions    Surgeon(s):  Acosta Baird MD    Assistant:   Resident: Mague Stanton DO    Anesthesia: General    Estimated Blood Loss (mL): Minimal    Complications: None    Specimens:   * No specimens in log *    Implants:  Implant Name Type Inv. Item Serial No.  Lot No. LRB No. Used Action   MESH SURG DIA6IN UNCOATED M WT MFIL PROPYLENE CIR HYDRGEL - [id number]  MESH SURG DIA6IN UNCOATED M WT MFIL PROPYLENE CIR HYDRGEL  BARD DAVOL-WD [id number] N/A 1 Implanted         Drains:   Urinary Catheter 06/12/25 2 Way (Active)       Findings:  Infection Present At Time Of Surgery (PATOS) (choose all levels that have infection present):  No infection present  Other Findings: Patient noted to have over 12 cm hernia defect.  Extensive adhesions involving small bowel loops noted.  This required extensive adhesions lysis    Detailed Description of Procedure:   Patient was brought to the operating room and was placed in the supine position.  After induction of general endotracheal anesthesia, patient's abdomen was prepped and draped in the usual sterile fashion.  Initially a Veress needle was inserted left upper abdomen.  Position of the needle was confirmed by injecting saline which went in without resistance.  Abdomen was next insufflated.  Next by using a Visiport technique, a 8 mm trocar was inserted left upper abdomen.  On inserting the laparoscope there was no stigmata of injury secondary to trocar insertion.  Under direct vision other 2 trocars were inserted.  A 8 mm trocar in the left lower abdomen and another 8 mm trocar in

## 2025-06-12 NOTE — ANESTHESIA PRE PROCEDURE
Department of Anesthesiology  Preprocedure Note       Name:  Donny Adrian   Age:  84 y.o.  :  1940                                          MRN:  [id number]         Date:  2025      Surgeon: Surgeon(s):  Acosta Baird MD    Procedure: Procedure(s):  ROBOTIC, POSSIBLE OPEN INCISIONAL HERNIA  REPAIR WITH MESH    Medications prior to admission:   Prior to Admission medications    Medication Sig Start Date End Date Taking? Authorizing Provider   phentermine (ADIPEX-P) 37.5 MG tablet Take 1 tablet by mouth every morning (before breakfast) for 30 days. Max Daily Amount: 37.5 mg 25  Darion Tucker MD   amLODIPine (NORVASC) 10 MG tablet Take 1 tablet by mouth daily 25  Darion Tucker MD   vitamin D (VITAMIN D3) 125 MCG (5000 UT) CAPS capsule Take 1 capsule by mouth every other day 25  Darion Tucker MD   aspirin (ASPIRIN LOW DOSE) 81 MG EC tablet Take 1 tablet by mouth daily 25  Darion Tucker MD   B Complex Vitamins (VITAMIN B COMPLEX) TABS Take 1 tablet by mouth every morning (before breakfast) 25  Darion Tucker MD   DULoxetine (CYMBALTA) 60 MG extended release capsule Take 1 capsule by mouth daily 25  Darion Tucker MD   losartan (COZAAR) 50 MG tablet Take 1 tablet by mouth daily 25  Darion Tucker MD       Current medications:    Current Facility-Administered Medications   Medication Dose Route Frequency Provider Last Rate Last Admin   • [START ON 2025] lidocaine PF 1 % injection 1 mL  1 mL IntraDERmal Once PRN Rk Reaves MD       • 0.9 % sodium chloride infusion   IntraVENous Continuous Rk Reaves MD       • lactated ringers infusion   IntraVENous Continuous Rk Reaves MD           Allergies:    Allergies   Allergen Reactions   • Diclofenac Sodium      Other reaction(s): Anaphylaxis, Other (See Comments)   Elevated Creatinine   • Voltaren [Diclofenac Sodium] Other (See Comments)     Elevated

## 2025-07-11 ENCOUNTER — HOSPITAL ENCOUNTER (OUTPATIENT)
Age: 85
Setting detail: SPECIMEN
Discharge: HOME | End: 2025-07-11
Payer: MEDICARE

## 2025-07-11 DIAGNOSIS — R31.0 GROSS HEMATURIA: ICD-10-CM

## 2025-07-11 LAB
BACTERIA URNS QL MICRO: ABNORMAL
BILIRUB UR QL STRIP: NEGATIVE
CASTS #/AREA URNS LPF: ABNORMAL /LPF (ref 0–8)
CLARITY UR: ABNORMAL
COLOR UR: ABNORMAL
EPI CELLS #/AREA URNS HPF: ABNORMAL /HPF (ref 0–5)
GLUCOSE UR STRIP-MCNC: NEGATIVE MG/DL
HGB UR QL STRIP.AUTO: ABNORMAL
KETONES UR STRIP-MCNC: NEGATIVE MG/DL
LEUKOCYTE ESTERASE UR QL STRIP: ABNORMAL
NITRITE UR QL STRIP: POSITIVE
PH UR STRIP: 6.5 [PH] (ref 5–8)
PROT UR STRIP-MCNC: ABNORMAL MG/DL
RBC #/AREA URNS HPF: ABNORMAL /HPF (ref 0–4)
SP GR UR STRIP: 1.02 (ref 1–1.03)
UROBILINOGEN UR STRIP-ACNC: NORMAL EU/DL (ref 0–1)
WBC #/AREA URNS HPF: ABNORMAL /HPF (ref 0–5)

## 2025-07-11 PROCEDURE — 87186 SC STD MICRODIL/AGAR DIL: CPT

## 2025-07-11 PROCEDURE — 81001 URINALYSIS AUTO W/SCOPE: CPT

## 2025-07-11 PROCEDURE — 87086 URINE CULTURE/COLONY COUNT: CPT

## 2025-07-11 PROCEDURE — 87077 CULTURE AEROBIC IDENTIFY: CPT

## 2025-07-13 LAB
MICROORGANISM SPEC CULT: ABNORMAL
MICROORGANISM SPEC CULT: ABNORMAL
SERVICE CMNT-IMP: ABNORMAL
SPECIMEN DESCRIPTION: ABNORMAL
SPECIMEN DESCRIPTION: ABNORMAL

## 2025-07-15 PROBLEM — Z98.49 POSTOPERATIVE CARE FOR CATARACT: Status: ACTIVE | Noted: 2018-05-24

## 2025-07-15 PROBLEM — D51.9 VITAMIN B12 DEFICIENCY ANEMIA, UNSPECIFIED: Status: ACTIVE | Noted: 2023-07-30

## 2025-07-15 PROBLEM — R07.9 CHEST PAIN, UNSPECIFIED: Status: ACTIVE | Noted: 2025-01-02

## 2025-07-15 PROBLEM — Z48.810 POSTOPERATIVE CARE FOR CATARACT: Status: ACTIVE | Noted: 2018-05-24

## 2025-07-21 ENCOUNTER — HOSPITAL ENCOUNTER (OUTPATIENT)
Dept: SURGERY | Age: 85
Discharge: HOME | End: 2025-07-21
Payer: MEDICARE

## 2025-07-21 VITALS
OXYGEN SATURATION: 97 % | RESPIRATION RATE: 18 BRPM | HEART RATE: 94 BPM | DIASTOLIC BLOOD PRESSURE: 69 MMHG | SYSTOLIC BLOOD PRESSURE: 131 MMHG

## 2025-07-21 PROCEDURE — 99211 OFF/OP EST MAY X REQ PHY/QHP: CPT | Performed by: SURGERY

## 2025-07-21 NOTE — PROGRESS NOTES
Surgery:    Patient is status post robotic incisional hernia repair with mesh who comes to us for follow-up visit.  Patient is feeling well.  He does still feel some lumps in the area where the hernias were.  He was examined in the lumps in question appeared to be the site where he had the hernia defects.  They appear to be filled with fluid.  On making the patient cough there is no evidence to suggest any hernia recurrence.  Recommend patient to continue to work on weight loss and use abdominal binder.  I strongly feel that those pockets of fluid collection will resorb over time.  If they do not after 3 months or so he is asked to contact us.  Otherwise he does not need to come back and see us.

## 2025-07-25 ENCOUNTER — HOSPITAL ENCOUNTER (EMERGENCY)
Age: 85
Discharge: HOME | End: 2025-07-25
Attending: EMERGENCY MEDICINE
Payer: MEDICARE

## 2025-07-25 ENCOUNTER — APPOINTMENT (OUTPATIENT)
Dept: VASCULAR LAB | Age: 85
End: 2025-07-25
Attending: EMERGENCY MEDICINE
Payer: MEDICARE

## 2025-07-25 VITALS
DIASTOLIC BLOOD PRESSURE: 79 MMHG | WEIGHT: 260 LBS | OXYGEN SATURATION: 97 % | BODY MASS INDEX: 38.51 KG/M2 | HEART RATE: 89 BPM | RESPIRATION RATE: 17 BRPM | SYSTOLIC BLOOD PRESSURE: 148 MMHG | TEMPERATURE: 98.2 F | HEIGHT: 69 IN

## 2025-07-25 DIAGNOSIS — M79.89 LEG SWELLING: Primary | ICD-10-CM

## 2025-07-25 LAB
ANION GAP SERPL CALCULATED.3IONS-SCNC: 13 MMOL/L (ref 9–16)
BASOPHILS # BLD: 0.04 K/UL (ref 0–0.2)
BASOPHILS NFR BLD: 1 % (ref 0–2)
BNP SERPL-MCNC: 132 PG/ML (ref 0–450)
BUN SERPL-MCNC: 13 MG/DL (ref 8–23)
CALCIUM SERPL-MCNC: 9.1 MG/DL (ref 8.8–10.2)
CHLORIDE SERPL-SCNC: 103 MMOL/L (ref 98–107)
CO2 SERPL-SCNC: 23 MMOL/L (ref 20–31)
CREAT SERPL-MCNC: 1.1 MG/DL (ref 0.7–1.2)
ECHO BSA: 2.4 M2
EOSINOPHIL # BLD: 0.14 K/UL (ref 0–0.44)
EOSINOPHILS RELATIVE PERCENT: 4 % (ref 1–4)
ERYTHROCYTE [DISTWIDTH] IN BLOOD BY AUTOMATED COUNT: 12.1 % (ref 11.8–14.4)
GFR, ESTIMATED: 64 ML/MIN/1.73M2
GLUCOSE SERPL-MCNC: 111 MG/DL (ref 82–115)
HCT VFR BLD AUTO: 32.3 % (ref 40.7–50.3)
HGB BLD-MCNC: 10.7 G/DL (ref 13–17)
IMM GRANULOCYTES # BLD AUTO: 0.01 K/UL (ref 0–0.3)
IMM GRANULOCYTES NFR BLD: 0 %
LYMPHOCYTES NFR BLD: 0.91 K/UL (ref 1.1–3.7)
LYMPHOCYTES RELATIVE PERCENT: 25 % (ref 24–43)
MCH RBC QN AUTO: 31.4 PG (ref 25.2–33.5)
MCHC RBC AUTO-ENTMCNC: 33.1 G/DL (ref 28.4–34.8)
MCV RBC AUTO: 94.7 FL (ref 82.6–102.9)
MONOCYTES NFR BLD: 0.3 K/UL (ref 0.1–1.2)
MONOCYTES NFR BLD: 8 % (ref 3–12)
NEUTROPHILS NFR BLD: 62 % (ref 36–65)
NEUTS SEG NFR BLD: 2.3 K/UL (ref 1.5–8.1)
NRBC BLD-RTO: 0 PER 100 WBC
PLATELET # BLD AUTO: 185 K/UL (ref 138–453)
PMV BLD AUTO: 9.1 FL (ref 8.1–13.5)
POTASSIUM SERPL-SCNC: 3.9 MMOL/L (ref 3.7–5.3)
RBC # BLD AUTO: 3.41 M/UL (ref 4.21–5.77)
SODIUM SERPL-SCNC: 139 MMOL/L (ref 136–145)
WBC OTHER # BLD: 3.7 K/UL (ref 3.5–11.3)

## 2025-07-25 PROCEDURE — 83880 ASSAY OF NATRIURETIC PEPTIDE: CPT

## 2025-07-25 PROCEDURE — 80048 BASIC METABOLIC PNL TOTAL CA: CPT

## 2025-07-25 PROCEDURE — 85025 COMPLETE CBC W/AUTO DIFF WBC: CPT

## 2025-07-25 PROCEDURE — 36415 COLL VENOUS BLD VENIPUNCTURE: CPT

## 2025-07-25 PROCEDURE — 99283 EMERGENCY DEPT VISIT LOW MDM: CPT

## 2025-07-25 PROCEDURE — 93970 EXTREMITY STUDY: CPT

## 2025-07-25 RX ORDER — FUROSEMIDE 20 MG/1
20 TABLET ORAL DAILY
Qty: 10 TABLET | Refills: 0 | Status: SHIPPED | OUTPATIENT
Start: 2025-07-25 | End: 2025-08-04

## 2025-07-25 ASSESSMENT — PAIN DESCRIPTION - DESCRIPTORS: DESCRIPTORS: ACHING;CRUSHING;DULL

## 2025-07-25 ASSESSMENT — PAIN DESCRIPTION - ORIENTATION: ORIENTATION: RIGHT;LEFT

## 2025-07-25 ASSESSMENT — PAIN DESCRIPTION - FREQUENCY: FREQUENCY: CONTINUOUS

## 2025-07-25 ASSESSMENT — PAIN SCALES - GENERAL: PAINLEVEL_OUTOF10: 6

## 2025-07-25 ASSESSMENT — PAIN DESCRIPTION - LOCATION: LOCATION: LEG

## 2025-07-25 ASSESSMENT — PAIN DESCRIPTION - PAIN TYPE: TYPE: ACUTE PAIN;CHRONIC PAIN

## 2025-07-25 ASSESSMENT — PAIN - FUNCTIONAL ASSESSMENT
PAIN_FUNCTIONAL_ASSESSMENT: 0-10
PAIN_FUNCTIONAL_ASSESSMENT: NONE - DENIES PAIN

## 2025-07-25 ASSESSMENT — LIFESTYLE VARIABLES
HOW MANY STANDARD DRINKS CONTAINING ALCOHOL DO YOU HAVE ON A TYPICAL DAY: PATIENT DOES NOT DRINK
HOW OFTEN DO YOU HAVE A DRINK CONTAINING ALCOHOL: NEVER

## 2025-07-25 NOTE — ED PROVIDER NOTES
components:       Result Value    RBC 3.41 (*)     Hemoglobin 10.7 (*)     Hematocrit 32.3 (*)     Lymphocytes Absolute 0.91 (*)     All other components within normal limits   BRAIN NATRIURETIC PEPTIDE   BASIC METABOLIC PANEL       Vitals Reviewed:    Vitals:    07/25/25 1102   BP: (!) 147/80   Pulse: 94   Resp: 16   Temp: 98.2 °F (36.8 °C)   TempSrc: Oral   SpO2: 97%   Weight: 117.9 kg (260 lb)   Height: 1.753 m (5' 9\")     MEDICATIONS GIVEN TO PATIENT THIS ENCOUNTER:  No orders of the defined types were placed in this encounter.    DISCHARGE PRESCRIPTIONS:  New Prescriptions    No medications on file     PHYSICIAN CONSULTS ORDERED THIS ENCOUNTER:  None  FINAL IMPRESSION    No diagnosis found.      DISPOSITION/PLAN   DISPOSITION                 OUTPATIENT FOLLOW UP THE PATIENT:  No follow-up provider specified.    Erica B Goldberger, MD Goldberger, Erica B, MD  07/25/25 [id number]

## 2025-07-25 NOTE — DISCHARGE INSTRUCTIONS
I recommend low-sodium diet.  Lasix can be used in the morning once a day.  I highly recommend follow-up with your doctor if you are going to continue to need Lasix.

## 2025-07-29 PROBLEM — D64.9 ABSOLUTE ANEMIA: Status: ACTIVE | Noted: 2022-08-12

## 2025-07-29 LAB — ECHO BSA: 2.4 M2

## 2025-08-25 ENCOUNTER — HOSPITAL ENCOUNTER (OUTPATIENT)
Dept: LAB | Age: 85
Discharge: HOME | End: 2025-08-25
Payer: MEDICARE

## 2025-08-25 DIAGNOSIS — D50.8 OTHER IRON DEFICIENCY ANEMIA: ICD-10-CM

## 2025-08-25 LAB
BASOPHILS # BLD: 0.05 K/UL (ref 0–0.2)
BASOPHILS NFR BLD: 1 % (ref 0–2)
EOSINOPHIL # BLD: 0.1 K/UL (ref 0–0.44)
EOSINOPHILS RELATIVE PERCENT: 2 % (ref 1–4)
ERYTHROCYTE [DISTWIDTH] IN BLOOD BY AUTOMATED COUNT: 11.9 % (ref 11.8–14.4)
FERRITIN SERPL-MCNC: 292 NG/ML
HCT VFR BLD AUTO: 34.9 % (ref 40.7–50.3)
HGB BLD-MCNC: 11.2 G/DL (ref 13–17)
IMM GRANULOCYTES # BLD AUTO: <0.03 K/UL (ref 0–0.3)
IMM GRANULOCYTES NFR BLD: 0 %
IRON SATN MFR SERPL: 31 % (ref 20–55)
IRON SERPL-MCNC: 71 UG/DL (ref 61–157)
LYMPHOCYTES NFR BLD: 1.2 K/UL (ref 1.1–3.7)
LYMPHOCYTES RELATIVE PERCENT: 24 % (ref 24–43)
MCH RBC QN AUTO: 30.4 PG (ref 25.2–33.5)
MCHC RBC AUTO-ENTMCNC: 32.1 G/DL (ref 28.4–34.8)
MCV RBC AUTO: 94.6 FL (ref 82.6–102.9)
MONOCYTES NFR BLD: 0.34 K/UL (ref 0.1–1.2)
MONOCYTES NFR BLD: 7 % (ref 3–12)
NEUTROPHILS NFR BLD: 66 % (ref 36–65)
NEUTS SEG NFR BLD: 3.41 K/UL (ref 1.5–8.1)
NRBC BLD-RTO: 0 PER 100 WBC
PLATELET # BLD AUTO: 229 K/UL (ref 138–453)
PMV BLD AUTO: 9.8 FL (ref 8.1–13.5)
RBC # BLD AUTO: 3.69 M/UL (ref 4.21–5.77)
TIBC SERPL-MCNC: 228 UG/DL (ref 250–450)
TRANSFERRIN SERPL-MCNC: 192 MG/DL (ref 200–360)
UNSATURATED IRON BINDING CAPACITY: 157 UG/DL (ref 112–347)
WBC OTHER # BLD: 5.1 K/UL (ref 3.5–11.3)

## 2025-08-25 PROCEDURE — 83550 IRON BINDING TEST: CPT

## 2025-08-25 PROCEDURE — 82728 ASSAY OF FERRITIN: CPT

## 2025-08-25 PROCEDURE — 85025 COMPLETE CBC W/AUTO DIFF WBC: CPT

## 2025-08-25 PROCEDURE — 36415 COLL VENOUS BLD VENIPUNCTURE: CPT

## 2025-08-25 PROCEDURE — 84466 ASSAY OF TRANSFERRIN: CPT

## 2025-08-25 PROCEDURE — 83540 ASSAY OF IRON: CPT

## (undated) DEVICE — STRAP,POSITIONING,KNEE/BODY,FOAM,4X60": Brand: MEDLINE

## (undated) DEVICE — SUTURE VCRL SZ 0 L36IN ABSRB UD CT-1 L36MM 1/2 CIR TAPR PNT VCP946H

## (undated) DEVICE — DRAPE,U/ SHT,SPLIT,PLAS,STERIL: Brand: MEDLINE

## (undated) DEVICE — CONTAINER,SPECIMEN,4OZ,OR STRL: Brand: MEDLINE

## (undated) DEVICE — BLANKET WRM W29.9XL79.1IN UP BODY FORC AIR MISTRAL-AIR

## (undated) DEVICE — DRESSING TRNSPAR W2XL2.75IN FLM SHT SEMIPERMEABLE WIND

## (undated) DEVICE — SUTURE N ABSRB MONOFILAMENT 0 GS-22 9 IN BLU V-LOC PBT VLOCN2146

## (undated) DEVICE — SUTURE VICRYL + SZ 0 L27IN ABSRB VLT L26MM UR-6 5/8 CIR VCP603H

## (undated) DEVICE — PROTECTOR ULN NRV PUR FOAM HK LOOP STRP ANATOMICALLY

## (undated) DEVICE — GLOVE SURG SZ 6 THK91MIL LTX FREE SYN POLYISOPRENE ANTI

## (undated) DEVICE — TROCAR: Brand: KII SHIELDED BLADED ACCESS SYSTEM

## (undated) DEVICE — STOCKINETTE,IMPERVIOUS,12X48,STERILE: Brand: MEDLINE

## (undated) DEVICE — BLADE CLIPPER GEN PURP NS

## (undated) DEVICE — CLOSURE SKIN FLX NONINVASIVE PRELOC TECHNOLOGY FOR 24IN

## (undated) DEVICE — 2108 SERIES SAGITTAL BLADE FLARED, GROUND  (29.0 X 1.32 X 84.0MM)

## (undated) DEVICE — DRAIN WND SIL FLAT RADIOPAQUE 10MM FULL FLUTED

## (undated) DEVICE — GLOVE ORTHO 8   MSG9480

## (undated) DEVICE — INSUFFLATION NEEDLE TO ESTABLISH PNEUMOPERITONEUM.: Brand: INSUFFLATION NEEDLE

## (undated) DEVICE — SEAL

## (undated) DEVICE — SUTURE FIBERWIRE SZ 5 L38IN NONABSORBABLE BLU L48MM 1/2 AR7211

## (undated) DEVICE — NEEDLE SPNL L3.5IN PNK HUB S STL REG WALL FIT STYL W/ QNCKE

## (undated) DEVICE — PACK LAP BASIC

## (undated) DEVICE — SUTURE PDS II SZ 0 L60IN ABSRB VLT L65MM TP-1 1/2 CIR Z991G

## (undated) DEVICE — 450 ML BOTTLE OF 0.05% CHLORHEXIDINE GLUCONATE IN 99.95% STERILE WATER FOR IRRIGATION, USP AND APPLICATOR.: Brand: IRRISEPT ANTIMICROBIAL WOUND LAVAGE

## (undated) DEVICE — 4-PORT MANIFOLD: Brand: NEPTUNE 2

## (undated) DEVICE — SUTURE STRATAFIX SPRL MCRYL + SZ 2 0 L27IN ABSRB UD W NDL SXMP1B419

## (undated) DEVICE — KIT SEP W/ BLD DRAW TB SYR NDL TRNQT PD

## (undated) DEVICE — YANKAUER,POOLE TIP,STERILE,50/CS: Brand: MEDLINE

## (undated) DEVICE — INSTRUMENT REPROC SEAL/DIVIDE LAP BLUNT TP LIGASURE NANO-COAT 5MMX37CM

## (undated) DEVICE — BANDAGE COBAN 4 IN COMPR W4INXL5YD FOAM COHESIVE QUIK STK SELF ADH SFT

## (undated) DEVICE — RESERVOIR,SUCTION,100CC,SILICONE: Brand: MEDLINE

## (undated) DEVICE — LIQUIBAND RAPID ADHESIVE 36/CS 0.8ML: Brand: MEDLINE

## (undated) DEVICE — DRESSING BORDERED ADH GZ UNIV GEN USE 5IN 4IN AND 2 1/2IN

## (undated) DEVICE — SUTURE VCRL SZ 2-0 L18IN ABSRB UD POLYGLACTIN 910 BRAID TIE J111T

## (undated) DEVICE — CEMENT MIXING SYSTEM WITH FEMORAL BREAKWAY NOZZLE: Brand: REVOLUTION

## (undated) DEVICE — DRAPE, SLUSH XL, 44X66, STERILE: Brand: MEDLINE

## (undated) DEVICE — KIT AUTOTRNS APPL AERO 2 SET SYR 2 TIP FOR PLT SEP SYS GPS

## (undated) DEVICE — SUTURE STRATAFIX SYMMETRIC PDS + SZ 1 L18IN ABSRB VLT L48MM SXPP1A400

## (undated) DEVICE — GLOVE ORANGE PI 7   MSG9070

## (undated) DEVICE — DISPOSABLE TOURNIQUET CUFF SINGLE BLADDER, SINGLE PORT AND QUICK CONNECT CONNECTOR: Brand: COLOR CUFF

## (undated) DEVICE — GLOVE ORANGE PI 8 1/2   MSG9085

## (undated) DEVICE — BLADE,CARBON-STEEL,10,STRL,DISPOSABLE,TB: Brand: MEDLINE

## (undated) DEVICE — COOLER THER 20-31IN L CRYO COMB W/ PD KNEE TB GRAV FLO

## (undated) DEVICE — GOWN,AURORA,NONREINFORCED,LARGE: Brand: MEDLINE

## (undated) DEVICE — ELECTRODE ES L3IN S STL BLDE INSUL DISP VALLEYLAB EDGE

## (undated) DEVICE — SYRINGE IRRIG 60ML SFT PLIABLE BLB EZ TO GRP 1 HND USE W/

## (undated) DEVICE — Device

## (undated) DEVICE — PENCIL ES L3M BTTN SWCH HOLSTER W/ BLDE ELECTRD EDGE

## (undated) DEVICE — DRESSING PETRO W3XL8IN OIL EMUL N ADH GZ KNIT IMPREG CELOS

## (undated) DEVICE — CHLORAPREP 26ML ORANGE

## (undated) DEVICE — ELECTRODE PT RET AD L9FT HI MOIST COND ADH HYDRGEL CORDED

## (undated) DEVICE — KIT DETRGNT ENZYMATIC SOLUTION 100 ML SOAK SHLD 5 VI LG HUMD

## (undated) DEVICE — STERILE PATIENT PROTECTIVE PAD FOR IMP® KNEE POSITIONERS & COHESIVE WRAP (10 / CASE): Brand: DE MAYO KNEE POSITIONER®

## (undated) DEVICE — GAUZE,SPONGE,FLUFF,6"X6.75",STRL,5/TRAY: Brand: MEDLINE

## (undated) DEVICE — SUTURE VCRL SZ 3-0 L27IN ABSRB UD L26MM SH 1/2 CIR J416H

## (undated) DEVICE — SUTURE MONOCRYL SZ 4-0 L27IN ABSRB UD L19MM PS-2 1/2 CIR PRIM Y426H

## (undated) DEVICE — DRESSING TRNSPAR W5XL4.5IN FLM SHT SEMIPERMEABLE WIND

## (undated) DEVICE — ENDO KIT W/SYRINGE: Brand: MEDLINE INDUSTRIES, INC.

## (undated) DEVICE — BANDAGE,SELF ADHRNT,COFLEX,4"X5YD,STRL: Brand: COLABEL

## (undated) DEVICE — MITT SURG PREP L ADH DISPOSABLE

## (undated) DEVICE — GLOVE ORANGE PI 7 1/2   MSG9075

## (undated) DEVICE — APPLICATOR MEDICATED 26 CC SOLUTION HI LT ORNG CHLORAPREP

## (undated) DEVICE — SOLUTION IV IRRIG WATER 1000ML POUR BRL 2F7114

## (undated) DEVICE — MHPB TOTAL KNEE PACK: Brand: MEDLINE INDUSTRIES, INC.

## (undated) DEVICE — DEFENDO AIR WATER SUCTION AND BIOPSY VALVE KIT FOR  OLYMPUS: Brand: DEFENDO AIR/WATER/SUCTION AND BIOPSY VALVE

## (undated) DEVICE — PROTECTOR EYE PT SELF ADH NS OPT GRD LF

## (undated) DEVICE — SOLUTION IRRIG 1000ML 0.9% SOD CHL USP POUR PLAS BTL

## (undated) DEVICE — SOLUTION IRRIG 3000ML 0.9% SOD CHL USP UROMATIC PLAS CONT

## (undated) DEVICE — SOLUTION ANTIFOG VIS SYS CLEARIFY LAPSCP

## (undated) DEVICE — 1LYRTR 16FR10ML100%SIL UMS SNP: Brand: MEDLINE INDUSTRIES, INC.

## (undated) DEVICE — MASTISOL ADHESIVE LIQ 2/3ML

## (undated) DEVICE — SPONGE LAP W18XL18IN WHT COT 4 PLY FLD STRUNG RADPQ DISP ST 2 PER PACK

## (undated) DEVICE — SUTURE VICRYL + SZ 3-0 L27IN ABSRB UD L26MM SH 1/2 CIR VCP416H

## (undated) DEVICE — BLADELESS OBTURATOR: Brand: WECK VISTA

## (undated) DEVICE — SUTURE V-LOC SZ 0 L18IN NONABSORBABLE BLU L37MM GS-21 1/2 VLOCN0326

## (undated) DEVICE — SET HNDPC W COAX BNE CLN TIP SUCT TB BTTRY PWR DISPOSABLE

## (undated) DEVICE — SUTURE PERMAHAND SZ 3-0 L18IN NONABSORBABLE BLK L26MM SH C013D

## (undated) DEVICE — TIP COVER ACCESSORY

## (undated) DEVICE — SOLUTION IV IRRIG POUR BRL 0.9% SODIUM CHL 2F7124

## (undated) DEVICE — TUBING, SUCTION, 9/32" X 20', STRAIGHT: Brand: MEDLINE INDUSTRIES, INC.

## (undated) DEVICE — GLOVE SURG SZ 75 CRM LTX FREE POLYISOPRENE POLYMER BEAD ANTI

## (undated) DEVICE — YANKAUER,SMOOTH HANDLE,HIGH CAPACITY: Brand: MEDLINE INDUSTRIES, INC.

## (undated) DEVICE — TROCAR: Brand: KII FIOS FIRST ENTRY

## (undated) DEVICE — GLOVE SURG SZ 65 THK91MIL LTX FREE SYN POLYISOPRENE

## (undated) DEVICE — ARM DRAPE

## (undated) DEVICE — MASTISOL ADHESIVE AMPULE

## (undated) DEVICE — SUTURE MCRYL SZ 4-0 L18IN ABSRB UD L16MM PC-3 3/8 CIR PRIM Y845G

## (undated) DEVICE — STAZ ROBOT: Brand: MEDLINE INDUSTRIES, INC.

## (undated) DEVICE — CUFF CRYOTHERAPY LG 20-31 IN KNEE CRYO/CUFF

## (undated) DEVICE — SUTURE SZ 0 27IN 5/8 CIR UR-6  TAPER PT VIOLET ABSRB VICRYL J603H

## (undated) DEVICE — PIN DRL DIA1/8IN QUIK REL FOR VANGUARD UNIV INSTR TOT KNEE 32486265] ZIMMER BIOMET ORTHOPEDICS]

## (undated) DEVICE — SCISSOR SURG METZ CRV TIP

## (undated) DEVICE — SUTURE VCRL + SZ 0 L27IN ABSRB VLT L26MM UR-6 5/8 CIR VCP603H

## (undated) DEVICE — TOWEL,OR,DSP,ST,NATURAL,DLX,4/PK,20PK/CS: Brand: MEDLINE

## (undated) DEVICE — SYRINGE, LUER LOCK, 10ML: Brand: MEDLINE

## (undated) DEVICE — TISSUE RETRIEVAL SYSTEM: Brand: INZII RETRIEVAL SYSTEM

## (undated) DEVICE — SUTURE N ABSRB 1 GS-21 12 IN 1/2 CIR BLU V-LOC PBT

## (undated) DEVICE — SOLUTION IRRIG 1000ML STRL H2O USP PLAS POUR BTL

## (undated) DEVICE — DUAL CUT SAGITTAL BLADE

## (undated) DEVICE — GLOVE SURG SZ 85 L12IN FNGR THK79MIL GRN LTX FREE

## (undated) DEVICE — MERCY HEALTH ST CHARLES: Brand: MEDLINE INDUSTRIES, INC.

## (undated) DEVICE — CONNECTOR,TUBING,5-IN-1,NON-STERILE: Brand: MEDLINE INDUSTRIES, INC.

## (undated) DEVICE — GLOVE SURG SZ 8 CRM LTX FREE POLYISOPRENE POLYMER BEAD ANTI

## (undated) DEVICE — BLADE ES L6IN ELASTOMERIC COAT EXT DURABLE BEND UPTO 90DEG

## (undated) DEVICE — SUTURE V-LOC 180 SZ 0 L12IN ABSRB GRN L37MM GS-21 1/2 CIR VLOCL0316

## (undated) DEVICE — YANKAUER,FLEXIBLE HANDLE,REGLR CAPACITY: Brand: MEDLINE INDUSTRIES, INC.

## (undated) DEVICE — DEVICE TRCR 12X9X3IN WHT CLSR DISP OMNICLOSE

## (undated) DEVICE — INTENDED TO SUPPORT AND MAINTAIN THE POSITION OF AN ANESTHETIZED PATIENT DURING SURGERY: Brand: HERMANTOR XL PINK KNEE POSITIONING PAD

## (undated) DEVICE — STAPLER SKIN STPL LN H1.5-3.5XL30MM REG TISS 2 ROW 8 FIRING